# Patient Record
Sex: FEMALE | Race: OTHER | Employment: PART TIME | URBAN - METROPOLITAN AREA
[De-identification: names, ages, dates, MRNs, and addresses within clinical notes are randomized per-mention and may not be internally consistent; named-entity substitution may affect disease eponyms.]

---

## 2017-01-06 ENCOUNTER — OFFICE VISIT (OUTPATIENT)
Dept: ORTHOPEDIC SURGERY | Age: 37
End: 2017-01-06

## 2017-01-06 VITALS
SYSTOLIC BLOOD PRESSURE: 115 MMHG | HEART RATE: 76 BPM | WEIGHT: 182.1 LBS | DIASTOLIC BLOOD PRESSURE: 75 MMHG | HEIGHT: 61 IN | BODY MASS INDEX: 34.38 KG/M2

## 2017-01-06 DIAGNOSIS — M25.561 CHRONIC PAIN OF BOTH KNEES: Primary | ICD-10-CM

## 2017-01-06 DIAGNOSIS — G89.29 CHRONIC PAIN OF BOTH KNEES: Primary | ICD-10-CM

## 2017-01-06 DIAGNOSIS — M25.562 CHRONIC PAIN OF BOTH KNEES: Primary | ICD-10-CM

## 2017-01-06 PROCEDURE — 73562 X-RAY EXAM OF KNEE 3: CPT | Performed by: ORTHOPAEDIC SURGERY

## 2017-01-06 PROCEDURE — 99202 OFFICE O/P NEW SF 15 MIN: CPT | Performed by: ORTHOPAEDIC SURGERY

## 2017-03-04 DIAGNOSIS — M79.641 BILATERAL HAND PAIN: ICD-10-CM

## 2017-03-04 DIAGNOSIS — M79.642 BILATERAL HAND PAIN: ICD-10-CM

## 2017-03-06 RX ORDER — IBUPROFEN 600 MG/1
TABLET ORAL
Qty: 120 TABLET | Refills: 0 | Status: SHIPPED | OUTPATIENT
Start: 2017-03-06 | End: 2017-04-10 | Stop reason: ALTCHOICE

## 2017-04-10 ENCOUNTER — TELEPHONE (OUTPATIENT)
Dept: FAMILY MEDICINE CLINIC | Age: 37
End: 2017-04-10

## 2017-04-10 ENCOUNTER — OFFICE VISIT (OUTPATIENT)
Dept: FAMILY MEDICINE CLINIC | Age: 37
End: 2017-04-10

## 2017-04-10 VITALS
TEMPERATURE: 97.9 F | RESPIRATION RATE: 18 BRPM | SYSTOLIC BLOOD PRESSURE: 104 MMHG | HEART RATE: 70 BPM | WEIGHT: 180 LBS | OXYGEN SATURATION: 97 % | BODY MASS INDEX: 33.98 KG/M2 | DIASTOLIC BLOOD PRESSURE: 68 MMHG

## 2017-04-10 DIAGNOSIS — R05.9 COUGH: ICD-10-CM

## 2017-04-10 DIAGNOSIS — H10.023 OTHER MUCOPURULENT CONJUNCTIVITIS OF BOTH EYES: Primary | ICD-10-CM

## 2017-04-10 DIAGNOSIS — J06.9 UPPER RESPIRATORY TRACT INFECTION, UNSPECIFIED TYPE: ICD-10-CM

## 2017-04-10 PROBLEM — H10.9 CONJUNCTIVITIS OF BOTH EYES: Status: ACTIVE | Noted: 2017-04-10

## 2017-04-10 PROCEDURE — 99213 OFFICE O/P EST LOW 20 MIN: CPT | Performed by: NURSE PRACTITIONER

## 2017-04-10 RX ORDER — CIPROFLOXACIN HYDROCHLORIDE 3.5 MG/ML
SOLUTION/ DROPS TOPICAL
Qty: 10 ML | Refills: 0 | Status: SHIPPED | OUTPATIENT
Start: 2017-04-10 | End: 2017-07-14 | Stop reason: ALTCHOICE

## 2017-04-10 RX ORDER — BENZONATATE 100 MG/1
100 CAPSULE ORAL 3 TIMES DAILY PRN
Qty: 24 CAPSULE | Refills: 0 | Status: SHIPPED | OUTPATIENT
Start: 2017-04-10 | End: 2017-07-14 | Stop reason: ALTCHOICE

## 2017-04-10 RX ORDER — AZITHROMYCIN 250 MG/1
TABLET, FILM COATED ORAL
Qty: 1 PACKET | Refills: 0 | Status: SHIPPED | OUTPATIENT
Start: 2017-04-10 | End: 2017-04-20

## 2017-04-10 ASSESSMENT — ENCOUNTER SYMPTOMS
SORE THROAT: 1
NAUSEA: 0
ORTHOPNEA: 0
EYE REDNESS: 1
RHINORRHEA: 0
EYE ITCHING: 1
VOMITING: 0
DOUBLE VISION: 0
COUGH: 1
PHOTOPHOBIA: 1
STRIDOR: 0
SWOLLEN GLANDS: 0
DIARRHEA: 0
EYE PAIN: 0
GASTROINTESTINAL NEGATIVE: 1
EYE DISCHARGE: 0
HEARTBURN: 0
ABDOMINAL PAIN: 0
WHEEZING: 0
CONSTIPATION: 0

## 2017-04-11 RX ORDER — SYRINGE AND NEEDLE,INSULIN,1ML 29 G X1/2"
SYRINGE, EMPTY DISPOSABLE MISCELLANEOUS
Qty: 100 EACH | Refills: 5 | Status: SHIPPED | OUTPATIENT
Start: 2017-04-11 | End: 2019-02-28

## 2017-05-15 ENCOUNTER — TELEPHONE (OUTPATIENT)
Dept: OBGYN CLINIC | Age: 37
End: 2017-05-15

## 2017-05-17 ENCOUNTER — OFFICE VISIT (OUTPATIENT)
Dept: OBGYN CLINIC | Age: 37
End: 2017-05-17

## 2017-05-17 VITALS
WEIGHT: 174 LBS | SYSTOLIC BLOOD PRESSURE: 99 MMHG | HEART RATE: 87 BPM | BODY MASS INDEX: 32.85 KG/M2 | DIASTOLIC BLOOD PRESSURE: 68 MMHG | TEMPERATURE: 97.2 F

## 2017-05-17 DIAGNOSIS — Z11.3 SCREEN FOR STD (SEXUALLY TRANSMITTED DISEASE): ICD-10-CM

## 2017-05-17 DIAGNOSIS — Z01.419 NORMAL GYNECOLOGIC EXAMINATION: Primary | ICD-10-CM

## 2017-05-17 PROCEDURE — 99395 PREV VISIT EST AGE 18-39: CPT | Performed by: OBSTETRICS & GYNECOLOGY

## 2017-05-18 ENCOUNTER — TELEPHONE (OUTPATIENT)
Dept: OBGYN CLINIC | Age: 37
End: 2017-05-18

## 2017-05-18 DIAGNOSIS — B96.89 BACTERIAL VAGINOSIS: Primary | ICD-10-CM

## 2017-05-18 DIAGNOSIS — N76.0 BACTERIAL VAGINOSIS: Primary | ICD-10-CM

## 2017-05-18 RX ORDER — METRONIDAZOLE 500 MG/1
500 TABLET ORAL 2 TIMES DAILY
Qty: 14 TABLET | Refills: 0 | Status: SHIPPED | OUTPATIENT
Start: 2017-05-18 | End: 2017-05-25

## 2017-05-19 LAB
HPV COMMENT: NORMAL
HPV TYPE 16: NOT DETECTED
HPV TYPE 18: NOT DETECTED
HPVOH (OTHER TYPES): NOT DETECTED

## 2017-05-22 ENCOUNTER — TELEPHONE (OUTPATIENT)
Dept: OBGYN CLINIC | Age: 37
End: 2017-05-22

## 2017-05-22 LAB
C. TRACHOMATIS DNA,THIN PREP: NEGATIVE
N. GONORRHOEAE DNA, THIN PREP: NEGATIVE

## 2017-06-01 ENCOUNTER — TELEPHONE (OUTPATIENT)
Dept: PERINATAL CARE | Age: 37
End: 2017-06-01

## 2017-06-23 ENCOUNTER — OFFICE VISIT (OUTPATIENT)
Dept: FAMILY MEDICINE CLINIC | Age: 37
End: 2017-06-23

## 2017-06-23 VITALS
BODY MASS INDEX: 31.53 KG/M2 | HEART RATE: 118 BPM | DIASTOLIC BLOOD PRESSURE: 80 MMHG | TEMPERATURE: 99.1 F | WEIGHT: 167 LBS | SYSTOLIC BLOOD PRESSURE: 116 MMHG | OXYGEN SATURATION: 97 % | HEIGHT: 61 IN

## 2017-06-23 DIAGNOSIS — Z20.818 STREP THROAT EXPOSURE: ICD-10-CM

## 2017-06-23 DIAGNOSIS — J02.0 STREP PHARYNGITIS: ICD-10-CM

## 2017-06-23 LAB — S PYO AG THROAT QL: POSITIVE

## 2017-06-23 PROCEDURE — 99213 OFFICE O/P EST LOW 20 MIN: CPT | Performed by: NURSE PRACTITIONER

## 2017-06-23 PROCEDURE — 87880 STREP A ASSAY W/OPTIC: CPT | Performed by: NURSE PRACTITIONER

## 2017-06-23 RX ORDER — AMOXICILLIN 500 MG/1
500 CAPSULE ORAL 2 TIMES DAILY
Qty: 20 CAPSULE | Refills: 0 | Status: SHIPPED | OUTPATIENT
Start: 2017-06-23 | End: 2017-07-03

## 2017-06-23 ASSESSMENT — ENCOUNTER SYMPTOMS
VOMITING: 0
COUGH: 1
ABDOMINAL PAIN: 0
WHEEZING: 0
SHORTNESS OF BREATH: 0
DIARRHEA: 0
SORE THROAT: 1
NAUSEA: 0
TROUBLE SWALLOWING: 0
CHEST TIGHTNESS: 0

## 2017-07-10 RX ORDER — IBUPROFEN 800 MG/1
800 TABLET ORAL EVERY 8 HOURS PRN
Qty: 20 TABLET | Refills: 0 | Status: SHIPPED | OUTPATIENT
Start: 2017-07-10 | End: 2017-08-09 | Stop reason: SDUPTHER

## 2017-07-21 ENCOUNTER — OFFICE VISIT (OUTPATIENT)
Dept: FAMILY MEDICINE CLINIC | Age: 37
End: 2017-07-21

## 2017-07-21 VITALS
DIASTOLIC BLOOD PRESSURE: 60 MMHG | BODY MASS INDEX: 32.47 KG/M2 | SYSTOLIC BLOOD PRESSURE: 112 MMHG | HEIGHT: 61 IN | WEIGHT: 172 LBS | HEART RATE: 77 BPM | OXYGEN SATURATION: 99 %

## 2017-07-21 DIAGNOSIS — E11.8 TYPE 2 DIABETES MELLITUS WITH COMPLICATION, UNSPECIFIED LONG TERM INSULIN USE STATUS: Primary | ICD-10-CM

## 2017-07-21 LAB — HBA1C MFR BLD: 11.3 %

## 2017-07-21 PROCEDURE — 99213 OFFICE O/P EST LOW 20 MIN: CPT | Performed by: FAMILY MEDICINE

## 2017-07-21 PROCEDURE — 83036 HEMOGLOBIN GLYCOSYLATED A1C: CPT | Performed by: FAMILY MEDICINE

## 2017-07-21 ASSESSMENT — PATIENT HEALTH QUESTIONNAIRE - PHQ9
SUM OF ALL RESPONSES TO PHQ9 QUESTIONS 1 & 2: 0
SUM OF ALL RESPONSES TO PHQ QUESTIONS 1-9: 0
1. LITTLE INTEREST OR PLEASURE IN DOING THINGS: 0
2. FEELING DOWN, DEPRESSED OR HOPELESS: 0

## 2017-07-21 ASSESSMENT — ENCOUNTER SYMPTOMS
RESPIRATORY NEGATIVE: 1
GASTROINTESTINAL NEGATIVE: 1

## 2017-09-21 ENCOUNTER — OFFICE VISIT (OUTPATIENT)
Dept: FAMILY MEDICINE CLINIC | Age: 37
End: 2017-09-21

## 2017-09-21 VITALS
RESPIRATION RATE: 15 BRPM | TEMPERATURE: 98 F | BODY MASS INDEX: 33.83 KG/M2 | HEART RATE: 87 BPM | WEIGHT: 179.2 LBS | SYSTOLIC BLOOD PRESSURE: 108 MMHG | OXYGEN SATURATION: 98 % | HEIGHT: 61 IN | DIASTOLIC BLOOD PRESSURE: 68 MMHG

## 2017-09-21 DIAGNOSIS — E11.8 TYPE 2 DIABETES MELLITUS WITH COMPLICATION, UNSPECIFIED LONG TERM INSULIN USE STATUS: Primary | ICD-10-CM

## 2017-09-21 DIAGNOSIS — E66.9 OBESITY (BMI 30.0-34.9): ICD-10-CM

## 2017-09-21 PROBLEM — E66.811 OBESITY (BMI 30.0-34.9): Status: ACTIVE | Noted: 2017-09-21

## 2017-09-21 LAB — HBA1C MFR BLD: 7.3 %

## 2017-09-21 PROCEDURE — 99214 OFFICE O/P EST MOD 30 MIN: CPT | Performed by: FAMILY MEDICINE

## 2017-09-21 PROCEDURE — 83036 HEMOGLOBIN GLYCOSYLATED A1C: CPT | Performed by: FAMILY MEDICINE

## 2017-09-21 ASSESSMENT — ENCOUNTER SYMPTOMS
RESPIRATORY NEGATIVE: 1
GASTROINTESTINAL NEGATIVE: 1

## 2017-10-30 ENCOUNTER — OFFICE VISIT (OUTPATIENT)
Dept: FAMILY MEDICINE CLINIC | Age: 37
End: 2017-10-30

## 2017-10-30 VITALS
WEIGHT: 175 LBS | DIASTOLIC BLOOD PRESSURE: 76 MMHG | TEMPERATURE: 97.8 F | HEART RATE: 84 BPM | BODY MASS INDEX: 33.04 KG/M2 | OXYGEN SATURATION: 98 % | SYSTOLIC BLOOD PRESSURE: 120 MMHG

## 2017-10-30 DIAGNOSIS — M79.10 MYALGIA: ICD-10-CM

## 2017-10-30 DIAGNOSIS — R20.2 PARESTHESIA OF BOTH LOWER EXTREMITIES: ICD-10-CM

## 2017-10-30 DIAGNOSIS — R53.83 FATIGUE, UNSPECIFIED TYPE: ICD-10-CM

## 2017-10-30 DIAGNOSIS — R51.9 RIGHT TEMPORAL HEADACHE: ICD-10-CM

## 2017-10-30 PROCEDURE — 99214 OFFICE O/P EST MOD 30 MIN: CPT | Performed by: NURSE PRACTITIONER

## 2017-10-30 PROCEDURE — 3045F PR MOST RECENT HEMOGLOBIN A1C LEVEL 7.0-9.0%: CPT | Performed by: NURSE PRACTITIONER

## 2017-10-30 PROCEDURE — G8427 DOCREV CUR MEDS BY ELIG CLIN: HCPCS | Performed by: NURSE PRACTITIONER

## 2017-10-30 PROCEDURE — G8484 FLU IMMUNIZE NO ADMIN: HCPCS | Performed by: NURSE PRACTITIONER

## 2017-10-30 PROCEDURE — G8417 CALC BMI ABV UP PARAM F/U: HCPCS | Performed by: NURSE PRACTITIONER

## 2017-10-30 PROCEDURE — 1036F TOBACCO NON-USER: CPT | Performed by: NURSE PRACTITIONER

## 2017-10-30 PROCEDURE — 36415 COLL VENOUS BLD VENIPUNCTURE: CPT | Performed by: NURSE PRACTITIONER

## 2017-10-30 RX ORDER — MELOXICAM 7.5 MG/1
7.5 TABLET ORAL DAILY
COMMUNITY
End: 2018-07-16
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
## 2017-10-30 NOTE — LETTER
[facility]  [address]  Phone: [phone]  Fax: [phone]    Duke Bell NP        October 30, 2017     Patient: Kendra Keen   YOB: 1980   Date of Visit: 10/30/2017       To Whom it May Concern:    Kendra Keen was seen in my clinic on 10/30/2017. If you have any questions or concerns, please don't hesitate to call.     Sincerely,         Duke Bell NP

## 2017-10-31 LAB
A/G RATIO: 1.3 (ref 1.1–2.2)
ALBUMIN SERPL-MCNC: 4.2 G/DL (ref 3.4–5)
ALP BLD-CCNC: 164 U/L (ref 40–129)
ALT SERPL-CCNC: 13 U/L (ref 10–40)
ANA INTERPRETATION: NORMAL
ANION GAP SERPL CALCULATED.3IONS-SCNC: 14 MMOL/L (ref 3–16)
ANTI-NUCLEAR ANTIBODY (ANA): NEGATIVE
AST SERPL-CCNC: 12 U/L (ref 15–37)
BASOPHILS ABSOLUTE: 0 K/UL (ref 0–0.2)
BASOPHILS RELATIVE PERCENT: 0.5 %
BILIRUB SERPL-MCNC: 0.3 MG/DL (ref 0–1)
BUN BLDV-MCNC: 15 MG/DL (ref 7–20)
CALCIUM SERPL-MCNC: 9.5 MG/DL (ref 8.3–10.6)
CHLORIDE BLD-SCNC: 103 MMOL/L (ref 99–110)
CO2: 26 MMOL/L (ref 21–32)
CREAT SERPL-MCNC: <0.5 MG/DL (ref 0.6–1.1)
EOSINOPHILS ABSOLUTE: 0.1 K/UL (ref 0–0.6)
EOSINOPHILS RELATIVE PERCENT: 1.8 %
GFR AFRICAN AMERICAN: >60
GFR NON-AFRICAN AMERICAN: >60
GLOBULIN: 3.3 G/DL
GLUCOSE BLD-MCNC: 235 MG/DL (ref 70–99)
HCT VFR BLD CALC: 40.2 % (ref 36–48)
HEMOGLOBIN: 14 G/DL (ref 12–16)
LYMPHOCYTES ABSOLUTE: 3 K/UL (ref 1–5.1)
LYMPHOCYTES RELATIVE PERCENT: 43.5 %
MCH RBC QN AUTO: 31.5 PG (ref 26–34)
MCHC RBC AUTO-ENTMCNC: 34.9 G/DL (ref 31–36)
MCV RBC AUTO: 90.2 FL (ref 80–100)
MONOCYTES ABSOLUTE: 0.5 K/UL (ref 0–1.3)
MONOCYTES RELATIVE PERCENT: 7.3 %
NEUTROPHILS ABSOLUTE: 3.2 K/UL (ref 1.7–7.7)
NEUTROPHILS RELATIVE PERCENT: 46.9 %
PDW BLD-RTO: 12.9 % (ref 12.4–15.4)
PLATELET # BLD: 273 K/UL (ref 135–450)
PMV BLD AUTO: 9.9 FL (ref 5–10.5)
POTASSIUM SERPL-SCNC: 4.1 MMOL/L (ref 3.5–5.1)
RBC # BLD: 4.45 M/UL (ref 4–5.2)
SEDIMENTATION RATE, ERYTHROCYTE: 21 MM/HR (ref 0–20)
SODIUM BLD-SCNC: 143 MMOL/L (ref 136–145)
TOTAL CK: 115 U/L (ref 26–192)
TOTAL PROTEIN: 7.5 G/DL (ref 6.4–8.2)
TSH REFLEX: 0.97 UIU/ML (ref 0.27–4.2)
WBC # BLD: 6.9 K/UL (ref 4–11)

## 2017-10-31 RX ORDER — GABAPENTIN 300 MG/1
300 CAPSULE ORAL NIGHTLY PRN
Qty: 30 CAPSULE | Refills: 0 | Status: SHIPPED | OUTPATIENT
Start: 2017-10-31 | End: 2017-12-15 | Stop reason: SDUPTHER

## 2017-11-08 ASSESSMENT — ENCOUNTER SYMPTOMS
RESPIRATORY NEGATIVE: 1
VOMITING: 0
DIARRHEA: 0
ABDOMINAL PAIN: 0
COLOR CHANGE: 0
NAUSEA: 0
SINUS PAIN: 0

## 2017-11-27 ENCOUNTER — OFFICE VISIT (OUTPATIENT)
Dept: FAMILY MEDICINE CLINIC | Age: 37
End: 2017-11-27

## 2017-11-27 VITALS
WEIGHT: 178 LBS | HEART RATE: 92 BPM | TEMPERATURE: 97.9 F | DIASTOLIC BLOOD PRESSURE: 62 MMHG | HEIGHT: 61 IN | OXYGEN SATURATION: 97 % | SYSTOLIC BLOOD PRESSURE: 100 MMHG | BODY MASS INDEX: 33.61 KG/M2

## 2017-11-27 DIAGNOSIS — Z23 FLU VACCINE NEED: ICD-10-CM

## 2017-11-27 DIAGNOSIS — K59.03 DRUG-INDUCED CONSTIPATION: ICD-10-CM

## 2017-11-27 DIAGNOSIS — M54.2 CERVICAL PAIN (NECK): Primary | ICD-10-CM

## 2017-11-27 DIAGNOSIS — E11.9 TYPE 2 DIABETES MELLITUS WITHOUT COMPLICATION, WITHOUT LONG-TERM CURRENT USE OF INSULIN (HCC): ICD-10-CM

## 2017-11-27 PROCEDURE — 90471 IMMUNIZATION ADMIN: CPT | Performed by: NURSE PRACTITIONER

## 2017-11-27 PROCEDURE — 90686 IIV4 VACC NO PRSV 0.5 ML IM: CPT | Performed by: NURSE PRACTITIONER

## 2017-11-27 PROCEDURE — 99213 OFFICE O/P EST LOW 20 MIN: CPT | Performed by: NURSE PRACTITIONER

## 2017-11-27 RX ORDER — IBUPROFEN 800 MG/1
TABLET ORAL
Qty: 30 TABLET | Refills: 1 | Status: CANCELLED | OUTPATIENT
Start: 2017-11-27

## 2017-11-27 RX ORDER — METHOCARBAMOL 500 MG/1
500 TABLET, FILM COATED ORAL 3 TIMES DAILY
Qty: 30 TABLET | Refills: 0 | Status: SHIPPED | OUTPATIENT
Start: 2017-11-27 | End: 2019-01-07 | Stop reason: SDUPTHER

## 2017-11-27 RX ORDER — MELOXICAM 7.5 MG/1
7.5 TABLET ORAL DAILY
Qty: 30 TABLET | Status: CANCELLED | OUTPATIENT
Start: 2017-11-27

## 2017-11-28 ENCOUNTER — TELEPHONE (OUTPATIENT)
Dept: FAMILY MEDICINE CLINIC | Age: 37
End: 2017-11-28

## 2017-11-28 PROBLEM — H10.9 CONJUNCTIVITIS OF BOTH EYES: Status: RESOLVED | Noted: 2017-04-10 | Resolved: 2017-11-28

## 2017-11-28 ASSESSMENT — ENCOUNTER SYMPTOMS
VOMITING: 0
SHORTNESS OF BREATH: 0
CHEST TIGHTNESS: 0
CONSTIPATION: 1
NAUSEA: 0
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
## 2017-12-15 ENCOUNTER — OFFICE VISIT (OUTPATIENT)
Dept: FAMILY MEDICINE CLINIC | Age: 37
End: 2017-12-15

## 2017-12-15 VITALS
HEIGHT: 61 IN | WEIGHT: 181 LBS | BODY MASS INDEX: 34.17 KG/M2 | HEART RATE: 90 BPM | SYSTOLIC BLOOD PRESSURE: 110 MMHG | OXYGEN SATURATION: 98 % | TEMPERATURE: 98.5 F | RESPIRATION RATE: 16 BRPM | DIASTOLIC BLOOD PRESSURE: 72 MMHG

## 2017-12-15 DIAGNOSIS — F41.9 ANXIETY: ICD-10-CM

## 2017-12-15 DIAGNOSIS — M54.2 CERVICAL PAIN (NECK): Primary | ICD-10-CM

## 2017-12-15 DIAGNOSIS — F33.1 MODERATE EPISODE OF RECURRENT MAJOR DEPRESSIVE DISORDER (HCC): ICD-10-CM

## 2017-12-15 DIAGNOSIS — E11.9 TYPE 2 DIABETES MELLITUS WITHOUT COMPLICATION, WITHOUT LONG-TERM CURRENT USE OF INSULIN (HCC): ICD-10-CM

## 2017-12-15 LAB
CREATININE URINE POCT: 200
MICROALBUMIN/CREAT 24H UR: 30 MG/G{CREAT}
MICROALBUMIN/CREAT UR-RTO: <30

## 2017-12-15 PROCEDURE — 99213 OFFICE O/P EST LOW 20 MIN: CPT | Performed by: NURSE PRACTITIONER

## 2017-12-15 PROCEDURE — G8484 FLU IMMUNIZE NO ADMIN: HCPCS | Performed by: NURSE PRACTITIONER

## 2017-12-15 PROCEDURE — G8417 CALC BMI ABV UP PARAM F/U: HCPCS | Performed by: NURSE PRACTITIONER

## 2017-12-15 PROCEDURE — 82044 UR ALBUMIN SEMIQUANTITATIVE: CPT | Performed by: NURSE PRACTITIONER

## 2017-12-15 PROCEDURE — 1036F TOBACCO NON-USER: CPT | Performed by: NURSE PRACTITIONER

## 2017-12-15 PROCEDURE — 3045F PR MOST RECENT HEMOGLOBIN A1C LEVEL 7.0-9.0%: CPT | Performed by: NURSE PRACTITIONER

## 2017-12-15 PROCEDURE — G8427 DOCREV CUR MEDS BY ELIG CLIN: HCPCS | Performed by: NURSE PRACTITIONER

## 2017-12-15 RX ORDER — IBUPROFEN 800 MG/1
TABLET ORAL
Qty: 40 TABLET | Refills: 0 | Status: SHIPPED | OUTPATIENT
Start: 2017-12-15 | End: 2018-07-16

## 2017-12-15 RX ORDER — GABAPENTIN 300 MG/1
300 CAPSULE ORAL 2 TIMES DAILY
Qty: 60 CAPSULE | Refills: 0 | Status: SHIPPED | OUTPATIENT
Start: 2017-12-15 | End: 2019-02-28

## 2017-12-15 RX ORDER — ESCITALOPRAM OXALATE 10 MG/1
10 TABLET ORAL DAILY
Qty: 30 TABLET | Refills: 3 | Status: SHIPPED | OUTPATIENT
Start: 2017-12-15 | End: 2018-02-27

## 2017-12-15 ASSESSMENT — ENCOUNTER SYMPTOMS
BACK PAIN: 0
CHEST TIGHTNESS: 0

## 2017-12-15 NOTE — PATIENT INSTRUCTIONS
1. Stop meloxicam    2. Start advil 800mg taking 1 tablet 3 times daily for the next 2 weeks, with food    3. Apply ice pack 4 times daily for 15-20 minutes. Wrap in towel, etc to avoid the coldness directly to the skin. 4. Will increase neurontin to 300mg 2 times daily    5. Joseph Later was seen today for neck pain and constipation. Diagnoses and all orders for this visit:    Cervical pain (neck)  -     ibuprofen (ADVIL;MOTRIN) 800 MG tablet; TAKE ONE TABLET BY MOUTH EVERY 8 HOURS AS NEEDED FOR PAIN OR  FEVER  -     gabapentin (NEURONTIN) 300 MG capsule; Take 1 capsule by mouth 2 times daily    Anxiety  -     escitalopram (LEXAPRO) 10 MG tablet; Take 1 tablet by mouth daily    Moderate episode of recurrent major depressive disorder (HCC)  -     escitalopram (LEXAPRO) 10 MG tablet;  Take 1 tablet by mouth daily
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
## 2018-01-12 ENCOUNTER — OFFICE VISIT (OUTPATIENT)
Dept: FAMILY MEDICINE CLINIC | Age: 38
End: 2018-01-12

## 2018-01-12 VITALS
BODY MASS INDEX: 33.61 KG/M2 | DIASTOLIC BLOOD PRESSURE: 72 MMHG | HEART RATE: 77 BPM | SYSTOLIC BLOOD PRESSURE: 115 MMHG | HEIGHT: 61 IN | OXYGEN SATURATION: 98 % | WEIGHT: 178 LBS

## 2018-01-12 DIAGNOSIS — F33.1 MODERATE EPISODE OF RECURRENT MAJOR DEPRESSIVE DISORDER (HCC): ICD-10-CM

## 2018-01-12 DIAGNOSIS — E11.9 TYPE 2 DIABETES MELLITUS WITHOUT COMPLICATION, WITHOUT LONG-TERM CURRENT USE OF INSULIN (HCC): Primary | ICD-10-CM

## 2018-01-12 LAB — HBA1C MFR BLD: 7.1 %

## 2018-01-12 PROCEDURE — 99214 OFFICE O/P EST MOD 30 MIN: CPT | Performed by: FAMILY MEDICINE

## 2018-01-12 PROCEDURE — 83036 HEMOGLOBIN GLYCOSYLATED A1C: CPT | Performed by: FAMILY MEDICINE

## 2018-01-12 RX ORDER — TRAZODONE HYDROCHLORIDE 50 MG/1
50 TABLET ORAL NIGHTLY
Qty: 30 TABLET | Refills: 0 | Status: SHIPPED | OUTPATIENT
Start: 2018-01-12 | End: 2018-01-31

## 2018-01-12 ASSESSMENT — ENCOUNTER SYMPTOMS
RESPIRATORY NEGATIVE: 1
BACK PAIN: 1

## 2018-01-12 NOTE — TELEPHONE ENCOUNTER
Last office visit 12/15/2017     Last written 07/14/2017     Next office visit scheduled 1/12/2018    Requested Prescriptions     Pending Prescriptions Disp Refills    insulin aspart (NOVOLOG FLEXPEN) 100 UNIT/ML injection pen 5 pen 3     Sig: Inject 20 Units into the skin 3 times daily (before meals)

## 2018-01-12 NOTE — PROGRESS NOTES
Subjective:      Patient ID: Agnes Devine is a 44 y.o. female. In for check on DM(out of insulin x 1 mo)-Depress(doing OK)    Prior to Visit Medications :  Medication insulin aspart (NOVOLOG FLEXPEN) 100 UNIT/ML injection pen, Sig Inject 20 Units into the skin 3 times daily (before meals), Taking? Yes, Authorizing Provider Blake Krueger, DO    Medication ibuprofen (ADVIL;MOTRIN) 800 MG tablet, Sig TAKE ONE TABLET BY MOUTH EVERY 8 HOURS AS NEEDED FOR PAIN OR  FEVER, Taking? Yes, Authorizing Provider Darin Dominguez CNP    Medication gabapentin (NEURONTIN) 300 MG capsule, Sig Take 1 capsule by mouth 2 times daily, Taking? Yes, Authorizing Provider Darin Dominguez CNP    Medication escitalopram (LEXAPRO) 10 MG tablet, Sig Take 1 tablet by mouth daily, Taking? Yes, Authorizing Provider Darin Dominguez CNP    Medication meloxicam (MOBIC) 7.5 MG tablet, Sig Take 7.5 mg by mouth daily, Taking? Yes, Authorizing Provider Remy Sheppard MD    Medication TOUJEO SOLOSTAR 300 UNIT/ML injection pen, Sig INJECT 60 UNITS SUBCUTANEOUSLY ONCE DAILY  Patient taking differently: INJECT 40 UNITS SUBCUTANEOUSLY ONCE DAILY, Taking? Yes, Authorizing Provider Blake Krueger, DO    Medication Liraglutide (VICTOZA) 18 MG/3ML SOPN SC injection, Sig Inject 1.8 mg into the skin daily, Taking? Yes, Authorizing Provider Blake Krueger, DO    Medication conjugated estrogens (PREMARIN) 0.625 MG/GM vaginal cream, Sig Place vaginally daily. , Taking? Yes, Authorizing Provider Jose uRdd MD    Medication metFORMIN (GLUCOPHAGE) 1000 MG tablet, Sig TAKE ONE TABLET BY MOUTH TWICE DAILY WITH MEALS, Taking? Yes, Authorizing Provider Blake Krueger, DO    Medication RELION INSULIN SYR 1CC/30G 30G X 5/16\" 1 ML MISC, Sig USE ONE  THREE TIMES DAILY, Taking?  Yes, Authorizing Provider Blake Krueger, DO    Medication albuterol sulfate HFA (PROAIR HFA) 108 (90 BASE) MCG/ACT inhaler, Sig Inhale 2 puffs into the lungs every 6 hours as needed for Wheezing, Taking? Yes, Authorizing Provider Moses Goode MD    Medication Lancets MISC, Sig DX: E 11.9-Uses insulin. FSBS 3 times daily-Delica lancets for one touch ultra II, Taking? Yes, Authorizing Provider Evelyn Krueger, DO    Medication glucose monitoring kit (FREESTYLE) monitoring kit, Sig Dx E11.9-One touch ultra II meter-uses tid, Taking? Yes, Authorizing Provider Blake Krueger, DO    Medication glucose blood VI test strips (ASCENSIA AUTODISC VI;ONE TOUCH ULTRA TEST VI) strip, Sig DX: E11.9-One touch ultra strips. FSBS 3 times daily, Taking? Yes, Authorizing Provider Blake Krueger, DO    Medication Insulin Pen Needle (B-D UF III MINI PEN NEEDLES) 31G X 5 MM MISC, Sig Inject 1 each into the skin 4 times daily, Taking? Yes, Authorizing Provider Pedro Luis Roach CNP      Past Medical History:  5/15/2013: Abdominal pain, acute, right lower quadrant  No date: Anxiety  No date: Arthritis      Comment: Left Knee  8/13/2013: Bilateral ovarian cysts  No date: Blood transfusion reaction  1/29/2014: Cellulitis and abscess of trunk      Comment: Pt was seen in ED today for bleeding from                incision after taking a fall this morning. 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 Respiratory: Negative. Cardiovascular: Negative. Genitourinary: Negative. Musculoskeletal: Positive for back pain. Neurological: Negative.     Psychiatric/Behavioral: The patient is nervous/anxious. Objective:   Physical Exam   Cardiovascular: Normal rate, regular rhythm and normal heart sounds. Pulmonary/Chest: Effort normal and breath sounds normal.   Abdominal: Soft. She exhibits no distension and no mass. There is no tenderness. Musculoskeletal: She exhibits no edema. Neurological: She is alert. Psychiatric: She has a normal mood and affect. Her behavior is normal. Judgment and thought content normal.       Assessment:      1. Type 2 diabetes mellitus without complication, without long-term current use of insulin ([facility] [address]    2. Moderate episode of recurrent major depressive disorder [facility])            Plan:      Jayleen Castro was seen today for anxiety, depression, back pain and diabetes.     Diagnoses and all orders for this visit:    Type 2 diabetes mellitus without complication, without long-term current use of insulin (HCC)  -     POCT glycosylated hemoglobin (Hb A1C)  AIC 7.1-continue meds-lower carbs  Moderate episode of recurrent major depressive disorder (HCC)  Continue meds-keep active    Rx Traz 50 hs  See me 4 mos

## 2018-01-29 ENCOUNTER — PATIENT MESSAGE (OUTPATIENT)
Dept: FAMILY MEDICINE CLINIC | Age: 38
End: 2018-01-29

## 2018-01-31 RX ORDER — HYDROXYZINE HYDROCHLORIDE 25 MG/1
TABLET, FILM COATED ORAL
Qty: 30 TABLET | Refills: 0 | Status: SHIPPED | OUTPATIENT
Start: 2018-01-31 | End: 2018-04-20

## 2018-02-16 ENCOUNTER — TELEPHONE (OUTPATIENT)
Dept: FAMILY MEDICINE CLINIC | Age: 38
End: 2018-02-16

## 2018-02-16 ENCOUNTER — OFFICE VISIT (OUTPATIENT)
Dept: FAMILY MEDICINE CLINIC | Age: 38
End: 2018-02-16

## 2018-02-16 VITALS
BODY MASS INDEX: 34.78 KG/M2 | OXYGEN SATURATION: 99 % | TEMPERATURE: 97.8 F | HEART RATE: 84 BPM | HEIGHT: 61 IN | SYSTOLIC BLOOD PRESSURE: 108 MMHG | RESPIRATION RATE: 16 BRPM | DIASTOLIC BLOOD PRESSURE: 68 MMHG | WEIGHT: 184.2 LBS

## 2018-02-16 DIAGNOSIS — B96.89 ACUTE BACTERIAL SINUSITIS: ICD-10-CM

## 2018-02-16 DIAGNOSIS — R05.9 COUGH: ICD-10-CM

## 2018-02-16 DIAGNOSIS — J01.90 ACUTE BACTERIAL SINUSITIS: ICD-10-CM

## 2018-02-16 DIAGNOSIS — J02.9 ACUTE PHARYNGITIS, UNSPECIFIED ETIOLOGY: Primary | ICD-10-CM

## 2018-02-16 LAB — S PYO AG THROAT QL: NORMAL

## 2018-02-16 PROCEDURE — 99213 OFFICE O/P EST LOW 20 MIN: CPT | Performed by: NURSE PRACTITIONER

## 2018-02-16 PROCEDURE — 87880 STREP A ASSAY W/OPTIC: CPT | Performed by: NURSE PRACTITIONER

## 2018-02-16 RX ORDER — AZITHROMYCIN 250 MG/1
TABLET, FILM COATED ORAL
Qty: 6 TABLET | Refills: 0 | Status: SHIPPED | OUTPATIENT
Start: 2018-02-16 | End: 2018-02-26

## 2018-02-16 ASSESSMENT — ENCOUNTER SYMPTOMS
SINUS PRESSURE: 1
SORE THROAT: 1
CHEST TIGHTNESS: 1
CONSTIPATION: 0
NAUSEA: 0
COUGH: 0

## 2018-02-16 NOTE — PROGRESS NOTES
Subjective:      Patient ID: Daniel Nolan is a 44 y.o. female. HPI   Yesterday awoke with tickle in throat. Today sore throat worse. Nasal congestion, chest tight, mild cough. Took motrin 2:30 today. Review of Systems   Constitutional: Positive for chills and fatigue. Negative for appetite change and fever. HENT: Positive for sinus pressure and sore throat. Respiratory: Positive for chest tightness. Negative for cough. Cardiovascular: Negative for chest pain. Gastrointestinal: Negative for constipation and nausea. Musculoskeletal: Positive for myalgias. Negative for arthralgias. Neurological: Negative for headaches. Psychiatric/Behavioral: Negative. Objective:   Physical Exam   Constitutional: She is oriented to person, place, and time. She appears well-developed and well-nourished. No distress. HENT:   Head: Normocephalic and atraumatic. Right Ear: Tympanic membrane and ear canal normal.   Left Ear: Tympanic membrane and ear canal normal.   Nose: Mucosal edema and rhinorrhea present. Mouth/Throat: Uvula is midline. Posterior oropharyngeal erythema present. No oropharyngeal exudate. Neck: Normal range of motion. Neck supple. Cardiovascular: Normal rate, regular rhythm and normal heart sounds. Pulmonary/Chest: Effort normal and breath sounds normal. No respiratory distress. She has no wheezes. Occ dry cough. Abdominal: Soft. Bowel sounds are normal.   Musculoskeletal: Normal range of motion. She exhibits no edema. Lymphadenopathy:     She has no cervical adenopathy. Neurological: She is alert and oriented to person, place, and time. Skin: Skin is warm. Psychiatric: She has a normal mood and affect. Her behavior is normal.       Assessment:      1. Pharyngitis  2. URI  3. cough      Plan:      1. Increase fluids  2. mucinex DM 2 times daily  3. Tylenol or Ibuprofen TID as needed  4. Sandoval Colindres was seen today for uri.     Diagnoses and all orders for this

## 2018-02-27 ENCOUNTER — OFFICE VISIT (OUTPATIENT)
Dept: FAMILY MEDICINE CLINIC | Age: 38
End: 2018-02-27

## 2018-02-27 VITALS
OXYGEN SATURATION: 98 % | HEIGHT: 61 IN | WEIGHT: 181 LBS | SYSTOLIC BLOOD PRESSURE: 100 MMHG | BODY MASS INDEX: 34.17 KG/M2 | HEART RATE: 68 BPM | DIASTOLIC BLOOD PRESSURE: 68 MMHG

## 2018-02-27 DIAGNOSIS — R60.0 LEG EDEMA, LEFT: ICD-10-CM

## 2018-02-27 DIAGNOSIS — F33.1 MODERATE EPISODE OF RECURRENT MAJOR DEPRESSIVE DISORDER (HCC): Primary | ICD-10-CM

## 2018-02-27 PROCEDURE — 99213 OFFICE O/P EST LOW 20 MIN: CPT | Performed by: PHYSICIAN ASSISTANT

## 2018-02-27 RX ORDER — MIRTAZAPINE 15 MG/1
15 TABLET, FILM COATED ORAL NIGHTLY
Qty: 30 TABLET | Refills: 3 | Status: SHIPPED | OUTPATIENT
Start: 2018-02-27 | End: 2018-06-20 | Stop reason: ALTCHOICE

## 2018-02-27 ASSESSMENT — ENCOUNTER SYMPTOMS
COLOR CHANGE: 0
CHEST TIGHTNESS: 0
COUGH: 0

## 2018-03-26 ENCOUNTER — TELEPHONE (OUTPATIENT)
Dept: FAMILY MEDICINE CLINIC | Age: 38
End: 2018-03-26

## 2018-03-29 ENCOUNTER — OFFICE VISIT (OUTPATIENT)
Dept: FAMILY MEDICINE CLINIC | Age: 38
End: 2018-03-29

## 2018-03-29 VITALS
HEIGHT: 61 IN | DIASTOLIC BLOOD PRESSURE: 72 MMHG | OXYGEN SATURATION: 97 % | SYSTOLIC BLOOD PRESSURE: 116 MMHG | BODY MASS INDEX: 34.74 KG/M2 | RESPIRATION RATE: 14 BRPM | WEIGHT: 184 LBS | HEART RATE: 86 BPM

## 2018-03-29 DIAGNOSIS — Z79.4 TYPE 2 DIABETES MELLITUS WITHOUT COMPLICATION, WITH LONG-TERM CURRENT USE OF INSULIN (HCC): ICD-10-CM

## 2018-03-29 DIAGNOSIS — E11.9 TYPE 2 DIABETES MELLITUS WITHOUT COMPLICATION, WITH LONG-TERM CURRENT USE OF INSULIN (HCC): ICD-10-CM

## 2018-03-29 DIAGNOSIS — R20.8 BURNING SENSATION OF FEET: Primary | ICD-10-CM

## 2018-03-29 PROCEDURE — 99213 OFFICE O/P EST LOW 20 MIN: CPT | Performed by: NURSE PRACTITIONER

## 2018-03-29 RX ORDER — NAPROXEN 500 MG/1
500 TABLET ORAL 2 TIMES DAILY PRN
Qty: 28 TABLET | Refills: 0 | Status: SHIPPED | OUTPATIENT
Start: 2018-03-29 | End: 2018-06-20 | Stop reason: ALTCHOICE

## 2018-03-29 ASSESSMENT — PATIENT HEALTH QUESTIONNAIRE - PHQ9
1. LITTLE INTEREST OR PLEASURE IN DOING THINGS: 0
SUM OF ALL RESPONSES TO PHQ9 QUESTIONS 1 & 2: 0
2. FEELING DOWN, DEPRESSED OR HOPELESS: 0
SUM OF ALL RESPONSES TO PHQ QUESTIONS 1-9: 0

## 2018-03-29 NOTE — PROGRESS NOTES
Subjective:      Patient ID: Astrid Severance is a 43 y.o. female. HPI     Increased burning sensation in bilateral feet over the past 10 days. Increases as day progresses. Feels like on fire. Difficulty making it up steps. States Taking gabapentin however OAARS check indicates last filled in December. Taking ibuprofen and melixocam alternating. No known cause for sudden increase in symptoms. When rubs lower arms creates burning sensation. Like hit elbow. Review of Systems   Constitutional: Negative for appetite change, chills and unexpected weight change. Respiratory: Negative for chest tightness. Cardiovascular: Negative for chest pain. Gastrointestinal: Negative for diarrhea and nausea. Musculoskeletal: Negative for arthralgias, back pain and gait problem. Neurological: Positive for numbness. Negative for dizziness and headaches. Psychiatric/Behavioral: Negative. Objective:   Physical Exam   Constitutional: She is oriented to person, place, and time. She appears well-developed and well-nourished. No distress. HENT:   Head: Normocephalic and atraumatic. Neck: Normal range of motion. Neck supple. Cardiovascular: Normal rate, regular rhythm, normal heart sounds and intact distal pulses. Pulmonary/Chest: Effort normal and breath sounds normal. No respiratory distress. Abdominal: Soft. Bowel sounds are normal.   Musculoskeletal: Normal range of motion. She exhibits no edema or deformity. Neurological: She is alert and oriented to person, place, and time. She has normal reflexes. 2=reflexes patellar bilateral and brachial bilateral   Skin: Skin is warm. No erythema. Psychiatric: She has a normal mood and affect.  Her behavior is normal.     Current Outpatient Prescriptions   Medication Sig Dispense Refill    naproxen (NAPROSYN) 500 MG tablet Take 1 tablet by mouth 2 times daily as needed for Pain 28 tablet 0    mirtazapine (REMERON) 15 MG tablet Take 1 tablet by mouth nightly 30 tablet 3    hydrOXYzine (ATARAX) 25 MG tablet 1 or 2 hs prn sleep 30 tablet 0    Liraglutide (VICTOZA) 18 MG/3ML SOPN SC injection Inject 1.8 mg into the skin daily 3 pen 5    insulin aspart (NOVOLOG FLEXPEN) 100 UNIT/ML injection pen Inject 20 Units into the skin 3 times daily (before meals) 5 pen 3    ibuprofen (ADVIL;MOTRIN) 800 MG tablet TAKE ONE TABLET BY MOUTH EVERY 8 HOURS AS NEEDED FOR PAIN OR  FEVER 40 tablet 0    gabapentin (NEURONTIN) 300 MG capsule Take 1 capsule by mouth 2 times daily 60 capsule 0    meloxicam (MOBIC) 7.5 MG tablet Take 7.5 mg by mouth daily      TOUJEO SOLOSTAR 300 UNIT/ML injection pen INJECT 60 UNITS SUBCUTANEOUSLY ONCE DAILY (Patient taking differently: INJECT 40 UNITS SUBCUTANEOUSLY ONCE DAILY) 6 Pen 3    metFORMIN (GLUCOPHAGE) 1000 MG tablet TAKE ONE TABLET BY MOUTH TWICE DAILY WITH MEALS 60 tablet 1    RELION INSULIN SYR 1CC/30G 30G X 5/16\" 1 ML MISC USE ONE  THREE TIMES DAILY 100 each 5    albuterol sulfate HFA (PROAIR HFA) 108 (90 BASE) MCG/ACT inhaler Inhale 2 puffs into the lungs every 6 hours as needed for Wheezing 1 Inhaler 3    Lancets MISC DX: E 11.9-Uses insulin. FSBS 3 times daily-Delica lancets for one touch ultra  each 5    glucose monitoring kit (FREESTYLE) monitoring kit Dx E11.9-One touch ultra II meter-uses tid 1 kit 0    glucose blood VI test strips (ASCENSIA AUTODISC VI;ONE TOUCH ULTRA TEST VI) strip DX: E11.9-One touch ultra strips. FSBS 3 times daily 100 strip 5    Insulin Pen Needle (B-D UF III MINI PEN NEEDLES) 31G X 5 MM MISC Inject 1 each into the skin 4 times daily 100 each 3     No current facility-administered medications for this visit. Assessment:      1. Neuropathy bilateral feet-worsening  2. DM-stable      Plan:      1. Sharmaine Haider was seen today for peripheral neuropathy. Diagnoses and all orders for this visit:    Burning sensation of feet  -     EMG; Future    Will obtain EMG given sudden worsening.

## 2018-03-30 LAB — VITAMIN B-12: 672 PG/ML (ref 211–911)

## 2018-03-30 ASSESSMENT — ENCOUNTER SYMPTOMS
DIARRHEA: 0
NAUSEA: 0
BACK PAIN: 0
CHEST TIGHTNESS: 0

## 2018-04-20 ENCOUNTER — OFFICE VISIT (OUTPATIENT)
Dept: FAMILY MEDICINE CLINIC | Age: 38
End: 2018-04-20

## 2018-04-20 VITALS
DIASTOLIC BLOOD PRESSURE: 70 MMHG | WEIGHT: 184 LBS | SYSTOLIC BLOOD PRESSURE: 115 MMHG | OXYGEN SATURATION: 98 % | HEIGHT: 61 IN | HEART RATE: 99 BPM | BODY MASS INDEX: 34.74 KG/M2

## 2018-04-20 DIAGNOSIS — Z79.4 TYPE 2 DIABETES MELLITUS WITHOUT COMPLICATION, WITH LONG-TERM CURRENT USE OF INSULIN (HCC): Primary | ICD-10-CM

## 2018-04-20 DIAGNOSIS — H43.392 VITREOUS FLOATERS OF LEFT EYE: ICD-10-CM

## 2018-04-20 DIAGNOSIS — G47.01 INSOMNIA DUE TO MEDICAL CONDITION: ICD-10-CM

## 2018-04-20 DIAGNOSIS — E11.9 TYPE 2 DIABETES MELLITUS WITHOUT COMPLICATION, WITH LONG-TERM CURRENT USE OF INSULIN (HCC): Primary | ICD-10-CM

## 2018-04-20 LAB — HBA1C MFR BLD: 11.2 %

## 2018-04-20 PROCEDURE — 83036 HEMOGLOBIN GLYCOSYLATED A1C: CPT | Performed by: FAMILY MEDICINE

## 2018-04-20 PROCEDURE — 99214 OFFICE O/P EST MOD 30 MIN: CPT | Performed by: FAMILY MEDICINE

## 2018-04-20 ASSESSMENT — ENCOUNTER SYMPTOMS
RESPIRATORY NEGATIVE: 1
GASTROINTESTINAL NEGATIVE: 1

## 2018-04-27 ENCOUNTER — OFFICE VISIT (OUTPATIENT)
Dept: ORTHOPEDIC SURGERY | Age: 38
End: 2018-04-27

## 2018-04-27 DIAGNOSIS — G56.03 BILATERAL CARPAL TUNNEL SYNDROME: Primary | ICD-10-CM

## 2018-04-27 PROCEDURE — 95910 NRV CNDJ TEST 7-8 STUDIES: CPT | Performed by: PHYSICAL MEDICINE & REHABILITATION

## 2018-04-27 PROCEDURE — 95886 MUSC TEST DONE W/N TEST COMP: CPT | Performed by: PHYSICAL MEDICINE & REHABILITATION

## 2018-05-24 ENCOUNTER — TELEPHONE (OUTPATIENT)
Dept: FAMILY MEDICINE CLINIC | Age: 38
End: 2018-05-24

## 2018-06-12 ENCOUNTER — TELEPHONE (OUTPATIENT)
Dept: FAMILY MEDICINE CLINIC | Age: 38
End: 2018-06-12

## 2018-06-12 DIAGNOSIS — G56.00 MEDIAN NERVE ENTRAPMENT: Primary | ICD-10-CM

## 2018-07-12 ENCOUNTER — OFFICE VISIT (OUTPATIENT)
Dept: ORTHOPEDIC SURGERY | Age: 38
End: 2018-07-12

## 2018-07-12 VITALS — WEIGHT: 187 LBS | BODY MASS INDEX: 35.3 KG/M2 | HEIGHT: 61 IN

## 2018-07-12 DIAGNOSIS — M54.2 CERVICAL PAIN (NECK): ICD-10-CM

## 2018-07-12 DIAGNOSIS — G56.02 LEFT CARPAL TUNNEL SYNDROME: ICD-10-CM

## 2018-07-12 DIAGNOSIS — M25.532 LEFT WRIST PAIN: Primary | ICD-10-CM

## 2018-07-12 PROCEDURE — L3908 WHO COCK-UP NONMOLDE PRE OTS: HCPCS | Performed by: ORTHOPAEDIC SURGERY

## 2018-07-12 PROCEDURE — 99243 OFF/OP CNSLTJ NEW/EST LOW 30: CPT | Performed by: ORTHOPAEDIC SURGERY

## 2018-07-12 RX ORDER — CLINDAMYCIN HYDROCHLORIDE 150 MG/1
CAPSULE ORAL
COMMUNITY
Start: 2018-07-09 | End: 2018-08-01

## 2018-07-12 RX ORDER — HYDROCODONE BITARTRATE AND ACETAMINOPHEN 5; 325 MG/1; MG/1
TABLET ORAL
COMMUNITY
Start: 2018-07-09 | End: 2018-08-01

## 2018-07-12 NOTE — PROGRESS NOTES
articular congruity. IMPRESSION AND PLAN:  Likely mild left carpal tunnel syndrome, with outside concern about possible mechanical cervical spine discomfort. I've recommended we start conservatively with nighttime bracing and today we will supply her with a left carpal tunnel brace. I've advised her that if after the 1st 4-6 weeks she is not finding good resolution of symptoms, I do a diagnostic carpal tunnel injection or definitive carpal tunnel release surgery can be discussed. If her neck does start to have more symptomatology, advanced imaging and therapy can always be discussed as well. Please note that this transcription was created using voice recognition software. Any errors are unintentional and may be due to voice recognition transcription. Procedures    Titan Wrist Orthosis Brace     Patient was prescribed a Ani Thomas Titan Wrist Orthosis. The left wrist will require stabilization / immobilization from this semi-rigid / rigid orthosis to improve their function. The orthosis will assist in protecting the affected area, provide functional support and facilitate healing. The patient was educated and fit by a healthcare professional with expert knowledge and specialization in brace application while under the direct supervision of the treating physician. Verbal and written instructions for the use of and application of this item were provided. They were instructed to contact the office immediately should the brace result in increased pain, decreased sensation, increased swelling or worsening of the condition.

## 2018-07-16 ENCOUNTER — TELEPHONE (OUTPATIENT)
Dept: FAMILY MEDICINE CLINIC | Age: 38
End: 2018-07-16

## 2018-07-16 RX ORDER — IBUPROFEN 800 MG/1
TABLET ORAL
Qty: 30 TABLET | Refills: 1 | Status: SHIPPED | OUTPATIENT
Start: 2018-07-16 | End: 2019-02-09

## 2018-07-16 NOTE — TELEPHONE ENCOUNTER
Last Seen: 4/20/2018  Next Appointment: Visit date not found    Requested Prescriptions     Pending Prescriptions Disp Refills    ibuprofen (ADVIL;MOTRIN) 800 MG tablet [Pharmacy Med Name: IBUPROFEN 800MG     TAB] 30 tablet 1     Sig: TAKE ONE TABLET BY MOUTH EVERY 8 HOURS AS NEEDED FOR PAIN OR  FEVER    metFORMIN (GLUCOPHAGE) 1000 MG tablet [Pharmacy Med Name: METFORMIN 1000MG TAB] 60 tablet 1     Sig: TAKE ONE TABLET BY MOUTH TWICE DAILY WITH MEALS

## 2018-08-01 ENCOUNTER — OFFICE VISIT (OUTPATIENT)
Dept: FAMILY MEDICINE CLINIC | Age: 38
End: 2018-08-01

## 2018-08-01 VITALS
SYSTOLIC BLOOD PRESSURE: 98 MMHG | OXYGEN SATURATION: 98 % | HEART RATE: 97 BPM | WEIGHT: 178 LBS | TEMPERATURE: 98.4 F | BODY MASS INDEX: 33.63 KG/M2 | DIASTOLIC BLOOD PRESSURE: 68 MMHG

## 2018-08-01 DIAGNOSIS — R10.9 FLANK PAIN: ICD-10-CM

## 2018-08-01 DIAGNOSIS — J06.9 VIRAL URI: ICD-10-CM

## 2018-08-01 LAB
BILIRUBIN, POC: ABNORMAL
BLOOD URINE, POC: ABNORMAL
CLARITY, POC: CLEAR
COLOR, POC: YELLOW
GLUCOSE BLD-MCNC: 361 MG/DL
GLUCOSE URINE, POC: >1000
KETONES, POC: ABNORMAL
LEUKOCYTE EST, POC: ABNORMAL
NITRITE, POC: ABNORMAL
PH, POC: 5
PROTEIN, POC: ABNORMAL
SPECIFIC GRAVITY, POC: 1.01
UROBILINOGEN, POC: 0.2

## 2018-08-01 PROCEDURE — 81002 URINALYSIS NONAUTO W/O SCOPE: CPT | Performed by: PHYSICIAN ASSISTANT

## 2018-08-01 PROCEDURE — 99213 OFFICE O/P EST LOW 20 MIN: CPT | Performed by: PHYSICIAN ASSISTANT

## 2018-08-01 PROCEDURE — 82962 GLUCOSE BLOOD TEST: CPT | Performed by: PHYSICIAN ASSISTANT

## 2018-08-01 RX ORDER — FLUTICASONE PROPIONATE 50 MCG
2 SPRAY, SUSPENSION (ML) NASAL DAILY
Qty: 1 BOTTLE | Refills: 11 | Status: SHIPPED | OUTPATIENT
Start: 2018-08-01 | End: 2019-02-09 | Stop reason: ALTCHOICE

## 2018-08-01 RX ORDER — MIRTAZAPINE 15 MG/1
15 TABLET, FILM COATED ORAL DAILY
COMMUNITY
Start: 2018-07-14 | End: 2019-12-04 | Stop reason: ALTCHOICE

## 2018-08-01 ASSESSMENT — ENCOUNTER SYMPTOMS
DIARRHEA: 0
NAUSEA: 0
VOMITING: 1
SORE THROAT: 1
SINUS PAIN: 1
COUGH: 1
SHORTNESS OF BREATH: 0
RHINORRHEA: 0
CONSTIPATION: 0
SINUS PRESSURE: 1
ABDOMINAL PAIN: 0

## 2018-08-01 NOTE — PROGRESS NOTES
2018  Gisselle Flores (: 1980)  43 y.o. HPI    Patient presents for evaluation of URI. Reports that she felt fatigued yesterday and this morning woke up with nasal congestion. Endorses sore throat. Denies ear pain. Patient also endorses body aches. Patient endorses subjective fever and chills. Patient has taken motrin and tylenol for the body aches. Patient also complaining of bilateral flank pain. This is new within the last 24 hours as well. Denies dysuria and urinary frequency. Patient reports that she has been out of some of her diabetes medications. Has not been monitoring her BG. Pt has a new insurance card but has not picked up her humalog insulin or her victoza. Patient reports she has only been taking metformin and toujeo. Review of Systems   Constitutional: Positive for fatigue and fever (subjective). Negative for activity change and chills. HENT: Positive for congestion, postnasal drip, sinus pain, sinus pressure and sore throat. Negative for ear pain and rhinorrhea. Eyes: Negative for visual disturbance. Respiratory: Positive for cough. Negative for shortness of breath. Cardiovascular: Negative for chest pain and palpitations. Gastrointestinal: Positive for vomiting. Negative for abdominal pain, constipation, diarrhea and nausea. Genitourinary: Negative for difficulty urinating and dysuria. Musculoskeletal: Positive for myalgias. Negative for arthralgias. Skin: Negative for rash. Neurological: Positive for weakness. Negative for dizziness and numbness. Psychiatric/Behavioral: Negative for sleep disturbance. Allergies, past medical history, family history, and social history reviewed and unchanged from previous encounter. Current Outpatient Prescriptions   Medication Sig Dispense Refill    blood glucose test strips (ASCENSIA AUTODISC VI;ONE TOUCH ULTRA TEST VI) strip DX: E11.9-One touch ultra strips.  FSBS 3 times daily 100 strip 5   

## 2018-08-27 ENCOUNTER — APPOINTMENT (OUTPATIENT)
Dept: CT IMAGING | Age: 38
End: 2018-08-27

## 2018-08-27 ENCOUNTER — HOSPITAL ENCOUNTER (EMERGENCY)
Age: 38
Discharge: HOME | End: 2018-08-28

## 2018-08-27 DIAGNOSIS — N30.01 ACUTE CYSTITIS WITH HEMATURIA: Primary | ICD-10-CM

## 2018-08-27 DIAGNOSIS — R11.0 NAUSEA WITHOUT VOMITING: ICD-10-CM

## 2018-08-27 DIAGNOSIS — R10.9 LEFT FLANK PAIN: ICD-10-CM

## 2018-08-27 LAB
A/G RATIO: 0.9 (ref 1.1–2.2)
ALBUMIN SERPL-MCNC: 3.7 G/DL (ref 3.4–5)
ALP BLD-CCNC: 219 U/L (ref 40–129)
ALT SERPL-CCNC: 16 U/L (ref 10–40)
ANION GAP SERPL CALCULATED.3IONS-SCNC: 15 MMOL/L (ref 3–16)
AST SERPL-CCNC: 19 U/L (ref 15–37)
BACTERIA: ABNORMAL /HPF
BASOPHILS ABSOLUTE: 0.1 K/UL (ref 0–0.2)
BASOPHILS RELATIVE PERCENT: 0.5 %
BILIRUB SERPL-MCNC: <0.2 MG/DL (ref 0–1)
BILIRUBIN URINE: NEGATIVE
BLOOD, URINE: ABNORMAL
BUN BLDV-MCNC: 12 MG/DL (ref 7–20)
CALCIUM SERPL-MCNC: 9 MG/DL (ref 8.3–10.6)
CHLORIDE BLD-SCNC: 96 MMOL/L (ref 99–110)
CLARITY: CLEAR
CO2: 21 MMOL/L (ref 21–32)
COLOR: YELLOW
CREAT SERPL-MCNC: <0.5 MG/DL (ref 0.6–1.1)
EOSINOPHILS ABSOLUTE: 0.1 K/UL (ref 0–0.6)
EOSINOPHILS RELATIVE PERCENT: 0.7 %
GFR AFRICAN AMERICAN: >60
GFR NON-AFRICAN AMERICAN: >60
GLOBULIN: 3.9 G/DL
GLUCOSE BLD-MCNC: 469 MG/DL (ref 70–99)
GLUCOSE URINE: >=1000 MG/DL
HCG QUALITATIVE: NEGATIVE
HCT VFR BLD CALC: 42.9 % (ref 36–48)
HEMOGLOBIN: 14.9 G/DL (ref 12–16)
KETONES, URINE: NEGATIVE MG/DL
LEUKOCYTE ESTERASE, URINE: NEGATIVE
LYMPHOCYTES ABSOLUTE: 3.2 K/UL (ref 1–5.1)
LYMPHOCYTES RELATIVE PERCENT: 31.3 %
MCH RBC QN AUTO: 30.4 PG (ref 26–34)
MCHC RBC AUTO-ENTMCNC: 34.7 G/DL (ref 31–36)
MCV RBC AUTO: 87.8 FL (ref 80–100)
MICROSCOPIC EXAMINATION: YES
MONOCYTES ABSOLUTE: 0.7 K/UL (ref 0–1.3)
MONOCYTES RELATIVE PERCENT: 7.3 %
NEUTROPHILS ABSOLUTE: 6.2 K/UL (ref 1.7–7.7)
NEUTROPHILS RELATIVE PERCENT: 60.2 %
NITRITE, URINE: NEGATIVE
PDW BLD-RTO: 12.8 % (ref 12.4–15.4)
PH UA: 6
PLATELET # BLD: 254 K/UL (ref 135–450)
PMV BLD AUTO: 9.1 FL (ref 5–10.5)
POTASSIUM SERPL-SCNC: 4.4 MMOL/L (ref 3.5–5.1)
PROTEIN UA: NEGATIVE MG/DL
RBC # BLD: 4.89 M/UL (ref 4–5.2)
RBC UA: ABNORMAL /HPF (ref 0–2)
SODIUM BLD-SCNC: 132 MMOL/L (ref 136–145)
SPECIFIC GRAVITY UA: 1.01
TOTAL PROTEIN: 7.6 G/DL (ref 6.4–8.2)
URINE TYPE: ABNORMAL
UROBILINOGEN, URINE: 0.2 E.U./DL
WBC # BLD: 10.2 K/UL (ref 4–11)
WBC UA: ABNORMAL /HPF (ref 0–5)

## 2018-08-27 PROCEDURE — 2580000003 HC RX 258: Performed by: NURSE PRACTITIONER

## 2018-08-27 PROCEDURE — 81001 URINALYSIS AUTO W/SCOPE: CPT

## 2018-08-27 PROCEDURE — 84703 CHORIONIC GONADOTROPIN ASSAY: CPT

## 2018-08-27 PROCEDURE — 74176 CT ABD & PELVIS W/O CONTRAST: CPT

## 2018-08-27 PROCEDURE — 6360000002 HC RX W HCPCS: Performed by: NURSE PRACTITIONER

## 2018-08-27 PROCEDURE — 99284 EMERGENCY DEPT VISIT MOD MDM: CPT

## 2018-08-27 PROCEDURE — 96375 TX/PRO/DX INJ NEW DRUG ADDON: CPT

## 2018-08-27 PROCEDURE — 96365 THER/PROPH/DIAG IV INF INIT: CPT

## 2018-08-27 PROCEDURE — 85025 COMPLETE CBC W/AUTO DIFF WBC: CPT

## 2018-08-27 PROCEDURE — 80053 COMPREHEN METABOLIC PANEL: CPT

## 2018-08-27 RX ORDER — KETOROLAC TROMETHAMINE 30 MG/ML
30 INJECTION, SOLUTION INTRAMUSCULAR; INTRAVENOUS ONCE
Status: COMPLETED | OUTPATIENT
Start: 2018-08-27 | End: 2018-08-27

## 2018-08-27 RX ORDER — 0.9 % SODIUM CHLORIDE 0.9 %
1000 INTRAVENOUS SOLUTION INTRAVENOUS ONCE
Status: COMPLETED | OUTPATIENT
Start: 2018-08-27 | End: 2018-08-28

## 2018-08-27 RX ORDER — ONDANSETRON 2 MG/ML
4 INJECTION INTRAMUSCULAR; INTRAVENOUS ONCE
Status: COMPLETED | OUTPATIENT
Start: 2018-08-27 | End: 2018-08-27

## 2018-08-27 RX ADMIN — CEFTRIAXONE SODIUM 1 G: 1 INJECTION, POWDER, FOR SOLUTION INTRAMUSCULAR; INTRAVENOUS at 23:07

## 2018-08-27 RX ADMIN — ONDANSETRON HYDROCHLORIDE 4 MG: 2 INJECTION, SOLUTION INTRAMUSCULAR; INTRAVENOUS at 23:06

## 2018-08-27 RX ADMIN — SODIUM CHLORIDE 1000 ML: 9 INJECTION, SOLUTION INTRAVENOUS at 23:07

## 2018-08-27 RX ADMIN — KETOROLAC TROMETHAMINE 30 MG: 30 INJECTION, SOLUTION INTRAMUSCULAR at 23:07

## 2018-08-27 ASSESSMENT — PAIN DESCRIPTION - DESCRIPTORS: DESCRIPTORS: BURNING

## 2018-08-27 ASSESSMENT — ENCOUNTER SYMPTOMS
VOMITING: 0
BACK PAIN: 0
NAUSEA: 1
COUGH: 0
COLOR CHANGE: 0
DIARRHEA: 0
SHORTNESS OF BREATH: 0
ABDOMINAL PAIN: 1
WHEEZING: 0

## 2018-08-27 ASSESSMENT — PAIN SCALES - GENERAL: PAINLEVEL_OUTOF10: 6

## 2018-08-27 ASSESSMENT — PAIN DESCRIPTION - LOCATION: LOCATION: FLANK

## 2018-08-27 ASSESSMENT — PAIN DESCRIPTION - PAIN TYPE: TYPE: ACUTE PAIN

## 2018-08-28 VITALS
TEMPERATURE: 98.1 F | OXYGEN SATURATION: 98 % | HEIGHT: 61 IN | RESPIRATION RATE: 15 BRPM | SYSTOLIC BLOOD PRESSURE: 97 MMHG | WEIGHT: 182 LBS | HEART RATE: 78 BPM | BODY MASS INDEX: 34.36 KG/M2 | DIASTOLIC BLOOD PRESSURE: 58 MMHG

## 2018-08-28 PROCEDURE — 96366 THER/PROPH/DIAG IV INF ADDON: CPT

## 2018-08-28 RX ORDER — PHENAZOPYRIDINE HYDROCHLORIDE 100 MG/1
100 TABLET, FILM COATED ORAL 3 TIMES DAILY PRN
Qty: 9 TABLET | Refills: 0 | Status: SHIPPED | OUTPATIENT
Start: 2018-08-28 | End: 2018-08-31

## 2018-08-28 RX ORDER — NITROFURANTOIN 25; 75 MG/1; MG/1
100 CAPSULE ORAL 2 TIMES DAILY
Qty: 10 CAPSULE | Refills: 0 | Status: SHIPPED | OUTPATIENT
Start: 2018-08-28 | End: 2018-09-02

## 2018-08-28 RX ORDER — NAPROXEN 500 MG/1
500 TABLET ORAL 2 TIMES DAILY WITH MEALS
Qty: 30 TABLET | Refills: 0 | Status: SHIPPED | OUTPATIENT
Start: 2018-08-28 | End: 2019-02-09

## 2018-08-28 RX ORDER — ONDANSETRON 4 MG/1
4 TABLET, ORALLY DISINTEGRATING ORAL EVERY 8 HOURS PRN
Qty: 20 TABLET | Refills: 0 | Status: SHIPPED | OUTPATIENT
Start: 2018-08-28 | End: 2019-02-09 | Stop reason: ALTCHOICE

## 2018-08-28 NOTE — ED PROVIDER NOTES
**[facility]  ED  eMERGENCY dEPARTMENT eNCOUnter      Pt Name: Isabel Beltran  MRN: [id number]  [place] 1980  Date of evaluation: 8/27/2018  Provider: IRASEMA Tsai CNP      Chief Complaint:    Chief Complaint   Patient presents with    Flank Pain     hurts when peeing. states saw some bleed. left елена e       Nursing Notes, Past Medical Hx, Past Surgical Hx, Social Hx, Allergies, and Family Hx were all reviewed and agreed with or any disagreements were addressed in the HPI.    HPI:  (Location, Duration, Timing, Severity, Quality, Assoc Sx, Context, Modifying factors)  This is a  43 y.o. female who presents today with left flan pain and frequency with urination, today she noticed blood in her urine with urination. She states that she has no vaginal bleeding or discharge, states she has had a hysterectomy. She states she is now having more pain with urination and then developed the waves of left flank pain. She denies history of kidney stones. States the pain is coming in waves and 1 and she has had sharp and aching, rates the pain a 6 out of 10. Denies any fever or chills. She does report nausea but no vomiting or diarrhea. She denies taking any medicine for her symptoms. No additional complaints, no additional aggravating or relieving factors. The patient presents awake, alert and in no acute respiratory distress or toxic appearance. Past Medical/Surgical History:      Diagnosis Date    Abdominal pain, acute, right lower quadrant 5/15/2013    Anxiety     Arthritis     Left Knee    Bilateral ovarian cysts 8/13/2013    Blood transfusion reaction     Cellulitis and abscess of trunk 1/29/2014    Pt was seen in ED today for bleeding from incision after taking a fall this morning.       Depression     Diabetes mellitus ([address]     x5yr    Diverticula of colon 8/13/2013    DM2 (diabetes mellitus, type 2) ([address] 7/12/2016 30G X 5/16\" 1 ML MISC    USE ONE  THREE TIMES DAILY    TOUJEO SOLOSTAR 300 UNIT/ML INJECTION PEN    INJECT 60 UNITS SUBCUTANEOUSLY ONCE DAILY         Review of Systems:  Review of Systems   Constitutional: Negative for chills and fever. HENT: Negative for congestion. Respiratory: Negative for cough, shortness of breath and wheezing. Cardiovascular: Negative for chest pain. Gastrointestinal: Positive for abdominal pain and nausea. Negative for diarrhea and vomiting. Patient complains of left flank pain wraps from the abdomen, reports nausea and no vomiting or diarrhea. She does report having hematuria, frequency and dysuria. Genitourinary: Positive for dysuria, flank pain and hematuria. Negative for difficulty urinating, frequency, vaginal bleeding and vaginal discharge. Musculoskeletal: Negative for back pain. Skin: Negative for color change. Neurological: Negative for weakness, numbness and headaches. Positives and Pertinent negatives as per HPI. Except as noted above in the ROS, problem specific ROS was completed and is negative. Physical Exam:  Physical Exam   Constitutional: She is oriented to person, place, and time. She appears well-developed and well-nourished. HENT:   Head: Normocephalic. Right Ear: External ear normal.   Left Ear: External ear normal.   Mouth/Throat: Oropharynx is clear and moist.   Eyes: Right eye exhibits no discharge. Left eye exhibits no discharge. Neck: Normal range of motion. Neck supple. Cardiovascular: Normal rate and intact distal pulses. Pulmonary/Chest: Effort normal and breath sounds normal. No respiratory distress. She has no wheezes. Abdominal: Soft. There is no tenderness. Abdomen is protuberant. Bowel sounds positive. Patient has left-sided CVA tenderness. No acute abdominal tenderness, guarding or rebound tenderness. No acute ascites or rigidity. Musculoskeletal: Normal range of motion.    Neurological: She is alert and oriented to person, place, and time. GCS eye subscore is 4. GCS verbal subscore is 5. GCS motor subscore is 6. Skin: Skin is warm. She is not diaphoretic. No pallor. Psychiatric: She has a normal mood and affect. Her behavior is normal.   Nursing note and vitals reviewed.       MEDICAL DECISION MAKING    Vitals:    Vitals:    08/27/18 2005 08/27/18 2316   BP: 114/82 124/85   Pulse: 98 75   Resp: 16 15   Temp: 98.1 °F (36.7 °C)    TempSrc: Oral    SpO2: 98% 98%   Weight: 182 lb (82.6 kg)    Height: 5' 1\" (1.549 m)        LABS:   Labs Reviewed   COMPREHENSIVE METABOLIC PANEL - Abnormal; Notable for the following:        Result Value    Sodium 132 (*)     Chloride 96 (*)     Glucose 469 (*)     CREATININE <0.5 (*)     Albumin/Globulin Ratio 0.9 (*)     Alkaline Phosphatase 219 (*)     All other components within normal limits    Narrative:     Performed at:  [address], [facility] [place]   Phone [phone]   URINALYSIS - Abnormal; Notable for the following:     Glucose, Ur >=1000 (*)     Blood, Urine MODERATE (*)     All other components within normal limits    Narrative:     Performed at:  [address], [address] [place]   Phone [phone]   MICROSCOPIC URINALYSIS - Abnormal; Notable for the following:     RBC, UA  (*)     Bacteria, UA Rare (*)     All other components within normal limits    Narrative:     Performed at:  [address], [address] [place]   Phone [phone]   CBC WITH AUTO DIFFERENTIAL    Narrative:     Performed at:  [address], [facility] [place]   Phone [phone]   HCG, SERUM, QUALITATIVE    Narrative:     Performed at:  [address], [address] [facility]   Phone  of labs reviewed and were empirically for urinary tract infection. My concerns for appendicitis, pancreatitis, diverticulosis, cholecystitis or bowel infection. Therefore, shared medical decision was made between the patient myself and we agreed she will be discharged home with outpatient follow-up. She was discharged home with prescription for Naprosyn, Zofran, Pyridium and Macrobid. She was educated take medicine as prescribed. Educated to follow-up with PCP in the next 2 days reevaluation but to return for any worsening symptoms. The patient tolerated their visit well. I evaluated the patient. The physician was available for consultation as needed. The patient and / or the family were informed of the results of any tests, a time was given to answer questions, a plan was proposed and they agreed with plan. Patient verbalized understanding of discharge instructions and was discharged from the department stable condition. CLINICAL IMPRESSION:  1. Acute cystitis with hematuria    2. Left flank pain    3.  Nausea without vomiting        DISPOSITION Decision To Discharge 08/28/2018 12:24:08 AM      PATIENT REFERRED TO:  Fam Danielson DO  [address]  [address] [facility]  [phone]    Schedule an appointment as soon as possible for a visit in 2 days  follow-up with her family doctor next 2 days for reevaluation    [facility]  ED  [facility]  [address] [phone]  Go to   If symptoms worsen      DISCHARGE MEDICATIONS:  New Prescriptions    NAPROXEN (NAPROSYN) 500 MG TABLET    Take 1 tablet by mouth 2 times daily (with meals)    NITROFURANTOIN, MACROCRYSTAL-MONOHYDRATE, (MACROBID) 100 MG CAPSULE    Take 1 capsule by mouth 2 times daily for 5 days    ONDANSETRON (ZOFRAN ODT) 4 MG DISINTEGRATING TABLET    Take 1 tablet by mouth every 8 hours as needed for Nausea    PHENAZOPYRIDINE (PYRIDIUM) 100 MG TABLET    Take 1 tablet by mouth 3 times daily as needed for Pain

## 2018-09-07 ENCOUNTER — APPOINTMENT (OUTPATIENT)
Dept: GENERAL RADIOLOGY | Age: 38
End: 2018-09-07

## 2018-09-07 ENCOUNTER — HOSPITAL ENCOUNTER (EMERGENCY)
Age: 38
Discharge: HOME | End: 2018-09-07
Attending: EMERGENCY MEDICINE

## 2018-09-07 VITALS
HEART RATE: 81 BPM | BODY MASS INDEX: 33.79 KG/M2 | WEIGHT: 179 LBS | OXYGEN SATURATION: 96 % | TEMPERATURE: 97.7 F | RESPIRATION RATE: 16 BRPM | HEIGHT: 61 IN | SYSTOLIC BLOOD PRESSURE: 119 MMHG | DIASTOLIC BLOOD PRESSURE: 65 MMHG

## 2018-09-07 DIAGNOSIS — S62.606A CLOSED NONDISPLACED FRACTURE OF PHALANX OF RIGHT LITTLE FINGER, UNSPECIFIED PHALANX, INITIAL ENCOUNTER: Primary | ICD-10-CM

## 2018-09-07 PROCEDURE — 99283 EMERGENCY DEPT VISIT LOW MDM: CPT

## 2018-09-07 PROCEDURE — 73130 X-RAY EXAM OF HAND: CPT

## 2018-09-07 ASSESSMENT — PAIN DESCRIPTION - PAIN TYPE: TYPE: ACUTE PAIN

## 2018-09-07 ASSESSMENT — PAIN DESCRIPTION - LOCATION: LOCATION: FINGER (COMMENT WHICH ONE)

## 2018-09-07 ASSESSMENT — PAIN SCALES - GENERAL: PAINLEVEL_OUTOF10: 4

## 2018-09-07 ASSESSMENT — PAIN DESCRIPTION - ORIENTATION: ORIENTATION: RIGHT

## 2018-09-07 NOTE — ED NOTES
D/c paperwork and f/u given to pt. Pt verbalized understanding and had no questions or concerns at this time. Pt iv removed. Pt vs stable.  Pt d.c in good condition     Duglas Haji RN  09/07/18 1641

## 2018-09-18 ENCOUNTER — OFFICE VISIT (OUTPATIENT)
Dept: ORTHOPEDIC SURGERY | Age: 38
End: 2018-09-18

## 2018-09-18 VITALS — BODY MASS INDEX: 34.17 KG/M2 | HEIGHT: 61 IN | WEIGHT: 181 LBS

## 2018-09-18 DIAGNOSIS — M79.644 FINGER PAIN, RIGHT: Primary | ICD-10-CM

## 2018-09-18 DIAGNOSIS — S62.629A CLOSED AVULSION FRACTURE OF MIDDLE PHALANX OF FINGER, INITIAL ENCOUNTER: ICD-10-CM

## 2018-09-18 PROCEDURE — 99214 OFFICE O/P EST MOD 30 MIN: CPT | Performed by: ORTHOPAEDIC SURGERY

## 2018-10-18 ENCOUNTER — HOSPITAL ENCOUNTER (EMERGENCY)
Age: 38
Discharge: HOME | End: 2018-10-18

## 2018-10-18 ENCOUNTER — APPOINTMENT (OUTPATIENT)
Dept: GENERAL RADIOLOGY | Age: 38
End: 2018-10-18

## 2018-10-18 VITALS
HEART RATE: 78 BPM | RESPIRATION RATE: 16 BRPM | DIASTOLIC BLOOD PRESSURE: 97 MMHG | TEMPERATURE: 98 F | SYSTOLIC BLOOD PRESSURE: 133 MMHG | OXYGEN SATURATION: 99 %

## 2018-10-18 DIAGNOSIS — S60.222A CONTUSION OF LEFT HAND, INITIAL ENCOUNTER: Primary | ICD-10-CM

## 2018-10-18 PROCEDURE — 73120 X-RAY EXAM OF HAND: CPT

## 2018-10-18 PROCEDURE — 99283 EMERGENCY DEPT VISIT LOW MDM: CPT

## 2018-10-18 ASSESSMENT — PAIN DESCRIPTION - PAIN TYPE: TYPE: ACUTE PAIN

## 2018-10-18 ASSESSMENT — PAIN DESCRIPTION - ORIENTATION: ORIENTATION: LEFT

## 2018-10-18 ASSESSMENT — PAIN DESCRIPTION - LOCATION: LOCATION: HAND

## 2018-10-18 ASSESSMENT — PAIN SCALES - GENERAL: PAINLEVEL_OUTOF10: 6
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
## 2018-12-12 NOTE — TELEPHONE ENCOUNTER
Pt called stating she doesn't currently have insurance, but she takes Toujeo and Novolog. States that just one script was going to cost her over $800 and she cannot afford that. Says the pharmacist informed her she can replace the Toujeo and Novolog with Novolin N and Novolin R for less than $50 a month. Pt wants to know if the Novolin N and Novolin R can be sent to [facility]?

## 2018-12-14 ENCOUNTER — TELEPHONE (OUTPATIENT)
Dept: FAMILY MEDICINE CLINIC | Age: 38
End: 2018-12-14

## 2018-12-14 NOTE — TELEPHONE ENCOUNTER
Patient stating the Toujeo you prescribed is $800, She says that pharmacist told her about Novolin N and Novolin R that can replace the Toujeo and Novolog that would be cheaper, could you send these prescriptions to [facility]?

## 2019-01-07 ENCOUNTER — OFFICE VISIT (OUTPATIENT)
Dept: FAMILY MEDICINE CLINIC | Age: 39
End: 2019-01-07
Payer: COMMERCIAL

## 2019-01-07 VITALS
DIASTOLIC BLOOD PRESSURE: 82 MMHG | BODY MASS INDEX: 34.01 KG/M2 | OXYGEN SATURATION: 98 % | SYSTOLIC BLOOD PRESSURE: 110 MMHG | WEIGHT: 180 LBS | HEART RATE: 78 BPM

## 2019-01-07 DIAGNOSIS — M54.50 ACUTE RIGHT-SIDED LOW BACK PAIN WITHOUT SCIATICA: Primary | ICD-10-CM

## 2019-01-07 PROCEDURE — 99213 OFFICE O/P EST LOW 20 MIN: CPT | Performed by: PHYSICIAN ASSISTANT

## 2019-01-07 RX ORDER — METHOCARBAMOL 500 MG/1
500 TABLET, FILM COATED ORAL 3 TIMES DAILY
Qty: 30 TABLET | Refills: 0 | Status: SHIPPED | OUTPATIENT
Start: 2019-01-07 | End: 2019-01-17

## 2019-01-07 ASSESSMENT — ENCOUNTER SYMPTOMS
BACK PAIN: 1
COLOR CHANGE: 0

## 2019-01-23 ENCOUNTER — OFFICE VISIT (OUTPATIENT)
Dept: FAMILY MEDICINE CLINIC | Age: 39
End: 2019-01-23
Payer: COMMERCIAL

## 2019-01-23 VITALS
OXYGEN SATURATION: 99 % | HEIGHT: 61 IN | BODY MASS INDEX: 33.34 KG/M2 | HEART RATE: 80 BPM | WEIGHT: 176.6 LBS | DIASTOLIC BLOOD PRESSURE: 62 MMHG | SYSTOLIC BLOOD PRESSURE: 100 MMHG

## 2019-01-23 DIAGNOSIS — R10.11 RIGHT UPPER QUADRANT ABDOMINAL PAIN: ICD-10-CM

## 2019-01-23 DIAGNOSIS — R11.0 NAUSEA: ICD-10-CM

## 2019-01-23 DIAGNOSIS — R10.816 EPIGASTRIC ABDOMINAL TENDERNESS WITHOUT REBOUND TENDERNESS: ICD-10-CM

## 2019-01-23 DIAGNOSIS — R19.7 DIARRHEA OF PRESUMED INFECTIOUS ORIGIN: ICD-10-CM

## 2019-01-23 DIAGNOSIS — K92.1 MELENA: ICD-10-CM

## 2019-01-23 DIAGNOSIS — R10.11 RIGHT UPPER QUADRANT ABDOMINAL PAIN: Primary | ICD-10-CM

## 2019-01-23 LAB
A/G RATIO: 1.3 (ref 1.1–2.2)
ALBUMIN SERPL-MCNC: 4.3 G/DL (ref 3.4–5)
ALP BLD-CCNC: 174 U/L (ref 40–129)
ALT SERPL-CCNC: 16 U/L (ref 10–40)
ANION GAP SERPL CALCULATED.3IONS-SCNC: 16 MMOL/L (ref 3–16)
AST SERPL-CCNC: 11 U/L (ref 15–37)
BASOPHILS ABSOLUTE: 0 K/UL (ref 0–0.2)
BASOPHILS RELATIVE PERCENT: 0.5 %
BILIRUB SERPL-MCNC: 0.3 MG/DL (ref 0–1)
BUN BLDV-MCNC: 14 MG/DL (ref 7–20)
CALCIUM SERPL-MCNC: 9.7 MG/DL (ref 8.3–10.6)
CHLORIDE BLD-SCNC: 100 MMOL/L (ref 99–110)
CO2: 23 MMOL/L (ref 21–32)
CREAT SERPL-MCNC: <0.5 MG/DL (ref 0.6–1.1)
EOSINOPHILS ABSOLUTE: 0.1 K/UL (ref 0–0.6)
EOSINOPHILS RELATIVE PERCENT: 1.2 %
GFR AFRICAN AMERICAN: >60
GFR NON-AFRICAN AMERICAN: >60
GLOBULIN: 3.4 G/DL
GLUCOSE BLD-MCNC: 472 MG/DL (ref 70–99)
HAV IGM SER IA-ACNC: NORMAL
HCT VFR BLD CALC: 44.1 % (ref 36–48)
HEMOGLOBIN: 14.9 G/DL (ref 12–16)
LIPASE: 74 U/L (ref 13–60)
LYMPHOCYTES ABSOLUTE: 1.9 K/UL (ref 1–5.1)
LYMPHOCYTES RELATIVE PERCENT: 31.3 %
MCH RBC QN AUTO: 30.2 PG (ref 26–34)
MCHC RBC AUTO-ENTMCNC: 33.9 G/DL (ref 31–36)
MCV RBC AUTO: 89.2 FL (ref 80–100)
MONOCYTES ABSOLUTE: 0.4 K/UL (ref 0–1.3)
MONOCYTES RELATIVE PERCENT: 6.7 %
NEUTROPHILS ABSOLUTE: 3.8 K/UL (ref 1.7–7.7)
NEUTROPHILS RELATIVE PERCENT: 60.3 %
PDW BLD-RTO: 12.6 % (ref 12.4–15.4)
PLATELET # BLD: 262 K/UL (ref 135–450)
PMV BLD AUTO: 10.2 FL (ref 5–10.5)
POTASSIUM SERPL-SCNC: 4.2 MMOL/L (ref 3.5–5.1)
RBC # BLD: 4.94 M/UL (ref 4–5.2)
SODIUM BLD-SCNC: 139 MMOL/L (ref 136–145)
TOTAL PROTEIN: 7.7 G/DL (ref 6.4–8.2)
TSH REFLEX: 0.62 UIU/ML (ref 0.27–4.2)
WBC # BLD: 6.2 K/UL (ref 4–11)

## 2019-01-23 PROCEDURE — 99214 OFFICE O/P EST MOD 30 MIN: CPT | Performed by: FAMILY MEDICINE

## 2019-01-23 RX ORDER — OMEPRAZOLE 40 MG/1
40 CAPSULE, DELAYED RELEASE ORAL
Qty: 30 CAPSULE | Refills: 0 | Status: SHIPPED | OUTPATIENT
Start: 2019-01-23 | End: 2019-02-09

## 2019-01-23 ASSESSMENT — ENCOUNTER SYMPTOMS
CONSTIPATION: 0
BLOOD IN STOOL: 1
DIARRHEA: 1
ABDOMINAL PAIN: 1
ABDOMINAL DISTENTION: 1
COUGH: 0
SHORTNESS OF BREATH: 0
COLOR CHANGE: 0
VOMITING: 0
NAUSEA: 1

## 2019-01-24 ENCOUNTER — HOSPITAL ENCOUNTER (EMERGENCY)
Age: 39
Discharge: HOME | End: 2019-01-24
Attending: EMERGENCY MEDICINE
Payer: COMMERCIAL

## 2019-01-24 ENCOUNTER — APPOINTMENT (OUTPATIENT)
Dept: CT IMAGING | Age: 39
End: 2019-01-24
Payer: COMMERCIAL

## 2019-01-24 VITALS
HEART RATE: 78 BPM | DIASTOLIC BLOOD PRESSURE: 67 MMHG | SYSTOLIC BLOOD PRESSURE: 105 MMHG | TEMPERATURE: 98.1 F | OXYGEN SATURATION: 98 % | HEIGHT: 61 IN | WEIGHT: 176 LBS | RESPIRATION RATE: 16 BRPM | BODY MASS INDEX: 33.23 KG/M2

## 2019-01-24 DIAGNOSIS — R73.9 HYPERGLYCEMIA: Primary | ICD-10-CM

## 2019-01-24 LAB
A/G RATIO: 1.3 (ref 1.1–2.2)
ALBUMIN SERPL-MCNC: 3.9 G/DL (ref 3.4–5)
ALP BLD-CCNC: 183 U/L (ref 40–129)
ALT SERPL-CCNC: 14 U/L (ref 10–40)
ANION GAP SERPL CALCULATED.3IONS-SCNC: 11 MMOL/L (ref 3–16)
AST SERPL-CCNC: 10 U/L (ref 15–37)
BASE EXCESS VENOUS: 1 MMOL/L (ref -3–3)
BASOPHILS ABSOLUTE: 0 K/UL (ref 0–0.2)
BASOPHILS RELATIVE PERCENT: 0.5 %
BILIRUB SERPL-MCNC: 0.3 MG/DL (ref 0–1)
BILIRUBIN URINE: NEGATIVE
BLOOD, URINE: NEGATIVE
BUN BLDV-MCNC: 10 MG/DL (ref 7–20)
CALCIUM SERPL-MCNC: 9 MG/DL (ref 8.3–10.6)
CARBOXYHEMOGLOBIN: 2.1 % (ref 0–1.5)
CHLORIDE BLD-SCNC: 100 MMOL/L (ref 99–110)
CLARITY: CLEAR
CO2: 26 MMOL/L (ref 21–32)
COLOR: ABNORMAL
CREAT SERPL-MCNC: 0.6 MG/DL (ref 0.6–1.1)
EOSINOPHILS ABSOLUTE: 0.1 K/UL (ref 0–0.6)
EOSINOPHILS RELATIVE PERCENT: 0.9 %
ESTIMATED AVERAGE GLUCOSE: 314.9 MG/DL
GFR AFRICAN AMERICAN: >60
GFR NON-AFRICAN AMERICAN: >60
GLOBULIN: 3 G/DL
GLUCOSE BLD-MCNC: 381 MG/DL (ref 70–99)
GLUCOSE BLD-MCNC: 519 MG/DL (ref 70–99)
GLUCOSE URINE: >=1000 MG/DL
HBA1C MFR BLD: 12.6 %
HCO3 VENOUS: 24 MMOL/L (ref 23–29)
HCT VFR BLD CALC: 43.6 % (ref 36–48)
HEMOGLOBIN: 14.7 G/DL (ref 12–16)
KETONES, URINE: NEGATIVE MG/DL
LEUKOCYTE ESTERASE, URINE: NEGATIVE
LIPASE: 41 U/L (ref 13–60)
LYMPHOCYTES ABSOLUTE: 2.4 K/UL (ref 1–5.1)
LYMPHOCYTES RELATIVE PERCENT: 34.2 %
MCH RBC QN AUTO: 30.2 PG (ref 26–34)
MCHC RBC AUTO-ENTMCNC: 33.8 G/DL (ref 31–36)
MCV RBC AUTO: 89.4 FL (ref 80–100)
METHEMOGLOBIN VENOUS: 0.5 %
MICROSCOPIC EXAMINATION: ABNORMAL
MONOCYTES ABSOLUTE: 0.6 K/UL (ref 0–1.3)
MONOCYTES RELATIVE PERCENT: 8.3 %
NEUTROPHILS ABSOLUTE: 4 K/UL (ref 1.7–7.7)
NEUTROPHILS RELATIVE PERCENT: 56.1 %
NITRITE, URINE: NEGATIVE
O2 CONTENT, VEN: 19 VOL %
O2 SAT, VEN: 97 %
O2 THERAPY: ABNORMAL
PCO2, VEN: 33.3 MMHG (ref 40–50)
PDW BLD-RTO: 12.6 % (ref 12.4–15.4)
PERFORMED ON: ABNORMAL
PH UA: 6.5
PH VENOUS: 7.47 (ref 7.35–7.45)
PLATELET # BLD: 241 K/UL (ref 135–450)
PMV BLD AUTO: 9.1 FL (ref 5–10.5)
PO2, VEN: 83 MMHG (ref 25–40)
POTASSIUM SERPL-SCNC: 4.2 MMOL/L (ref 3.5–5.1)
PROTEIN UA: NEGATIVE MG/DL
RBC # BLD: 4.87 M/UL (ref 4–5.2)
SODIUM BLD-SCNC: 137 MMOL/L (ref 136–145)
SPECIFIC GRAVITY UA: 1.01
TCO2 CALC VENOUS: 25 MMOL/L
TOTAL PROTEIN: 6.9 G/DL (ref 6.4–8.2)
URINE TYPE: ABNORMAL
UROBILINOGEN, URINE: 0.2 E.U./DL
WBC # BLD: 7.1 K/UL (ref 4–11)

## 2019-01-24 PROCEDURE — 83690 ASSAY OF LIPASE: CPT

## 2019-01-24 PROCEDURE — 6370000000 HC RX 637 (ALT 250 FOR IP): Performed by: NURSE PRACTITIONER

## 2019-01-24 PROCEDURE — 99285 EMERGENCY DEPT VISIT HI MDM: CPT

## 2019-01-24 PROCEDURE — 6360000004 HC RX CONTRAST MEDICATION: Performed by: EMERGENCY MEDICINE

## 2019-01-24 PROCEDURE — 80053 COMPREHEN METABOLIC PANEL: CPT

## 2019-01-24 PROCEDURE — 96372 THER/PROPH/DIAG INJ SC/IM: CPT

## 2019-01-24 PROCEDURE — 96361 HYDRATE IV INFUSION ADD-ON: CPT

## 2019-01-24 PROCEDURE — 82803 BLOOD GASES ANY COMBINATION: CPT

## 2019-01-24 PROCEDURE — 85025 COMPLETE CBC W/AUTO DIFF WBC: CPT

## 2019-01-24 PROCEDURE — 96360 HYDRATION IV INFUSION INIT: CPT

## 2019-01-24 PROCEDURE — 36415 COLL VENOUS BLD VENIPUNCTURE: CPT

## 2019-01-24 PROCEDURE — 2580000003 HC RX 258: Performed by: NURSE PRACTITIONER

## 2019-01-24 PROCEDURE — 81003 URINALYSIS AUTO W/O SCOPE: CPT

## 2019-01-24 PROCEDURE — 74177 CT ABD & PELVIS W/CONTRAST: CPT

## 2019-01-24 RX ORDER — 0.9 % SODIUM CHLORIDE 0.9 %
1000 INTRAVENOUS SOLUTION INTRAVENOUS ONCE
Status: COMPLETED | OUTPATIENT
Start: 2019-01-24 | End: 2019-01-24

## 2019-01-24 RX ADMIN — IOPAMIDOL 75 ML: 755 INJECTION, SOLUTION INTRAVENOUS at 15:04

## 2019-01-24 RX ADMIN — INSULIN HUMAN 6 UNITS: 100 INJECTION, SOLUTION PARENTERAL at 15:11

## 2019-01-24 RX ADMIN — SODIUM CHLORIDE 1000 ML: 9 INJECTION, SOLUTION INTRAVENOUS at 15:10

## 2019-01-24 ASSESSMENT — PAIN SCALES - GENERAL: PAINLEVEL_OUTOF10: 6

## 2019-02-09 ENCOUNTER — HOSPITAL ENCOUNTER (EMERGENCY)
Age: 39
Discharge: HOME | End: 2019-02-09
Attending: EMERGENCY MEDICINE
Payer: COMMERCIAL

## 2019-02-09 VITALS
TEMPERATURE: 98.2 F | SYSTOLIC BLOOD PRESSURE: 111 MMHG | DIASTOLIC BLOOD PRESSURE: 61 MMHG | RESPIRATION RATE: 16 BRPM | BODY MASS INDEX: 33.61 KG/M2 | HEIGHT: 61 IN | HEART RATE: 78 BPM | OXYGEN SATURATION: 99 % | WEIGHT: 178 LBS

## 2019-02-09 DIAGNOSIS — M53.3 PAIN OF RIGHT SACROILIAC JOINT: Primary | ICD-10-CM

## 2019-02-09 DIAGNOSIS — K92.1 MELENA: ICD-10-CM

## 2019-02-09 PROCEDURE — 99283 EMERGENCY DEPT VISIT LOW MDM: CPT

## 2019-02-09 RX ORDER — OMEPRAZOLE 40 MG/1
40 CAPSULE, DELAYED RELEASE ORAL
Qty: 30 CAPSULE | Refills: 0 | Status: SHIPPED | OUTPATIENT
Start: 2019-02-09 | End: 2019-04-08 | Stop reason: SDUPTHER

## 2019-02-09 RX ORDER — METHOCARBAMOL 500 MG/1
500 TABLET, FILM COATED ORAL 4 TIMES DAILY
COMMUNITY
End: 2019-12-04 | Stop reason: ALTCHOICE

## 2019-02-09 RX ORDER — METHYLPREDNISOLONE 4 MG/1
TABLET ORAL
Qty: 1 KIT | Refills: 0 | Status: SHIPPED | OUTPATIENT
Start: 2019-02-09 | End: 2019-02-28

## 2019-02-09 RX ORDER — NAPROXEN 500 MG/1
500 TABLET ORAL 2 TIMES DAILY WITH MEALS
Qty: 20 TABLET | Refills: 0 | Status: SHIPPED | OUTPATIENT
Start: 2019-02-09 | End: 2019-07-16 | Stop reason: SDUPTHER

## 2019-02-09 ASSESSMENT — PAIN SCALES - GENERAL
PAINLEVEL_OUTOF10: 3
PAINLEVEL_OUTOF10: 3

## 2019-02-09 ASSESSMENT — PAIN DESCRIPTION - LOCATION: LOCATION: COCCYX

## 2019-02-09 ASSESSMENT — PAIN DESCRIPTION - PAIN TYPE: TYPE: ACUTE PAIN

## 2019-02-22 ENCOUNTER — OFFICE VISIT (OUTPATIENT)
Dept: FAMILY MEDICINE CLINIC | Age: 39
End: 2019-02-22
Payer: COMMERCIAL

## 2019-02-22 VITALS
TEMPERATURE: 98.5 F | OXYGEN SATURATION: 98 % | HEART RATE: 98 BPM | SYSTOLIC BLOOD PRESSURE: 110 MMHG | BODY MASS INDEX: 34.01 KG/M2 | WEIGHT: 180 LBS | DIASTOLIC BLOOD PRESSURE: 80 MMHG

## 2019-02-22 DIAGNOSIS — R68.83 CHILLS: ICD-10-CM

## 2019-02-22 DIAGNOSIS — R68.89 FLU-LIKE SYMPTOMS: Primary | ICD-10-CM

## 2019-02-22 DIAGNOSIS — R50.9 FEVER, UNSPECIFIED FEVER CAUSE: ICD-10-CM

## 2019-02-22 LAB
INFLUENZA A ANTIBODY: NORMAL
INFLUENZA B ANTIBODY: NORMAL

## 2019-02-22 PROCEDURE — 87804 INFLUENZA ASSAY W/OPTIC: CPT | Performed by: PHYSICIAN ASSISTANT

## 2019-02-22 PROCEDURE — 99213 OFFICE O/P EST LOW 20 MIN: CPT | Performed by: PHYSICIAN ASSISTANT

## 2019-02-22 RX ORDER — BENZONATATE 100 MG/1
100 CAPSULE ORAL EVERY 6 HOURS PRN
Qty: 30 CAPSULE | Refills: 0 | Status: SHIPPED | OUTPATIENT
Start: 2019-02-22 | End: 2019-03-01

## 2019-02-22 RX ORDER — OSELTAMIVIR PHOSPHATE 75 MG/1
75 CAPSULE ORAL 2 TIMES DAILY
Qty: 10 CAPSULE | Refills: 0 | Status: SHIPPED | OUTPATIENT
Start: 2019-02-22 | End: 2019-02-27

## 2019-02-22 ASSESSMENT — ENCOUNTER SYMPTOMS
WHEEZING: 0
EYE DISCHARGE: 0
NAUSEA: 0
SINUS PAIN: 0
EYE ITCHING: 0
SHORTNESS OF BREATH: 0
SORE THROAT: 1
RHINORRHEA: 0
SINUS PRESSURE: 0
COUGH: 1
SWOLLEN GLANDS: 0

## 2019-02-22 ASSESSMENT — PATIENT HEALTH QUESTIONNAIRE - PHQ9
SUM OF ALL RESPONSES TO PHQ QUESTIONS 1-9: 0
SUM OF ALL RESPONSES TO PHQ9 QUESTIONS 1 & 2: 0
SUM OF ALL RESPONSES TO PHQ QUESTIONS 1-9: 0
DEPRESSION UNABLE TO ASSESS: FUNCTIONAL CAPACITY MOTIVATION LIMITS ACCURACY
1. LITTLE INTEREST OR PLEASURE IN DOING THINGS: 0
2. FEELING DOWN, DEPRESSED OR HOPELESS: 0

## 2019-02-28 ENCOUNTER — OFFICE VISIT (OUTPATIENT)
Dept: FAMILY MEDICINE CLINIC | Age: 39
End: 2019-02-28
Payer: COMMERCIAL

## 2019-02-28 VITALS
WEIGHT: 180 LBS | DIASTOLIC BLOOD PRESSURE: 68 MMHG | BODY MASS INDEX: 34.01 KG/M2 | HEART RATE: 84 BPM | SYSTOLIC BLOOD PRESSURE: 110 MMHG | TEMPERATURE: 97.7 F | OXYGEN SATURATION: 97 %

## 2019-02-28 DIAGNOSIS — H66.002 ACUTE SUPPURATIVE OTITIS MEDIA OF LEFT EAR WITHOUT SPONTANEOUS RUPTURE OF TYMPANIC MEMBRANE, RECURRENCE NOT SPECIFIED: ICD-10-CM

## 2019-02-28 DIAGNOSIS — J01.00 ACUTE NON-RECURRENT MAXILLARY SINUSITIS: Primary | ICD-10-CM

## 2019-02-28 PROCEDURE — 99213 OFFICE O/P EST LOW 20 MIN: CPT | Performed by: NURSE PRACTITIONER

## 2019-02-28 RX ORDER — CEFDINIR 300 MG/1
300 CAPSULE ORAL 2 TIMES DAILY
Qty: 14 CAPSULE | Refills: 0 | Status: SHIPPED | OUTPATIENT
Start: 2019-02-28 | End: 2019-03-07

## 2019-02-28 ASSESSMENT — ENCOUNTER SYMPTOMS
SHORTNESS OF BREATH: 0
NAUSEA: 0
COUGH: 1
CONSTIPATION: 0
CHEST TIGHTNESS: 0
SINUS PRESSURE: 1

## 2019-03-04 ENCOUNTER — HOSPITAL ENCOUNTER (EMERGENCY)
Age: 39
Discharge: HOME | End: 2019-03-04
Payer: COMMERCIAL

## 2019-03-04 VITALS
TEMPERATURE: 98 F | WEIGHT: 180 LBS | DIASTOLIC BLOOD PRESSURE: 71 MMHG | HEART RATE: 115 BPM | RESPIRATION RATE: 17 BRPM | BODY MASS INDEX: 35.34 KG/M2 | HEIGHT: 60 IN | SYSTOLIC BLOOD PRESSURE: 123 MMHG | OXYGEN SATURATION: 98 %

## 2019-03-04 DIAGNOSIS — S39.012A BACK STRAIN, INITIAL ENCOUNTER: Primary | ICD-10-CM

## 2019-03-04 LAB
BILIRUBIN URINE: NEGATIVE
BLOOD, URINE: NEGATIVE
CLARITY: CLEAR
COLOR: ABNORMAL
GLUCOSE URINE: >=1000 MG/DL
KETONES, URINE: NEGATIVE MG/DL
LEUKOCYTE ESTERASE, URINE: NEGATIVE
MICROSCOPIC EXAMINATION: ABNORMAL
NITRITE, URINE: NEGATIVE
PH UA: 6 (ref 5–8)
PROTEIN UA: NEGATIVE MG/DL
SPECIFIC GRAVITY UA: 1.01 (ref 1–1.03)
URINE TYPE: ABNORMAL
UROBILINOGEN, URINE: 0.2 E.U./DL

## 2019-03-04 PROCEDURE — 6370000000 HC RX 637 (ALT 250 FOR IP): Performed by: PHYSICIAN ASSISTANT

## 2019-03-04 PROCEDURE — 99283 EMERGENCY DEPT VISIT LOW MDM: CPT

## 2019-03-04 PROCEDURE — 81003 URINALYSIS AUTO W/O SCOPE: CPT

## 2019-03-04 RX ORDER — NAPROXEN 500 MG/1
500 TABLET ORAL 2 TIMES DAILY
Qty: 30 TABLET | Refills: 0 | Status: SHIPPED | OUTPATIENT
Start: 2019-03-04 | End: 2019-07-16

## 2019-03-04 RX ORDER — OXYCODONE HYDROCHLORIDE AND ACETAMINOPHEN 5; 325 MG/1; MG/1
1 TABLET ORAL ONCE
Status: COMPLETED | OUTPATIENT
Start: 2019-03-04 | End: 2019-03-04

## 2019-03-04 RX ORDER — HYDROCODONE BITARTRATE AND ACETAMINOPHEN 5; 325 MG/1; MG/1
1 TABLET ORAL EVERY 8 HOURS PRN
Qty: 10 TABLET | Refills: 0 | Status: SHIPPED | OUTPATIENT
Start: 2019-03-04 | End: 2019-03-07

## 2019-03-04 RX ORDER — KETOROLAC TROMETHAMINE 10 MG/1
10 TABLET, FILM COATED ORAL ONCE
Status: COMPLETED | OUTPATIENT
Start: 2019-03-04 | End: 2019-03-04

## 2019-03-04 RX ORDER — METHOCARBAMOL 750 MG/1
750 TABLET, FILM COATED ORAL ONCE
Status: COMPLETED | OUTPATIENT
Start: 2019-03-04 | End: 2019-03-04

## 2019-03-04 RX ORDER — METHOCARBAMOL 500 MG/1
500 TABLET, FILM COATED ORAL 3 TIMES DAILY
Qty: 15 TABLET | Refills: 0 | Status: SHIPPED | OUTPATIENT
Start: 2019-03-04 | End: 2019-12-04

## 2019-03-04 RX ADMIN — OXYCODONE AND ACETAMINOPHEN 1 TABLET: 5; 325 TABLET ORAL at 15:35

## 2019-03-04 RX ADMIN — KETOROLAC TROMETHAMINE 10 MG: 10 TABLET, FILM COATED ORAL at 15:35

## 2019-03-04 RX ADMIN — METHOCARBAMOL TABLETS 750 MG: 750 TABLET, COATED ORAL at 15:35

## 2019-03-04 ASSESSMENT — PAIN DESCRIPTION - DESCRIPTORS: DESCRIPTORS: ACHING

## 2019-03-04 ASSESSMENT — PAIN SCALES - GENERAL: PAINLEVEL_OUTOF10: 6

## 2019-03-04 ASSESSMENT — PAIN DESCRIPTION - LOCATION: LOCATION: BACK

## 2019-03-13 ENCOUNTER — HOSPITAL ENCOUNTER (EMERGENCY)
Age: 39
Discharge: HOME | End: 2019-03-13
Attending: EMERGENCY MEDICINE
Payer: COMMERCIAL

## 2019-03-13 VITALS
RESPIRATION RATE: 16 BRPM | TEMPERATURE: 98.1 F | SYSTOLIC BLOOD PRESSURE: 104 MMHG | HEART RATE: 85 BPM | WEIGHT: 180 LBS | DIASTOLIC BLOOD PRESSURE: 48 MMHG | BODY MASS INDEX: 35.15 KG/M2 | OXYGEN SATURATION: 97 %

## 2019-03-13 DIAGNOSIS — R73.9 HYPERGLYCEMIA: ICD-10-CM

## 2019-03-13 DIAGNOSIS — R19.7 DIARRHEA, UNSPECIFIED TYPE: Primary | ICD-10-CM

## 2019-03-13 DIAGNOSIS — Z76.0 MEDICATION REFILL: ICD-10-CM

## 2019-03-13 LAB
A/G RATIO: 1.1 (ref 1.1–2.2)
ALBUMIN SERPL-MCNC: 3.9 G/DL (ref 3.4–5)
ALP BLD-CCNC: 180 U/L (ref 40–129)
ALT SERPL-CCNC: 22 U/L (ref 10–40)
ANION GAP SERPL CALCULATED.3IONS-SCNC: 11 MMOL/L (ref 3–16)
AST SERPL-CCNC: 14 U/L (ref 15–37)
BASOPHILS ABSOLUTE: 0.1 K/UL (ref 0–0.2)
BASOPHILS RELATIVE PERCENT: 1 %
BILIRUB SERPL-MCNC: 0.3 MG/DL (ref 0–1)
BILIRUBIN URINE: NEGATIVE
BLOOD, URINE: NEGATIVE
BUN BLDV-MCNC: 13 MG/DL (ref 7–20)
CALCIUM SERPL-MCNC: 9.2 MG/DL (ref 8.3–10.6)
CHLORIDE BLD-SCNC: 101 MMOL/L (ref 99–110)
CLARITY: CLEAR
CO2: 26 MMOL/L (ref 21–32)
COLOR: YELLOW
CREAT SERPL-MCNC: 0.6 MG/DL (ref 0.6–1.1)
EOSINOPHILS ABSOLUTE: 0.2 K/UL (ref 0–0.6)
EOSINOPHILS RELATIVE PERCENT: 2.2 %
GFR AFRICAN AMERICAN: >60
GFR NON-AFRICAN AMERICAN: >60
GLOBULIN: 3.5 G/DL
GLUCOSE BLD-MCNC: 397 MG/DL (ref 70–99)
GLUCOSE URINE: >=1000 MG/DL
HCT VFR BLD CALC: 41.8 % (ref 36–48)
HEMOGLOBIN: 14.7 G/DL (ref 12–16)
KETONES, URINE: NEGATIVE MG/DL
LEUKOCYTE ESTERASE, URINE: NEGATIVE
LIPASE: 44 U/L (ref 13–60)
LYMPHOCYTES ABSOLUTE: 2.9 K/UL (ref 1–5.1)
LYMPHOCYTES RELATIVE PERCENT: 37.9 %
MCH RBC QN AUTO: 31 PG (ref 26–34)
MCHC RBC AUTO-ENTMCNC: 35.1 G/DL (ref 31–36)
MCV RBC AUTO: 88.2 FL (ref 80–100)
MICROSCOPIC EXAMINATION: ABNORMAL
MONOCYTES ABSOLUTE: 0.6 K/UL (ref 0–1.3)
MONOCYTES RELATIVE PERCENT: 8.3 %
NEUTROPHILS ABSOLUTE: 3.9 K/UL (ref 1.7–7.7)
NEUTROPHILS RELATIVE PERCENT: 50.6 %
NITRITE, URINE: NEGATIVE
PDW BLD-RTO: 13 % (ref 12.4–15.4)
PH UA: 6 (ref 5–8)
PLATELET # BLD: 277 K/UL (ref 135–450)
PMV BLD AUTO: 9.2 FL (ref 5–10.5)
POTASSIUM SERPL-SCNC: 3.7 MMOL/L (ref 3.5–5.1)
PROTEIN UA: NEGATIVE MG/DL
RBC # BLD: 4.74 M/UL (ref 4–5.2)
SODIUM BLD-SCNC: 138 MMOL/L (ref 136–145)
SPECIFIC GRAVITY UA: 1.02 (ref 1–1.03)
TOTAL PROTEIN: 7.4 G/DL (ref 6.4–8.2)
URINE TYPE: ABNORMAL
UROBILINOGEN, URINE: 0.2 E.U./DL
WBC # BLD: 7.6 K/UL (ref 4–11)

## 2019-03-13 PROCEDURE — 99283 EMERGENCY DEPT VISIT LOW MDM: CPT

## 2019-03-13 PROCEDURE — 80053 COMPREHEN METABOLIC PANEL: CPT

## 2019-03-13 PROCEDURE — 2580000003 HC RX 258: Performed by: EMERGENCY MEDICINE

## 2019-03-13 PROCEDURE — 85025 COMPLETE CBC W/AUTO DIFF WBC: CPT

## 2019-03-13 PROCEDURE — 83690 ASSAY OF LIPASE: CPT

## 2019-03-13 PROCEDURE — 96374 THER/PROPH/DIAG INJ IV PUSH: CPT

## 2019-03-13 PROCEDURE — 6370000000 HC RX 637 (ALT 250 FOR IP): Performed by: EMERGENCY MEDICINE

## 2019-03-13 PROCEDURE — 96361 HYDRATE IV INFUSION ADD-ON: CPT

## 2019-03-13 PROCEDURE — 81003 URINALYSIS AUTO W/O SCOPE: CPT

## 2019-03-13 PROCEDURE — 96375 TX/PRO/DX INJ NEW DRUG ADDON: CPT

## 2019-03-13 PROCEDURE — 2500000003 HC RX 250 WO HCPCS: Performed by: EMERGENCY MEDICINE

## 2019-03-13 PROCEDURE — 6360000002 HC RX W HCPCS: Performed by: EMERGENCY MEDICINE

## 2019-03-13 RX ORDER — ACETAMINOPHEN 500 MG
1000 TABLET ORAL ONCE
Status: COMPLETED | OUTPATIENT
Start: 2019-03-13 | End: 2019-03-13

## 2019-03-13 RX ORDER — ONDANSETRON 2 MG/ML
4 INJECTION INTRAMUSCULAR; INTRAVENOUS ONCE
Status: COMPLETED | OUTPATIENT
Start: 2019-03-13 | End: 2019-03-13

## 2019-03-13 RX ORDER — SUCRALFATE 1 G/1
1 TABLET ORAL EVERY 6 HOURS SCHEDULED
Status: DISCONTINUED | OUTPATIENT
Start: 2019-03-13 | End: 2019-03-13 | Stop reason: HOSPADM

## 2019-03-13 RX ORDER — 0.9 % SODIUM CHLORIDE 0.9 %
1000 INTRAVENOUS SOLUTION INTRAVENOUS ONCE
Status: COMPLETED | OUTPATIENT
Start: 2019-03-13 | End: 2019-03-13

## 2019-03-13 RX ADMIN — ACETAMINOPHEN 1000 MG: 500 TABLET ORAL at 04:59

## 2019-03-13 RX ADMIN — SUCRALFATE 1 G: 1 TABLET ORAL at 06:09

## 2019-03-13 RX ADMIN — ONDANSETRON 4 MG: 2 INJECTION INTRAMUSCULAR; INTRAVENOUS at 04:58

## 2019-03-13 RX ADMIN — FAMOTIDINE 20 MG: 10 INJECTION, SOLUTION INTRAVENOUS at 04:59

## 2019-03-13 RX ADMIN — SODIUM CHLORIDE 1000 ML: 9 INJECTION, SOLUTION INTRAVENOUS at 04:59

## 2019-03-13 ASSESSMENT — PAIN SCALES - GENERAL
PAINLEVEL_OUTOF10: 6
PAINLEVEL_OUTOF10: 4
PAINLEVEL_OUTOF10: 6

## 2019-03-13 ASSESSMENT — PAIN DESCRIPTION - LOCATION
LOCATION: RECTUM
LOCATION: ABDOMEN

## 2019-03-13 ASSESSMENT — PAIN DESCRIPTION - PAIN TYPE
TYPE: ACUTE PAIN
TYPE: ACUTE PAIN

## 2019-04-08 ENCOUNTER — OFFICE VISIT (OUTPATIENT)
Dept: FAMILY MEDICINE CLINIC | Age: 39
End: 2019-04-08
Payer: COMMERCIAL

## 2019-04-08 VITALS
TEMPERATURE: 98.1 F | RESPIRATION RATE: 16 BRPM | OXYGEN SATURATION: 99 % | HEART RATE: 85 BPM | SYSTOLIC BLOOD PRESSURE: 96 MMHG | HEIGHT: 61 IN | WEIGHT: 182.9 LBS | BODY MASS INDEX: 34.53 KG/M2 | DIASTOLIC BLOOD PRESSURE: 70 MMHG

## 2019-04-08 DIAGNOSIS — J06.9 VIRAL URI: Primary | ICD-10-CM

## 2019-04-08 DIAGNOSIS — K21.9 GASTROESOPHAGEAL REFLUX DISEASE WITHOUT ESOPHAGITIS: ICD-10-CM

## 2019-04-08 DIAGNOSIS — G44.52 NEW DAILY PERSISTENT HEADACHE: ICD-10-CM

## 2019-04-08 PROCEDURE — 99214 OFFICE O/P EST MOD 30 MIN: CPT | Performed by: PHYSICIAN ASSISTANT

## 2019-04-08 RX ORDER — OMEPRAZOLE 40 MG/1
40 CAPSULE, DELAYED RELEASE ORAL
Qty: 30 CAPSULE | Refills: 0 | Status: SHIPPED | OUTPATIENT
Start: 2019-04-08 | End: 2019-12-04 | Stop reason: SDUPTHER

## 2019-04-08 RX ORDER — FLUTICASONE PROPIONATE 50 MCG
2 SPRAY, SUSPENSION (ML) NASAL DAILY
Qty: 1 BOTTLE | Refills: 11 | Status: SHIPPED | OUTPATIENT
Start: 2019-04-08 | End: 2019-11-12 | Stop reason: SDUPTHER

## 2019-04-08 RX ORDER — KETOROLAC TROMETHAMINE 30 MG/ML
30 INJECTION, SOLUTION INTRAMUSCULAR; INTRAVENOUS ONCE
Status: DISCONTINUED | OUTPATIENT
Start: 2019-04-08 | End: 2019-04-08

## 2019-04-08 RX ORDER — KETOROLAC TROMETHAMINE 30 MG/ML
30 INJECTION, SOLUTION INTRAMUSCULAR; INTRAVENOUS ONCE
Status: COMPLETED | OUTPATIENT
Start: 2019-04-08 | End: 2019-04-08

## 2019-04-08 RX ADMIN — KETOROLAC TROMETHAMINE 30 MG: 30 INJECTION, SOLUTION INTRAMUSCULAR; INTRAVENOUS at 15:56

## 2019-04-08 ASSESSMENT — ENCOUNTER SYMPTOMS
DIARRHEA: 0
COUGH: 0
ABDOMINAL PAIN: 0
SHORTNESS OF BREATH: 0
VOMITING: 0
NAUSEA: 0
CONSTIPATION: 0
RHINORRHEA: 0

## 2019-04-08 NOTE — PROGRESS NOTES
4/10/2019  March  (: 1980)  35 y.o. HPI    Patient presents for evaluation of HA. Patient states that she has had persistent headache for the last 3 days. It is located in the frontal region, on both sides, occasionally behind her eyes. Endorses photophobia. Also endorses blurry vision. Denies speech difficulties, facial paralysis, and extremity weakness. Has tried tylenol/ibuprofen without improvement in sxs. Does have some other URI symptoms as well, nasal congestion and sore throat, but feels theses are getting better, HA is getting worse. DM: patient states that she had been having insurance issues but has gotten new coverage. Has had her insulin steadily over the last month. Has been using toujeo, lispro, and victoza. Has reflux with victoza, well controlled with omeprazole; denies dysphagia/odynophagia. Has had great improvement in BG, trending down into the low 100s. Occasional low of 80. Has also been monitoring diet. Review of Systems   Constitutional: Negative for activity change, chills and fever. HENT: Positive for congestion and sore throat. Negative for ear pain and rhinorrhea. Eyes: Positive for visual disturbance (blurry). Respiratory: Negative for cough and shortness of breath. Cardiovascular: Negative for chest pain and palpitations. Gastrointestinal: Negative for abdominal pain, constipation, diarrhea, nausea and vomiting. Genitourinary: Negative for difficulty urinating and dysuria. Musculoskeletal: Negative for arthralgias and myalgias. Skin: Negative for rash. Neurological: Positive for dizziness and headaches. Negative for weakness and numbness. Psychiatric/Behavioral: Negative for sleep disturbance. Allergies, past medical history, family history, and social history reviewed and unchanged from previous encounter.      Current Outpatient Medications   Medication Sig Dispense Refill    omeprazole (PRILOSEC) 40 MG delayed release capsule Take 1 capsule by mouth every morning (before breakfast) 30 capsule 0    fluticasone (FLONASE) 50 MCG/ACT nasal spray 2 sprays by Nasal route daily 1 Bottle 11    insulin glargine (TOUJEO SOLOSTAR) 300 UNIT/ML injection pen INJECT 60 UNITS SUBCUTANEOUSLY ONCE DAILY 6 pen 1    insulin lispro (HUMALOG KWIKPEN) 100 UNIT/ML pen 20 units tid ac 15 pen 1    Liraglutide (VICTOZA) 18 MG/3ML SOPN SC injection Inject 1.8 mg into the skin daily 3 pen 1    naproxen (NAPROSYN) 500 MG tablet Take 1 tablet by mouth 2 times daily 30 tablet 0    methocarbamol (ROBAXIN) 500 MG tablet Take 1 tablet by mouth 3 times daily 15 tablet 0    methocarbamol (ROBAXIN) 500 MG tablet Take 500 mg by mouth 4 times daily      naproxen (NAPROSYN) 500 MG tablet Take 1 tablet by mouth 2 times daily (with meals) 20 tablet 0    Insulin Pen Needle (B-D UF III MINI PEN NEEDLES) 31G X 5 MM MISC Inject 1 each into the skin 4 times daily 100 each 0    metFORMIN (GLUCOPHAGE) 1000 MG tablet TAKE ONE TABLET BY MOUTH TWICE DAILY WITH MEALS 60 tablet 1    blood glucose test strips (ASCENSIA AUTODISC VI;ONE TOUCH ULTRA TEST VI) strip DX: E11.9-One touch ultra strips. FSBS 3 times daily 100 strip 5    albuterol sulfate HFA (PROAIR HFA) 108 (90 BASE) MCG/ACT inhaler Inhale 2 puffs into the lungs every 6 hours as needed for Wheezing 1 Inhaler 3    Lancets MISC DX: E 11.9-Uses insulin. FSBS 3 times daily-Delica lancets for one touch ultra  each 5    glucose monitoring kit (FREESTYLE) monitoring kit Dx E11.9-One touch ultra II meter-uses tid 1 kit 0    mirtazapine (REMERON) 15 MG tablet Take 15 mg by mouth daily       No current facility-administered medications for this visit.         Vitals:    04/08/19 1518   BP: 96/70   Site: Right Upper Arm   Position: Sitting   Cuff Size: Medium Adult   Pulse: 85   Resp: 16   Temp: 98.1 °F (36.7 °C)   SpO2: 99%   Weight: 182 lb 14.4 oz (83 kg)   Height: 5' 1\" (1.549 m)     Estimated body mass index is Diabetes Mellitus.

## 2019-04-10 ASSESSMENT — ENCOUNTER SYMPTOMS: SORE THROAT: 1

## 2019-04-30 ENCOUNTER — TELEPHONE (OUTPATIENT)
Dept: FAMILY MEDICINE CLINIC | Age: 39
End: 2019-04-30

## 2019-04-30 ENCOUNTER — OFFICE VISIT (OUTPATIENT)
Dept: FAMILY MEDICINE CLINIC | Age: 39
End: 2019-04-30
Payer: COMMERCIAL

## 2019-04-30 VITALS
DIASTOLIC BLOOD PRESSURE: 62 MMHG | TEMPERATURE: 98.2 F | HEART RATE: 88 BPM | WEIGHT: 191.4 LBS | BODY MASS INDEX: 36.16 KG/M2 | SYSTOLIC BLOOD PRESSURE: 120 MMHG

## 2019-04-30 DIAGNOSIS — H93.13 TINNITUS OF BOTH EARS: ICD-10-CM

## 2019-04-30 DIAGNOSIS — G44.52 NEW DAILY PERSISTENT HEADACHE: Primary | ICD-10-CM

## 2019-04-30 PROCEDURE — 99203 OFFICE O/P NEW LOW 30 MIN: CPT | Performed by: NURSE PRACTITIONER

## 2019-04-30 PROCEDURE — 96372 THER/PROPH/DIAG INJ SC/IM: CPT | Performed by: NURSE PRACTITIONER

## 2019-04-30 RX ORDER — ZOLMITRIPTAN 2.5 MG/1
2.5 TABLET, FILM COATED ORAL PRN
Qty: 6 TABLET | Refills: 0 | Status: SHIPPED | OUTPATIENT
Start: 2019-04-30 | End: 2019-12-04 | Stop reason: ALTCHOICE

## 2019-04-30 RX ORDER — KETOROLAC TROMETHAMINE 30 MG/ML
30 INJECTION, SOLUTION INTRAMUSCULAR; INTRAVENOUS ONCE
Status: COMPLETED | OUTPATIENT
Start: 2019-04-30 | End: 2019-04-30

## 2019-04-30 RX ORDER — KETOROLAC TROMETHAMINE 30 MG/ML
30 INJECTION, SOLUTION INTRAMUSCULAR; INTRAVENOUS ONCE
Qty: 1 ML | Refills: 0
Start: 2019-04-30 | End: 2019-04-30 | Stop reason: CLARIF

## 2019-04-30 RX ADMIN — KETOROLAC TROMETHAMINE 30 MG: 30 INJECTION, SOLUTION INTRAMUSCULAR; INTRAVENOUS at 15:32

## 2019-04-30 ASSESSMENT — ENCOUNTER SYMPTOMS
SINUS PAIN: 0
EYE DISCHARGE: 0
SCALP TENDERNESS: 1
BLURRED VISION: 1
COUGH: 0
VOMITING: 0
SHORTNESS OF BREATH: 0
SINUS PRESSURE: 0
ABDOMINAL PAIN: 0
COLOR CHANGE: 0
PHOTOPHOBIA: 1
TROUBLE SWALLOWING: 0
NAUSEA: 0
CHEST TIGHTNESS: 0
BACK PAIN: 0
SORE THROAT: 0

## 2019-04-30 NOTE — TELEPHONE ENCOUNTER
Pharmacy called stating no insurance can we switch from zomig to fumasumatriptan 25, 50, 0r 100 mg .     Spoke with Diomedes Cornelius she ok'd to switch to the fumasumatriptan of 25 mg    [facility]Current back spoke to Sohail Marino informed of the change

## 2019-04-30 NOTE — PROGRESS NOTES
2019     Sarah Garcia (:  1980) is a 35 y.o. female, here for evaluation of the following medical concerns: She is having headaches for a couple weeks. She was seen at [facility] and got a shot, that helped. She has a MRI scheduled this week. Pressure in the margareth and sides of head with bending over. She also has ringing in her ears. She is a diabetic, she is on medication, sugars stable. No highs or lows. She is taking ibuprofen and tylenol for headaches. She is fatigued and weak after a headache. Her vision becomes blurry with the headaches. She does not have issues with blood pressure. No blood pressure medications. The pillow is hurting her. She becomes hot and flushed and then the headache comes on. No changes in life. Headache    This is a recurrent problem. The current episode started 1 to 4 weeks ago. The problem occurs daily. The problem has been unchanged. The pain is located in the occipital and frontal region. The pain radiates to the right neck and left neck. Quality: extremely painful. The pain is at a severity of 4/10. The pain is mild. Associated symptoms include anorexia, blurred vision, dizziness, ear pain, phonophobia, photophobia, scalp tenderness and weakness. Pertinent negatives include no abdominal pain, back pain, coughing, fever, nausea, seizures, sinus pressure, sore throat or vomiting. The symptoms are aggravated by unknown. She has tried acetaminophen, darkened room and NSAIDs for the symptoms. The treatment provided mild relief. Her past medical history is significant for obesity. There is no history of migraines in the family (daughter). Review of Systems   Constitutional: Positive for activity change. Negative for appetite change, chills, diaphoresis, fatigue and fever. HENT: Positive for ear pain. Negative for congestion, postnasal drip, sinus pressure, sinus pain, sore throat and trouble swallowing.     Eyes: Positive for blurred vision and TABLET BY MOUTH TWICE DAILY WITH MEALS Yes IRASEMA Fontaine - CNP   blood glucose test strips (ASCENSIA AUTODISC VI;ONE TOUCH ULTRA TEST VI) strip DX: E11.9-One touch ultra strips. FSBS 3 times daily Yes EVETTE Mccord   albuterol sulfate HFA (PROAIR HFA) 108 (90 BASE) MCG/ACT inhaler Inhale 2 puffs into the lungs every 6 hours as needed for Wheezing Yes Jose Wells MD   Lancets MISC DX: E 11.9-Uses insulin. FSBS 3 times daily-Delica lancets for one touch ultra II Yes Blake Krueger DO   glucose monitoring kit (FREESTYLE) monitoring kit Dx E11.9-One touch ultra II meter-uses tid Yes Blake Krueger DO   methocarbamol (ROBAXIN) 500 MG tablet Take 1 tablet by mouth 3 times daily  EVETTE Frost   methocarbamol (ROBAXIN) 500 MG tablet Take 500 mg by mouth 4 times daily  Historical Provider, MD   mirtazapine (REMERON) 15 MG tablet Take 15 mg by mouth daily  Historical Provider, MD        Social History     Tobacco Use    Smoking status: Former Smoker     Years: 0.00     Types: Cigarettes     Last attempt to quit: 1999     Years since quittin.6    Smokeless tobacco: Never Used    Tobacco comment: smoked for 1 months when teenager   Substance Use Topics    Alcohol use: Yes     Comment: social drinker  4 beer per month        Vitals:    19 1422   BP: 120/62   Pulse: 88   Temp: 98.2 °F (36.8 °C)   TempSrc: Oral   Weight: 191 lb 6.4 oz (86.8 kg)     Estimated body mass index is 36.16 kg/m² as calculated from the following:    Height as of 19: 5' 1\" (1.549 m). Weight as of this encounter: 191 lb 6.4 oz (86.8 kg). Physical Exam   Constitutional: She is oriented to person, place, and time. Vital signs are normal. She appears well-developed and well-nourished. No distress. HENT:   Head: Normocephalic and atraumatic.    Right Ear: External ear normal.   Left Ear: External ear normal.   Nose: Nose normal.   Mouth/Throat: Oropharynx is clear and moist.   Eyes: Pupils are equal, round, and reactive to light. Conjunctivae and EOM are normal. Right eye exhibits no discharge. Left eye exhibits no discharge. Neck: Normal range of motion. Neck supple. Cardiovascular: Normal rate, regular rhythm, normal heart sounds and intact distal pulses. Pulmonary/Chest: Effort normal and breath sounds normal. No respiratory distress. Abdominal: Soft. Musculoskeletal: Normal range of motion. She exhibits no deformity. Neurological: She is alert and oriented to person, place, and time. Skin: Skin is warm and dry. She is not diaphoretic. Psychiatric: She has a normal mood and affect. Her behavior is normal. Judgment and thought content normal.   Vitals reviewed. ASSESSMENT/PLAN:  Lavon Foreman was seen today for headache. Diagnoses and all orders for this visit:    New daily persistent headache  -     DONATO - Naomy Verma DO, Neurology, [place]  -     ketorolac (TORADOL) 30 MG/ML injection; Inject 1 mL into the muscle once for 1 dose  -     ZOLMitriptan (ZOMIG) 2.5 MG tablet; Take 1 tablet by mouth as needed for Migraine    Tinnitus of both ears  -     DONATO - Naomy Verma DO, Neurology, [facility]    MRI tomorrow    Return if symptoms worsen or fail to improve. An electronic signature was used to authenticate this note. --JUANCARLOS Cross on 4/30/2019 at3:39 PM

## 2019-05-01 ENCOUNTER — HOSPITAL ENCOUNTER (OUTPATIENT)
Dept: CT IMAGING | Age: 39
Discharge: HOME | End: 2019-05-01
Payer: MEDICAID

## 2019-05-01 DIAGNOSIS — G44.52 NEW DAILY PERSISTENT HEADACHE: ICD-10-CM

## 2019-05-01 PROCEDURE — 70450 CT HEAD/BRAIN W/O DYE: CPT

## 2019-05-08 ENCOUNTER — OFFICE VISIT (OUTPATIENT)
Dept: FAMILY MEDICINE CLINIC | Age: 39
End: 2019-05-08
Payer: MEDICAID

## 2019-05-08 VITALS
DIASTOLIC BLOOD PRESSURE: 78 MMHG | OXYGEN SATURATION: 97 % | SYSTOLIC BLOOD PRESSURE: 106 MMHG | TEMPERATURE: 98 F | WEIGHT: 192 LBS | BODY MASS INDEX: 36.28 KG/M2 | HEART RATE: 80 BPM

## 2019-05-08 DIAGNOSIS — L72.9 INFECTED CYST OF SKIN: Primary | ICD-10-CM

## 2019-05-08 DIAGNOSIS — L08.9 INFECTED CYST OF SKIN: Primary | ICD-10-CM

## 2019-05-08 PROCEDURE — 99213 OFFICE O/P EST LOW 20 MIN: CPT | Performed by: PHYSICIAN ASSISTANT

## 2019-05-08 RX ORDER — ACETAMINOPHEN AND CODEINE PHOSPHATE 300; 30 MG/1; MG/1
1 TABLET ORAL EVERY 6 HOURS PRN
Qty: 18 TABLET | Refills: 0 | Status: SHIPPED | OUTPATIENT
Start: 2019-05-08 | End: 2019-05-11

## 2019-05-08 RX ORDER — SULFAMETHOXAZOLE AND TRIMETHOPRIM 800; 160 MG/1; MG/1
1 TABLET ORAL 2 TIMES DAILY
Qty: 14 TABLET | Refills: 0 | Status: SHIPPED | OUTPATIENT
Start: 2019-05-08 | End: 2019-05-15

## 2019-05-08 ASSESSMENT — ENCOUNTER SYMPTOMS
COLOR CHANGE: 1
ABDOMINAL PAIN: 0

## 2019-05-08 NOTE — PROGRESS NOTES
Gavino Waters 35 y.o. female    Chief Complaint   Patient presents with    Cyst     top of vaginia        HPI  Patient is here for evaluation of cyst near her vagina. This is a recurrent cyst, last one was 2 years ago. She first started noticing pain and swelling in the area on Sunday. It has gradually increased in size and discomfort. She is having pain while walking. She has not noticed any drainage from the area. It is slightly pink, possibly due to rubbing of undergarments. Hx of I&D in this same area. Hx of diabetes. Current Outpatient Medications:     sulfamethoxazole-trimethoprim (BACTRIM DS;SEPTRA DS) 800-160 MG per tablet, Take 1 tablet by mouth 2 times daily for 7 days, Disp: 14 tablet, Rfl: 0    acetaminophen-codeine (TYLENOL/CODEINE #3) 300-30 MG per tablet, Take 1 tablet by mouth every 6 hours as needed for Pain for up to 3 days. Intended supply: 3 days.  Take lowest dose possible to manage pain, Disp: 18 tablet, Rfl: 0    ZOLMitriptan (ZOMIG) 2.5 MG tablet, Take 1 tablet by mouth as needed for Migraine, Disp: 6 tablet, Rfl: 0    omeprazole (PRILOSEC) 40 MG delayed release capsule, Take 1 capsule by mouth every morning (before breakfast), Disp: 30 capsule, Rfl: 0    fluticasone (FLONASE) 50 MCG/ACT nasal spray, 2 sprays by Nasal route daily, Disp: 1 Bottle, Rfl: 11    insulin glargine (TOUJEO SOLOSTAR) 300 UNIT/ML injection pen, INJECT 60 UNITS SUBCUTANEOUSLY ONCE DAILY, Disp: 6 pen, Rfl: 1    insulin lispro (HUMALOG KWIKPEN) 100 UNIT/ML pen, 20 units tid ac, Disp: 15 pen, Rfl: 1    Liraglutide (VICTOZA) 18 MG/3ML SOPN SC injection, Inject 1.8 mg into the skin daily, Disp: 3 pen, Rfl: 1    naproxen (NAPROSYN) 500 MG tablet, Take 1 tablet by mouth 2 times daily, Disp: 30 tablet, Rfl: 0    methocarbamol (ROBAXIN) 500 MG tablet, Take 1 tablet by mouth 3 times daily, Disp: 15 tablet, Rfl: 0    methocarbamol (ROBAXIN) 500 MG tablet, Take 500 mg by mouth 4 times daily, Disp: , Rfl:     naproxen (NAPROSYN) 500 MG tablet, Take 1 tablet by mouth 2 times daily (with meals), Disp: 20 tablet, Rfl: 0    Insulin Pen Needle (B-D UF III MINI PEN NEEDLES) 31G X 5 MM MISC, Inject 1 each into the skin 4 times daily, Disp: 100 each, Rfl: 0    metFORMIN (GLUCOPHAGE) 1000 MG tablet, TAKE ONE TABLET BY MOUTH TWICE DAILY WITH MEALS, Disp: 60 tablet, Rfl: 1    mirtazapine (REMERON) 15 MG tablet, Take 15 mg by mouth daily, Disp: , Rfl:     blood glucose test strips (ASCENSIA AUTODISC VI;ONE TOUCH ULTRA TEST VI) strip, DX: E11.9-One touch ultra strips. FSBS 3 times daily, Disp: 100 strip, Rfl: 5    albuterol sulfate HFA (PROAIR HFA) 108 (90 BASE) MCG/ACT inhaler, Inhale 2 puffs into the lungs every 6 hours as needed for Wheezing, Disp: 1 Inhaler, Rfl: 3    Lancets MISC, DX: E 11.9-Uses insulin. FSBS 3 times daily-Delica lancets for one touch ultra II, Disp: 100 each, Rfl: 5    glucose monitoring kit (FREESTYLE) monitoring kit, Dx E11.9-One touch ultra II meter-uses tid, Disp: 1 kit, Rfl: 0      Vitals:    05/08/19 1030   BP: 106/78   Pulse: 80   Temp: 98 °F (36.7 °C)   SpO2: 97%       Review of Systems   Constitutional: Negative for activity change, appetite change, chills and fever. Gastrointestinal: Negative for abdominal pain. Genitourinary: Negative for vaginal bleeding, vaginal discharge and vaginal pain. Musculoskeletal: Negative for arthralgias. Skin: Positive for color change. Hematological: Negative for adenopathy. Does not bruise/bleed easily. Physical Exam   Constitutional: She appears well-developed and well-nourished. Cardiovascular: Normal rate, regular rhythm and normal heart sounds. Pulmonary/Chest: Effort normal and breath sounds normal.   Genitourinary:         Skin:   Mild erythema of overlying skin   Vitals reviewed. Assessment    1.  Infected cyst of skin        Plan    Taiwo Beauchamp was seen today for cyst.    Diagnoses and all orders for this visit:    Infected cyst of skin  -     sulfamethoxazole-trimethoprim (BACTRIM DS;SEPTRA DS) 800-160 MG per tablet; Take 1 tablet by mouth 2 times daily for 7 days  -     acetaminophen-codeine (TYLENOL/CODEINE #3) 300-30 MG per tablet; Take 1 tablet by mouth every 6 hours as needed for Pain for up to 3 days. Intended supply: 3 days.  Take lowest dose possible to manage pain  -   Follow up with Dr. Mary Persaud for I&D

## 2019-05-08 NOTE — LETTER
[address] Primary Care  [address] [facility] [address]  Phone: [phone]  Fax: [phone]    Melvin Hare        May 8, 2019     Patient: Katt Boston   YOB: 1980   Date of Visit: 5/8/2019       To Whom It May Concern: It is my medical opinion that Katt Boston may return to work on 05/10/2019. Please excuse abscences from 05/07 and 05/08. .    If you have any questions or concerns, please don't hesitate to call.     Sincerely,            EVETTE Hare

## 2019-05-09 ENCOUNTER — TELEPHONE (OUTPATIENT)
Dept: FAMILY MEDICINE CLINIC | Age: 39
End: 2019-05-09

## 2019-05-09 ENCOUNTER — PROCEDURE VISIT (OUTPATIENT)
Dept: FAMILY MEDICINE CLINIC | Age: 39
End: 2019-05-09
Payer: MEDICAID

## 2019-05-09 VITALS — DIASTOLIC BLOOD PRESSURE: 80 MMHG | SYSTOLIC BLOOD PRESSURE: 110 MMHG | TEMPERATURE: 97.8 F

## 2019-05-09 DIAGNOSIS — N73.9 PELVIC ABSCESS IN FEMALE: Primary | ICD-10-CM

## 2019-05-09 PROCEDURE — 10060 I&D ABSCESS SIMPLE/SINGLE: CPT | Performed by: FAMILY MEDICINE

## 2019-05-09 RX ORDER — CEPHALEXIN 500 MG/1
500 CAPSULE ORAL 3 TIMES DAILY
Qty: 21 CAPSULE | Refills: 0 | Status: SHIPPED | OUTPATIENT
Start: 2019-05-09 | End: 2019-05-16

## 2019-05-09 RX ORDER — CEPHALEXIN 500 MG/1
500 CAPSULE ORAL 3 TIMES DAILY
Qty: 21 CAPSULE | Refills: 0 | Status: SHIPPED | OUTPATIENT
Start: 2019-05-09 | End: 2019-05-09 | Stop reason: SDUPTHER

## 2019-05-09 RX ORDER — ACETAMINOPHEN AND CODEINE PHOSPHATE 300; 30 MG/1; MG/1
1 TABLET ORAL EVERY 4 HOURS PRN
Qty: 30 TABLET | Refills: 0 | Status: SHIPPED | OUTPATIENT
Start: 2019-05-09 | End: 2019-05-14

## 2019-05-09 ASSESSMENT — ENCOUNTER SYMPTOMS
SHORTNESS OF BREATH: 0
ABDOMINAL DISTENTION: 0
DIARRHEA: 0
COLOR CHANGE: 1
CONSTIPATION: 0
BACK PAIN: 0
NAUSEA: 0
VOMITING: 0
ABDOMINAL PAIN: 0

## 2019-05-09 NOTE — LETTER
[address] [place] [address] Primary Care  [address] [facility] [address]  Phone: [phone]  Fax: [phone]    Davis Flores MD        May 9, 2019     Patient: Nazia Jarrell   YOB: 1980   Date of Visit: 5/9/2019       To Whom It May Concern: It is my medical opinion that Nazia Jarrell may return to work on 5/13/19. If you have any questions or concerns, please don't hesitate to call.     Sincerely,          Davis Flores MD

## 2019-05-09 NOTE — PATIENT INSTRUCTIONS
Patient Education        Skin Abscess: Care Instructions  Your Care Instructions    A skin abscess is a bacterial infection that forms a pocket of pus. A boil is a kind of skin abscess. The doctor may have cut an opening in the abscess so that the pus can drain out. You may have gauze in the cut so that the abscess will stay open and keep draining. You may need antibiotics. You will need to follow up with your doctor to make sure the infection has gone away. The doctor has checked you carefully, but problems can develop later. If you notice any problems or new symptoms, get medical treatment right away. Follow-up care is a key part of your treatment and safety. Be sure to make and go to all appointments, and call your doctor if you are having problems. It's also a good idea to know your test results and keep a list of the medicines you take. How can you care for yourself at home? · Apply warm and dry compresses, a heating pad set on low, or a hot water bottle 3 or 4 times a day for pain. Keep a cloth between the heat source and your skin. · If your doctor prescribed antibiotics, take them as directed. Do not stop taking them just because you feel better. You need to take the full course of antibiotics. · Take pain medicines exactly as directed. ? If the doctor gave you a prescription medicine for pain, take it as prescribed. ? If you are not taking a prescription pain medicine, ask your doctor if you can take an over-the-counter medicine. · Keep your bandage clean and dry. Change the bandage whenever it gets wet or dirty, or at least one time a day. · If the abscess was packed with gauze:  ? Keep follow-up appointments to have the gauze changed or removed. If the doctor instructed you to remove the gauze, follow the instructions you were given for how to remove it. ? After the gauze is removed, soak the area in warm water for 15 to 20 minutes 2 times a day, until the wound closes.   When should you call for help? Call your doctor now or seek immediate medical care if:    · You have signs of worsening infection, such as:  ? Increased pain, swelling, warmth, or redness. ? Red streaks leading from the infected skin. ? Pus draining from the wound. ? A fever.    Watch closely for changes in your health, and be sure to contact your doctor if:    · You do not get better as expected. Where can you learn more? Go to https://[email or web address] [email or web address]. org and sign in to your [employer] account. Enter S231 in the FunGoPlay box to learn more about \"Skin Abscess: Care Instructions. \"     If you do not have an account, please click on the \"Sign Up Now\" link. Current as of: April 17, 2018  Content Version: 12.0  © 3675-8780 [employer]. Care instructions adapted under license by [facility] ([facility]). If you have questions about a medical condition or this instruction, always ask your healthcare professional. [place] [address] any warranty or liability for your use of this information. Patient Education        Wound Check: Care Instructions  Your Care Instructions  People have wounds that need care for many reasons. You may have a cut that needs care after surgery. You may have a cut or puncture wound from an accident. Or you may have a wound because of a condition like diabetes. Whatever the cause of your wound, there are things you can do to care for it at home. Your doctor may also want you to come back for a wound check. The wound check lets the doctor know how your wound is healing and if you need more treatment. Follow-up care is a key part of your treatment and safety. Be sure to make and go to all appointments, and call your doctor if you are having problems. It's also a good idea to know your test results and keep a list of the medicines you take. How can you care for yourself at home?   · If your doctor told you how to care for your wound, follow your doctor's instructions. If you did not get instructions, follow this general advice:  ? You may cover the wound with a thin layer of petroleum jelly, such as Vaseline, and a nonstick bandage. ? Apply more petroleum jelly and replace the bandage as needed. · Keep the wound dry for the first 24 to 48 hours. After this, you can shower if your doctor okays it. Pat the wound dry. · Be safe with medicines. Read and follow all instructions on the label. ? If the doctor gave you a prescription medicine for pain, take it as prescribed. ? If you are not taking a prescription pain medicine, ask your doctor if you can take an over-the-counter medicine. · If your doctor prescribed antibiotics, take them as directed. Do not stop taking them just because you feel better. You need to take the full course of antibiotics. · If you have stitches, do not remove them on your own. Your doctor will tell you when to come back to have them removed. · If you have Steri-Strips, leave them on until they fall off. · If possible, prop up the injured area on a pillow anytime you sit or lie down during the next 3 days. Try to keep it above the level of your heart. This will help reduce swelling. When should you call for help? Call your doctor now or seek immediate medical care if:    · You have new pain, or the pain gets worse.     · The skin near the wound is cold or pale or changes color.     · You have tingling, weakness, or numbness near the wound.     · The wound starts to bleed, and blood soaks through the bandage. Oozing small amounts of blood is normal.     · You have symptoms of infection, such as:  ? Increased pain, swelling, warmth, or redness. ? Red streaks leading from the wound. ? Pus draining from the wound. ? A fever.    Watch closely for changes in your health, and be sure to contact your doctor if:    · You do not get better as expected. Where can you learn more? Go to https://alconeb.health-partners. org and sign in to your [email or web address] account. Enter  in the Bycler box to learn more about \"Wound Check: Care Instructions. \"     If you do not have an account, please click on the \"Sign Up Now\" link. Current as of: September 23, 2018  Content Version: 12.0  © 6846-4104 [employer]. Care instructions adapted under license by [facility] ([facility]). If you have questions about a medical condition or this instruction, always ask your healthcare professional. [address] any warranty or liability for your use of this information.

## 2019-05-09 NOTE — PROGRESS NOTES
2019    This is a 35 y.o. female who presents for  Chief Complaint   Patient presents with    Cyst     on Vagina, Drained some last night       HPI:     Lower abd pain/abscess  Has these all the time  Had this in the same spot last time  Saw Cori Morton yesterday  Started on Bactrim  Started draining today  Pus material   No fevers, chills  Some nausea due to pain   + Hx of MRSA     Past Medical History:   Diagnosis Date    Abdominal pain, acute, right lower quadrant 5/15/2013    Anxiety     Arthritis     Left Knee    Bilateral ovarian cysts 2013    Blood transfusion reaction     Cellulitis and abscess of trunk 2014    Pt was seen in ED today for bleeding from incision after taking a fall this morning.       Depression     Diabetes mellitus ([facility] [address]     [id number]    Diverticula of colon 2013    DM2 (diabetes mellitus, type 2) ([facility] [address] 2016    Insomnia     Insomnia secondary to situational depression 2014    Left carpal tunnel syndrome 2018    MDRO (multiple drug resistant organisms) resistance     poss MRSA after hysterectomy treated with antibiotics    OA (osteoarthritis) of knee 2014    Obesity (BMI 30.0-34.9) 2017    Ovarian cyst     Plantar fasciitis, left 2014       Past Surgical History:   Procedure Laterality Date    BREAST CYST ASPIRATION       SECTION      CHOLECYSTECTOMY      COLONOSCOPY  2007    polyps    HYSTERECTOMY  2008    LAPAROSCOPY      lysis of adhesions    LAPAROTOMY  2014    LAPAROTOMY, BILATERAL SALPINGO - OOPHORECTOMY    SALPINGO-OOPHORECTOMY  2014       Social History     Socioeconomic History    Marital status:      Spouse name: Not on file    Number of children: Not on file    Years of education: Not on file    Highest education level: Not on file   Occupational History    Not on file   Social Needs    Financial resource strain: Not on file    Food insecurity:     Worry: Not on file Take 1 capsule by mouth 3 times daily for 7 days 21 capsule 0    sulfamethoxazole-trimethoprim (BACTRIM DS;SEPTRA DS) 800-160 MG per tablet Take 1 tablet by mouth 2 times daily for 7 days 14 tablet 0    acetaminophen-codeine (TYLENOL/CODEINE #3) 300-30 MG per tablet Take 1 tablet by mouth every 6 hours as needed for Pain for up to 3 days. Intended supply: 3 days. Take lowest dose possible to manage pain 18 tablet 0    ZOLMitriptan (ZOMIG) 2.5 MG tablet Take 1 tablet by mouth as needed for Migraine 6 tablet 0    omeprazole (PRILOSEC) 40 MG delayed release capsule Take 1 capsule by mouth every morning (before breakfast) 30 capsule 0    fluticasone (FLONASE) 50 MCG/ACT nasal spray 2 sprays by Nasal route daily 1 Bottle 11    insulin glargine (TOUJEO SOLOSTAR) 300 UNIT/ML injection pen INJECT 60 UNITS SUBCUTANEOUSLY ONCE DAILY 6 pen 1    insulin lispro (HUMALOG KWIKPEN) 100 UNIT/ML pen 20 units tid ac 15 pen 1    Liraglutide (VICTOZA) 18 MG/3ML SOPN SC injection Inject 1.8 mg into the skin daily 3 pen 1    naproxen (NAPROSYN) 500 MG tablet Take 1 tablet by mouth 2 times daily 30 tablet 0    methocarbamol (ROBAXIN) 500 MG tablet Take 1 tablet by mouth 3 times daily 15 tablet 0    methocarbamol (ROBAXIN) 500 MG tablet Take 500 mg by mouth 4 times daily      naproxen (NAPROSYN) 500 MG tablet Take 1 tablet by mouth 2 times daily (with meals) 20 tablet 0    Insulin Pen Needle (B-D UF III MINI PEN NEEDLES) 31G X 5 MM MISC Inject 1 each into the skin 4 times daily 100 each 0    metFORMIN (GLUCOPHAGE) 1000 MG tablet TAKE ONE TABLET BY MOUTH TWICE DAILY WITH MEALS 60 tablet 1    mirtazapine (REMERON) 15 MG tablet Take 15 mg by mouth daily      blood glucose test strips (ASCENSIA AUTODISC VI;ONE TOUCH ULTRA TEST VI) strip DX: E11.9-One touch ultra strips.  FSBS 3 times daily 100 strip 5    albuterol sulfate HFA (PROAIR HFA) 108 (90 BASE) MCG/ACT inhaler Inhale 2 puffs into the lungs every 6 hours as needed for Wheezing 1 Inhaler 3    Lancets MISC DX: E 11.9-Uses insulin. FSBS 3 times daily-Delica lancets for one touch ultra  each 5    glucose monitoring kit (FREESTYLE) monitoring kit Dx E11.9-One touch ultra II meter-uses tid 1 kit 0     No current facility-administered medications for this visit. Immunization History   Administered Date(s) Administered    Influenza Virus Vaccine 01/17/2014, 12/03/2014, 01/11/2016    Influenza, Intradermal, Preservative free 11/08/2012    Influenza, Peg Cooney, 3 yrs and older, IM, PF (Fluzone 3 yrs and older or Afluria 5 yrs and older) 11/27/2017    Pneumococcal Polysaccharide ([id number]) 01/17/2014, 12/03/2014    Tdap (Boostrix, Adacel) 03/17/2014, 02/21/2015       Allergies   Allergen Reactions    Morphine Nausea And Vomiting     States after getting morphine began having sharp chest pain. Review of Systems   Constitutional: Negative for activity change, chills, fever and unexpected weight change. Respiratory: Negative for shortness of breath. Cardiovascular: Negative for chest pain. Gastrointestinal: Negative for abdominal distention, abdominal pain, constipation, diarrhea, nausea and vomiting. Genitourinary: Positive for pelvic pain and vaginal pain. Negative for decreased urine volume, difficulty urinating, dyspareunia, dysuria, flank pain, frequency, genital sores, hematuria, menstrual problem, urgency, vaginal bleeding and vaginal discharge. Musculoskeletal: Negative for back pain. Skin: Positive for color change and wound. Negative for rash. Allergic/Immunologic: Negative for immunocompromised state. Hematological: Negative for adenopathy. /80 (Site: Right Upper Arm, Position: Sitting)   Temp 97.8 °F (36.6 °C) (Oral)   LMP 12/18/2008 (Approximate)     Physical Exam   Constitutional: She appears well-developed and well-nourished. No distress. HENT:   Head: Normocephalic and atraumatic.    Eyes: Pupils are equal, round, and reactive to light. EOM are normal.   Neck: Normal range of motion. Neck supple. Cardiovascular: Normal rate and regular rhythm. Pulmonary/Chest: Effort normal and breath sounds normal. No respiratory distress. No breast tenderness, discharge or bleeding. Abdominal: Soft. She exhibits no distension and no mass. There is no tenderness. There is no rebound and no guarding. Genitourinary: Vagina normal and uterus normal. Rectal exam shows no external hemorrhoid. No breast tenderness, discharge or bleeding. There is no rash, tenderness, lesion or injury on the right labia. There is no rash, tenderness, lesion or injury on the left labia. Uterus is not deviated, not enlarged, not fixed and not tender. Cervix exhibits no motion tenderness, no discharge and no friability. Right adnexum displays no mass, no tenderness and no fullness. Left adnexum displays no mass, no tenderness and no fullness. No erythema, tenderness or bleeding in the vagina. No foreign body in the vagina. No signs of injury around the vagina. No vaginal discharge found. Musculoskeletal: Normal range of motion. Lymphadenopathy:        Right: No inguinal adenopathy present. Left: No inguinal adenopathy present. Neurological: She is alert. Skin: Skin is warm and dry. No rash noted. She is not diaphoretic. There is erythema. No pallor. Psychiatric: She has a normal mood and affect. Her behavior is normal. Judgment and thought content normal.   Nursing note and vitals reviewed.     Incision and Drainage Procedure Note    Pre-operative Diagnosis: vulvar abscess    Post-operative Diagnosis: same    Indications: pain, infection, worsening despite oral Abx    Anesthesia: 2% lidocaine with epinephrine    Procedure Details   The procedure, risks and complications have been discussed in detail (including, but not limited to airway compromise, infection, bleeding) with the patient, and the patient has signed consent to the procedure. The skin was sterilely prepped and draped over the affected area in the usual fashion. After adequate local anesthesia, I&D with a #11 blade was performed on the right upper vulvar, lower pelvic. Purulent drainage: absent, with bloody serous fluid  The patient was observed until stable. Findings:  Per above    EBL: 1 cc's    Drains: none    Condition:  Stable    Complications:  pain. Plan  1. Pelvic abscess in female  No improvement with Bactrim, although <24 hours. Will add Keflex and follow culture. - acetaminophen-codeine (TYLENOL/CODEINE #3) 300-30 MG per tablet; Take 1 tablet by mouth every 4 hours as needed for Pain for up to 5 days. Intended supply: 5 days. Take lowest dose possible to manage pain  Dispense: 30 tablet; Refill: 0  - cephALEXin (KEFLEX) 500 MG capsule; Take 1 capsule by mouth 3 times daily for 7 days  Dispense: 21 capsule; Refill: 0  - I&D VULVAR ABSCESS  - Culture    While assessing care for this patient, I have reviewed all pertinent lab work/imaging/ specialist notes and care in reference to those problems addressed above in detail. Appropriate medical decision making was based on this. Please note that portions of this note may have been completed with a voice recognition program. Efforts were made to edit the dictations but occasionally words are mis-transcribed. Return in about 4 days (around 5/13/2019) for wound check, procedure visit 15 min.

## 2019-05-10 ENCOUNTER — TELEPHONE (OUTPATIENT)
Dept: FAMILY MEDICINE CLINIC | Age: 39
End: 2019-05-10

## 2019-05-10 RX ORDER — MUPIROCIN CALCIUM 20 MG/G
CREAM TOPICAL
Qty: 30 G | Refills: 1 | Status: SHIPPED | OUTPATIENT
Start: 2019-05-10 | End: 2019-06-09

## 2019-05-12 LAB
GRAM STAIN RESULT: NORMAL
WOUND/ABSCESS: NORMAL

## 2019-05-13 ENCOUNTER — PROCEDURE VISIT (OUTPATIENT)
Dept: FAMILY MEDICINE CLINIC | Age: 39
End: 2019-05-13
Payer: MEDICAID

## 2019-05-13 VITALS
HEART RATE: 88 BPM | DIASTOLIC BLOOD PRESSURE: 60 MMHG | SYSTOLIC BLOOD PRESSURE: 100 MMHG | WEIGHT: 191 LBS | BODY MASS INDEX: 36.09 KG/M2 | OXYGEN SATURATION: 98 % | TEMPERATURE: 98 F

## 2019-05-13 DIAGNOSIS — R10.2 PELVIC PAIN: Primary | ICD-10-CM

## 2019-05-13 DIAGNOSIS — L02.91 ABSCESS: ICD-10-CM

## 2019-05-13 PROCEDURE — 99213 OFFICE O/P EST LOW 20 MIN: CPT | Performed by: FAMILY MEDICINE

## 2019-05-13 RX ORDER — TRAMADOL HYDROCHLORIDE 50 MG/1
50 TABLET ORAL EVERY 4 HOURS PRN
Qty: 18 TABLET | Refills: 0 | Status: SHIPPED | OUTPATIENT
Start: 2019-05-13 | End: 2019-05-16

## 2019-05-13 ASSESSMENT — ENCOUNTER SYMPTOMS
COLOR CHANGE: 1
NAUSEA: 0
SHORTNESS OF BREATH: 0

## 2019-05-13 NOTE — LETTER
[facility]  [address] [facility] [address]  Phone: [phone]  Fax: [phone]    Johanne Freitas MD        May 13, 2019     Patient: Solange Ignacio   YOB: 1980   Date of Visit: 5/13/2019       To Whom It May Concern: It is my medical opinion that Solange Ignacio may return to work on 5/15/19. If you have any questions or concerns, please don't hesitate to call.     Sincerely,        Johanne Freitas MD

## 2019-05-13 NOTE — PROGRESS NOTES
2019    This is a 35 y.o. female who presents for  Chief Complaint   Patient presents with    Wound Check       HPI:     Less painful but bump still present  Pain was worse at night, throbbing sensation  Tylenol #3 gives her heart palpitations, hasn't been taking  Scheduling Motrin 800 mg Q 6 hours  No worsening redness or swelling  No fevers, chills,   Last episode of drainage was Saturday      Past Medical History:   Diagnosis Date    Abdominal pain, acute, right lower quadrant 5/15/2013    Anxiety     Arthritis     Left Knee    Bilateral ovarian cysts 2013    Blood transfusion reaction     Cellulitis and abscess of trunk 2014    Pt was seen in ED today for bleeding from incision after taking a fall this morning.       Depression     Diabetes mellitus ([facility] [address]     x5yr    Diverticula of colon 2013    DM2 (diabetes mellitus, type 2) ([facility] [address] 2016    Insomnia     Insomnia secondary to situational depression 2014    Left carpal tunnel syndrome 2018    MDRO (multiple drug resistant organisms) resistance     poss MRSA after hysterectomy treated with antibiotics    OA (osteoarthritis) of knee 2014    Obesity (BMI 30.0-34.9) 2017    Ovarian cyst     Plantar fasciitis, left 2014       Past Surgical History:   Procedure Laterality Date    BREAST CYST ASPIRATION       SECTION      CHOLECYSTECTOMY      COLONOSCOPY  2007    polyps    HYSTERECTOMY  2008    LAPAROSCOPY      lysis of adhesions    LAPAROTOMY  2014    LAPAROTOMY, BILATERAL SALPINGO - OOPHORECTOMY    SALPINGO-OOPHORECTOMY  2014       Social History     Socioeconomic History    Marital status:      Spouse name: Not on file    Number of children: Not on file    Years of education: Not on file    Highest education level: Not on file   Occupational History    Not on file   Social Needs    Financial resource strain: Not on file    Food insecurity:     Worry: 30 g 1    acetaminophen-codeine (TYLENOL/CODEINE #3) 300-30 MG per tablet Take 1 tablet by mouth every 4 hours as needed for Pain for up to 5 days. Intended supply: 5 days. Take lowest dose possible to manage pain 30 tablet 0    cephALEXin (KEFLEX) 500 MG capsule Take 1 capsule by mouth 3 times daily for 7 days 21 capsule 0    sulfamethoxazole-trimethoprim (BACTRIM DS;SEPTRA DS) 800-160 MG per tablet Take 1 tablet by mouth 2 times daily for 7 days 14 tablet 0    ZOLMitriptan (ZOMIG) 2.5 MG tablet Take 1 tablet by mouth as needed for Migraine 6 tablet 0    omeprazole (PRILOSEC) 40 MG delayed release capsule Take 1 capsule by mouth every morning (before breakfast) 30 capsule 0    fluticasone (FLONASE) 50 MCG/ACT nasal spray 2 sprays by Nasal route daily 1 Bottle 11    insulin glargine (TOUJEO SOLOSTAR) 300 UNIT/ML injection pen INJECT 60 UNITS SUBCUTANEOUSLY ONCE DAILY 6 pen 1    insulin lispro (HUMALOG KWIKPEN) 100 UNIT/ML pen 20 units tid ac 15 pen 1    Liraglutide (VICTOZA) 18 MG/3ML SOPN SC injection Inject 1.8 mg into the skin daily 3 pen 1    naproxen (NAPROSYN) 500 MG tablet Take 1 tablet by mouth 2 times daily 30 tablet 0    methocarbamol (ROBAXIN) 500 MG tablet Take 1 tablet by mouth 3 times daily 15 tablet 0    methocarbamol (ROBAXIN) 500 MG tablet Take 500 mg by mouth 4 times daily      naproxen (NAPROSYN) 500 MG tablet Take 1 tablet by mouth 2 times daily (with meals) 20 tablet 0    Insulin Pen Needle (B-D UF III MINI PEN NEEDLES) 31G X 5 MM MISC Inject 1 each into the skin 4 times daily 100 each 0    metFORMIN (GLUCOPHAGE) 1000 MG tablet TAKE ONE TABLET BY MOUTH TWICE DAILY WITH MEALS 60 tablet 1    mirtazapine (REMERON) 15 MG tablet Take 15 mg by mouth daily      blood glucose test strips (ASCENSIA AUTODISC VI;ONE TOUCH ULTRA TEST VI) strip DX: E11.9-One touch ultra strips.  FSBS 3 times daily 100 strip 5    albuterol sulfate HFA (PROAIR HFA) 108 (90 BASE) MCG/ACT inhaler Inhale 2 puffs into the lungs every 6 hours as needed for Wheezing 1 Inhaler 3    Lancets MISC DX: E 11.9-Uses insulin. FSBS 3 times daily-Delica lancets for one touch ultra  each 5    glucose monitoring kit (FREESTYLE) monitoring kit Dx E11.9-One touch ultra II meter-uses tid 1 kit 0     No current facility-administered medications for this visit. Immunization History   Administered Date(s) Administered    Influenza Virus Vaccine 01/17/2014, 12/03/2014, 01/11/2016    Influenza, Intradermal, Preservative free 11/08/2012    Influenza, Tiffany Sames, 3 yrs and older, IM, PF (Fluzone 3 yrs and older or Afluria 5 yrs and older) 11/27/2017    Pneumococcal Polysaccharide ([id number]) 01/17/2014, 12/03/2014    Tdap (Boostrix, Adacel) 03/17/2014, 02/21/2015       Allergies   Allergen Reactions    Morphine Nausea And Vomiting     States after getting morphine began having sharp chest pain. Review of Systems   Constitutional: Negative for activity change, fever and unexpected weight change. Respiratory: Negative for shortness of breath. Cardiovascular: Negative for chest pain. Gastrointestinal: Negative for nausea. Skin: Positive for color change and wound. Negative for pallor and rash. Allergic/Immunologic: Negative for immunocompromised state. Hematological: Negative for adenopathy. /60 (Site: Left Upper Arm, Position: Sitting, Cuff Size: Medium Adult)   Pulse 88   Temp 98 °F (36.7 °C)   Wt 191 lb (86.6 kg)   LMP 12/18/2008 (Approximate)   SpO2 98%   BMI 36.09 kg/m²     Physical Exam   Constitutional: She is oriented to person, place, and time. She appears well-developed and well-nourished. No distress. HENT:   Head: Normocephalic and atraumatic. Eyes: Pupils are equal, round, and reactive to light. EOM are normal.   Neck: Normal range of motion. Neck supple. Pulmonary/Chest: No respiratory distress. Neurological: She is alert and oriented to person, place, and time.    Skin: Skin

## 2019-05-13 NOTE — PATIENT INSTRUCTIONS
Patient Education        Wound Check: Care Instructions  Your Care Instructions  People have wounds that need care for many reasons. You may have a cut that needs care after surgery. You may have a cut or puncture wound from an accident. Or you may have a wound because of a condition like diabetes. Whatever the cause of your wound, there are things you can do to care for it at home. Your doctor may also want you to come back for a wound check. The wound check lets the doctor know how your wound is healing and if you need more treatment. Follow-up care is a key part of your treatment and safety. Be sure to make and go to all appointments, and call your doctor if you are having problems. It's also a good idea to know your test results and keep a list of the medicines you take. How can you care for yourself at home? · If your doctor told you how to care for your wound, follow your doctor's instructions. If you did not get instructions, follow this general advice:  ? You may cover the wound with a thin layer of petroleum jelly, such as Vaseline, and a nonstick bandage. ? Apply more petroleum jelly and replace the bandage as needed. · Keep the wound dry for the first 24 to 48 hours. After this, you can shower if your doctor okays it. Pat the wound dry. · Be safe with medicines. Read and follow all instructions on the label. ? If the doctor gave you a prescription medicine for pain, take it as prescribed. ? If you are not taking a prescription pain medicine, ask your doctor if you can take an over-the-counter medicine. · If your doctor prescribed antibiotics, take them as directed. Do not stop taking them just because you feel better. You need to take the full course of antibiotics. · If you have stitches, do not remove them on your own. Your doctor will tell you when to come back to have them removed. · If you have Steri-Strips, leave them on until they fall off.   · If possible, prop up the injured area on a pillow anytime you sit or lie down during the next 3 days. Try to keep it above the level of your heart. This will help reduce swelling. When should you call for help? Call your doctor now or seek immediate medical care if:    · You have new pain, or the pain gets worse.     · The skin near the wound is cold or pale or changes color.     · You have tingling, weakness, or numbness near the wound.     · The wound starts to bleed, and blood soaks through the bandage. Oozing small amounts of blood is normal.     · You have symptoms of infection, such as:  ? Increased pain, swelling, warmth, or redness. ? Red streaks leading from the wound. ? Pus draining from the wound. ? A fever.    Watch closely for changes in your health, and be sure to contact your doctor if:    · You do not get better as expected. Where can you learn more? Go to [email or web address]://[email or web address] org and sign in to your [employer] account. Enter  in the [employer] box to learn more about \"Wound Check: Care Instructions. \"     If you do not have an account, please click on the \"Sign Up Now\" link. Current as of: September 23, 2018  Content Version: 12.0  © 6727-8923 [employer]wise[employer]. Care instructions adapted under license by [facility] ([facility]). If you have questions about a medical condition or this instruction, always ask your healthcare professional. [place] [address] any warranty or liability for your use of this information.

## 2019-06-24 ENCOUNTER — HOSPITAL ENCOUNTER (EMERGENCY)
Age: 39
Discharge: HOME | End: 2019-06-24
Attending: EMERGENCY MEDICINE
Payer: MEDICARE

## 2019-06-24 ENCOUNTER — APPOINTMENT (OUTPATIENT)
Dept: CT IMAGING | Age: 39
End: 2019-06-24
Payer: MEDICARE

## 2019-06-24 VITALS
DIASTOLIC BLOOD PRESSURE: 74 MMHG | RESPIRATION RATE: 12 BRPM | OXYGEN SATURATION: 100 % | WEIGHT: 187 LBS | HEART RATE: 74 BPM | SYSTOLIC BLOOD PRESSURE: 120 MMHG | BODY MASS INDEX: 33.13 KG/M2 | TEMPERATURE: 98.6 F | HEIGHT: 63 IN

## 2019-06-24 DIAGNOSIS — R10.31 RIGHT LOWER QUADRANT ABDOMINAL PAIN: Primary | ICD-10-CM

## 2019-06-24 DIAGNOSIS — K86.9 PANCREATIC LESION: ICD-10-CM

## 2019-06-24 LAB
A/G RATIO: 1.1 (ref 1.1–2.2)
ALBUMIN SERPL-MCNC: 3.9 G/DL (ref 3.4–5)
ALP BLD-CCNC: 193 U/L (ref 40–129)
ALT SERPL-CCNC: 19 U/L (ref 10–40)
ANION GAP SERPL CALCULATED.3IONS-SCNC: 13 MMOL/L (ref 3–16)
AST SERPL-CCNC: 20 U/L (ref 15–37)
BASOPHILS ABSOLUTE: 0.1 K/UL (ref 0–0.2)
BASOPHILS RELATIVE PERCENT: 1 %
BILIRUB SERPL-MCNC: 0.6 MG/DL (ref 0–1)
BILIRUBIN URINE: NEGATIVE
BLOOD, URINE: NEGATIVE
BUN BLDV-MCNC: 14 MG/DL (ref 7–20)
CALCIUM SERPL-MCNC: 9.5 MG/DL (ref 8.3–10.6)
CHLORIDE BLD-SCNC: 100 MMOL/L (ref 99–110)
CLARITY: CLEAR
CO2: 24 MMOL/L (ref 21–32)
COLOR: YELLOW
CREAT SERPL-MCNC: <0.5 MG/DL (ref 0.6–1.1)
EOSINOPHILS ABSOLUTE: 0.1 K/UL (ref 0–0.6)
EOSINOPHILS RELATIVE PERCENT: 2.6 %
GFR AFRICAN AMERICAN: >60
GFR NON-AFRICAN AMERICAN: >60
GLOBULIN: 3.6 G/DL
GLUCOSE BLD-MCNC: 426 MG/DL (ref 70–99)
GLUCOSE URINE: >=1000 MG/DL
HCT VFR BLD CALC: 42.7 % (ref 36–48)
HEMOGLOBIN: 14.7 G/DL (ref 12–16)
KETONES, URINE: NEGATIVE MG/DL
LEUKOCYTE ESTERASE, URINE: NEGATIVE
LIPASE: 42 U/L (ref 13–60)
LYMPHOCYTES ABSOLUTE: 2.1 K/UL (ref 1–5.1)
LYMPHOCYTES RELATIVE PERCENT: 38.4 %
MCH RBC QN AUTO: 30.8 PG (ref 26–34)
MCHC RBC AUTO-ENTMCNC: 34.3 G/DL (ref 31–36)
MCV RBC AUTO: 89.6 FL (ref 80–100)
MICROSCOPIC EXAMINATION: ABNORMAL
MONOCYTES ABSOLUTE: 0.5 K/UL (ref 0–1.3)
MONOCYTES RELATIVE PERCENT: 9.2 %
NEUTROPHILS ABSOLUTE: 2.7 K/UL (ref 1.7–7.7)
NEUTROPHILS RELATIVE PERCENT: 48.8 %
NITRITE, URINE: NEGATIVE
PDW BLD-RTO: 12.8 % (ref 12.4–15.4)
PH UA: 6 (ref 5–8)
PLATELET # BLD: 236 K/UL (ref 135–450)
PMV BLD AUTO: 9.2 FL (ref 5–10.5)
POTASSIUM SERPL-SCNC: 4.8 MMOL/L (ref 3.5–5.1)
PROTEIN UA: NEGATIVE MG/DL
RBC # BLD: 4.77 M/UL (ref 4–5.2)
SODIUM BLD-SCNC: 137 MMOL/L (ref 136–145)
SPECIFIC GRAVITY UA: 1.01 (ref 1–1.03)
TOTAL PROTEIN: 7.5 G/DL (ref 6.4–8.2)
URINE TYPE: ABNORMAL
UROBILINOGEN, URINE: 0.2 E.U./DL
WBC # BLD: 5.6 K/UL (ref 4–11)

## 2019-06-24 PROCEDURE — 96375 TX/PRO/DX INJ NEW DRUG ADDON: CPT

## 2019-06-24 PROCEDURE — 99284 EMERGENCY DEPT VISIT MOD MDM: CPT

## 2019-06-24 PROCEDURE — 80053 COMPREHEN METABOLIC PANEL: CPT

## 2019-06-24 PROCEDURE — 83690 ASSAY OF LIPASE: CPT

## 2019-06-24 PROCEDURE — 6370000000 HC RX 637 (ALT 250 FOR IP): Performed by: EMERGENCY MEDICINE

## 2019-06-24 PROCEDURE — 6360000002 HC RX W HCPCS: Performed by: NURSE PRACTITIONER

## 2019-06-24 PROCEDURE — 81003 URINALYSIS AUTO W/O SCOPE: CPT

## 2019-06-24 PROCEDURE — 74177 CT ABD & PELVIS W/CONTRAST: CPT

## 2019-06-24 PROCEDURE — 96374 THER/PROPH/DIAG INJ IV PUSH: CPT

## 2019-06-24 PROCEDURE — 2580000003 HC RX 258: Performed by: NURSE PRACTITIONER

## 2019-06-24 PROCEDURE — 6360000004 HC RX CONTRAST MEDICATION: Performed by: EMERGENCY MEDICINE

## 2019-06-24 PROCEDURE — 85025 COMPLETE CBC W/AUTO DIFF WBC: CPT

## 2019-06-24 RX ORDER — HYDROCODONE BITARTRATE AND ACETAMINOPHEN 5; 325 MG/1; MG/1
1 TABLET ORAL ONCE
Status: COMPLETED | OUTPATIENT
Start: 2019-06-24 | End: 2019-06-24

## 2019-06-24 RX ORDER — KETOROLAC TROMETHAMINE 30 MG/ML
30 INJECTION, SOLUTION INTRAMUSCULAR; INTRAVENOUS ONCE
Status: COMPLETED | OUTPATIENT
Start: 2019-06-24 | End: 2019-06-24

## 2019-06-24 RX ORDER — 0.9 % SODIUM CHLORIDE 0.9 %
1000 INTRAVENOUS SOLUTION INTRAVENOUS ONCE
Status: COMPLETED | OUTPATIENT
Start: 2019-06-24 | End: 2019-06-24

## 2019-06-24 RX ORDER — ONDANSETRON 2 MG/ML
4 INJECTION INTRAMUSCULAR; INTRAVENOUS ONCE
Status: COMPLETED | OUTPATIENT
Start: 2019-06-24 | End: 2019-06-24

## 2019-06-24 RX ORDER — DICYCLOMINE HCL 20 MG
20 TABLET ORAL ONCE
Status: COMPLETED | OUTPATIENT
Start: 2019-06-24 | End: 2019-06-24

## 2019-06-24 RX ORDER — DICYCLOMINE HYDROCHLORIDE 10 MG/1
10 CAPSULE ORAL
Qty: 120 CAPSULE | Refills: 3 | Status: SHIPPED | OUTPATIENT
Start: 2019-06-24 | End: 2020-05-10

## 2019-06-24 RX ORDER — ONDANSETRON 4 MG/1
4 TABLET, ORALLY DISINTEGRATING ORAL EVERY 8 HOURS PRN
Qty: 12 TABLET | Refills: 0 | Status: SHIPPED | OUTPATIENT
Start: 2019-06-24 | End: 2019-12-04 | Stop reason: ALTCHOICE

## 2019-06-24 RX ADMIN — IOPAMIDOL 75 ML: 755 INJECTION, SOLUTION INTRAVENOUS at 12:11

## 2019-06-24 RX ADMIN — SODIUM CHLORIDE 1000 ML: 9 INJECTION, SOLUTION INTRAVENOUS at 10:46

## 2019-06-24 RX ADMIN — KETOROLAC TROMETHAMINE 30 MG: 30 INJECTION, SOLUTION INTRAMUSCULAR at 10:46

## 2019-06-24 RX ADMIN — HYDROCODONE BITARTRATE AND ACETAMINOPHEN 1 TABLET: 5; 325 TABLET ORAL at 12:45

## 2019-06-24 RX ADMIN — DICYCLOMINE HYDROCHLORIDE 20 MG: 20 TABLET ORAL at 12:45

## 2019-06-24 RX ADMIN — ONDANSETRON 4 MG: 2 INJECTION INTRAMUSCULAR; INTRAVENOUS at 10:46

## 2019-06-24 ASSESSMENT — PAIN SCALES - GENERAL
PAINLEVEL_OUTOF10: 6
PAINLEVEL_OUTOF10: 4
PAINLEVEL_OUTOF10: 6
PAINLEVEL_OUTOF10: 0
PAINLEVEL_OUTOF10: 6
PAINLEVEL_OUTOF10: 6

## 2019-06-24 ASSESSMENT — PAIN DESCRIPTION - ORIENTATION: ORIENTATION: RIGHT;LOWER

## 2019-06-24 ASSESSMENT — PAIN DESCRIPTION - LOCATION: LOCATION: ABDOMEN

## 2019-06-24 NOTE — ED NOTES
Pt ambulatory to the restroom with a steady gait without assistance to obtain a urine sample.       Diana Solitario  06/24/19 [id number]

## 2019-06-24 NOTE — ED PROVIDER NOTES
[address] [facility]    CHIEF COMPLAINT  Abdominal Pain (Pt states she has RLQ pain that starts in the abdomen and radiates to the back. It started on Saturday)      HISTORY OF PRESENT ILLNESS  Teresa Boyce is a 35 y.o. female who presents to the ED complaining of right lower abdominal pain that radiates to back onset Saturday. Patient reports that she was carrying her grandson and bouncing him on her lap when she noticed the pain. Patient reports that pain is sharp and burning and has worsened. Patient reports that she feels very nauseous but denies any vomiting, constipation, or diarrhea. Patient reports that one episode of loose stool yesterday but denies any since then. No known fever or chills. No chest pain or shortness of breath. No dysuria, hematuria, urinary urgency, difficulty, tension. Patient denies any vaginal bleeding or discharge. No other complaints, modifying factors or associated symptoms. Nursing notes reviewed. Past Medical History:   Diagnosis Date    Abdominal pain, acute, right lower quadrant 5/15/2013    Anxiety     Arthritis     Left Knee    Bilateral ovarian cysts 8/13/2013    Blood transfusion reaction     Cellulitis and abscess of trunk 1/29/2014    Pt was seen in ED today for bleeding from incision after taking a fall this morning.       Depression     Diabetes mellitus ([address]     x5yr    Diverticula of colon 8/13/2013    DM2 (diabetes mellitus, type 2) ([address] 7/12/2016    Insomnia     Insomnia secondary to situational depression 2/27/2014    Left carpal tunnel syndrome 7/12/2018    MDRO (multiple drug resistant organisms) resistance     poss MRSA after hysterectomy treated with antibiotics    OA (osteoarthritis) of knee 5/13/2014    Obesity (BMI 30.0-34.9) 9/21/2017    Ovarian cyst     Plantar fasciitis, left 5/13/2014     Past Surgical History:   Procedure Laterality Date    BREAST CYST ASPIRATION  2013   SECTION      CHOLECYSTECTOMY      COLONOSCOPY  2007    polyps    HYSTERECTOMY  2008    LAPAROSCOPY      lysis of adhesions    LAPAROTOMY  2014    LAPAROTOMY, BILATERAL SALPINGO - OOPHORECTOMY    SALPINGO-OOPHORECTOMY  2014     Family History   Problem Relation Age of Onset    Cancer Mother         uterus    Diabetes Mother     High Blood Pressure Mother     Hypertension Mother     Diabetes Brother     Hypertension Brother     Diabetes Maternal Grandmother     Cancer Maternal Grandfather         stomach    Cancer Paternal Grandfather         prostate     Social History     Socioeconomic History    Marital status:      Spouse name: Not on file    Number of children: Not on file    Years of education: Not on file    Highest education level: Not on file   Occupational History    Not on file   Social Needs    Financial resource strain: Not on file    Food insecurity:     Worry: Not on file     Inability: Not on file    Transportation needs:     Medical: Not on file     Non-medical: Not on file   Tobacco Use    Smoking status: Former Smoker     Years: 0.00     Types: Cigarettes     Last attempt to quit: 1999     Years since quittin.7    Smokeless tobacco: Never Used    Tobacco comment: smoked for 1 months when teenager   Substance and Sexual Activity    Alcohol use: Yes     Comment: social drinker  4 beer per month    Drug use: No     Comment: Hx of cocaine use in     Sexual activity: Yes     Partners: Male     Comment: painful intercourse    Lifestyle    Physical activity:     Days per week: Not on file     Minutes per session: Not on file    Stress: Not on file   Relationships    Social connections:     Talks on phone: Not on file     Gets together: Not on file     Attends [employer] service: Not on file     Active member of club or organization: Not on file     Attends meetings of clubs or organizations: Not on file     Relationship status: Not on file    Intimate partner violence:     Fear of current or ex partner: Not on file     Emotionally abused: Not on file     Physically abused: Not on file     Forced sexual activity: Not on file   Other Topics Concern    Not on file   Social History Narrative    Not on file     No current facility-administered medications for this encounter. Current Outpatient Medications   Medication Sig Dispense Refill    ZOLMitriptan (ZOMIG) 2.5 MG tablet Take 1 tablet by mouth as needed for Migraine 6 tablet 0    omeprazole (PRILOSEC) 40 MG delayed release capsule Take 1 capsule by mouth every morning (before breakfast) 30 capsule 0    fluticasone (FLONASE) 50 MCG/ACT nasal spray 2 sprays by Nasal route daily 1 Bottle 11    insulin glargine (TOUJEO SOLOSTAR) 300 UNIT/ML injection pen INJECT 60 UNITS SUBCUTANEOUSLY ONCE DAILY 6 pen 1    insulin lispro (HUMALOG KWIKPEN) 100 UNIT/ML pen 20 units tid ac 15 pen 1    Liraglutide (VICTOZA) 18 MG/3ML SOPN SC injection Inject 1.8 mg into the skin daily 3 pen 1    naproxen (NAPROSYN) 500 MG tablet Take 1 tablet by mouth 2 times daily 30 tablet 0    methocarbamol (ROBAXIN) 500 MG tablet Take 1 tablet by mouth 3 times daily 15 tablet 0    methocarbamol (ROBAXIN) 500 MG tablet Take 500 mg by mouth 4 times daily      naproxen (NAPROSYN) 500 MG tablet Take 1 tablet by mouth 2 times daily (with meals) 20 tablet 0    Insulin Pen Needle (B-D UF III MINI PEN NEEDLES) 31G X 5 MM MISC Inject 1 each into the skin 4 times daily 100 each 0    metFORMIN (GLUCOPHAGE) 1000 MG tablet TAKE ONE TABLET BY MOUTH TWICE DAILY WITH MEALS 60 tablet 1    mirtazapine (REMERON) 15 MG tablet Take 15 mg by mouth daily      blood glucose test strips (ASCENSIA AUTODISC VI;ONE TOUCH ULTRA TEST VI) strip DX: E11.9-One touch ultra strips.  FSBS 3 times daily 100 strip 5    albuterol sulfate HFA (PROAIR HFA) 108 (90 BASE) MCG/ACT inhaler Inhale 2 puffs into the lungs every 6 hours as needed for Wheezing 1 Inhaler 3    Lancets MISC DX: E 11.9-Uses insulin. FSBS 3 times daily-Delica lancets for one touch ultra  each 5    glucose monitoring kit (FREESTYLE) monitoring kit Dx E11.9-One touch ultra II meter-uses tid 1 kit 0     Allergies   Allergen Reactions    Morphine Nausea And Vomiting     States after getting morphine began having sharp chest pain. REVIEW OF SYSTEMS  10 systems reviewed, pertinent positives per HPI otherwise noted to be negative    PHYSICAL EXAM  /75   Pulse 77   Temp 98.4 °F (36.9 °C) (Oral)   Resp 14   Ht 5' 3\" (1.6 m)   Wt 187 lb (84.8 kg)   LMP 12/18/2008 (Approximate)   SpO2 99%   BMI 33.13 kg/m²   GENERAL APPEARANCE: Awake and alert. Cooperative. No acute distress. Non Toxic in appearance. Speech is clear and patient answers questions appropriately. HEAD: Normocephalic. Atraumatic. EYES: PERRL. EOM's grossly intact. ENT: Mucous membranes are pink and moist.   NECK: Supple. HEART: no chest wall tenderness. LUNGS: Respirations unlabored. CTAB. Good air exchange. Speaking comfortably in full sentences. ABDOMEN: Soft. Non-distended. RLQ tenderness, No CVA tenderness. Negative rigidity. +BS x 4 quadrants. Midline pulsatile masses. EXTREMITIES: No peripheral edema. Moves all extremities equally. All extremities neurovascularly intact. Brisk Cap refill. Pulses palpable 2/2 radial/dorsalis pedis equal bilaterally. No unilateral weakness. SKIN: Warm and dry. No acute rashes, lesions or ecchymosis. NEUROLOGICAL: Alert and oriented. No gross facial drooping. Strength 5/5, sensation intact. PSYCHIATRIC: Normal mood and affect.     RADIOLOGY  Ct Abdomen Pelvis W Iv Contrast Additional Contrast? None    Result Date: 6/24/2019  EXAMINATION: CT OF THE ABDOMEN AND PELVIS WITH CONTRAST 6/24/2019 12:06 pm TECHNIQUE: CT of the abdomen and pelvis was performed with the administration of intravenous contrast. Multiplanar reformatted images are provided for review. Dose modulation, iterative reconstruction, and/or weight based adjustment of the mA/kV was utilized to reduce the radiation dose to as low as reasonably achievable. COMPARISON: 01/24/2019 and 10/29/2014 HISTORY: ORDERING SYSTEM PROVIDED HISTORY: abd pain TECHNOLOGIST PROVIDED HISTORY: Additional Contrast?->None Ordering Physician Provided Reason for Exam: right sided abd pain since sat with nausea-constipation Acuity: Acute Type of Exam: Initial Relevant Medical/Surgical History: total hyst-gb removed FINDINGS: Lower Chest: The lung bases are clear. Organs: The liver, spleen, pancreas, adrenal glands and kidneys are unchanged. Status post cholecystectomy with mild intrahepatic ductal dilatation. No change in the 1.7 x 2.3 cm low-attenuation area within the uncinate process of the pancreas. GI/Bowel: There is no bowel obstruction. The appendix is within normal limits. Pelvis: Status post hysterectomy. Peritoneum/Retroperitoneum: There is no evidence of free fluid or adenopathy. Bones/Soft Tissues: Degenerative changes involve the thoracolumbar spine and bilateral hips. 1. No acute abnormality. 2. Unchanged 2.3 cm low-attenuation area within the uncinate process favoring focal fat infiltration rather than a cystic pancreatic lesion. Recommend nonemergent MRI of the abdomen with without contrast using pancreatic mass protocol. ED COURSE   I have evaluated this patient in collaboration with Dr Sujata Nelson. Vital signs stable. Patient was given Zofran, Toradol, Bentyl, and Norco with one normal saline bolus pain and nausea with good relief. Urinalysis revealed greater than 1000 glucose otherwise no acute findings for infection. CBC revealed no leukocytosis with stable hemoglobin and hematocrit. CMP revealed glucose of 426 patient was given IV fluids. Otherwise no acute findings. Lipase normal at 42.   The abdomen pelvis revealed no acute abnormality, unchanged 2.3 cm low-attenuation area within the uncinate process favoring focal fat infiltration rather than a cystic pancreatic lesion however recommendations for a nonemergent MRI were given per radiology. A discussion was had with . Guanakito Mina regarding ED findings, results and follow up. All questions were answered. Patient will follow up with  PCP and GI  for further evaluation/treatment. Patient will return to ED for new/worsening symptoms. Comfortable upon discharge. Return precautions were discussed in detail with patient. Patient agreeable with plan of care, treatment, discharge at this time. Patient was sent home with a prescription for bentyl and zofran.     MDM  Results for orders placed or performed during the hospital encounter of 06/24/19   CBC Auto Differential   Result Value Ref Range    WBC 5.6 4.0 - 11.0 K/uL    RBC 4.77 4.00 - 5.20 M/uL    Hemoglobin 14.7 12.0 - 16.0 g/dL    Hematocrit 42.7 36.0 - 48.0 %    MCV 89.6 80.0 - 100.0 fL    MCH 30.8 26.0 - 34.0 pg    MCHC 34.3 31.0 - 36.0 g/dL    RDW 12.8 12.4 - 15.4 %    Platelets 952 065 - 957 K/uL    MPV 9.2 5.0 - 10.5 fL    Neutrophils % 48.8 %    Lymphocytes % 38.4 %    Monocytes % 9.2 %    Eosinophils % 2.6 %    Basophils % 1.0 %    Neutrophils # 2.7 1.7 - 7.7 K/uL    Lymphocytes # 2.1 1.0 - 5.1 K/uL    Monocytes # 0.5 0.0 - 1.3 K/uL    Eosinophils # 0.1 0.0 - 0.6 K/uL    Basophils # 0.1 0.0 - 0.2 K/uL   Comprehensive Metabolic Panel   Result Value Ref Range    Sodium 137 136 - 145 mmol/L    Potassium 4.8 3.5 - 5.1 mmol/L    Chloride 100 99 - 110 mmol/L    CO2 24 21 - 32 mmol/L    Anion Gap 13 3 - 16    Glucose 426 (H) 70 - 99 mg/dL    BUN 14 7 - 20 mg/dL    CREATININE <0.5 (L) 0.6 - 1.1 mg/dL    GFR Non-African American >60 >60    GFR African American >60 >60    Calcium 9.5 8.3 - 10.6 mg/dL    Total Protein 7.5 6.4 - 8.2 g/dL    Alb 3.9 3.4 - 5.0 g/dL    Albumin/Globulin Ratio 1.1 1.1 - 2.2    Total Bilirubin 0.6 0.0 - 1.0 mg/dL    Alkaline Phosphatase 193 (H) 40 - 129 U/L    ALT 19 10 - 40 U/L    AST 20 15 - 37 U/L    Globulin 3.6 g/dL   Lipase   Result Value Ref Range    Lipase 42.0 13.0 - 60.0 U/L   Urinalysis   Result Value Ref Range    Color, UA Yellow Straw/Yellow    Clarity, UA Clear Clear    Glucose, Ur >=1000 (A) Negative mg/dL    Bilirubin Urine Negative Negative    Ketones, Urine Negative Negative mg/dL    Specific Gravity, UA 1.010 1.005 - 1.030    Blood, Urine Negative Negative    pH, UA 6.0 5.0 - 8.0    Protein, UA Negative Negative mg/dL    Urobilinogen, Urine 0.2 <2.0 E.U./dL    Nitrite, Urine Negative Negative    Leukocyte Esterase, Urine Negative Negative    Microscopic Examination Not Indicated     Urine Type Not Specified          I estimate there is LOW risk for ACUTE APPENDICITIS, BOWEL OBSTRUCTION, CHOLECYSTITIS, DIVERTICULITIS, INCARCERATED HERNIA, PANCREATITIS, or PERFORATED BOWEL or ULCER, thus I consider the discharge disposition reasonable. Also, there is no evidence or peritonitis, sepsis, or toxicity. Cl Sales and I have discussed the diagnosis and risks, and we agree with discharging home to follow-up with their primary doctor. We also discussed returning to the Emergency Department immediately if new or worsening symptoms occur. We have discussed the symptoms which are most concerning (e.g., bloody stool, fever, changing or worsening pain, vomiting) that necessitate immediate return. FINAL Impression    1. Right lower quadrant abdominal pain    2. Pancreatic lesion        Blood pressure 120/74, pulse 74, temperature 98.6 °F (37 °C), temperature source Oral, resp. rate 12, height 5' 3\" (1.6 m), weight 187 lb (84.8 kg), last menstrual period 12/18/2008, SpO2 100 %, not currently breastfeeding. DISPOSITION  Patient was discharged to home in good condition.             IRASEMA Montoya - AVELINA  06/24/19 IRASEMA De Leon - AVELINA  07/06/19 [id number]

## 2019-06-24 NOTE — ED PROVIDER NOTES
I independently performed a history and physical on Nazia Jarrell. All diagnostic, treatment, and disposition decisions were made by myself in conjunction with the advanced practice provider. For further details of Nazia Jarrell emergency department encounter, please see Mira Ortez NP's documentation. Patient is a 48-year-old female presenting with right lower quadrant pain that has been constant since 2 days ago while she was playing with her grandson and bouncing him who is 1 months old on her right leg and subsequently feeling a sharp burning pain in her right lower abdominal region that radiates to her back. She denies any actual falls or trauma. No dysuria or vaginal complaints. She had a prior hysterectomy with bilateral salpingo-oophorectomy along with cholecystectomy. She states she has had multiple abscesses in the past and the most recent one was 1 month ago when she was on antibiotics for one near her right groin but denies any cutaneous abscesses that she is aware of currently. She denies any fever or chills. She is nauseated but no vomiting or diarrhea. No dysuria. No rashes. Physical:   Gen: No acute distress. AOx3. Pysch: Normal mood and affect  HEENT: NCAT, MMM  Neck: supple  Cardiac: RRR, pulses 2+ in upper extremities  Lungs: C2AB, no R/R/W  Abdomen: soft and mild RLQ tenderness and suprapubic tenderness with no R/D/G  Neuro: no focal neuro deficits with strength and sensation 5/5 in lower extremities  Back: No midline or paraspinal tenderness, no CVA tenderness    MDM: Patient was evaluated due to concern for right lower quadrant pain for 2 days but no fever or vomiting. She was nauseated. She has no ovaries and therefore no concern for torsion. She had a prior cholecystectomy. No significant lab findings. CT scan was obtained showing no appendicitis.   She was informed of her pancreatic lesion and to follow-up with primary doctor to have outpatient MRI. Improvement of pain in the emergency department. At this time, with normal vitals and no significant lab findings, I do feel that she is safe for discharge. No obvious finding for abscess at this time. She was given strict return precautions for any worsening pain, vomiting, fever, but otherwise follow-up with primary doctor within the next 3 to 5 days for any other concerns. Since this occurred suddenly while playing with her grandson, I do believe this may be musculoskeletal in nature, but again, she knows to return to the emergency department for any other concerns or worsening symptoms. She was well-appearing and in no acute distress when I saw her.      Alex Rivers MD  06/25/19 [id number]

## 2019-06-27 ENCOUNTER — OFFICE VISIT (OUTPATIENT)
Dept: FAMILY MEDICINE CLINIC | Age: 39
End: 2019-06-27
Payer: MEDICARE

## 2019-06-27 VITALS
TEMPERATURE: 97.8 F | OXYGEN SATURATION: 99 % | BODY MASS INDEX: 33.13 KG/M2 | RESPIRATION RATE: 18 BRPM | SYSTOLIC BLOOD PRESSURE: 122 MMHG | DIASTOLIC BLOOD PRESSURE: 74 MMHG | WEIGHT: 187 LBS | HEART RATE: 83 BPM

## 2019-06-27 DIAGNOSIS — E11.9 TYPE 2 DIABETES MELLITUS WITHOUT COMPLICATION, WITH LONG-TERM CURRENT USE OF INSULIN (HCC): ICD-10-CM

## 2019-06-27 DIAGNOSIS — Z79.4 TYPE 2 DIABETES MELLITUS WITHOUT COMPLICATION, WITH LONG-TERM CURRENT USE OF INSULIN (HCC): ICD-10-CM

## 2019-06-27 DIAGNOSIS — R10.84 GENERALIZED ABDOMINAL PAIN: Primary | ICD-10-CM

## 2019-06-27 DIAGNOSIS — R31.9 HEMATURIA, UNSPECIFIED TYPE: ICD-10-CM

## 2019-06-27 DIAGNOSIS — K86.9 LESION OF PANCREAS: ICD-10-CM

## 2019-06-27 DIAGNOSIS — K59.00 CONSTIPATION, UNSPECIFIED CONSTIPATION TYPE: ICD-10-CM

## 2019-06-27 LAB
BILIRUBIN, POC: ABNORMAL
BLOOD URINE, POC: ABNORMAL
CLARITY, POC: CLEAR
COLOR, POC: YELLOW
GLUCOSE URINE, POC: 1000
HBA1C MFR BLD: 11.2 %
KETONES, POC: ABNORMAL
LEUKOCYTE EST, POC: ABNORMAL
NITRITE, POC: ABNORMAL
PH, POC: 5.5
PROTEIN, POC: ABNORMAL
SPECIFIC GRAVITY, POC: 1.01
UROBILINOGEN, POC: 0.2

## 2019-06-27 PROCEDURE — 99214 OFFICE O/P EST MOD 30 MIN: CPT | Performed by: FAMILY MEDICINE

## 2019-06-27 PROCEDURE — 83036 HEMOGLOBIN GLYCOSYLATED A1C: CPT | Performed by: FAMILY MEDICINE

## 2019-06-27 PROCEDURE — 3046F HEMOGLOBIN A1C LEVEL >9.0%: CPT | Performed by: FAMILY MEDICINE

## 2019-06-27 PROCEDURE — 1036F TOBACCO NON-USER: CPT | Performed by: FAMILY MEDICINE

## 2019-06-27 PROCEDURE — G8427 DOCREV CUR MEDS BY ELIG CLIN: HCPCS | Performed by: FAMILY MEDICINE

## 2019-06-27 PROCEDURE — 2022F DILAT RTA XM EVC RTNOPTHY: CPT | Performed by: FAMILY MEDICINE

## 2019-06-27 PROCEDURE — G8417 CALC BMI ABV UP PARAM F/U: HCPCS | Performed by: FAMILY MEDICINE

## 2019-06-27 RX ORDER — POLYETHYLENE GLYCOL 3350 17 G/17G
17 POWDER, FOR SOLUTION ORAL DAILY PRN
Qty: 510 G | Refills: 5 | Status: SHIPPED | OUTPATIENT
Start: 2019-06-27 | End: 2020-12-02

## 2019-06-27 RX ORDER — CIPROFLOXACIN 500 MG/1
500 TABLET, FILM COATED ORAL 2 TIMES DAILY
Qty: 14 TABLET | Refills: 0 | Status: SHIPPED | OUTPATIENT
Start: 2019-06-27 | End: 2019-07-04

## 2019-06-27 ASSESSMENT — ENCOUNTER SYMPTOMS
ABDOMINAL PAIN: 1
NAUSEA: 1
CONSTIPATION: 1

## 2019-06-27 NOTE — PROGRESS NOTES
Kvng Ochoa is a 35 y.o. female    Chief Complaint   Patient presents with    [place] [facility] [place]    Abdominal Pain     lower right x 6 days    Back Pain    Diabetes       HPI:    This is a new patient to me. Abdominal Pain   This is a new problem. The current episode started in the past 7 days. The problem occurs intermittently. The problem has been unchanged. The pain is located in the LUQ and LLQ. The abdominal pain radiates to the back. Associated symptoms include constipation and nausea. Nothing aggravates the pain. The pain is relieved by nothing. Diabetes   She presents for her follow-up diabetic visit. She has type 2 diabetes mellitus. Her disease course has been worsening. Pertinent negatives for diabetes include no polydipsia. Risk factors for coronary artery disease include diabetes mellitus and obesity. Current diabetic treatment includes intensive insulin program. She is following a diabetic diet. An ACE inhibitor/angiotensin II receptor blocker is not being taken. ROS:    Review of Systems   Gastrointestinal: Positive for constipation and nausea. Endocrine: Negative for polydipsia. /74   Pulse 83   Temp 97.8 °F (36.6 °C) (Oral)   Resp 18   Wt 187 lb (84.8 kg)   LMP 12/18/2008 (Approximate)   SpO2 99%   BMI 33.13 kg/m²     Physical Exam:    Physical Exam   Constitutional: She appears well-developed and well-nourished. HENT:   Head: Normocephalic. Cardiovascular: Normal rate and regular rhythm. No murmur heard. Pulmonary/Chest: Effort normal and breath sounds normal. No stridor. Abdominal: Soft. Bowel sounds are normal. There is tenderness (right side). Neurological: She is alert.        Current Outpatient Medications   Medication Sig Dispense Refill    polyethylene glycol (GLYCOLAX) powder Take 17 g by mouth daily as needed (constipation) 510 g 5    ciprofloxacin (CIPRO) 500 MG tablet Take 1 tablet by mouth 2 times daily for 7 days 14 tablet 0    dicyclomine (BENTYL) 10 MG capsule Take 1 capsule by mouth 4 times daily (before meals and nightly) 120 capsule 3    ondansetron (ZOFRAN ODT) 4 MG disintegrating tablet Take 1 tablet by mouth every 8 hours as needed for Nausea or Vomiting 12 tablet 0    ZOLMitriptan (ZOMIG) 2.5 MG tablet Take 1 tablet by mouth as needed for Migraine 6 tablet 0    omeprazole (PRILOSEC) 40 MG delayed release capsule Take 1 capsule by mouth every morning (before breakfast) 30 capsule 0    fluticasone (FLONASE) 50 MCG/ACT nasal spray 2 sprays by Nasal route daily 1 Bottle 11    insulin glargine (TOUJEO SOLOSTAR) 300 UNIT/ML injection pen INJECT 60 UNITS SUBCUTANEOUSLY ONCE DAILY 6 pen 1    insulin lispro (HUMALOG KWIKPEN) 100 UNIT/ML pen 20 units tid ac 15 pen 1    Liraglutide (VICTOZA) 18 MG/3ML SOPN SC injection Inject 1.8 mg into the skin daily 3 pen 1    naproxen (NAPROSYN) 500 MG tablet Take 1 tablet by mouth 2 times daily 30 tablet 0    methocarbamol (ROBAXIN) 500 MG tablet Take 1 tablet by mouth 3 times daily 15 tablet 0    methocarbamol (ROBAXIN) 500 MG tablet Take 500 mg by mouth 4 times daily      naproxen (NAPROSYN) 500 MG tablet Take 1 tablet by mouth 2 times daily (with meals) 20 tablet 0    Insulin Pen Needle (B-D UF III MINI PEN NEEDLES) 31G X 5 MM MISC Inject 1 each into the skin 4 times daily 100 each 0    metFORMIN (GLUCOPHAGE) 1000 MG tablet TAKE ONE TABLET BY MOUTH TWICE DAILY WITH MEALS 60 tablet 1    mirtazapine (REMERON) 15 MG tablet Take 15 mg by mouth daily      blood glucose test strips (ASCENSIA AUTODISC VI;ONE TOUCH ULTRA TEST VI) strip DX: E11.9-One touch ultra strips. FSBS 3 times daily 100 strip 5    albuterol sulfate HFA (PROAIR HFA) 108 (90 BASE) MCG/ACT inhaler Inhale 2 puffs into the lungs every 6 hours as needed for Wheezing 1 Inhaler 3    Lancets MISC DX: E 11.9-Uses insulin.  FSBS 3 times daily-Delica lancets for one touch ultra  each 5    glucose monitoring kit

## 2019-06-29 LAB — URINE CULTURE, ROUTINE: NORMAL

## 2019-07-09 ENCOUNTER — HOSPITAL ENCOUNTER (OUTPATIENT)
Dept: MRI IMAGING | Age: 39
Discharge: HOME | End: 2019-07-09
Payer: MEDICARE

## 2019-07-09 DIAGNOSIS — K86.9 LESION OF PANCREAS: ICD-10-CM

## 2019-07-09 PROCEDURE — A9579 GAD-BASE MR CONTRAST NOS,1ML: HCPCS | Performed by: FAMILY MEDICINE

## 2019-07-09 PROCEDURE — 74183 MRI ABD W/O CNTR FLWD CNTR: CPT

## 2019-07-09 PROCEDURE — 6360000004 HC RX CONTRAST MEDICATION: Performed by: FAMILY MEDICINE

## 2019-07-09 RX ADMIN — GADOTERIDOL 17 ML: 279.3 INJECTION, SOLUTION INTRAVENOUS at 10:31

## 2019-07-16 ENCOUNTER — OFFICE VISIT (OUTPATIENT)
Dept: FAMILY MEDICINE CLINIC | Age: 39
End: 2019-07-16
Payer: MEDICARE

## 2019-07-16 VITALS
DIASTOLIC BLOOD PRESSURE: 72 MMHG | HEART RATE: 86 BPM | SYSTOLIC BLOOD PRESSURE: 124 MMHG | OXYGEN SATURATION: 98 % | BODY MASS INDEX: 33.13 KG/M2 | WEIGHT: 187 LBS | RESPIRATION RATE: 16 BRPM

## 2019-07-16 DIAGNOSIS — M25.551 PAIN OF RIGHT HIP JOINT: ICD-10-CM

## 2019-07-16 DIAGNOSIS — N76.0 ACUTE VAGINITIS: ICD-10-CM

## 2019-07-16 DIAGNOSIS — R31.9 HEMATURIA, UNSPECIFIED TYPE: ICD-10-CM

## 2019-07-16 DIAGNOSIS — Z12.39 SCREENING FOR BREAST CANCER: ICD-10-CM

## 2019-07-16 LAB
BILIRUBIN URINE: NEGATIVE
BLOOD, URINE: NEGATIVE
CLARITY: CLEAR
COLOR: YELLOW
EPITHELIAL CELLS, UA: 2 /HPF (ref 0–5)
GLUCOSE URINE: >=1000 MG/DL
HYALINE CASTS: 3 /LPF (ref 0–8)
KETONES, URINE: ABNORMAL MG/DL
LEUKOCYTE ESTERASE, URINE: ABNORMAL
MICROSCOPIC EXAMINATION: YES
NITRITE, URINE: NEGATIVE
PH UA: 6 (ref 5–8)
PROTEIN UA: NEGATIVE MG/DL
RBC UA: 1 /HPF (ref 0–4)
SPECIFIC GRAVITY UA: >1.03 (ref 1–1.03)
UROBILINOGEN, URINE: 0.2 E.U./DL
WBC UA: 11 /HPF (ref 0–5)

## 2019-07-16 PROCEDURE — G8417 CALC BMI ABV UP PARAM F/U: HCPCS | Performed by: NURSE PRACTITIONER

## 2019-07-16 PROCEDURE — G8427 DOCREV CUR MEDS BY ELIG CLIN: HCPCS | Performed by: NURSE PRACTITIONER

## 2019-07-16 PROCEDURE — 1036F TOBACCO NON-USER: CPT | Performed by: NURSE PRACTITIONER

## 2019-07-16 PROCEDURE — 99214 OFFICE O/P EST MOD 30 MIN: CPT | Performed by: NURSE PRACTITIONER

## 2019-07-16 RX ORDER — NAPROXEN 500 MG/1
500 TABLET ORAL 2 TIMES DAILY WITH MEALS
Qty: 60 TABLET | Refills: 0 | Status: SHIPPED | OUTPATIENT
Start: 2019-07-16 | End: 2019-12-04 | Stop reason: ALTCHOICE

## 2019-07-17 LAB
C. TRACHOMATIS DNA ,URINE: NEGATIVE
CANDIDA SPECIES, DNA PROBE: NORMAL
GARDNERELLA VAGINALIS, DNA PROBE: NORMAL
N. GONORRHOEAE DNA, URINE: NEGATIVE
TRICHOMONAS VAGINALIS DNA: NORMAL

## 2019-07-22 ASSESSMENT — ENCOUNTER SYMPTOMS
CONSTIPATION: 0
NAUSEA: 0
VOMITING: 0
ABDOMINAL PAIN: 0
DIARRHEA: 0
COLOR CHANGE: 0

## 2019-08-12 ENCOUNTER — OFFICE VISIT (OUTPATIENT)
Dept: FAMILY MEDICINE CLINIC | Age: 39
End: 2019-08-12
Payer: MEDICARE

## 2019-08-12 VITALS
HEIGHT: 61 IN | BODY MASS INDEX: 34.66 KG/M2 | HEART RATE: 94 BPM | OXYGEN SATURATION: 97 % | SYSTOLIC BLOOD PRESSURE: 102 MMHG | DIASTOLIC BLOOD PRESSURE: 72 MMHG | WEIGHT: 183.6 LBS

## 2019-08-12 DIAGNOSIS — R04.2 HEMOPTYSIS: ICD-10-CM

## 2019-08-12 DIAGNOSIS — Z12.39 BREAST CANCER SCREENING: ICD-10-CM

## 2019-08-12 DIAGNOSIS — Z13.29 THYROID DISORDER SCREEN: ICD-10-CM

## 2019-08-12 LAB
CREATININE URINE POCT: NORMAL
HBA1C MFR BLD: 14 %
MICROALBUMIN/CREAT 24H UR: NORMAL MG/G{CREAT}
MICROALBUMIN/CREAT UR-RTO: NORMAL

## 2019-08-12 PROCEDURE — 82044 UR ALBUMIN SEMIQUANTITATIVE: CPT | Performed by: FAMILY MEDICINE

## 2019-08-12 PROCEDURE — G8427 DOCREV CUR MEDS BY ELIG CLIN: HCPCS | Performed by: FAMILY MEDICINE

## 2019-08-12 PROCEDURE — 99214 OFFICE O/P EST MOD 30 MIN: CPT | Performed by: FAMILY MEDICINE

## 2019-08-12 PROCEDURE — 83036 HEMOGLOBIN GLYCOSYLATED A1C: CPT | Performed by: FAMILY MEDICINE

## 2019-08-12 PROCEDURE — G8417 CALC BMI ABV UP PARAM F/U: HCPCS | Performed by: FAMILY MEDICINE

## 2019-08-12 PROCEDURE — 3046F HEMOGLOBIN A1C LEVEL >9.0%: CPT | Performed by: FAMILY MEDICINE

## 2019-08-12 PROCEDURE — 1036F TOBACCO NON-USER: CPT | Performed by: FAMILY MEDICINE

## 2019-08-12 PROCEDURE — 2022F DILAT RTA XM EVC RTNOPTHY: CPT | Performed by: FAMILY MEDICINE

## 2019-08-12 RX ORDER — ATORVASTATIN CALCIUM 10 MG/1
10 TABLET, FILM COATED ORAL DAILY
Qty: 90 TABLET | Refills: 0 | Status: SHIPPED | OUTPATIENT
Start: 2019-08-12 | End: 2020-05-10

## 2019-08-12 RX ORDER — LISINOPRIL 2.5 MG/1
2.5 TABLET ORAL DAILY
Qty: 90 TABLET | Refills: 1 | Status: SHIPPED | OUTPATIENT
Start: 2019-08-12 | End: 2020-05-10

## 2019-08-12 ASSESSMENT — ENCOUNTER SYMPTOMS
COUGH: 0
SHORTNESS OF BREATH: 0
ABDOMINAL PAIN: 0
BLOOD IN STOOL: 0

## 2019-08-12 NOTE — PROGRESS NOTES
Subjective:      Patient ID: Maggie Ambrose is a 35 y.o. female. HPI  Patient in for checkup on diabetes. She has been back on her 2 injectables for the last 2 weeks. She occasionally has trouble getting her medications. Her last A1c was approximately 10 or 11. She states that one day last week she woke up in the night and had something in her mouth. Added and she spit it out and it ended up being a blood clot but 2 inches in diameter. She has had no sore throat no postnasal drip no cough no dental surgery. She states she did not cough it up it was just in her mouth and she spit it out. She is a non-smoker and this is never happened before. Prior to Visit Medications :  Medication insulin glargine (TOUJEO SOLOSTAR) 300 UNIT/ML injection pen, Sig INJECT 60 UNITS SUBCUTANEOUSLY ONCE DAILY, Taking? Yes, Authorizing Provider Blake Krueger, DO    Medication Liraglutide (VICTOZA) 18 MG/3ML SOPN SC injection, Sig Inject 1.8 mg into the skin daily, Taking? Yes, Authorizing Provider Blake Krueger, DO    Medication lisinopril (PRINIVIL;ZESTRIL) 2.5 MG tablet, Sig Take 1 tablet by mouth daily, Taking? Yes, Authorizing Provider Blake Krueger, DO    Medication atorvastatin (LIPITOR) 10 MG tablet, Sig Take 1 tablet by mouth daily, Taking? Yes, Authorizing Provider Blake Krueger, DO    Medication insulin lispro (HUMALOG KWIKPEN) 100 UNIT/ML pen, Sig INJECT 20 UNITS SUBCUTANEOUSLY THREE TIMES DAILY BEFORE MEAL(S), Taking? Yes, Authorizing Provider Blake Krueger, DO    Medication metFORMIN (GLUCOPHAGE) 1000 MG tablet, Sig TAKE 1 TABLET BY MOUTH TWICE DAILY WITH MEALS, Taking? Yes, Authorizing Provider Blake Krueger, DO    Medication ibuprofen (ADVIL;MOTRIN) 800 MG tablet, Sig TAKE 1 TABLET BY MOUTH EVERY 8 HOURS AS NEEDED FOR PAIN OR  FEVER, Taking? Yes, Authorizing Provider Blake Krueger, DO    Medication naproxen (NAPROSYN) 500 MG tablet, Sig Take 1 tablet by mouth 2 times daily (with meals), Taking?  Yes,

## 2019-08-13 LAB
CHOLESTEROL, TOTAL: 235 MG/DL (ref 0–199)
HDLC SERPL-MCNC: 52 MG/DL (ref 40–60)
LDL CHOLESTEROL CALCULATED: 148 MG/DL
TRIGL SERPL-MCNC: 176 MG/DL (ref 0–150)
TSH SERPL DL<=0.05 MIU/L-ACNC: 0.94 UIU/ML (ref 0.27–4.2)
VLDLC SERPL CALC-MCNC: 35 MG/DL

## 2019-09-05 ENCOUNTER — HOSPITAL ENCOUNTER (EMERGENCY)
Age: 39
Discharge: LEFT BEFORE DISCHARGE | End: 2019-09-05
Payer: MEDICARE

## 2019-09-05 VITALS
DIASTOLIC BLOOD PRESSURE: 67 MMHG | RESPIRATION RATE: 14 BRPM | HEART RATE: 94 BPM | SYSTOLIC BLOOD PRESSURE: 104 MMHG | HEIGHT: 61 IN | BODY MASS INDEX: 35.3 KG/M2 | OXYGEN SATURATION: 97 % | TEMPERATURE: 98.3 F | WEIGHT: 187 LBS

## 2019-09-05 LAB
A/G RATIO: 1.2 (ref 1.1–2.2)
ALBUMIN SERPL-MCNC: 4.4 G/DL (ref 3.4–5)
ALP BLD-CCNC: 148 U/L (ref 40–129)
ALT SERPL-CCNC: <5 U/L (ref 10–40)
ANION GAP SERPL CALCULATED.3IONS-SCNC: 12 MMOL/L (ref 3–16)
AST SERPL-CCNC: 14 U/L (ref 15–37)
BASOPHILS ABSOLUTE: 0 K/UL (ref 0–0.2)
BASOPHILS RELATIVE PERCENT: 0.6 %
BILIRUB SERPL-MCNC: 0.5 MG/DL (ref 0–1)
BUN BLDV-MCNC: 11 MG/DL (ref 7–20)
CALCIUM SERPL-MCNC: 9.8 MG/DL (ref 8.3–10.6)
CHLORIDE BLD-SCNC: 98 MMOL/L (ref 99–110)
CO2: 28 MMOL/L (ref 21–32)
CREAT SERPL-MCNC: <0.5 MG/DL (ref 0.6–1.1)
EOSINOPHILS ABSOLUTE: 0.1 K/UL (ref 0–0.6)
EOSINOPHILS RELATIVE PERCENT: 1.4 %
GFR AFRICAN AMERICAN: >60
GFR NON-AFRICAN AMERICAN: >60
GLOBULIN: 3.8 G/DL
GLUCOSE BLD-MCNC: 279 MG/DL (ref 70–99)
HCT VFR BLD CALC: 46 % (ref 36–48)
HEMOGLOBIN: 16.1 G/DL (ref 12–16)
LIPASE: 50 U/L (ref 13–60)
LYMPHOCYTES ABSOLUTE: 2.7 K/UL (ref 1–5.1)
LYMPHOCYTES RELATIVE PERCENT: 37.3 %
MCH RBC QN AUTO: 31.1 PG (ref 26–34)
MCHC RBC AUTO-ENTMCNC: 35 G/DL (ref 31–36)
MCV RBC AUTO: 88.8 FL (ref 80–100)
MONOCYTES ABSOLUTE: 0.7 K/UL (ref 0–1.3)
MONOCYTES RELATIVE PERCENT: 9.9 %
NEUTROPHILS ABSOLUTE: 3.7 K/UL (ref 1.7–7.7)
NEUTROPHILS RELATIVE PERCENT: 50.8 %
PDW BLD-RTO: 13 % (ref 12.4–15.4)
PLATELET # BLD: 287 K/UL (ref 135–450)
PMV BLD AUTO: 9.4 FL (ref 5–10.5)
POTASSIUM SERPL-SCNC: 4.3 MMOL/L (ref 3.5–5.1)
RBC # BLD: 5.18 M/UL (ref 4–5.2)
SODIUM BLD-SCNC: 138 MMOL/L (ref 136–145)
TOTAL PROTEIN: 8.2 G/DL (ref 6.4–8.2)
WBC # BLD: 7.2 K/UL (ref 4–11)

## 2019-09-05 PROCEDURE — 80053 COMPREHEN METABOLIC PANEL: CPT

## 2019-09-05 PROCEDURE — 85025 COMPLETE CBC W/AUTO DIFF WBC: CPT

## 2019-09-05 PROCEDURE — 83690 ASSAY OF LIPASE: CPT

## 2019-09-05 PROCEDURE — 4500000002 HC ER NO CHARGE

## 2019-09-05 ASSESSMENT — PAIN DESCRIPTION - ONSET: ONSET: ON-GOING

## 2019-09-05 ASSESSMENT — PAIN SCALES - GENERAL: PAINLEVEL_OUTOF10: 3

## 2019-09-05 ASSESSMENT — PAIN DESCRIPTION - FREQUENCY: FREQUENCY: INTERMITTENT

## 2019-09-05 ASSESSMENT — PAIN DESCRIPTION - PROGRESSION: CLINICAL_PROGRESSION: NOT CHANGED

## 2019-09-05 ASSESSMENT — PAIN DESCRIPTION - DESCRIPTORS: DESCRIPTORS: ACHING

## 2019-09-05 ASSESSMENT — PAIN DESCRIPTION - ORIENTATION: ORIENTATION: RIGHT;LOWER

## 2019-09-05 ASSESSMENT — PAIN DESCRIPTION - LOCATION: LOCATION: ABDOMEN

## 2019-09-05 ASSESSMENT — PAIN DESCRIPTION - PAIN TYPE: TYPE: ACUTE PAIN

## 2019-09-18 ENCOUNTER — HOSPITAL ENCOUNTER (EMERGENCY)
Age: 39
Discharge: LEFT BEFORE DISCHARGE | End: 2019-09-18
Payer: MEDICARE

## 2019-09-18 PROCEDURE — 4500000002 HC ER NO CHARGE

## 2019-09-19 ENCOUNTER — OFFICE VISIT (OUTPATIENT)
Dept: FAMILY MEDICINE CLINIC | Age: 39
End: 2019-09-19
Payer: MEDICARE

## 2019-09-19 VITALS
TEMPERATURE: 98.2 F | DIASTOLIC BLOOD PRESSURE: 82 MMHG | OXYGEN SATURATION: 98 % | HEART RATE: 115 BPM | SYSTOLIC BLOOD PRESSURE: 130 MMHG

## 2019-09-19 DIAGNOSIS — J22 ACUTE LOWER RESPIRATORY INFECTION: Primary | ICD-10-CM

## 2019-09-19 PROCEDURE — 1036F TOBACCO NON-USER: CPT | Performed by: PHYSICIAN ASSISTANT

## 2019-09-19 PROCEDURE — 99213 OFFICE O/P EST LOW 20 MIN: CPT | Performed by: PHYSICIAN ASSISTANT

## 2019-09-19 PROCEDURE — G8417 CALC BMI ABV UP PARAM F/U: HCPCS | Performed by: PHYSICIAN ASSISTANT

## 2019-09-19 PROCEDURE — G8427 DOCREV CUR MEDS BY ELIG CLIN: HCPCS | Performed by: PHYSICIAN ASSISTANT

## 2019-09-19 RX ORDER — ALBUTEROL SULFATE 90 UG/1
2 AEROSOL, METERED RESPIRATORY (INHALATION) EVERY 6 HOURS PRN
Qty: 1 INHALER | Refills: 3 | Status: SHIPPED | OUTPATIENT
Start: 2019-09-19 | End: 2020-12-02

## 2019-09-19 RX ORDER — AZITHROMYCIN 250 MG/1
250 TABLET, FILM COATED ORAL SEE ADMIN INSTRUCTIONS
Qty: 6 TABLET | Refills: 0 | Status: SHIPPED | OUTPATIENT
Start: 2019-09-19 | End: 2019-09-24

## 2019-09-19 RX ORDER — AZITHROMYCIN 250 MG/1
250 TABLET, FILM COATED ORAL SEE ADMIN INSTRUCTIONS
Qty: 6 TABLET | Refills: 0 | Status: SHIPPED | OUTPATIENT
Start: 2019-09-19 | End: 2019-09-19

## 2019-09-19 RX ORDER — BENZONATATE 100 MG/1
100-200 CAPSULE ORAL 3 TIMES DAILY PRN
Qty: 30 CAPSULE | Refills: 0 | Status: SHIPPED | OUTPATIENT
Start: 2019-09-19 | End: 2019-09-26

## 2019-09-19 ASSESSMENT — ENCOUNTER SYMPTOMS
SINUS PRESSURE: 0
SORE THROAT: 0
RHINORRHEA: 1
COUGH: 1
EYE DISCHARGE: 0
WHEEZING: 1
SHORTNESS OF BREATH: 1
EYE ITCHING: 0

## 2019-09-20 ENCOUNTER — TELEPHONE (OUTPATIENT)
Dept: FAMILY MEDICINE CLINIC | Age: 39
End: 2019-09-20

## 2019-09-23 ENCOUNTER — TELEPHONE (OUTPATIENT)
Dept: FAMILY MEDICINE CLINIC | Age: 39
End: 2019-09-23

## 2019-09-23 NOTE — TELEPHONE ENCOUNTER
Submitted PA for insulin glargine (TOUJEO SOLOSTAR) 300 UNIT/ML injection pen  Via CMM Key: [id number] STATUS: PENDING.

## 2019-10-07 ENCOUNTER — TELEPHONE (OUTPATIENT)
Dept: FAMILY MEDICINE CLINIC | Age: 39
End: 2019-10-07

## 2019-10-21 ENCOUNTER — TELEPHONE (OUTPATIENT)
Dept: FAMILY MEDICINE CLINIC | Age: 39
End: 2019-10-21

## 2019-10-24 ENCOUNTER — TELEPHONE (OUTPATIENT)
Dept: FAMILY MEDICINE CLINIC | Age: 39
End: 2019-10-24

## 2019-11-12 ENCOUNTER — OFFICE VISIT (OUTPATIENT)
Dept: FAMILY MEDICINE CLINIC | Age: 39
End: 2019-11-12
Payer: MEDICARE

## 2019-11-12 VITALS
OXYGEN SATURATION: 96 % | DIASTOLIC BLOOD PRESSURE: 68 MMHG | BODY MASS INDEX: 32.31 KG/M2 | WEIGHT: 171 LBS | HEART RATE: 105 BPM | SYSTOLIC BLOOD PRESSURE: 90 MMHG | TEMPERATURE: 98.2 F

## 2019-11-12 DIAGNOSIS — J02.9 SORE THROAT: Primary | ICD-10-CM

## 2019-11-12 DIAGNOSIS — J06.9 VIRAL URI: ICD-10-CM

## 2019-11-12 LAB — S PYO AG THROAT QL: NORMAL

## 2019-11-12 PROCEDURE — G8484 FLU IMMUNIZE NO ADMIN: HCPCS | Performed by: PHYSICIAN ASSISTANT

## 2019-11-12 PROCEDURE — 1036F TOBACCO NON-USER: CPT | Performed by: PHYSICIAN ASSISTANT

## 2019-11-12 PROCEDURE — 99213 OFFICE O/P EST LOW 20 MIN: CPT | Performed by: PHYSICIAN ASSISTANT

## 2019-11-12 PROCEDURE — G8417 CALC BMI ABV UP PARAM F/U: HCPCS | Performed by: PHYSICIAN ASSISTANT

## 2019-11-12 PROCEDURE — 87880 STREP A ASSAY W/OPTIC: CPT | Performed by: PHYSICIAN ASSISTANT

## 2019-11-12 PROCEDURE — G8427 DOCREV CUR MEDS BY ELIG CLIN: HCPCS | Performed by: PHYSICIAN ASSISTANT

## 2019-11-12 RX ORDER — BENZONATATE 100 MG/1
100 CAPSULE ORAL 3 TIMES DAILY PRN
Qty: 21 CAPSULE | Refills: 0 | Status: SHIPPED | OUTPATIENT
Start: 2019-11-12 | End: 2019-11-19

## 2019-11-12 RX ORDER — IBUPROFEN 800 MG/1
TABLET ORAL
Qty: 30 TABLET | Refills: 0 | Status: SHIPPED
Start: 2019-11-12 | End: 2020-02-29 | Stop reason: CLARIF

## 2019-11-12 RX ORDER — FLUTICASONE PROPIONATE 50 MCG
2 SPRAY, SUSPENSION (ML) NASAL DAILY
Qty: 1 BOTTLE | Refills: 0 | Status: SHIPPED | OUTPATIENT
Start: 2019-11-12

## 2019-11-12 ASSESSMENT — ENCOUNTER SYMPTOMS
COUGH: 1
EYE PAIN: 0
RHINORRHEA: 1
TROUBLE SWALLOWING: 0
SHORTNESS OF BREATH: 0
NAUSEA: 0
VOMITING: 0
SORE THROAT: 1
VOICE CHANGE: 1
SINUS PAIN: 0
ABDOMINAL PAIN: 0
CHEST TIGHTNESS: 1

## 2019-12-04 ENCOUNTER — OFFICE VISIT (OUTPATIENT)
Dept: FAMILY MEDICINE CLINIC | Age: 39
End: 2019-12-04
Payer: MEDICARE

## 2019-12-04 VITALS
DIASTOLIC BLOOD PRESSURE: 70 MMHG | BODY MASS INDEX: 32.12 KG/M2 | WEIGHT: 170 LBS | HEART RATE: 86 BPM | SYSTOLIC BLOOD PRESSURE: 110 MMHG | OXYGEN SATURATION: 99 %

## 2019-12-04 DIAGNOSIS — B37.2 CANDIDAL DERMATITIS: Primary | ICD-10-CM

## 2019-12-04 DIAGNOSIS — N90.89 VULVAR BLEEDING: ICD-10-CM

## 2019-12-04 DIAGNOSIS — R10.2 VULVAR PAIN: ICD-10-CM

## 2019-12-04 DIAGNOSIS — K21.9 GASTROESOPHAGEAL REFLUX DISEASE WITHOUT ESOPHAGITIS: ICD-10-CM

## 2019-12-04 LAB
BILIRUBIN, POC: NEGATIVE
BLOOD URINE, POC: NEGATIVE
CLARITY, POC: CLEAR
COLOR, POC: YELLOW
GLUCOSE URINE, POC: ABNORMAL
KETONES, POC: NEGATIVE
LEUKOCYTE EST, POC: NEGATIVE
NITRITE, POC: NEGATIVE
PH, POC: 6
PROTEIN, POC: NEGATIVE
SPECIFIC GRAVITY, POC: 1.02
UROBILINOGEN, POC: 0.2

## 2019-12-04 PROCEDURE — G8427 DOCREV CUR MEDS BY ELIG CLIN: HCPCS | Performed by: FAMILY MEDICINE

## 2019-12-04 PROCEDURE — G8484 FLU IMMUNIZE NO ADMIN: HCPCS | Performed by: FAMILY MEDICINE

## 2019-12-04 PROCEDURE — 99214 OFFICE O/P EST MOD 30 MIN: CPT | Performed by: FAMILY MEDICINE

## 2019-12-04 PROCEDURE — 1036F TOBACCO NON-USER: CPT | Performed by: FAMILY MEDICINE

## 2019-12-04 PROCEDURE — G8417 CALC BMI ABV UP PARAM F/U: HCPCS | Performed by: FAMILY MEDICINE

## 2019-12-04 RX ORDER — OMEPRAZOLE 40 MG/1
40 CAPSULE, DELAYED RELEASE ORAL
Qty: 30 CAPSULE | Refills: 0 | Status: SHIPPED | OUTPATIENT
Start: 2019-12-04 | End: 2020-05-10

## 2019-12-04 RX ORDER — NAPROXEN 500 MG/1
500 TABLET ORAL 2 TIMES DAILY WITH MEALS
Qty: 60 TABLET | Refills: 1 | Status: SHIPPED | OUTPATIENT
Start: 2019-12-04 | End: 2021-01-27 | Stop reason: ALTCHOICE

## 2019-12-04 ASSESSMENT — ENCOUNTER SYMPTOMS
NAUSEA: 0
DIARRHEA: 0
COLOR CHANGE: 1
BACK PAIN: 0
VOMITING: 0
CONSTIPATION: 0
ABDOMINAL PAIN: 0
ABDOMINAL DISTENTION: 0
SHORTNESS OF BREATH: 0

## 2019-12-05 LAB
CANDIDA SPECIES, DNA PROBE: ABNORMAL
GARDNERELLA VAGINALIS, DNA PROBE: ABNORMAL
TRICHOMONAS VAGINALIS DNA: ABNORMAL

## 2019-12-05 RX ORDER — FLUCONAZOLE 150 MG/1
150 TABLET ORAL
Qty: 2 TABLET | Refills: 0 | Status: SHIPPED | OUTPATIENT
Start: 2019-12-05 | End: 2019-12-09

## 2019-12-06 LAB
C TRACH DNA GENITAL QL NAA+PROBE: NEGATIVE
N. GONORRHOEAE DNA: NEGATIVE

## 2020-02-10 ENCOUNTER — OFFICE VISIT (OUTPATIENT)
Dept: FAMILY MEDICINE CLINIC | Age: 40
End: 2020-02-10
Payer: MEDICARE

## 2020-02-10 VITALS
HEIGHT: 61 IN | TEMPERATURE: 100.1 F | HEART RATE: 125 BPM | RESPIRATION RATE: 20 BRPM | OXYGEN SATURATION: 98 % | WEIGHT: 166 LBS | BODY MASS INDEX: 31.34 KG/M2 | DIASTOLIC BLOOD PRESSURE: 70 MMHG | SYSTOLIC BLOOD PRESSURE: 144 MMHG

## 2020-02-10 LAB
INFLUENZA A ANTIBODY: NEGATIVE
INFLUENZA B ANTIBODY: POSITIVE
S PYO AG THROAT QL: NORMAL

## 2020-02-10 PROCEDURE — 87880 STREP A ASSAY W/OPTIC: CPT | Performed by: NURSE PRACTITIONER

## 2020-02-10 PROCEDURE — G8417 CALC BMI ABV UP PARAM F/U: HCPCS | Performed by: NURSE PRACTITIONER

## 2020-02-10 PROCEDURE — 99213 OFFICE O/P EST LOW 20 MIN: CPT | Performed by: NURSE PRACTITIONER

## 2020-02-10 PROCEDURE — 87804 INFLUENZA ASSAY W/OPTIC: CPT | Performed by: NURSE PRACTITIONER

## 2020-02-10 PROCEDURE — G8484 FLU IMMUNIZE NO ADMIN: HCPCS | Performed by: NURSE PRACTITIONER

## 2020-02-10 PROCEDURE — G8427 DOCREV CUR MEDS BY ELIG CLIN: HCPCS | Performed by: NURSE PRACTITIONER

## 2020-02-10 PROCEDURE — 1036F TOBACCO NON-USER: CPT | Performed by: NURSE PRACTITIONER

## 2020-02-10 RX ORDER — OSELTAMIVIR PHOSPHATE 75 MG/1
75 CAPSULE ORAL 2 TIMES DAILY
Qty: 10 CAPSULE | Refills: 0 | Status: SHIPPED | OUTPATIENT
Start: 2020-02-10 | End: 2020-02-15

## 2020-02-10 ASSESSMENT — PATIENT HEALTH QUESTIONNAIRE - PHQ9
SUM OF ALL RESPONSES TO PHQ9 QUESTIONS 1 & 2: 0
SUM OF ALL RESPONSES TO PHQ QUESTIONS 1-9: 0
2. FEELING DOWN, DEPRESSED OR HOPELESS: 0
SUM OF ALL RESPONSES TO PHQ QUESTIONS 1-9: 0
1. LITTLE INTEREST OR PLEASURE IN DOING THINGS: 0

## 2020-02-10 ASSESSMENT — ENCOUNTER SYMPTOMS
NAUSEA: 0
CHEST TIGHTNESS: 0
DIARRHEA: 1
VOMITING: 1
COUGH: 1

## 2020-02-10 NOTE — PROGRESS NOTES
Subjective:      Patient ID: Nga Mckee is a 35 y.o. female. HPI     Friday awoke with sore throat. Saturday went to work. Last night had fever 101. 2. body hurting, emesis, diarrhea. Emesis x 1 this morning. Took mucinex and sore throat improved. Took naprosyn, motrin for headache. Review of Systems   Constitutional: Positive for chills, fatigue and fever. Negative for unexpected weight change. Respiratory: Positive for cough. Negative for chest tightness. Cardiovascular: Negative for chest pain and palpitations. Gastrointestinal: Positive for diarrhea and vomiting. Negative for nausea. Musculoskeletal: Positive for myalgias. Negative for arthralgias. Neurological: Negative for dizziness and headaches. Psychiatric/Behavioral: Negative. Objective:   Physical Exam  Constitutional:       Appearance: Normal appearance. She is well-developed. HENT:      Head: Normocephalic and atraumatic. Right Ear: Tympanic membrane and ear canal normal.      Left Ear: Tympanic membrane and ear canal normal.   Eyes:      Extraocular Movements: Extraocular movements intact. Pupils: Pupils are equal, round, and reactive to light. Neck:      Musculoskeletal: Normal range of motion and neck supple. Thyroid: No thyromegaly. Cardiovascular:      Rate and Rhythm: Normal rate and regular rhythm. Pulmonary:      Effort: Pulmonary effort is normal.      Breath sounds: Normal breath sounds. No wheezing or rhonchi. Abdominal:      General: Bowel sounds are normal.      Palpations: Abdomen is soft. Musculoskeletal: Normal range of motion. Skin:     General: Skin is warm. Neurological:      Mental Status: She is alert.    Psychiatric:         Behavior: Behavior normal.          Current Outpatient Medications   Medication Sig Dispense Refill    omeprazole (PRILOSEC) 40 MG delayed release capsule Take 1 capsule by mouth every morning (before breakfast) 30 capsule 0    fluticasone (FLONASE) 50 MCG/ACT nasal spray 2 sprays by Nasal route daily 1 Bottle 0    ibuprofen (ADVIL;MOTRIN) 800 MG tablet TAKE 1 TABLET BY MOUTH EVERY 8 HOURS AS NEEDED FOR PAIN OR  FEVER 30 tablet 0    albuterol sulfate HFA (VENTOLIN HFA) 108 (90 Base) MCG/ACT inhaler Inhale 2 puffs into the lungs every 6 hours as needed for Wheezing 1 Inhaler 3    lisinopril (PRINIVIL;ZESTRIL) 2.5 MG tablet Take 1 tablet by mouth daily 90 tablet 1    atorvastatin (LIPITOR) 10 MG tablet Take 1 tablet by mouth daily 90 tablet 0    insulin lispro (HUMALOG KWIKPEN) 100 UNIT/ML pen INJECT 20 UNITS SUBCUTANEOUSLY THREE TIMES DAILY BEFORE MEAL(S) 15 pen 3    metFORMIN (GLUCOPHAGE) 1000 MG tablet TAKE 1 TABLET BY MOUTH TWICE DAILY WITH MEALS 60 tablet 1    polyethylene glycol (GLYCOLAX) powder Take 17 g by mouth daily as needed (constipation) 510 g 5    dicyclomine (BENTYL) 10 MG capsule Take 1 capsule by mouth 4 times daily (before meals and nightly) 120 capsule 3    Insulin Pen Needle (B-D UF III MINI PEN NEEDLES) 31G X 5 MM MISC Inject 1 each into the skin 4 times daily 100 each 0    blood glucose test strips (ASCENSIA AUTODISC VI;ONE TOUCH ULTRA TEST VI) strip DX: E11.9-One touch ultra strips. FSBS 3 times daily 100 strip 5    albuterol sulfate HFA (PROAIR HFA) 108 (90 BASE) MCG/ACT inhaler Inhale 2 puffs into the lungs every 6 hours as needed for Wheezing 1 Inhaler 3    Lancets MISC DX: E 11.9-Uses insulin. FSBS 3 times daily-Delica lancets for one touch ultra  each 5    glucose monitoring kit (FREESTYLE) monitoring kit Dx E11.9-One touch ultra II meter-uses tid 1 kit 0    naproxen (EC NAPROSYN) 500 MG EC tablet Take 1 tablet by mouth 2 times daily (with meals) for 14 days 60 tablet 1    insulin glargine (BASAGLAR KWIKPEN) 100 UNIT/ML injection pen Inject 48 Units into the skin nightly 5 pen 1     No current facility-administered medications for this visit. Assessment:      1.  Influenza B   2. pharyngitis

## 2020-02-10 NOTE — LETTER
[address] [facility] [address]  [facility] [address]  Phone: [phone]  Fax: [phone]    IRASEMA Tobin CNP        February 10, 2020     Patient: Carlos A Macias   YOB: 1980   Date of Visit: 2/10/2020       To Whom It May Concern:    Carlos A Macias was seen today for evaluation and treatment. It is my medical opinion that Carlos A Macias will return to work 2/14/2020. If you have any questions or concerns, please don't hesitate to call.     Sincerely,        IRASEMA BERRY CNP

## 2020-02-13 ENCOUNTER — HOSPITAL ENCOUNTER (EMERGENCY)
Age: 40
Discharge: HOME | End: 2020-02-14
Payer: MEDICARE

## 2020-02-13 ENCOUNTER — APPOINTMENT (OUTPATIENT)
Dept: GENERAL RADIOLOGY | Age: 40
End: 2020-02-13
Payer: MEDICARE

## 2020-02-13 LAB
A/G RATIO: 0.9 (ref 1.1–2.2)
ALBUMIN SERPL-MCNC: 3.9 G/DL (ref 3.4–5)
ALP BLD-CCNC: 155 U/L (ref 40–129)
ALT SERPL-CCNC: 21 U/L (ref 10–40)
ANION GAP SERPL CALCULATED.3IONS-SCNC: 20 MMOL/L (ref 3–16)
AST SERPL-CCNC: 24 U/L (ref 15–37)
BASE EXCESS VENOUS: -1.1 MMOL/L (ref -3–3)
BASOPHILS ABSOLUTE: 0 K/UL (ref 0–0.2)
BASOPHILS RELATIVE PERCENT: 0.4 %
BILIRUB SERPL-MCNC: 0.3 MG/DL (ref 0–1)
BILIRUBIN URINE: NEGATIVE
BLOOD, URINE: NEGATIVE
BUN BLDV-MCNC: 11 MG/DL (ref 7–20)
CALCIUM SERPL-MCNC: 9.3 MG/DL (ref 8.3–10.6)
CARBOXYHEMOGLOBIN: 1.6 % (ref 0–1.5)
CHLORIDE BLD-SCNC: 92 MMOL/L (ref 99–110)
CLARITY: CLEAR
CO2: 20 MMOL/L (ref 21–32)
COLOR: YELLOW
CREAT SERPL-MCNC: <0.5 MG/DL (ref 0.6–1.1)
EOSINOPHILS ABSOLUTE: 0 K/UL (ref 0–0.6)
EOSINOPHILS RELATIVE PERCENT: 0.4 %
GFR AFRICAN AMERICAN: >60
GFR NON-AFRICAN AMERICAN: >60
GLOBULIN: 4.2 G/DL
GLUCOSE BLD-MCNC: 442 MG/DL (ref 70–99)
GLUCOSE URINE: >=1000 MG/DL
HCO3 VENOUS: 22.4 MMOL/L (ref 23–29)
HCT VFR BLD CALC: 45.4 % (ref 36–48)
HEMOGLOBIN: 15.8 G/DL (ref 12–16)
KETONES, URINE: >=80 MG/DL
LEUKOCYTE ESTERASE, URINE: NEGATIVE
LYMPHOCYTES ABSOLUTE: 2.1 K/UL (ref 1–5.1)
LYMPHOCYTES RELATIVE PERCENT: 41.6 %
MAGNESIUM: 1.9 MG/DL (ref 1.8–2.4)
MCH RBC QN AUTO: 31.5 PG (ref 26–34)
MCHC RBC AUTO-ENTMCNC: 34.8 G/DL (ref 31–36)
MCV RBC AUTO: 90.5 FL (ref 80–100)
METHEMOGLOBIN VENOUS: 0.5 %
MICROSCOPIC EXAMINATION: ABNORMAL
MONOCYTES ABSOLUTE: 0.6 K/UL (ref 0–1.3)
MONOCYTES RELATIVE PERCENT: 10.8 %
NEUTROPHILS ABSOLUTE: 2.4 K/UL (ref 1.7–7.7)
NEUTROPHILS RELATIVE PERCENT: 46.8 %
NITRITE, URINE: NEGATIVE
O2 CONTENT, VEN: 21 VOL %
O2 SAT, VEN: 97 %
O2 THERAPY: ABNORMAL
PCO2, VEN: 34.6 MMHG (ref 40–50)
PDW BLD-RTO: 12.7 % (ref 12.4–15.4)
PH UA: 5.5 (ref 5–8)
PH VENOUS: 7.43 (ref 7.35–7.45)
PHOSPHORUS: 3.9 MG/DL (ref 2.5–4.9)
PLATELET # BLD: 244 K/UL (ref 135–450)
PMV BLD AUTO: 9.4 FL (ref 5–10.5)
PO2, VEN: 93.3 MMHG (ref 25–40)
POTASSIUM SERPL-SCNC: 4.6 MMOL/L (ref 3.5–5.1)
PROTEIN UA: NEGATIVE MG/DL
RBC # BLD: 5.02 M/UL (ref 4–5.2)
SODIUM BLD-SCNC: 132 MMOL/L (ref 136–145)
SPECIFIC GRAVITY UA: 1.01 (ref 1–1.03)
TCO2 CALC VENOUS: 24 MMOL/L
TOTAL PROTEIN: 8.1 G/DL (ref 6.4–8.2)
URINE TYPE: ABNORMAL
UROBILINOGEN, URINE: 0.2 E.U./DL
WBC # BLD: 5.1 K/UL (ref 4–11)

## 2020-02-13 PROCEDURE — 6360000002 HC RX W HCPCS: Performed by: NURSE PRACTITIONER

## 2020-02-13 PROCEDURE — 71046 X-RAY EXAM CHEST 2 VIEWS: CPT

## 2020-02-13 PROCEDURE — 96372 THER/PROPH/DIAG INJ SC/IM: CPT

## 2020-02-13 PROCEDURE — 84100 ASSAY OF PHOSPHORUS: CPT

## 2020-02-13 PROCEDURE — 85025 COMPLETE CBC W/AUTO DIFF WBC: CPT

## 2020-02-13 PROCEDURE — 2580000003 HC RX 258: Performed by: NURSE PRACTITIONER

## 2020-02-13 PROCEDURE — 81003 URINALYSIS AUTO W/O SCOPE: CPT

## 2020-02-13 PROCEDURE — 96361 HYDRATE IV INFUSION ADD-ON: CPT

## 2020-02-13 PROCEDURE — 80053 COMPREHEN METABOLIC PANEL: CPT

## 2020-02-13 PROCEDURE — 6370000000 HC RX 637 (ALT 250 FOR IP): Performed by: NURSE PRACTITIONER

## 2020-02-13 PROCEDURE — 96375 TX/PRO/DX INJ NEW DRUG ADDON: CPT

## 2020-02-13 PROCEDURE — 99283 EMERGENCY DEPT VISIT LOW MDM: CPT

## 2020-02-13 PROCEDURE — 82803 BLOOD GASES ANY COMBINATION: CPT

## 2020-02-13 PROCEDURE — 83735 ASSAY OF MAGNESIUM: CPT

## 2020-02-13 PROCEDURE — 96374 THER/PROPH/DIAG INJ IV PUSH: CPT

## 2020-02-13 RX ORDER — KETOROLAC TROMETHAMINE 30 MG/ML
30 INJECTION, SOLUTION INTRAMUSCULAR; INTRAVENOUS ONCE
Status: COMPLETED | OUTPATIENT
Start: 2020-02-13 | End: 2020-02-13

## 2020-02-13 RX ORDER — DIPHENHYDRAMINE HYDROCHLORIDE 50 MG/ML
25 INJECTION INTRAMUSCULAR; INTRAVENOUS ONCE
Status: COMPLETED | OUTPATIENT
Start: 2020-02-13 | End: 2020-02-13

## 2020-02-13 RX ORDER — 0.9 % SODIUM CHLORIDE 0.9 %
2000 INTRAVENOUS SOLUTION INTRAVENOUS ONCE
Status: COMPLETED | OUTPATIENT
Start: 2020-02-13 | End: 2020-02-13

## 2020-02-13 RX ORDER — METOCLOPRAMIDE HYDROCHLORIDE 5 MG/ML
10 INJECTION INTRAMUSCULAR; INTRAVENOUS ONCE
Status: COMPLETED | OUTPATIENT
Start: 2020-02-13 | End: 2020-02-13

## 2020-02-13 RX ADMIN — SODIUM CHLORIDE 2000 ML: 9 INJECTION, SOLUTION INTRAVENOUS at 22:14

## 2020-02-13 RX ADMIN — INSULIN HUMAN 10 UNITS: 100 INJECTION, SOLUTION PARENTERAL at 23:32

## 2020-02-13 RX ADMIN — DIPHENHYDRAMINE HYDROCHLORIDE 25 MG: 50 INJECTION, SOLUTION INTRAMUSCULAR; INTRAVENOUS at 22:14

## 2020-02-13 RX ADMIN — METOCLOPRAMIDE HYDROCHLORIDE 10 MG: 5 INJECTION INTRAMUSCULAR; INTRAVENOUS at 22:14

## 2020-02-13 RX ADMIN — KETOROLAC TROMETHAMINE 30 MG: 30 INJECTION, SOLUTION INTRAMUSCULAR at 22:14

## 2020-02-13 ASSESSMENT — PAIN DESCRIPTION - PAIN TYPE: TYPE: ACUTE PAIN

## 2020-02-13 ASSESSMENT — PAIN DESCRIPTION - FREQUENCY: FREQUENCY: CONTINUOUS

## 2020-02-13 ASSESSMENT — PAIN SCALES - GENERAL
PAINLEVEL_OUTOF10: 6
PAINLEVEL_OUTOF10: 6

## 2020-02-13 ASSESSMENT — PAIN DESCRIPTION - DESCRIPTORS: DESCRIPTORS: ACHING;THROBBING

## 2020-02-13 ASSESSMENT — PAIN DESCRIPTION - LOCATION: LOCATION: HEAD

## 2020-02-14 VITALS
TEMPERATURE: 98 F | SYSTOLIC BLOOD PRESSURE: 98 MMHG | OXYGEN SATURATION: 95 % | RESPIRATION RATE: 16 BRPM | HEIGHT: 61 IN | DIASTOLIC BLOOD PRESSURE: 59 MMHG | HEART RATE: 80 BPM | WEIGHT: 167 LBS | BODY MASS INDEX: 31.53 KG/M2

## 2020-02-14 LAB
ANION GAP SERPL CALCULATED.3IONS-SCNC: 13 MMOL/L (ref 3–16)
BUN BLDV-MCNC: 10 MG/DL (ref 7–20)
CALCIUM SERPL-MCNC: 8.1 MG/DL (ref 8.3–10.6)
CHLORIDE BLD-SCNC: 98 MMOL/L (ref 99–110)
CO2: 22 MMOL/L (ref 21–32)
CREAT SERPL-MCNC: <0.5 MG/DL (ref 0.6–1.1)
GFR AFRICAN AMERICAN: >60
GFR NON-AFRICAN AMERICAN: >60
GLUCOSE BLD-MCNC: 275 MG/DL (ref 70–99)
POTASSIUM SERPL-SCNC: 3.5 MMOL/L (ref 3.5–5.1)
SODIUM BLD-SCNC: 133 MMOL/L (ref 136–145)

## 2020-02-14 PROCEDURE — 80048 BASIC METABOLIC PNL TOTAL CA: CPT

## 2020-02-14 RX ORDER — NAPROXEN 500 MG/1
500 TABLET ORAL 2 TIMES DAILY WITH MEALS
Qty: 60 TABLET | Refills: 0 | Status: SHIPPED | OUTPATIENT
Start: 2020-02-14 | End: 2020-12-02

## 2020-02-14 RX ORDER — ONDANSETRON 4 MG/1
4 TABLET, ORALLY DISINTEGRATING ORAL EVERY 8 HOURS PRN
Qty: 20 TABLET | Refills: 0 | Status: SHIPPED | OUTPATIENT
Start: 2020-02-14 | End: 2020-11-14

## 2020-02-14 NOTE — ED NOTES
Patient is resting in bed with family at bedside.  Patient is alert and oriented     Richa Hwang RN  02/14/20 [id number]

## 2020-02-14 NOTE — ED PROVIDER NOTES
Evaluated by Advanced Practice Provider    [address] [facility]  ED      CHIEF COMPLAINT  Illness (dx with the flu on monday, taking tamiflu, but is not feeling any better. Has headache till and sinus pressure. tylenol isn't working for the pain. )    [facility]  Barbara Wagoner is a 35 y.o. female who presents to the ED complaining of flu-like symptoms. Bad cough, HA that has not gone away, has taken tylenol but not helping. When she gets up she feels dizzy and like she will pass out. When she sneezes she sees stars. Diagnosed with the flu on Monday, on tamiflu. She is a diabetic. Sugars had been good for a while, when she changed insurance they are wanting PA for everything. Today and yesterday had vomiting and diarrhea. Has not checked her blood sugars over the past couple of days due to feeling so bad. Treatments tried prior to arrival in the ED include: above. The patient denies other complaints, modifying factors or associated symptoms. The patient arrived to the ED via private car. Patient is accompanied by family who is bedside for the visit. PAST MEDICAL HISTORY    Past Medical History:   Diagnosis Date    Abdominal pain, acute, right lower quadrant 5/15/2013    Anxiety     Arthritis     Left Knee    Bilateral ovarian cysts 8/13/2013    Blood transfusion reaction     Cellulitis and abscess of trunk 1/29/2014    Pt was seen in ED today for bleeding from incision after taking a fall this morning.      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 FSBS 3 times daily 100 strip 5    albuterol sulfate HFA (PROAIR HFA) 108 (90 BASE) MCG/ACT inhaler Inhale 2 puffs into the lungs every 6 hours as needed for Wheezing 1 Inhaler 3    Lancets MISC DX: E 11.9-Uses insulin. FSBS 3 times daily-Delica lancets for one touch ultra  each 5    glucose monitoring kit (FREESTYLE) monitoring kit Dx E11.9-One touch ultra II meter-uses tid 1 kit 0       ALLERGIES    Allergies   Allergen Reactions    Morphine Nausea And Vomiting     States after getting morphine began having sharp chest pain.         FAMILY HISTORY    Family History   Problem Relation Age of Onset   [de-identified] Cancer Mother         uterus    Diabetes Mother     High Blood Pressure Mother     Hypertension Mother     Diabetes Brother     Hypertension Brother     Diabetes Maternal Grandmother     Cancer Maternal Grandfather         stomach    Cancer Paternal Grandfather         prostate       SOCIAL HISTORY    Social History     Socioeconomic History    Marital status:      Spouse name: None    Number of children: None    Years of education: None    Highest education level: None   Occupational History    None   Social Needs    Financial resource strain: None    Food insecurity:     Worry: None     Inability: None    Transportation needs:     Medical: None     Non-medical: None   Tobacco Use    Smoking status: Former Smoker     Years: 0.00     Types: Cigarettes     Last attempt to quit: 1999     Years since quittin.4    Smokeless tobacco: Never Used   [employer] hepatosplenomegaly    MUSCULOSKELETAL:  No gross deformities or trauma noted. Moving all extremities equally and appropriately. Normal ROM. SKIN: Warm/dry. Skin is intact. No rashes/lesions noted. PSYCHIATRIC: Mood and affect appropriate. Speech is clear and articulate. NEUROLOGIC: Alert and oriented. No focal motor or sensory deficits. LABS  I have reviewed all labs for this visit.    Results for orders placed or performed during the hospital encounter of 02/13/20   CBC Auto Differential   Result Value Ref Range    WBC 5.1 4.0 - 11.0 K/uL    RBC 5.02 4.00 - 5.20 M/uL    Hemoglobin 15.8 12.0 - 16.0 g/dL    Hematocrit 45.4 36.0 - 48.0 %    MCV 90.5 80.0 - 100.0 fL    MCH 31.5 26.0 - 34.0 pg    MCHC 34.8 31.0 - 36.0 g/dL    RDW 12.7 12.4 - 15.4 %    Platelets 462 443 - 071 K/uL    MPV 9.4 5.0 - 10.5 fL    Neutrophils % 46.8 %    Lymphocytes % 41.6 %    Monocytes % 10.8 %    Eosinophils % 0.4 %    Basophils % 0.4 %    Neutrophils Absolute 2.4 1.7 - 7.7 K/uL    Lymphocytes Absolute 2.1 1.0 - 5.1 K/uL    Monocytes Absolute 0.6 0.0 - 1.3 K/uL    Eosinophils Absolute 0.0 0.0 - 0.6 K/uL    Basophils Absolute 0.0 0.0 - 0.2 K/uL   Comprehensive Metabolic Panel   Result Value Ref Range    Sodium 132 (L) 136 - 145 mmol/L    Potassium 4.6 3.5 - 5.1 mmol/L    Chloride 92 (L) 99 - 110 mmol/L    CO2 20 (L) 21 - 32 mmol/L    Anion Gap 20 (H) 3 - 16    Glucose 442 (H) 70 - 99 mg/dL    BUN 11 7 - 20 mg/dL    CREATININE <0.5 (L) 0.6 - 1.1 mg/dL    GFR Non-African American >60 >60    GFR African American >60 >60    Calcium 9.3 8.3 - 10.6 mg/dL    Total Protein 8.1 6.4 - 8.2 g/dL    Alb 3.9 3.4 - 5.0 g/dL    Albumin/Globulin Ratio 0.9 (L) 1.1 - 2.2    Total Bilirubin 0.3 0.0 - 1.0 mg/dL    Alkaline Phosphatase 155 (H) 40 - 129 U/L    ALT 21 10 - 40 U/L    AST 24 15 - 37 U/L    Globulin 4.2 g/dL   Urinalysis, reflex to microscopic   Result Value Ref Range    Color, UA Yellow Straw/Yellow    Clarity, UA Clear Clear    Glucose, Ur Sodium remained low at 133 but not significant. Upon reevaluation of the patient she did report feeling her symptoms had improved. I feel that the patient was likely dehydrated and this is what was causing her symptoms. She was discharged with prescriptions for naproxen and Zofran to help with symptom control. At this point I do not feel the patient requires further work up and it is reasonable to discharge the patient. Please refer to AVS for further details regarding discharge instructions. A discussion was had with the patient regarding diagnosis, diagnostic testing results, treatment/ plan of care, and follow up. All questions were answered. Patient will follow up as directed for further evaluation/treatment. The patient was given strict return precautions as we discussed symptoms that would necessitate return to the ED. Patient will return to ED for new/worsening symptoms. The patient verbalized their understanding and agreement with the above plan. I estimate there is LOW risk for EPIGLOTTITIS, PNEUMONIA, MENINGITIS, OR URINARY TRACT INFECTION, thus I consider the discharge disposition reasonable. Also, there is no evidence or peritonitis, sepsis, or toxicity. Melia Bee and I have discussed the diagnosis and risks, and we agree with discharging home to follow-up with their primary doctor. We also discussed returning to the Emergency Department immediately if new or worsening symptoms occur. We have discussed the symptoms which are most concerning (e.g., changing or worsening pain, trouble swallowing or breating, neck stiffness, fever) that necessitate immediate return. Patient was sent home with a prescription for below medication/s. I did [place] patient on appropriate use of these medication.   New Prescriptions    NAPROXEN (NAPROSYN) 500 MG TABLET    Take 1 tablet by mouth 2 times daily (with meals)    ONDANSETRON (ZOFRAN ODT) 4 MG DISINTEGRATING TABLET    Take 1 tablet by

## 2020-02-14 NOTE — ED NOTES
--Patient provided with discharge instructions. --Instructions, and follow-up appointments reviewed with patient/family. No further questions or needs at this time. --Vital signs and patient stable upon discharge. --Patient ambulatory to [facility]. --DSD to IV site. Bleed controlled.       Sandy ArnoldLifecare Hospital of [facility]burg  02/14/20 [id number]

## 2020-02-14 NOTE — ED NOTES
IV infusing at this time. Patient is sleeping and family is at bedside.       Josué PlazaCancer Treatment Centers of America  02/13/20 046

## 2020-02-29 ENCOUNTER — HOSPITAL ENCOUNTER (EMERGENCY)
Age: 40
Discharge: HOME | End: 2020-02-29
Payer: MEDICARE

## 2020-02-29 VITALS
HEIGHT: 61 IN | HEART RATE: 86 BPM | DIASTOLIC BLOOD PRESSURE: 82 MMHG | SYSTOLIC BLOOD PRESSURE: 113 MMHG | RESPIRATION RATE: 16 BRPM | WEIGHT: 167 LBS | OXYGEN SATURATION: 99 % | TEMPERATURE: 98.7 F | BODY MASS INDEX: 31.53 KG/M2

## 2020-02-29 PROCEDURE — 99282 EMERGENCY DEPT VISIT SF MDM: CPT

## 2020-02-29 PROCEDURE — 6370000000 HC RX 637 (ALT 250 FOR IP): Performed by: NURSE PRACTITIONER

## 2020-02-29 RX ORDER — BUTALBITAL, ACETAMINOPHEN AND CAFFEINE 50; 325; 40 MG/1; MG/1; MG/1
1 TABLET ORAL ONCE
Status: COMPLETED | OUTPATIENT
Start: 2020-02-29 | End: 2020-02-29

## 2020-02-29 RX ORDER — IBUPROFEN 600 MG/1
600 TABLET ORAL EVERY 8 HOURS PRN
Qty: 30 TABLET | Refills: 0 | Status: SHIPPED | OUTPATIENT
Start: 2020-02-29 | End: 2021-04-29

## 2020-02-29 RX ORDER — AMOXICILLIN AND CLAVULANATE POTASSIUM 875; 125 MG/1; MG/1
1 TABLET, FILM COATED ORAL 2 TIMES DAILY
Qty: 20 TABLET | Refills: 0 | Status: SHIPPED | OUTPATIENT
Start: 2020-02-29 | End: 2020-03-10

## 2020-02-29 RX ORDER — BUTALBITAL, ACETAMINOPHEN AND CAFFEINE 300; 40; 50 MG/1; MG/1; MG/1
1 CAPSULE ORAL EVERY 4 HOURS PRN
Qty: 6 CAPSULE | Refills: 0 | Status: SHIPPED | OUTPATIENT
Start: 2020-02-29 | End: 2020-03-03 | Stop reason: SDUPTHER

## 2020-02-29 RX ORDER — AMOXICILLIN AND CLAVULANATE POTASSIUM 875; 125 MG/1; MG/1
1 TABLET, FILM COATED ORAL ONCE
Status: COMPLETED | OUTPATIENT
Start: 2020-02-29 | End: 2020-02-29

## 2020-02-29 RX ADMIN — AMOXICILLIN AND CLAVULANATE POTASSIUM 1 TABLET: 875; 125 TABLET, FILM COATED ORAL at 16:48

## 2020-02-29 RX ADMIN — BUTALBITAL, ACETAMINOPHEN, AND CAFFEINE 1 TABLET: 50; 325; 40 TABLET ORAL at 16:48

## 2020-02-29 ASSESSMENT — ENCOUNTER SYMPTOMS
EYE PAIN: 1
PHOTOPHOBIA: 0
NAUSEA: 0
ABDOMINAL DISTENTION: 0
WHEEZING: 0
VOMITING: 0
ABDOMINAL PAIN: 0
EYE DISCHARGE: 0
SHORTNESS OF BREATH: 0
EYE ITCHING: 0
BACK PAIN: 0
DIARRHEA: 0
ALLERGIC/IMMUNOLOGIC NEGATIVE: 1
EYE REDNESS: 0
COUGH: 0

## 2020-02-29 ASSESSMENT — PAIN SCALES - GENERAL
PAINLEVEL_OUTOF10: 9
PAINLEVEL_OUTOF10: 9
PAINLEVEL_OUTOF10: 3

## 2020-02-29 ASSESSMENT — PAIN DESCRIPTION - LOCATION: LOCATION: HEAD;EAR

## 2020-02-29 ASSESSMENT — PAIN DESCRIPTION - PAIN TYPE: TYPE: ACUTE PAIN

## 2020-02-29 ASSESSMENT — PAIN DESCRIPTION - ORIENTATION: ORIENTATION: RIGHT

## 2020-02-29 NOTE — ED PROVIDER NOTES
**[facility]  ED  EMERGENCY DEPARTMENT ENCOUNTER      Pt Name: Milton Baugh  YANET:[id number]  [place] 1980  Date of evaluation: 2/29/2020  Provider: IRASEMA Valladares CNP      Chief Complaint:    Chief Complaint   Patient presents with   Joyce Kirk     since yedsterday    Headache    Eye Problem       Nursing Notes, Past Medical Hx, Past Surgical Hx, Social Hx, Allergies, and Family Hx were all reviewed and agreed with or any disagreements were addressed in the HPI.    HPI:  (Location, Duration, Timing, Severity, Quality, Assoc Sx, Context, Modifying factors)  This is a  38 y.o. female who presents with complaints of fracturing her tooth last week and now with pain that shoots from her jaw to her right ear and up to her head. States headache is behind right eye but denies any watering, itching, vision changes or floaters. Denies any fevers, chills, nausea, vomiting, abdominal pain or weakness. Rates pain a 9/10    PastMedical/Surgical History:      Diagnosis Date    Abdominal pain, acute, right lower quadrant 5/15/2013    Anxiety     Arthritis     Left Knee    Bilateral ovarian cysts 8/13/2013    Blood transfusion reaction     Cellulitis and abscess of trunk 1/29/2014    Pt was seen in ED today for bleeding from incision after taking a fall this morning.      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 Conjunctiva/sclera: Conjunctivae normal.      Pupils: Pupils are equal, round, and reactive to light. Neck:      Musculoskeletal: Normal range of motion and neck supple. No muscular tenderness. Cardiovascular:      Rate and Rhythm: Normal rate and regular rhythm. Pulses: Normal pulses. Heart sounds: Normal heart sounds. No murmur. No friction rub. No gallop. Pulmonary:      Effort: Pulmonary effort is normal. No respiratory distress. Breath sounds: Normal breath sounds. No stridor. No wheezing, rhonchi or rales. Chest:      Chest wall: No tenderness. Abdominal:      Tenderness: There is no abdominal tenderness. Musculoskeletal: Normal range of motion. Lymphadenopathy:      Cervical: No cervical adenopathy. Skin:     General: Skin is warm and dry. Capillary Refill: Capillary refill takes less than 2 seconds. Neurological:      General: No focal deficit present. Mental Status: She is alert and oriented to person, place, and time. Cranial Nerves: No cranial nerve deficit. Sensory: No sensory deficit. Motor: No weakness. Coordination: Coordination normal.      Gait: Gait normal.      Deep Tendon Reflexes: Reflexes normal.   Psychiatric:         Mood and Affect: Mood normal.         Behavior: Behavior normal.         Thought Content: Thought content normal.         Judgment: Judgment normal.         MEDICAL DECISION MAKING    Vitals:    Vitals:    02/29/20 1630   BP: 113/82   Pulse: 86   Resp: 16   Temp: 98.7 °F (37.1 °C)   TempSrc: Oral   SpO2: 99%   Weight: 167 lb (75.8 kg)   Height: 5' 1\" (1.549 m)       LABS:Labs Reviewed - No data to display     Remainder of labs reviewed and werenegative at this time or not returned at the time of this note.     RADIOLOGY:   Non-plain film images such as CT, Ultrasound and MRI are read by the radiologist. Baljinder HOLLOWAY APRN - AVELINA have directly visualized the radiologic plain film image(s) with the below findings:        Interpretation per the Radiologist below, if available at the time of this note:    No orders to display        Xr Chest Standard (2 Vw)    Result Date: 2/13/2020  EXAMINATION: TWO XRAY VIEWS OF THE CHEST 2/13/2020 10:12 pm COMPARISON: 09/26/2016 radiograph HISTORY: ORDERING SYSTEM PROVIDED HISTORY: flu, cough, low O2 sat TECHNOLOGIST PROVIDED HISTORY: Reason for exam:->flu, cough, low O2 sat Reason for Exam: Post op open heart surgery FINDINGS: The heart, mediastinum and pulmonary vascularity are normal.  Lungs are well-expanded and clear. No skeletal abnormalities are present in the chest.     No significant findings in the chest.          MEDICAL DECISION MAKING / ED COURSE:      PROCEDURES:   Procedures    None    Patient was given:  Medications   butalbital-acetaminophen-caffeine (FIORICET, ESGIC) per tablet 1 tablet (1 tablet Oral Given 2/29/20 1648)   amoxicillin-clavulanate (AUGMENTIN) 875-125 MG per tablet 1 tablet (1 tablet Oral Given 2/29/20 1648)       Patient is alert and oriented x4. Skin is pwd, no cyanosis or pallor. Moist mucus membranes. Tooth 31 with fracture, no palpable abscess noted. Decay present. PERRLA, EOMI. ( no pain with extraocular eye movement), no erythema noted to the right eye. Right TM with middle ear effusion, no signs of infection. Neck with FROM, no cervical adenopathy noted. Heart with RRR. Lungs are clear to auscultation. Abdomen is soft and non-tender. Distal pulses and neurovascular status is intact  Differentials: headache, migraine, otitis media, middle ear effusion, dental caries, dental abscess, fractured tooth,  Augmentin given for the dental pain, possible infection and fioricet given for headache  Diagnosis: tooth fracture, dental caries, headache, middle ear effusion, right ear pain  Patient will be discharged with ibuprofen, Fioricet and Augmentin.    Follow up with PCP and dentist ( patient states she already has flonase at home to help with the middle ear effusion)  Dental clinic list provided      The patient tolerated their visit well. I evaluated the patient. The physician was available for consultation as needed. The patient and / or the family were informed of the results of any tests, a time was given to answer questions, a plan was proposed and they agreed with plan. CLINICAL IMPRESSION:  1. Closed fracture of tooth, initial encounter    2. Dental caries    3. Right ear pain    4. Acute middle ear effusion, right    5.  Acute nonintractable headache, unspecified headache type        DISPOSITION        PATIENT REFERRED TO:  Bipin Viveros DO  [address] [address],[address] [facility] [address]  [phone]    Schedule an appointment as soon as possible for a visit in 3 days  For recheck    [facility]  [address]  Go to   For new or worsening symptoms      DISCHARGE MEDICATIONS:  New Prescriptions    AMOXICILLIN-CLAVULANATE (AUGMENTIN) 875-125 MG PER TABLET    Take 1 tablet by mouth 2 times daily for 10 days    BUTALBITAL-APAP-CAFFEINE (FIORICET) -40 MG CAPS PER CAPSULE    Take 1 capsule by mouth every 4 hours as needed for Headaches    IBUPROFEN (ADVIL;MOTRIN) 600 MG TABLET    Take 1 tablet by mouth every 8 hours as needed for Pain       DISCONTINUED MEDICATIONS:  Discontinued Medications    IBUPROFEN (ADVIL;MOTRIN) 800 MG TABLET    TAKE 1 TABLET BY MOUTH EVERY 8 HOURS AS NEEDED FOR PAIN OR  FEVER              (Please note the MDM and HPI sections of this note were completed with a voice recognition program.  Efforts were made to edit the dictations but occasionally words are mis-transcribed.)    Electronically signed, IRASEMA Xavier CNP,        IRASEMA Xavier CNP  02/29/20 0374

## 2020-03-02 ENCOUNTER — TELEPHONE (OUTPATIENT)
Dept: FAMILY MEDICINE CLINIC | Age: 40
End: 2020-03-02

## 2020-03-03 ENCOUNTER — OFFICE VISIT (OUTPATIENT)
Dept: FAMILY MEDICINE CLINIC | Age: 40
End: 2020-03-03
Payer: COMMERCIAL

## 2020-03-03 VITALS
BODY MASS INDEX: 32.51 KG/M2 | WEIGHT: 172.2 LBS | SYSTOLIC BLOOD PRESSURE: 115 MMHG | HEART RATE: 105 BPM | HEIGHT: 61 IN | OXYGEN SATURATION: 97 % | DIASTOLIC BLOOD PRESSURE: 78 MMHG

## 2020-03-03 LAB — HBA1C MFR BLD: 14 %

## 2020-03-03 PROCEDURE — 83036 HEMOGLOBIN GLYCOSYLATED A1C: CPT | Performed by: FAMILY MEDICINE

## 2020-03-03 PROCEDURE — 99214 OFFICE O/P EST MOD 30 MIN: CPT | Performed by: FAMILY MEDICINE

## 2020-03-03 RX ORDER — BUTALBITAL, ACETAMINOPHEN AND CAFFEINE 300; 40; 50 MG/1; MG/1; MG/1
1 CAPSULE ORAL 2 TIMES DAILY PRN
Qty: 20 CAPSULE | Refills: 0 | Status: SHIPPED | OUTPATIENT
Start: 2020-03-03 | End: 2021-04-29

## 2020-03-03 RX ORDER — PREDNISONE 20 MG/1
20 TABLET ORAL 2 TIMES DAILY
Qty: 10 TABLET | Refills: 0 | Status: SHIPPED | OUTPATIENT
Start: 2020-03-03 | End: 2020-03-08

## 2020-03-03 ASSESSMENT — ENCOUNTER SYMPTOMS
SINUS PAIN: 0
CONSTIPATION: 0
CHEST TIGHTNESS: 0
SHORTNESS OF BREATH: 0
COUGH: 0
BLOOD IN STOOL: 0
ABDOMINAL PAIN: 0
SINUS PRESSURE: 0

## 2020-03-03 NOTE — PROGRESS NOTES
Subjective:      Patient ID: Namita Denney is a 38 y.o. female. HPI  Rt HA & Rt ear pain-onset 5 days-seen in ER-told tooth problem-put on Augm-no better. She states she has been out of her for probably 2 months and the rest of her diabetic medication because her not being covered by any insurance but she did find that [facility] will be taken care of her medications until she gets things straightened out with the insurance company. Her last A1c August 2019 was 14 and today used A1c was 14-but she has been out of medication off and on for quite a while-she states she would like to be prescribed Trulicity if her insurance will cover it-right now she buys her Metformin and pays out-of-pocket but she has not had it for several months. Prior to Visit Medications :  Medication amoxicillin-clavulanate (AUGMENTIN) 875-125 MG per tablet, Sig Take 1 tablet by mouth 2 times daily for 10 days, Taking? Yes, Authorizing Provider Reed Santa APRN - CNP    Medication butalbital-APAP-caffeine (FIORICET) -40 MG CAPS per capsule, Sig Take 1 capsule by mouth every 4 hours as needed for Headaches, Taking? Yes, Authorizing Provider Reed Santa APRN - CNP    Medication ibuprofen (ADVIL;MOTRIN) 600 MG tablet, Sig Take 1 tablet by mouth every 8 hours as needed for Pain, Taking? Yes, Authorizing Provider Reed Santa APRN - CNP    Medication naproxen (NAPROSYN) 500 MG tablet, Sig Take 1 tablet by mouth 2 times daily (with meals), Taking? Yes, Authorizing Provider Alejandro Mejia APRN - CNP    Medication ondansetron (ZOFRAN ODT) 4 MG disintegrating tablet, Sig Take 1 tablet by mouth every 8 hours as needed for Nausea, Taking? Yes, Authorizing Provider Alejandro Mejia APRN - CNP    Medication omeprazole (PRILOSEC) 40 MG delayed release capsule, Sig Take 1 capsule by mouth every morning (before breakfast), Taking?  Yes, Authorizing Provider Mariely Tran MD    Medication fluticasone (FLONASE) 50 MCG/ACT

## 2020-03-09 PROBLEM — H92.01 OTALGIA OF RIGHT EAR: Status: ACTIVE | Noted: 2020-03-09

## 2020-03-09 PROBLEM — K02.9 DENTAL CARIES: Status: ACTIVE | Noted: 2020-03-09

## 2020-03-09 PROBLEM — S02.5XXA FRACTURE OF TOOTH: Status: ACTIVE | Noted: 2020-03-09

## 2020-03-09 PROBLEM — H65.199 ACUTE TRANSUDATIVE OTITIS MEDIA: Status: ACTIVE | Noted: 2020-03-09

## 2020-03-10 ENCOUNTER — OFFICE VISIT (OUTPATIENT)
Dept: FAMILY MEDICINE CLINIC | Age: 40
End: 2020-03-10
Payer: COMMERCIAL

## 2020-03-10 VITALS
WEIGHT: 161 LBS | BODY MASS INDEX: 30.4 KG/M2 | SYSTOLIC BLOOD PRESSURE: 110 MMHG | TEMPERATURE: 98 F | HEART RATE: 98 BPM | DIASTOLIC BLOOD PRESSURE: 62 MMHG | OXYGEN SATURATION: 97 % | HEIGHT: 61 IN

## 2020-03-10 LAB
APTT: 33.6 SEC (ref 24.2–36.2)
BASOPHILS ABSOLUTE: 0 K/UL (ref 0–0.2)
BASOPHILS RELATIVE PERCENT: 0.5 %
EOSINOPHILS ABSOLUTE: 0.1 K/UL (ref 0–0.6)
EOSINOPHILS RELATIVE PERCENT: 0.6 %
HCT VFR BLD CALC: 44.9 % (ref 36–48)
HEMOGLOBIN: 15.5 G/DL (ref 12–16)
INR BLD: 0.85 (ref 0.86–1.14)
LYMPHOCYTES ABSOLUTE: 3 K/UL (ref 1–5.1)
LYMPHOCYTES RELATIVE PERCENT: 36.3 %
MCH RBC QN AUTO: 31.5 PG (ref 26–34)
MCHC RBC AUTO-ENTMCNC: 34.6 G/DL (ref 31–36)
MCV RBC AUTO: 91 FL (ref 80–100)
MONOCYTES ABSOLUTE: 0.6 K/UL (ref 0–1.3)
MONOCYTES RELATIVE PERCENT: 7.2 %
NEUTROPHILS ABSOLUTE: 4.6 K/UL (ref 1.7–7.7)
NEUTROPHILS RELATIVE PERCENT: 55.4 %
PDW BLD-RTO: 12.8 % (ref 12.4–15.4)
PLATELET # BLD: 250 K/UL (ref 135–450)
PMV BLD AUTO: 9.9 FL (ref 5–10.5)
PROTHROMBIN TIME: 9.8 SEC (ref 10–13.2)
RBC # BLD: 4.93 M/UL (ref 4–5.2)
WBC # BLD: 8.4 K/UL (ref 4–11)

## 2020-03-10 PROCEDURE — 36415 COLL VENOUS BLD VENIPUNCTURE: CPT | Performed by: INTERNAL MEDICINE

## 2020-03-10 PROCEDURE — 99214 OFFICE O/P EST MOD 30 MIN: CPT | Performed by: INTERNAL MEDICINE

## 2020-03-10 RX ORDER — RIZATRIPTAN BENZOATE 10 MG/1
TABLET, ORALLY DISINTEGRATING ORAL
Qty: 9 TABLET | Refills: 3 | Status: SHIPPED | OUTPATIENT
Start: 2020-03-10 | End: 2021-01-27 | Stop reason: ALTCHOICE

## 2020-03-10 RX ORDER — METOCLOPRAMIDE 5 MG/1
5 TABLET ORAL 3 TIMES DAILY PRN
Qty: 30 TABLET | Refills: 3 | Status: SHIPPED | OUTPATIENT
Start: 2020-03-10 | End: 2020-05-10

## 2020-03-10 NOTE — PROGRESS NOTES
3/10/2020     Loy Meehan (:  1980) is a 38 y.o. female, here for evaluation of the following medical concerns:  Chief Complaint   Patient presents with    Headache       HPI  Acute visit with complaint of migraine headache twice monthly no longer responding to advil. Moderate, some nausea and photophobia and lasting hours. No visual changes or vomiting. Also easy bruising on legs, no bleeding. Patient's medications, allergies, past medical, surgical, social and family histories were reviewed and updated as appropriate. Review of Systems   Constitutional: Negative for fatigue. Genitourinary: Negative for dysuria and frequency. Neurological: Positive for headaches. Negative for dizziness and weakness. Prior to Visit Medications    Medication Sig Taking?  Authorizing Provider   metFORMIN (GLUCOPHAGE) 1000 MG tablet Take 1 tablet by mouth daily (with breakfast)  Blake Krueger DO   Dulaglutide (TRULICITY) 8.81 AP/9.4OM SOPN Inject 0.75 mg into the skin once a week  Blake Krueger DO   butalbital-APAP-caffeine (FIORICET) -40 MG CAPS per capsule Take 1 capsule by mouth 2 times daily as needed for Headaches  Blake Krueger DO   amoxicillin-clavulanate (AUGMENTIN) 875-125 MG per tablet Take 1 tablet by mouth 2 times daily for 10 days  IRASEMA Rosas CNP   ibuprofen (ADVIL;MOTRIN) 600 MG tablet Take 1 tablet by mouth every 8 hours as needed for Pain  IRASEMA Rosas CNP   naproxen (NAPROSYN) 500 MG tablet Take 1 tablet by mouth 2 times daily (with meals)  IRASEMA Costa CNP   ondansetron (ZOFRAN ODT) 4 MG disintegrating tablet Take 1 tablet by mouth every 8 hours as needed for Nausea  IRASEMA Costa CNP   omeprazole (PRILOSEC) 40 MG delayed release capsule Take 1 capsule by mouth every morning (before breakfast)  Moris Moreno MD   naproxen (EC NAPROSYN) 500 MG EC tablet Take 1 tablet by mouth 2 times daily (with meals) for 14 days  Reena Grider Leia Humphrey MD   fluticasone (FLONASE) 50 MCG/ACT nasal spray 2 sprays by Nasal route daily  EVETTE Pop   insulin glargine (BASAGLAR KWIKPEN) 100 UNIT/ML injection pen Inject 48 Units into the skin nightly  EVETTE Milligan   albuterol sulfate HFA (VENTOLIN HFA) 108 (90 Base) MCG/ACT inhaler Inhale 2 puffs into the lungs every 6 hours as needed for Wheezing  EVETTE Milligan   lisinopril (PRINIVIL;ZESTRIL) 2.5 MG tablet Take 1 tablet by mouth daily  Blake Krueger DO   atorvastatin (LIPITOR) 10 MG tablet Take 1 tablet by mouth daily  Blake Krueger DO   insulin lispro (HUMALOG KWIKPEN) 100 UNIT/ML pen INJECT 20 UNITS SUBCUTANEOUSLY THREE TIMES DAILY BEFORE MEAL(S)  Blake Krueger DO   polyethylene glycol (GLYCOLAX) powder Take 17 g by mouth daily as needed (constipation)  Sojennifer Morse DO   dicyclomine (BENTYL) 10 MG capsule Take 1 capsule by mouth 4 times daily (before meals and nightly)  IRASEMA Toure CNP   Insulin Pen Needle (B-D UF III MINI PEN NEEDLES) 31G X 5 MM MISC Inject 1 each into the skin 4 times daily  IRASEMA Monzon CNP   blood glucose test strips (ASCENSIA AUTODISC VI;ONE TOUCH ULTRA TEST VI) strip DX: E11.9-One touch ultra strips. FSBS 3 times daily  EVETTE Jiang   albuterol sulfate HFA (PROAIR HFA) 108 (90 BASE) MCG/ACT inhaler Inhale 2 puffs into the lungs every 6 hours as needed for Wheezing  Jay Son MD   Lancets MISC DX: E 11.9-Uses insulin.  FSBS 3 times daily-Delica lancets for one touch ultra II  Blake Krueger DO   glucose monitoring kit (FREESTYLE) monitoring kit Dx E11.9-One touch ultra II meter-uses tid  Romi Omer DO        Social History     Tobacco Use    Smoking status: Former Smoker     Years: 0.00     Types: Cigarettes     Last attempt to quit: 1999     Years since quittin.4    Smokeless tobacco: Never Used    Tobacco comment: smoked for 1 months when teenager   Substance Use Topics    Alcohol use: Yes     Comment: social authenticate this note.     --Eudora Sicard, MD on 3/10/2020 at 5:24 PM

## 2020-04-02 ENCOUNTER — NURSE TRIAGE (OUTPATIENT)
Dept: OTHER | Facility: CLINIC | Age: 40
End: 2020-04-02

## 2020-04-26 NOTE — ED PROVIDER NOTES
COLONOSCOPY  2007    polyps    HYSTERECTOMY  2008    LAPAROSCOPY  6/31/2013    lysis of adhesions    LAPAROTOMY  1/2014    LAPAROTOMY, BILATERAL SALPINGO - OOPHORECTOMY    SALPINGO-OOPHORECTOMY  1/2014     Social History     Social History    Marital status: Legally      Spouse name: N/A    Number of children: N/A    Years of education: N/A     Social History Main Topics    Smoking status: Former Smoker     Years: 0.00     Types: Cigarettes     Quit date: 9/22/1999    Smokeless tobacco: Never Used      Comment: smoked for 1 months when teenager    Alcohol use Yes      Comment: social drinker  4 beer per month    Drug use: No      Comment: Hx of cocaine use in 2004    Sexual activity: Yes     Partners: Male      Comment: painful intercourse      Other Topics Concern    None     Social History Narrative    None       Medications/Allergies     Previous Medications    ALBUTEROL SULFATE HFA (PROAIR HFA) 108 (90 BASE) MCG/ACT INHALER    Inhale 2 puffs into the lungs every 6 hours as needed for Wheezing    BLOOD GLUCOSE TEST STRIPS (ASCENSIA AUTODISC VI;ONE TOUCH ULTRA TEST VI) STRIP    DX: E11.9-One touch ultra strips. FSBS 3 times daily    FLUTICASONE (FLONASE) 50 MCG/ACT NASAL SPRAY    2 sprays by Nasal route daily    GABAPENTIN (NEURONTIN) 300 MG CAPSULE    Take 1 capsule by mouth 2 times daily    GLUCOSE MONITORING KIT (FREESTYLE) MONITORING KIT    Dx E11.9-One touch ultra II meter-uses tid    IBUPROFEN (ADVIL;MOTRIN) 800 MG TABLET    TAKE ONE TABLET BY MOUTH EVERY 8 HOURS AS NEEDED FOR PAIN OR  FEVER    INSULIN LISPRO (HUMALOG KWIKPEN) 100 UNIT/ML PEN    20 units tid ac    INSULIN PEN NEEDLE (B-D UF III MINI PEN NEEDLES) 31G X 5 MM MISC    Inject 1 each into the skin 4 times daily    LANCETS MISC    DX: E 11.9-Uses insulin.  FSBS 3 times daily-Delica lancets for one touch ultra II    LIRAGLUTIDE (VICTOZA) 18 MG/3ML SOPN SC INJECTION    Inject 1.8 mg into the skin daily    METFORMIN (GLUCOPHAGE) fever

## 2020-05-04 ENCOUNTER — NURSE TRIAGE (OUTPATIENT)
Dept: OTHER | Facility: CLINIC | Age: 40
End: 2020-05-04

## 2020-05-04 ENCOUNTER — OFFICE VISIT (OUTPATIENT)
Dept: PRIMARY CARE CLINIC | Age: 40
End: 2020-05-04
Payer: COMMERCIAL

## 2020-05-04 VITALS — TEMPERATURE: 98.7 F | HEART RATE: 107 BPM | OXYGEN SATURATION: 97 %

## 2020-05-04 PROCEDURE — 99213 OFFICE O/P EST LOW 20 MIN: CPT | Performed by: NURSE PRACTITIONER

## 2020-05-04 NOTE — TELEPHONE ENCOUNTER
throat, loss of smell)        Headache, cough, sore throat, fever; hard to take deep breath    Protocols used: CORONAVIRUS (COVID-19) DIAGNOSED OR SUSPECTED-ADULT-AH    Call received through Covid-19 hot line. Rosangela Colindres calls reporting cough, headache, slight SOB, fever. Also has DM with elevated BS. She has fast acting insulin with correction scale--instructed on hydration-8 ounces every hour/ insulin scale now/ low carb food choice now. Per dispo-to be seen today. IF BS does not come down 50-70 points in the next 2-4 hours--must go to ER. Care instructions given per disposition via protocol. Assisted patient in finding the nearest flu clinic. Patient agrees to follow up with flu clinic today[facility] and to go to ER if BS does not come down in the next 2-4 hours. Please do not respond to the triage nurse through this encounter. Any subsequent communication should be directly with the patient.

## 2020-05-05 ENCOUNTER — TELEPHONE (OUTPATIENT)
Dept: PRIMARY CARE CLINIC | Age: 40
End: 2020-05-05

## 2020-05-05 NOTE — TELEPHONE ENCOUNTER
Your patient was seen at the [facility] today. A follow up virtual visit with the patient's PCP should be completed in 48 hours. Please schedule the patient for this follow-up. Thank you.

## 2020-05-07 ENCOUNTER — VIRTUAL VISIT (OUTPATIENT)
Dept: FAMILY MEDICINE CLINIC | Age: 40
End: 2020-05-07
Payer: COMMERCIAL

## 2020-05-07 ENCOUNTER — NURSE TRIAGE (OUTPATIENT)
Dept: OTHER | Facility: CLINIC | Age: 40
End: 2020-05-07

## 2020-05-07 PROCEDURE — 99213 OFFICE O/P EST LOW 20 MIN: CPT | Performed by: FAMILY MEDICINE

## 2020-05-07 ASSESSMENT — ENCOUNTER SYMPTOMS
SHORTNESS OF BREATH: 1
DIARRHEA: 1
COUGH: 1

## 2020-05-10 ENCOUNTER — HOSPITAL ENCOUNTER (EMERGENCY)
Age: 40
Discharge: HOME | End: 2020-05-11
Attending: EMERGENCY MEDICINE
Payer: COMMERCIAL

## 2020-05-10 PROCEDURE — 93005 ELECTROCARDIOGRAM TRACING: CPT

## 2020-05-10 PROCEDURE — 99285 EMERGENCY DEPT VISIT HI MDM: CPT

## 2020-05-10 RX ORDER — DULAGLUTIDE 0.75 MG/.5ML
0.75 INJECTION, SOLUTION SUBCUTANEOUS WEEKLY
COMMUNITY
End: 2020-11-14

## 2020-05-11 ENCOUNTER — APPOINTMENT (OUTPATIENT)
Dept: GENERAL RADIOLOGY | Age: 40
End: 2020-05-11
Payer: COMMERCIAL

## 2020-05-11 ENCOUNTER — TELEPHONE (OUTPATIENT)
Dept: FAMILY MEDICINE CLINIC | Age: 40
End: 2020-05-11

## 2020-05-11 VITALS
RESPIRATION RATE: 18 BRPM | DIASTOLIC BLOOD PRESSURE: 41 MMHG | WEIGHT: 146 LBS | OXYGEN SATURATION: 95 % | HEART RATE: 98 BPM | TEMPERATURE: 98.6 F | HEIGHT: 61 IN | BODY MASS INDEX: 27.56 KG/M2 | SYSTOLIC BLOOD PRESSURE: 123 MMHG

## 2020-05-11 LAB
A/G RATIO: 0.9 (ref 1.1–2.2)
ALBUMIN SERPL-MCNC: 3.7 G/DL (ref 3.4–5)
ALP BLD-CCNC: 134 U/L (ref 40–129)
ALT SERPL-CCNC: 12 U/L (ref 10–40)
ANION GAP SERPL CALCULATED.3IONS-SCNC: 15 MMOL/L (ref 3–16)
ANION GAP SERPL CALCULATED.3IONS-SCNC: 19 MMOL/L (ref 3–16)
AST SERPL-CCNC: 13 U/L (ref 15–37)
BASOPHILS ABSOLUTE: 0 K/UL (ref 0–0.2)
BASOPHILS RELATIVE PERCENT: 0.3 %
BILIRUB SERPL-MCNC: 0.3 MG/DL (ref 0–1)
BUN BLDV-MCNC: 5 MG/DL (ref 7–20)
BUN BLDV-MCNC: 6 MG/DL (ref 7–20)
CALCIUM SERPL-MCNC: 8.2 MG/DL (ref 8.3–10.6)
CALCIUM SERPL-MCNC: 9.1 MG/DL (ref 8.3–10.6)
CHLORIDE BLD-SCNC: 103 MMOL/L (ref 99–110)
CHLORIDE BLD-SCNC: 99 MMOL/L (ref 99–110)
CO2: 18 MMOL/L (ref 21–32)
CO2: 20 MMOL/L (ref 21–32)
CREAT SERPL-MCNC: <0.5 MG/DL (ref 0.6–1.1)
CREAT SERPL-MCNC: <0.5 MG/DL (ref 0.6–1.1)
D DIMER: 266 NG/ML DDU (ref 0–229)
EKG ATRIAL RATE: 101 BPM
EKG DIAGNOSIS: NORMAL
EKG P AXIS: 116 DEGREES
EKG P-R INTERVAL: 132 MS
EKG Q-T INTERVAL: 308 MS
EKG QRS DURATION: 76 MS
EKG QTC CALCULATION (BAZETT): 399 MS
EKG R AXIS: 61 DEGREES
EKG T AXIS: 49 DEGREES
EKG VENTRICULAR RATE: 101 BPM
EOSINOPHILS ABSOLUTE: 0 K/UL (ref 0–0.6)
EOSINOPHILS RELATIVE PERCENT: 0.4 %
FERRITIN: 656.6 NG/ML (ref 15–150)
GFR AFRICAN AMERICAN: >60
GFR AFRICAN AMERICAN: >60
GFR NON-AFRICAN AMERICAN: >60
GFR NON-AFRICAN AMERICAN: >60
GLOBULIN: 4.1 G/DL
GLUCOSE BLD-MCNC: 282 MG/DL (ref 70–99)
GLUCOSE BLD-MCNC: 364 MG/DL (ref 70–99)
HCT VFR BLD CALC: 39.1 % (ref 36–48)
HEMOGLOBIN: 13.6 G/DL (ref 12–16)
INR BLD: 0.98 (ref 0.86–1.14)
LACTATE DEHYDROGENASE: 194 U/L (ref 100–190)
LYMPHOCYTES ABSOLUTE: 2 K/UL (ref 1–5.1)
LYMPHOCYTES RELATIVE PERCENT: 39.9 %
MCH RBC QN AUTO: 31.8 PG (ref 26–34)
MCHC RBC AUTO-ENTMCNC: 34.7 G/DL (ref 31–36)
MCV RBC AUTO: 91.6 FL (ref 80–100)
MONOCYTES ABSOLUTE: 0.5 K/UL (ref 0–1.3)
MONOCYTES RELATIVE PERCENT: 8.9 %
NEUTROPHILS ABSOLUTE: 2.6 K/UL (ref 1.7–7.7)
NEUTROPHILS RELATIVE PERCENT: 50.5 %
PDW BLD-RTO: 12.2 % (ref 12.4–15.4)
PLATELET # BLD: 278 K/UL (ref 135–450)
PMV BLD AUTO: 8.6 FL (ref 5–10.5)
POTASSIUM SERPL-SCNC: 3.8 MMOL/L (ref 3.5–5.1)
POTASSIUM SERPL-SCNC: 4.6 MMOL/L (ref 3.5–5.1)
PROCALCITONIN: 0.1 NG/ML (ref 0–0.15)
PROTHROMBIN TIME: 11.4 SEC (ref 10–13.2)
RBC # BLD: 4.27 M/UL (ref 4–5.2)
SODIUM BLD-SCNC: 136 MMOL/L (ref 136–145)
SODIUM BLD-SCNC: 138 MMOL/L (ref 136–145)
TOTAL PROTEIN: 7.8 G/DL (ref 6.4–8.2)
TROPONIN: <0.01 NG/ML
WBC # BLD: 5.1 K/UL (ref 4–11)

## 2020-05-11 PROCEDURE — 96360 HYDRATION IV INFUSION INIT: CPT

## 2020-05-11 PROCEDURE — 85379 FIBRIN DEGRADATION QUANT: CPT

## 2020-05-11 PROCEDURE — 2580000003 HC RX 258: Performed by: EMERGENCY MEDICINE

## 2020-05-11 PROCEDURE — 71045 X-RAY EXAM CHEST 1 VIEW: CPT

## 2020-05-11 PROCEDURE — 84484 ASSAY OF TROPONIN QUANT: CPT

## 2020-05-11 PROCEDURE — 84145 PROCALCITONIN (PCT): CPT

## 2020-05-11 PROCEDURE — 83615 LACTATE (LD) (LDH) ENZYME: CPT

## 2020-05-11 PROCEDURE — 82728 ASSAY OF FERRITIN: CPT

## 2020-05-11 PROCEDURE — 85025 COMPLETE CBC W/AUTO DIFF WBC: CPT

## 2020-05-11 PROCEDURE — 85610 PROTHROMBIN TIME: CPT

## 2020-05-11 PROCEDURE — 80053 COMPREHEN METABOLIC PANEL: CPT

## 2020-05-11 PROCEDURE — 96372 THER/PROPH/DIAG INJ SC/IM: CPT

## 2020-05-11 PROCEDURE — 6370000000 HC RX 637 (ALT 250 FOR IP): Performed by: EMERGENCY MEDICINE

## 2020-05-11 RX ORDER — 0.9 % SODIUM CHLORIDE 0.9 %
1000 INTRAVENOUS SOLUTION INTRAVENOUS ONCE
Status: COMPLETED | OUTPATIENT
Start: 2020-05-11 | End: 2020-05-11

## 2020-05-11 RX ORDER — AZITHROMYCIN 250 MG/1
500 TABLET, FILM COATED ORAL ONCE
Status: COMPLETED | OUTPATIENT
Start: 2020-05-11 | End: 2020-05-11

## 2020-05-11 RX ORDER — AZITHROMYCIN 250 MG/1
250 TABLET, FILM COATED ORAL SEE ADMIN INSTRUCTIONS
Qty: 6 TABLET | Refills: 0 | Status: SHIPPED | OUTPATIENT
Start: 2020-05-11 | End: 2020-05-16

## 2020-05-11 RX ADMIN — INSULIN LISPRO 20 UNITS: 100 INJECTION, SOLUTION INTRAVENOUS; SUBCUTANEOUS at 01:08

## 2020-05-11 RX ADMIN — AZITHROMYCIN 500 MG: 250 TABLET, FILM COATED ORAL at 01:08

## 2020-05-11 RX ADMIN — SODIUM CHLORIDE 1000 ML: 9 INJECTION, SOLUTION INTRAVENOUS at 01:08

## 2020-05-11 NOTE — ED PROVIDER NOTES
DISINTEGRATING TABLET    Take 1 tablet by mouth every 8 hours as needed for Nausea    POLYETHYLENE GLYCOL (GLYCOLAX) POWDER    Take 17 g by mouth daily as needed (constipation)    RIZATRIPTAN (MAXALT-MLT) 10 MG DISINTEGRATING TABLET    Take one tablet by mouth daily as needed for headache, may repeat in 2 hours if needed       Social history:  reports that she quit smoking about 20 years ago. Her smoking use included cigarettes. She quit after 0.00 years of use. She has never used smokeless tobacco. She reports current alcohol use. She reports that she does not use drugs. Family history:    Family History   Problem Relation Age of Onset    Cancer Mother         uterus    Diabetes Mother     High Blood Pressure Mother     Hypertension Mother     Diabetes Brother     Hypertension Brother     Diabetes Maternal Grandmother     Cancer Maternal Grandfather         stomach    Cancer Paternal Grandfather         prostate         Exam  ED Triage Vitals [05/10/20 2348]   BP Temp Temp Source Pulse Resp SpO2 Height Weight   (!) 148/89 97.9 °F (36.6 °C) Axillary 108 16 94 % 5' 1\" (1.549 m) 146 lb (66.2 kg)     Nursing note and vitals reviewed. Constitutional: In no acute distress  HENT:      Head: Normocephalic      Ears: External ears normal.      Nose: Nose normal.     Mouth: Membrane mucosa moist   Eyes: No discharge. Neck: Supple. Trachea midline. Cardiovascular: Regular rate. Warm extremities  Pulmonary/Chest: Effort normal. No respiratory distress. No wheezing. Speaking full sentences. Not using accessory muscles  Abdominal: Soft. No distension. Nontender  : Deferred  Rectal: Deferred   Musculoskeletal: Moves all extremities. No gross deformity. Neurological: Alert and oriented. Face symmetric. Speech is clear. Skin: Warm and dry. No rash. Psychiatric: Normal mood and affect. Behavior is normal.    Procedures      EKG  The Ekg interpreted by me in the absence of a cardiologist shows.   Tachy sinus rhythm. No specific ST-T wave changes appreciated. No evidence of acute ischemia. Radiology  XR CHEST PORTABLE   Final Result   Commonly reported imaging features of COVID-19 pneumonia are present, and   have developed since the prior study. Other processes such as influenza   pneumonia and organizing pneumonia, as can be seen with drug toxicity and   connective tissue disease, can cause a similar imaging pattern.              Labs  Results for orders placed or performed during the hospital encounter of 05/10/20   Comprehensive Metabolic Panel   Result Value Ref Range    Sodium 138 136 - 145 mmol/L    Potassium 3.8 3.5 - 5.1 mmol/L    Chloride 99 99 - 110 mmol/L    CO2 20 (L) 21 - 32 mmol/L    Anion Gap 19 (H) 3 - 16    Glucose 364 (H) 70 - 99 mg/dL    BUN 6 (L) 7 - 20 mg/dL    CREATININE <0.5 (L) 0.6 - 1.1 mg/dL    GFR Non-African American >60 >60    GFR African American >60 >60    Calcium 9.1 8.3 - 10.6 mg/dL    Total Protein 7.8 6.4 - 8.2 g/dL    Alb 3.7 3.4 - 5.0 g/dL    Albumin/Globulin Ratio 0.9 (L) 1.1 - 2.2    Total Bilirubin 0.3 0.0 - 1.0 mg/dL    Alkaline Phosphatase 134 (H) 40 - 129 U/L    ALT 12 10 - 40 U/L    AST 13 (L) 15 - 37 U/L    Globulin 4.1 g/dL   Troponin   Result Value Ref Range    Troponin <0.01 <0.01 ng/mL   CBC Auto Differential   Result Value Ref Range    WBC 5.1 4.0 - 11.0 K/uL    RBC 4.27 4.00 - 5.20 M/uL    Hemoglobin 13.6 12.0 - 16.0 g/dL    Hematocrit 39.1 36.0 - 48.0 %    MCV 91.6 80.0 - 100.0 fL    MCH 31.8 26.0 - 34.0 pg    MCHC 34.7 31.0 - 36.0 g/dL    RDW 12.2 (L) 12.4 - 15.4 %    Platelets 361 747 - 002 K/uL    MPV 8.6 5.0 - 10.5 fL    Neutrophils % 50.5 %    Lymphocytes % 39.9 %    Monocytes % 8.9 %    Eosinophils % 0.4 %    Basophils % 0.3 %    Neutrophils Absolute 2.6 1.7 - 7.7 K/uL    Lymphocytes Absolute 2.0 1.0 - 5.1 K/uL    Monocytes Absolute 0.5 0.0 - 1.3 K/uL    Eosinophils Absolute 0.0 0.0 - 0.6 K/uL    Basophils Absolute 0.0 0.0 - 0.2 K/uL   Lactate Dehydrogenase   Result Value Ref Range     (H) 100 - 190 U/L   Procalcitonin   Result Value Ref Range    Procalcitonin 0.10 0.00 - 0.15 ng/mL   D-Dimer, Quantitative   Result Value Ref Range    D-Dimer, Quant 266 (H) 0 - 229 ng/mL DDU   Protime-INR   Result Value Ref Range    Protime 11.4 10.0 - 13.2 sec    INR 0.98 0.86 - 2.27   Basic Metabolic Panel   Result Value Ref Range    Sodium 136 136 - 145 mmol/L    Potassium 4.6 3.5 - 5.1 mmol/L    Chloride 103 99 - 110 mmol/L    CO2 18 (L) 21 - 32 mmol/L    Anion Gap 15 3 - 16    Glucose 282 (H) 70 - 99 mg/dL    BUN 5 (L) 7 - 20 mg/dL    CREATININE <0.5 (L) 0.6 - 1.1 mg/dL    GFR Non-African American >60 >60    GFR African American >60 >60    Calcium 8.2 (L) 8.3 - 10.6 mg/dL       Screenings           MDM and ED Course  Patient is a 43-year-old woman who presents with shortness of breath and cough and known novel coronavirus 19+ status. Is tachycardic to 108. Likely dehydrated. Will give IV fluids. Afebrile and has no respiratory distress, speaking in full sentences. O2 saturation 96% on room air (EMP MDM)    D-dimer obtained for evaluation of severity of novel coronavirus and not for purposes of re-stratification for pulmonary embolism. Dimer was mildly elevated. Do not believe that patient would benefit at this time from empiric anticoagulation given this is much more likely secondary to it being an acute phase reactant for her respiratory infection. BMP revealed hyperglycemia likely because patient had not taken her insulin. This was given with IV fluids and her hyperglycemia improved and she no longer has an anion gap. She is well-appearing. Now 97% on room air. Pulse has also improved to 81 and she was likely dehydrated. Discussed again with the patient strict return precautions and she is happy with her care. [unfilled]    Final Impression  1. Dyspnea and respiratory abnormalities    2.  COVID-19        Blood pressure (!) 148/89, pulse 81, temperature 97.9 °F (36.6 °C), temperature source Axillary, resp. rate 16, height 5' 1\" (1.549 m), weight 146 lb (66.2 kg), last menstrual period 12/18/2008, SpO2 97 %, not currently breastfeeding. Disposition:  DISPOSITION Decision To Discharge 05/11/2020 02:32:34 AM      Patient Referrals:  Corinne Solid, DO  [address] [address]  [phone]    In 1 day        Discharge Medications:  New Prescriptions    AZITHROMYCIN (ZITHROMAX) 250 MG TABLET    Take 1 tablet by mouth See Admin Instructions for 5 days 500mg on day 1 followed by 250mg on days 2 - 5       Discontinued Medications:  Discontinued Medications    ATORVASTATIN (LIPITOR) 10 MG TABLET    Take 1 tablet by mouth daily    DICYCLOMINE (BENTYL) 10 MG CAPSULE    Take 1 capsule by mouth 4 times daily (before meals and nightly)    INSULIN GLARGINE (BASAGLAR KWIKPEN) 100 UNIT/ML INJECTION PEN    Inject 48 Units into the skin nightly    LISINOPRIL (PRINIVIL;ZESTRIL) 2.5 MG TABLET    Take 1 tablet by mouth daily    METOCLOPRAMIDE (REGLAN) 5 MG TABLET    Take 1 tablet by mouth 3 times daily as needed (nausea and headache)    OMEPRAZOLE (PRILOSEC) 40 MG DELAYED RELEASE CAPSULE    Take 1 capsule by mouth every morning (before breakfast)       This chart was generated using the Dragon dictation system. I created this record but it may contain dictation errors given the limitations of this technology.         Pool De La Rosa MD  05/11/20 3931

## 2020-05-12 ENCOUNTER — CARE COORDINATION (OUTPATIENT)
Dept: CARE COORDINATION | Age: 40
End: 2020-05-12

## 2020-05-18 ENCOUNTER — TELEPHONE (OUTPATIENT)
Dept: PRIMARY CARE CLINIC | Age: 40
End: 2020-05-18

## 2020-05-18 ENCOUNTER — OFFICE VISIT (OUTPATIENT)
Dept: PRIMARY CARE CLINIC | Age: 40
End: 2020-05-18
Payer: OTHER GOVERNMENT

## 2020-05-18 VITALS — HEART RATE: 93 BPM | OXYGEN SATURATION: 96 % | TEMPERATURE: 98.6 F

## 2020-05-18 LAB
SARS-COV-2: DETECTED
SOURCE: ABNORMAL

## 2020-05-18 PROCEDURE — 99213 OFFICE O/P EST LOW 20 MIN: CPT | Performed by: NURSE PRACTITIONER

## 2020-05-18 NOTE — TELEPHONE ENCOUNTER
Your patient was seen at the [facility] today. A follow up virtual visit with the patient's PCP should be completed in 48 hours. Please schedule the patient for this follow-up. Thank you.

## 2020-05-19 ENCOUNTER — VIRTUAL VISIT (OUTPATIENT)
Dept: FAMILY MEDICINE CLINIC | Age: 40
End: 2020-05-19
Payer: COMMERCIAL

## 2020-05-19 ENCOUNTER — TELEPHONE (OUTPATIENT)
Dept: PRIMARY CARE CLINIC | Age: 40
End: 2020-05-19

## 2020-05-19 PROCEDURE — 99213 OFFICE O/P EST LOW 20 MIN: CPT | Performed by: FAMILY MEDICINE

## 2020-05-19 ASSESSMENT — ENCOUNTER SYMPTOMS
VOMITING: 0
NAUSEA: 0
DIARRHEA: 0
COUGH: 0
SHORTNESS OF BREATH: 0

## 2020-05-19 NOTE — PROGRESS NOTES
have it emailed to her. This was a video visit that lasted approximately 13 to 15 minutes.         Blake Krueger, DO

## 2020-05-21 LAB
SARS-COV-2: NOT DETECTED
SOURCE: NORMAL

## 2020-09-16 ENCOUNTER — HOSPITAL ENCOUNTER (OUTPATIENT)
Dept: WOMENS IMAGING | Age: 40
Discharge: HOME | End: 2020-09-16
Payer: COMMERCIAL

## 2020-09-16 ENCOUNTER — OFFICE VISIT (OUTPATIENT)
Dept: FAMILY MEDICINE CLINIC | Age: 40
End: 2020-09-16

## 2020-09-16 VITALS
TEMPERATURE: 98.3 F | HEIGHT: 61 IN | DIASTOLIC BLOOD PRESSURE: 72 MMHG | HEART RATE: 63 BPM | WEIGHT: 151.8 LBS | BODY MASS INDEX: 28.66 KG/M2 | SYSTOLIC BLOOD PRESSURE: 110 MMHG | OXYGEN SATURATION: 98 %

## 2020-09-16 LAB — HBA1C MFR BLD: 14 %

## 2020-09-16 PROCEDURE — 77067 SCR MAMMO BI INCL CAD: CPT

## 2020-09-16 PROCEDURE — 83036 HEMOGLOBIN GLYCOSYLATED A1C: CPT | Performed by: FAMILY MEDICINE

## 2020-09-16 PROCEDURE — 99214 OFFICE O/P EST MOD 30 MIN: CPT | Performed by: FAMILY MEDICINE

## 2020-09-16 RX ORDER — TOPIRAMATE 25 MG/1
TABLET ORAL
Qty: 60 TABLET | Refills: 3 | Status: SHIPPED | OUTPATIENT
Start: 2020-09-16 | End: 2021-01-27 | Stop reason: ALTCHOICE

## 2020-09-16 RX ORDER — ESCITALOPRAM OXALATE 10 MG/1
TABLET ORAL
Qty: 60 TABLET | Refills: 0 | Status: SHIPPED | OUTPATIENT
Start: 2020-09-16 | End: 2020-11-14

## 2020-09-16 RX ORDER — GABAPENTIN 300 MG/1
CAPSULE ORAL
Qty: 60 CAPSULE | Refills: 0 | Status: SHIPPED | OUTPATIENT
Start: 2020-09-16 | End: 2020-12-15 | Stop reason: ALTCHOICE

## 2020-09-16 ASSESSMENT — ENCOUNTER SYMPTOMS
COUGH: 0
BLOOD IN STOOL: 0
ABDOMINAL PAIN: 0
CONSTIPATION: 0
SHORTNESS OF BREATH: 0
CHEST TIGHTNESS: 0

## 2020-09-16 NOTE — LETTER
[address]  [place] [address]  Phone: [phone]  Fax: [phone] DO        September 16, 2020     Patient: Alejandro Cowden   YOB: 1980   Date of Visit: 9/16/2020       To Whom It May Concern: It is my medical opinion that Alejandro Cowden may return to work on 9/21/2020. Off 9/16/2020 thru 9/20-under my care. If you have any questions or concerns, please don't hesitate to call.     Sincerely,          Diane Powers, DO

## 2020-09-16 NOTE — PATIENT INSTRUCTIONS
Type 2 diabetes mellitus without complication, unspecified whether long term insulin use (HCC)  -     POCT glycosylated hemoglobin (Hb A1C)  A1c is 14 as where the last 2-continue medications and adjusted sliding scale twice a day-continue watching carbohydrates and increase activity as tolerated  Hx of migraine headaches  Prescription sent for generic Topamax-call me in 2 or 3 weeks with an update  Neuropathy  Prescription sent for gabapentin and call me 2 or 3 weeks with an update  Breast cancer screening  -     LACHO DIGITAL SCREEN W OR WO CAD BILATERAL;  Future  Refer for mammogram hopefully today  Other orders  -     topiramate (TOPAMAX) 25 MG tablet; 1 hs for 10 days then 2 hs  -     escitalopram (LEXAPRO) 10 MG tablet; 1 in AM for 2 weeks then 2 in AM  -     gabapentin (NEURONTIN) 300 MG capsule; 1 hs-may increase to 2 hs after 10 days  Prescription sent for generic Lexapro and again call me in 2 to 3 weeks with an update  See me 2 months

## 2020-09-16 NOTE — PROGRESS NOTES
Subjective:      Patient ID: Arely Urena is a 38 y.o. female. HPI  3 week Hx pain both feet-onset late PM-daily-motrin,mobic no help--she did take some gabapentin a while back for some burning in her feet but is not on it now. Diabetes-she has lost a considerable amount of weight but still has trouble with her blood sugars-Trulicity was not covered so she is back on Victoza but only for the last few weeks-Metformin 1000 mg once a day and a sliding scale with regular insulin-she stated that last week she had a blood sugar a little over 500. Migraine headaches- she states she gets headaches frequently and Imitrex does help and she is never been on any kind of a migraine preventer. She is now eligible for mammograms. She states she is going through a separation which will probably be permanent and is depressed and a little trouble sleeping. She has been on Lexapro in the past and worked fairly well. Prior to Visit Medications :  Medication topiramate (TOPAMAX) 25 MG tablet, Sig 1 hs for 10 days then 2 hs, Taking? Yes, Authorizing Provider Blake Krueger, DO    Medication escitalopram (LEXAPRO) 10 MG tablet, Sig 1 in AM for 2 weeks then 2 in AM, Taking? Yes, Authorizing Provider Blake Krueger, DO    Medication gabapentin (NEURONTIN) 300 MG capsule, Sig 1 hs-may increase to 2 hs after 10 days, Taking? Yes, Authorizing Provider Blake Krueger, DO    Medication Dulaglutide (TRULICITY) 2.44 VV/5.3ND SOPN, Sig Inject 0.75 mg into the skin once a week, Taking? Yes, Authorizing Provider Historical Provider, MD    Medication metFORMIN (GLUCOPHAGE) 1000 MG tablet, Sig Take 1 tablet by mouth daily (with breakfast), Taking? Yes, Authorizing Provider Blake Krueger, DO    Medication Dulaglutide (TRULICITY) 7.19 ZB/7.1JJ SOPN, Sig Inject 0.75 mg into the skin once a week, Taking?  Yes, Authorizing Provider Blake Krueger, DO    Medication butalbital-APAP-caffeine (FIORICET) -40 MG CAPS per capsule, Sig Take 1 capsule by mouth 2 times daily as needed for Headaches, Taking? Yes, Authorizing Provider Blake Krueger, DO    Medication ibuprofen (ADVIL;MOTRIN) 600 MG tablet, Sig Take 1 tablet by mouth every 8 hours as needed for Pain, Taking? Yes, Authorizing Provider Judd Farfan APRN - CNP    Medication naproxen (NAPROSYN) 500 MG tablet, Sig Take 1 tablet by mouth 2 times daily (with meals), Taking? Yes, Authorizing Provider Everlydahiana Coad, APRN - CNP    Medication ondansetron (ZOFRAN ODT) 4 MG disintegrating tablet, Sig Take 1 tablet by mouth every 8 hours as needed for Nausea, Taking? Yes, Authorizing Provider Everlyn Coad, APRN - CNP    Medication fluticasone (FLONASE) 50 MCG/ACT nasal spray, Sig 2 sprays by Nasal route daily, Taking? Yes, Authorizing Provider Claudeen Grebe, PA    Medication albuterol sulfate HFA (VENTOLIN HFA) 108 (90 Base) MCG/ACT inhaler, Sig Inhale 2 puffs into the lungs every 6 hours as needed for Wheezing, Taking? Yes, Authorizing Provider EVETTE Soria    Medication insulin lispro (HUMALOG KWIKPEN) 100 UNIT/ML pen, Sig INJECT 20 UNITS SUBCUTANEOUSLY THREE TIMES DAILY BEFORE MEAL(S), Taking? Yes, Authorizing Provider Blake Krueger, DO    Medication Insulin Pen Needle (B-D UF III MINI PEN NEEDLES) 31G X 5 MM MISC, Sig Inject 1 each into the skin 4 times daily, Taking? Yes, Authorizing Provider Bonnie Thomas APRN - CNP    Medication blood glucose test strips (ASCENSIA AUTODISC VI;ONE TOUCH ULTRA TEST VI) strip, Sig DX: E11.9-One touch ultra strips. FSBS 3 times daily, Taking? Yes, Authorizing Provider EVETTE Tee    Medication albuterol sulfate HFA (PROAIR HFA) 108 (90 BASE) MCG/ACT inhaler, Sig Inhale 2 puffs into the lungs every 6 hours as needed for Wheezing, Taking? Yes, Authorizing Provider Kathleen Truong MD    Medication Lancets MISC, Sig DX: E 11.9-Uses insulin. FSBS 3 times daily-Delica lancets for one touch ultra II, Taking?  Yes, Authorizing Provider Denis Olvera DO    Medication glucose monitoring kit (FREESTYLE) monitoring kit, Sig Dx E11.9-One touch ultra II meter-uses tid, Taking? Yes, Authorizing Provider Ardean Aschoff Heindl, DO    Medication rizatriptan (MAXALT-MLT) 10 MG disintegrating tablet, Sig Take one tablet by mouth daily as needed for headache, may repeat in 2 hours if needed  Patient not taking: Reported on 5/7/2020, Taking? , Authorizing Provider Luc Peters MD    Medication naproxen (EC NAPROSYN) 500 MG EC tablet, Sig Take 1 tablet by mouth 2 times daily (with meals) for 14 days, Taking? , Authorizing Provider Louie Johnson MD    Medication polyethylene glycol (GLYCOLAX) powder, Sig Take 17 g by mouth daily as needed (constipation)  Patient not taking: Reported on 5/7/2020, Taking? , Authorizing Provider Simon Valentino DO      Past Medical History:  5/15/2013: Abdominal pain, acute, right lower quadrant  No date: Anxiety  No date: Arthritis      Comment:  Left Knee  8/13/2013: Bilateral ovarian cysts  No date: Blood transfusion reaction  1/29/2014: Cellulitis and abscess of trunk      Comment:  Pt was seen in ED today for bleeding from incision after               taking a fall this morning. 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 HENT: Negative for congestion and postnasal drip. Eyes: Negative for visual disturbance. Respiratory: Negative for cough, chest tightness and shortness of breath. Cardiovascular: Negative for chest pain, palpitations and leg swelling. Gastrointestinal: Negative for abdominal pain, blood in stool and constipation. Genitourinary: Positive for frequency. Negative for hematuria. Musculoskeletal: Negative for arthralgias and myalgias. Skin: Negative for rash. Neurological: Negative for tremors and headaches. Psychiatric/Behavioral: Positive for dysphoric mood and sleep disturbance. The patient is nervous/anxious. Objective:   Physical Exam      Physical Exam  Constitutional:       Appearance: Normal appearance. She is well-developed. HENT:      Head: Normocephalic. Mouth/Throat:      Mouth: Mucous membranes are moist.      Pharynx: Oropharynx is clear. Eyes:      General: No scleral icterus. Conjunctiva/sclera: Conjunctivae normal.   Neck:      Musculoskeletal: Neck supple. Thyroid: No thyromegaly. Cardiovascular:      Rate and Rhythm: Normal rate and regular rhythm. Heart sounds: Normal heart sounds. Pulmonary:      Effort: Pulmonary effort is normal.      Breath sounds: Normal breath sounds. Abdominal:      General: Bowel sounds are normal. There is no distension. Palpations: Abdomen is soft. There is no mass. Tenderness: There is no abdominal tenderness. Musculoskeletal: Normal range of motion. General: No tenderness. Right lower leg: No edema. Left lower leg: No edema. Lymphadenopathy:      Cervical: No cervical adenopathy. Skin:     General: Skin is warm and dry. Coloration: Skin is not pale. Neurological:      Mental Status: She is alert and oriented to person, place, and time. Cranial Nerves: No cranial nerve deficit. Motor: No abnormal muscle tone.       Coordination: Coordination normal.      Deep Tendon Reflexes: Reflexes normal.   Psychiatric:         Mood and Affect: Mood normal. Behavior: Behavior normal.         Thought Content: Thought content normal.         Judgment: Judgment normal.      Comments: She is a little teary-eyed at times         Assessment:      1. Type 2 diabetes mellitus without complication, unspecified whether long term insulin use ([address]    2. Hx of migraine headaches    3. Neuropathy    4. Breast cancer screening            Plan:      Kulwinder Ng was seen today for other. Diagnoses and all orders for this visit:    Type 2 diabetes mellitus without complication, unspecified whether long term insulin use (HCC)  -     POCT glycosylated hemoglobin (Hb A1C)  A1c is 14 as where the last 2-continue medications and adjusted sliding scale twice a day-continue watching carbohydrates and increase activity as tolerated  Hx of migraine headaches  Prescription sent for generic Topamax-call me in 2 or 3 weeks with an update  Neuropathy  Prescription sent for gabapentin and call me 2 or 3 weeks with an update  Breast cancer screening  -     [facility] DIGITAL SCREEN W OR WO CAD BILATERAL;  Future  Refer for mammogram hopefully today  Other orders  -     topiramate (TOPAMAX) 25 MG tablet; 1 hs for 10 days then 2 hs  -     escitalopram (LEXAPRO) 10 MG tablet; 1 in AM for 2 weeks then 2 in AM  -     gabapentin (NEURONTIN) 300 MG capsule; 1 hs-may increase to 2 hs after 10 days  Prescription sent for generic Lexapro and again call me in 2 to 3 weeks with an update  See me 2 months          Blake Krueger,

## 2020-09-21 ENCOUNTER — TELEPHONE (OUTPATIENT)
Dept: FAMILY MEDICINE CLINIC | Age: 40
End: 2020-09-21

## 2020-09-24 ENCOUNTER — TELEPHONE (OUTPATIENT)
Dept: SURGERY | Age: 40
End: 2020-09-24

## 2020-09-26 NOTE — PROGRESS NOTES
20     CHIEF COMPLAINT:  Evaluation of high risk status. HISTORY OF PRESENT ILLNESS:  Lico Willoughby is a 38 y.o. woman referred by  Cynthia Gonzalez DO for evaluation of her risk of breast cancer. The patient's family history is significant for: breast cancer in her maternal aunt (diagnosed in her 45s), ovarian cancer in her mother (dx around 45,  43), maternal grandfather with stomach cancer, maternal aunt with kidney cancer, maternal uncle with kidney and lung cancer, and paternal grandfather with metastatic prostate cancer. As far as she knows, no one in her family has ever had genetic testing. She presents today to discuss her risk for breast cancer. The patient states that she herself has not noticed any abnormal masses in either breast.  She denies any change in the appearance of her breasts or the skin of her breasts. She denies bilateral nipple discharge. She has no other systemic complaints and otherwise feels well today. She has not had any prior breast biopsies. She does note a history of a prior left breast abscess (upper inner quadrant) around . She notes that she was hospitalized for this at that time and had a bedside I&D performed. She does also have a history of multiple prior abscesses in her right groin x3 as well as a neck abscess which was I&D in . Her last screening mammogram was performed on 2020 and revealed no new suspicious masses, microcalcifications, or areas of architectural distortion. I have reviewed her medical records. She is diabetic on p.o. meds and insulin. Accuchecks at home were running high, just recently changed medication (was delayed due to insurance). Also has migraines. She did have COVID-19 back in May; follow up repeat testing was negative.      PCP - Nayely Lopez DO   OB/GYN - Was seeing Dr. Carroll [facility]), but no longer follows      REVIEW OF SYSTEMS  Constitutional: Positive for fatigue (worse since COVID back in May). Negative for chills, fever and unexpected weight change. Eyes: Positive for visual disturbance (from diabetes). Respiratory: Negative for apnea, cough and wheezing. Cardiovascular: Negative for chest pain. Gastrointestinal: Positive for nausea. Negative for abdominal distention, abdominal pain and vomiting. Musculoskeletal: Negative for arthralgias, gait problem and neck pain. Skin: Negative for rash and wound. Neurological: Positive for syncope (diabetes) and numbness (diabetes). Negative for weakness and headaches. Hematological: Negative for adenopathy. Bruises/bleeds easily. Psychiatric/Behavioral: Negative for confusion and dysphoric mood. The patient is nervous/anxious. I personally reviewed and agree with the above ROS as documented by my medical assistant. Obstetric and Gynecologic History  Number of pregnancies: 4  Births: 3  Miscarriages: 1  Abortions: 0  Age at First Birth:  12  Age at Menstruation:  5  Still Menstruating? No, age of menopause 44  Hysterectomy? Yes - and ovaries removed (hysterectomy for painful heavy periods, and ovaries due to cysts). Initially took one ovary out to prevent menopause, but then went back and had the second ovary removed. All done in 2014. Age at first mammogram: 44  Frequency of mammograms: Last one prior to this was 3 years ago. Bra/Cup size: 38 D    History of breastfeeding? Yes   History of OCP Use? No and is not currently taking  History of hormone use? Yes took Premarin for 3-6 months. Stopped 8 years ago. Past Medical History      Diagnosis Date    Abdominal pain, acute, right lower quadrant 5/15/2013    Anxiety     Arthritis     Left Knee    Bilateral ovarian cysts 8/13/2013    Blood transfusion reaction     Cellulitis and abscess of trunk 1/29/2014    Pt was seen in ED today for bleeding from incision after taking a fall this morning.       COVID-19     Depression     Diabetes mellitus ([facility]     x5yr   Melissa De Jesus  Dulaglutide (TRULICITY) 8.00 TC/9.6GE SOPN Inject 0.75 mg into the skin once a week      rizatriptan (MAXALT-MLT) 10 MG disintegrating tablet Take one tablet by mouth daily as needed for headache, may repeat in 2 hours if needed (Patient not taking: Reported on 5/7/2020) 9 tablet 3    metFORMIN (GLUCOPHAGE) 1000 MG tablet Take 1 tablet by mouth daily (with breakfast) 30 tablet 1    Dulaglutide (TRULICITY) 7.10 WM/2.9JH SOPN Inject 0.75 mg into the skin once a week 4 pen 2    butalbital-APAP-caffeine (FIORICET) -40 MG CAPS per capsule Take 1 capsule by mouth 2 times daily as needed for Headaches 20 capsule 0    ibuprofen (ADVIL;MOTRIN) 600 MG tablet Take 1 tablet by mouth every 8 hours as needed for Pain 30 tablet 0    naproxen (NAPROSYN) 500 MG tablet Take 1 tablet by mouth 2 times daily (with meals) 60 tablet 0    ondansetron (ZOFRAN ODT) 4 MG disintegrating tablet Take 1 tablet by mouth every 8 hours as needed for Nausea 20 tablet 0    naproxen (EC NAPROSYN) 500 MG EC tablet Take 1 tablet by mouth 2 times daily (with meals) for 14 days 60 tablet 1    fluticasone (FLONASE) 50 MCG/ACT nasal spray 2 sprays by Nasal route daily 1 Bottle 0    albuterol sulfate HFA (VENTOLIN HFA) 108 (90 Base) MCG/ACT inhaler Inhale 2 puffs into the lungs every 6 hours as needed for Wheezing 1 Inhaler 3    insulin lispro (HUMALOG KWIKPEN) 100 UNIT/ML pen INJECT 20 UNITS SUBCUTANEOUSLY THREE TIMES DAILY BEFORE MEAL(S) 15 pen 3    polyethylene glycol (GLYCOLAX) powder Take 17 g by mouth daily as needed (constipation) (Patient not taking: Reported on 5/7/2020) 510 g 5    Insulin Pen Needle (B-D UF III MINI PEN NEEDLES) 31G X 5 MM MISC Inject 1 each into the skin 4 times daily 100 each 0    blood glucose test strips (ASCENSIA AUTODISC VI;ONE TOUCH ULTRA TEST VI) strip DX: E11.9-One touch ultra strips.  FSBS 3 times daily 100 strip 5    albuterol sulfate HFA (PROAIR HFA) 108 (90 BASE) MCG/ACT inhaler Inhale 2 puffs into the lungs every 6 hours as needed for Wheezing 1 Inhaler 3    Lancets MISC DX: E 11.9-Uses insulin. FSBS 3 times daily-Delica lancets for one touch ultra  each 5    glucose monitoring kit (FREESTYLE) monitoring kit Dx E11.9-One touch ultra II meter-uses tid 1 kit 0     No current facility-administered medications for this visit. Allergies  Allergies   Allergen Reactions    Morphine Nausea And Vomiting     States after getting morphine began having sharp chest pain. PHYSICAL EXAMINATION:   VS: /75 (Site: Left Upper Arm, Position: Sitting, Cuff Size: Medium Adult)   Pulse 81   Temp 98.7 °F (37.1 °C) (Temporal)   Resp 18   Ht 5' 1\" (1.549 m)   Wt 154 lb (69.9 kg)   LMP 12/18/2008 (Approximate)   SpO2 98%   BMI 29.10 kg/m²     General: Well-developed, well-nourished, in no apparent distress. Head: The head is normocephalic  Neck: Supple with no thyromegaly or adenopathy  Respiratory: Normal respiratory effort, chest expands symmetrically, lungs are clear to auscultation bilaterally  Cardiovascular: Regular rate and rhythm. No lower extremity edema. Abdomen: Soft, non-tender, non-distended, without obvious masses or hernia  Psychiatric: Alert and oriented x 3, appropriate affect and behavior   Musculoskeletal: Normal gait and range of motion in all 4 extremities. Skin: Warm and dry  Lymphatics: The supraclavicular, submental, cervical and axillary regions are free of significant lymphadenopathy  Right Breast: Examined with patient seated and supine. The skin, nipple, and areola appear normal, there is no skin dimpling with movement of the pectoralis, there is no nipple retraction, no nipple discharge can be elicited, the parenchyma is mildly nodular, there are no dominant masses and the axillary tail is normal. Small well-healed burn wound in upper outer quadrant at superior aspect of breast.  Left Breast: Examined with patient seated and supine.  The skin, nipple, and areola appear normal, there is no skin dimpling with movement of the pectoralis, there is no nipple retraction, no nipple discharge can be elicited, the parenchyma is mildly nodular, there are no dominant masses and the axillary tail is normal.     --------------------------------------    IMAGING: Imaging was performed here and read by our radiologists. I personally reviewed the breast imaging performed on 9/16/2020. Mammogram revealed no new suspicious masses, microcalcifications or areas of distortion, BI-RADS 2. Breast density is described as average/scattered fibroglandular. PATHOLOGY:  There is no pathology to review    --------------------------------------    IMPRESSION/RECOMMENDATION:      Kika Pederson is a 38 y.o. woman who presents today for consultation regarding her possible elevated risk for future breast cancer development. This is based on her family history. First of all, I reassured her that based upon her clinical examination and recent mammogram that there is no evidence of any underlying abnormalities that require further intervention or biopsy. I did perform a formal risk assessment using the HARJIT Risk Assessment tool (Tyrer-Cuzick Model), version 8. Her lifetime risk for breast cancer is 8.7% (general population average 12.8%) thus, she does not meet high risk criteria. I did ask her to alert me if she ever finds out about any other family members with breast cancer, or who develop breast cancer, so that I can re-calculate her risk. Given her family history, we did talk about the role of genetic counseling and testing. Based on her risk, we will refer her for counseling +/- testing. If her testing is positive, she will see me back to discuss options. We briefly discussed that in this case, next steps could include high risk screening with mammograms/MRIs versus prophylactic mastectomies versus chemo-prevention. She has already had bilateral oophorectomy.      She should continue with her annual screening mammograms (she will do this with Dr. Lee Antony), and monthly self breast exams. I answered all of her questions thoroughly, and she does seem pleased with this plan of approach. I encouraged her to contact me in the interim if any new questions or concerns arise. IN SUMMARY:  - Referred to genetics - will f/u with me if results are positive    Kitty Cao.  Wallace Fontenot, DO  Breast Surgical Oncology    [facility]  Phone: [phone] (option 3)

## 2020-09-28 ENCOUNTER — TELEPHONE (OUTPATIENT)
Dept: SURGERY | Age: 40
End: 2020-09-28

## 2020-09-29 ENCOUNTER — OFFICE VISIT (OUTPATIENT)
Dept: SURGERY | Age: 40
End: 2020-09-29
Payer: COMMERCIAL

## 2020-09-29 VITALS
TEMPERATURE: 98.7 F | WEIGHT: 154 LBS | HEART RATE: 81 BPM | HEIGHT: 61 IN | SYSTOLIC BLOOD PRESSURE: 113 MMHG | OXYGEN SATURATION: 98 % | RESPIRATION RATE: 18 BRPM | BODY MASS INDEX: 29.07 KG/M2 | DIASTOLIC BLOOD PRESSURE: 75 MMHG

## 2020-09-29 PROCEDURE — 99204 OFFICE O/P NEW MOD 45 MIN: CPT | Performed by: SURGERY

## 2020-09-29 ASSESSMENT — ENCOUNTER SYMPTOMS
ABDOMINAL DISTENTION: 0
ABDOMINAL PAIN: 0
WHEEZING: 0
VOMITING: 0
APNEA: 0
COUGH: 0
NAUSEA: 1

## 2020-09-29 NOTE — PATIENT INSTRUCTIONS
Please make a follow up appointment with me if your genetic testing is positive. Continue having your mammograms once a year. I recommend that you perform self breast exams once a month. Call the office with any concerns. Patient Education        Breast Self-Exam: Care Instructions  Your Care Instructions     A breast self-exam is when you check your breasts for lumps or changes. This regular exam helps you learn how your breasts normally look and feel. Most breast problems or changes are not because of cancer. Breast self-exam is not a substitute for a mammogram. Having regular breast exams by your doctor and regular mammograms improve your chances of finding any problems with your breasts. Some women set a time each month to do a step-by-step breast self-exam. Other women like a less formal system. They might look at their breasts as they brush their teeth, or feel their breasts once in a while in the shower. If you notice a change in your breast, tell your doctor. Follow-up care is a key part of your treatment and safety. Be sure to make and go to all appointments, and call your doctor if you are having problems. It's also a good idea to know your test results and keep a list of the medicines you take. How do you do a breast self-exam?  · The best time to examine your breasts is usually one week after your menstrual period begins. Your breasts should not be tender then. If you do not have periods, you might do your exam on a day of the month that is easy to remember. · To examine your breasts:  ? Remove all your clothes above the waist and lie down. When you are lying down, your breast tissue spreads evenly over your chest wall, which makes it easier to feel all your breast tissue. ? Use the pads--not the fingertips--of the 3 middle fingers of your left hand to check your right breast. Move your fingers slowly in small coin-sized circles that overlap.   ? Use three levels of pressure to feel of

## 2020-10-09 ENCOUNTER — NURSE TRIAGE (OUTPATIENT)
Dept: OTHER | Facility: CLINIC | Age: 40
End: 2020-10-09

## 2020-10-09 NOTE — TELEPHONE ENCOUNTER
Received call from [place] [id number] [address]. Reason for Disposition   Patient wants to be seen    Answer Assessment - Initial Assessment Questions  1. LOCATION: \"Where does it hurt? \"       Right frontal, temple    2. ONSET: \"When did the headache start? \" (Minutes, hours or days)       2-3 days ago    3. PATTERN: \"Does the pain come and go, or has it been constant since it started? \"      Constant     4. SEVERITY: \"How bad is the pain? \" and \"What does it keep you from doing? \"  (e.g., Scale 1-10; mild, moderate, or severe)    - MILD (1-3): doesn't interfere with normal activities     - MODERATE (4-7): interferes with normal activities or awakens from sleep     - SEVERE (8-10): excruciating pain, unable to do any normal activities         4-5 out of 10    5. RECURRENT SYMPTOM: \"Have you ever had headaches before? \" If so, ask: \"When was the last time? \" and \"What happened that time? \"       Pt states that she does gets migraines however, this feels different    6. CAUSE: \"What do you think is causing the headache? \"      Pt is not sure if this is a migraine    7. MIGRAINE: \"Have you been diagnosed with migraine headaches? \" If so, ask: \"Is this headache similar? \"       Yes, pt states that she is on migraine medication. The medication has not been working. 8. HEAD INJURY: \"Has there been any recent injury to the head? \"       Denies     9. OTHER SYMPTOMS: \"Do you have any other symptoms? \" (fever, stiff neck, eye pain, sore throat, cold symptoms)      Dizziness     10. PREGNANCY: \"Is there any chance you are pregnant? \" \"When was your last menstrual period? \"        Denies    Protocols used: HEADACHE-ADULT-[place]    Recommended that pt be seen today. If an appointment is not available for today, recommended that pt be seen in an urgent care or ED. Provided pt with care advice. Call soft transferred to [place] [phone] to schedule appointment. Attention Provider:  Thank you for allowing me to participate in the care of your patient. The  patient was connected to triage in response to information provided to the [facility]. Please do not respond through this encounter as the response is not directed to a shared pool.

## 2020-11-14 ENCOUNTER — HOSPITAL ENCOUNTER (EMERGENCY)
Age: 40
Discharge: HOME | End: 2020-11-14

## 2020-11-14 VITALS
BODY MASS INDEX: 27.75 KG/M2 | HEIGHT: 61 IN | WEIGHT: 147 LBS | HEART RATE: 84 BPM | RESPIRATION RATE: 15 BRPM | OXYGEN SATURATION: 98 % | DIASTOLIC BLOOD PRESSURE: 72 MMHG | SYSTOLIC BLOOD PRESSURE: 102 MMHG | TEMPERATURE: 98 F

## 2020-11-14 LAB
ANION GAP SERPL CALCULATED.3IONS-SCNC: 10 MMOL/L (ref 3–16)
BUN BLDV-MCNC: 11 MG/DL (ref 7–20)
CALCIUM SERPL-MCNC: 9 MG/DL (ref 8.3–10.6)
CHLORIDE BLD-SCNC: 103 MMOL/L (ref 99–110)
CO2: 24 MMOL/L (ref 21–32)
CREAT SERPL-MCNC: <0.5 MG/DL (ref 0.6–1.1)
GFR AFRICAN AMERICAN: >60
GFR NON-AFRICAN AMERICAN: >60
GLUCOSE BLD-MCNC: 303 MG/DL (ref 70–99)
GLUCOSE BLD-MCNC: 420 MG/DL (ref 70–99)
PERFORMED ON: ABNORMAL
POTASSIUM SERPL-SCNC: 4.3 MMOL/L (ref 3.5–5.1)
SODIUM BLD-SCNC: 137 MMOL/L (ref 136–145)

## 2020-11-14 PROCEDURE — 96372 THER/PROPH/DIAG INJ SC/IM: CPT

## 2020-11-14 PROCEDURE — 80048 BASIC METABOLIC PNL TOTAL CA: CPT

## 2020-11-14 PROCEDURE — 6370000000 HC RX 637 (ALT 250 FOR IP): Performed by: PHYSICIAN ASSISTANT

## 2020-11-14 PROCEDURE — 99285 EMERGENCY DEPT VISIT HI MDM: CPT

## 2020-11-14 PROCEDURE — 2580000003 HC RX 258: Performed by: PHYSICIAN ASSISTANT

## 2020-11-14 RX ORDER — LIRAGLUTIDE 6 MG/ML
1.2 INJECTION SUBCUTANEOUS DAILY
COMMUNITY
End: 2021-01-19 | Stop reason: SDUPTHER

## 2020-11-14 RX ORDER — 0.9 % SODIUM CHLORIDE 0.9 %
1000 INTRAVENOUS SOLUTION INTRAVENOUS ONCE
Status: COMPLETED | OUTPATIENT
Start: 2020-11-14 | End: 2020-11-14

## 2020-11-14 RX ORDER — TRAMADOL HYDROCHLORIDE 50 MG/1
50 TABLET ORAL EVERY 6 HOURS PRN
Qty: 28 TABLET | Refills: 0 | Status: SHIPPED | OUTPATIENT
Start: 2020-11-14 | End: 2020-11-21

## 2020-11-14 RX ORDER — AMOXICILLIN AND CLAVULANATE POTASSIUM 875; 125 MG/1; MG/1
1 TABLET, FILM COATED ORAL 2 TIMES DAILY
Qty: 14 TABLET | Refills: 0 | Status: SHIPPED | OUTPATIENT
Start: 2020-11-14 | End: 2020-11-21

## 2020-11-14 RX ORDER — TRAMADOL HYDROCHLORIDE 50 MG/1
50 TABLET ORAL ONCE
Status: COMPLETED | OUTPATIENT
Start: 2020-11-14 | End: 2020-11-14

## 2020-11-14 RX ORDER — INSULIN GLARGINE 300 U/ML
INJECTION, SOLUTION SUBCUTANEOUS
COMMUNITY
End: 2021-01-19 | Stop reason: ALTCHOICE

## 2020-11-14 RX ADMIN — SODIUM CHLORIDE 1000 ML: 9 INJECTION, SOLUTION INTRAVENOUS at 13:06

## 2020-11-14 RX ADMIN — TRAMADOL HYDROCHLORIDE 50 MG: 50 TABLET, FILM COATED ORAL at 11:52

## 2020-11-14 RX ADMIN — INSULIN HUMAN 10 UNITS: 100 INJECTION, SOLUTION PARENTERAL at 13:06

## 2020-11-14 ASSESSMENT — PAIN DESCRIPTION - DESCRIPTORS: DESCRIPTORS: SORE

## 2020-11-14 ASSESSMENT — PAIN SCALES - GENERAL
PAINLEVEL_OUTOF10: 1
PAINLEVEL_OUTOF10: 4

## 2020-11-14 ASSESSMENT — PAIN DESCRIPTION - ORIENTATION: ORIENTATION: RIGHT;LEFT

## 2020-11-14 ASSESSMENT — PAIN DESCRIPTION - LOCATION: LOCATION: HIP;LEG;FOOT

## 2020-11-14 NOTE — ED PROVIDER NOTES
Emergency [address] [facility]  ED    Patient: Jadon Kamara  YME:[id number]  : 1980  Date of Evaluation: 2020  ED [facility] Provider: EVETTE Jay    Chief Complaint       Chief Complaint   Patient presents with    Foot Pain     patient reporting she takes gabapentin for her pain but reports her foot pain is getting worse. pt c/o low back pain on left side raditing into left hip and left leg. pain is worse with movement    Back Pain    Hip Pain     [place]     I was wearing a surgical mask for the entirety of this encounter. Jadon Kamara is a 38 y.o. female who presents to the emergency department for evaluation of bilateral foot pain which has been previously diagnosed as neuropathic foot pain as a result of her poorly controlled diabetes. Patient has been previously on Trulicity as well as Victoza however has recently had to change these medications as a result of insurance coverage. She has an upcoming appointment in the coming days to revisit her A1c and diabetic medication regimens however her bilateral foot pain which is independent of any traumatic mechanism continues to persist.  She describes ongoing 5 out of 10 bilateral foot pain which is causing her sleep disturbance and is otherwise idiopathic but consistent with her previous diagnoses of diabetic foot pain without any other radiation aggravating or alleviating factors times several days    ROS:     Review of Systems describes ongoing 5 out of 10 bilateral foot pain which is causing her sleep disturbance and is otherwise idiopathic but consistent with her previous diagnoses of diabetic foot pain without any other radiation aggravating or alleviating factors times several days  At least 10 systemsreviewed and otherwise acutely negative except as in the [address]     Past History     Past Medical History:   Diagnosis Date    Abdominal pain, acute, right lower quadrant 5/15/2013    Anxiety  Arthritis     Left Knee    Bilateral ovarian cysts 2013    Blood transfusion reaction     Cellulitis and abscess of trunk 2014    Pt was seen in ED today for bleeding from incision after taking a fall this morning.       COVID-19     Depression     Diabetes mellitus ([address]     x5yr    Diverticula of colon 2013    DM2 (diabetes mellitus, type 2) ([address] 2016    Insomnia     Insomnia secondary to situational depression 2014    Left carpal tunnel syndrome 2018    MDRO (multiple drug resistant organisms) resistance     poss MRSA after hysterectomy treated with antibiotics    OA (osteoarthritis) of knee 2014    Obesity (BMI 30.0-34.9) 2017    Ovarian cyst     Plantar fasciitis, left 2014     Past Surgical History:   Procedure Laterality Date    BREAST CYST ASPIRATION       SECTION      CHOLECYSTECTOMY      COLONOSCOPY      polyps    HYSTERECTOMY  2008    LAPAROSCOPY      lysis of adhesions    LAPAROTOMY  2014    LAPAROTOMY, BILATERAL SALPINGO - OOPHORECTOMY    SALPINGO-OOPHORECTOMY  2014     Social History     Socioeconomic History    Marital status:      Spouse name: None    Number of children: None    Years of education: None    Highest education level: None   Occupational History    None   Social Needs    Financial resource strain: None    Food insecurity     Worry: None     Inability: None    Transportation needs     Medical: None     Non-medical: None   Tobacco Use    Smoking status: Former Smoker     Years: 0.00     Types: Cigarettes     Last attempt to quit: 1999     Years since quittin.1    Smokeless tobacco: Never Used    Tobacco comment: smoked for 1 months when teenager   Substance and Sexual Activity    Alcohol use: Yes     Comment: social drinker  4 beer per month    Drug use: No     Comment: Hx of cocaine use in     Sexual activity: Yes     Partners: Male     Comment: painful intercourse    Lifestyle    Physical activity     Days per week: None     Minutes per session: None    Stress: None   Relationships    Social connections     Talks on phone: None     Gets together: None     Attends [employer] service: None     Active member of club or organization: None     Attends meetings of clubs or organizations: None     Relationship status: None    Intimate partner violence     Fear of current or ex partner: None     Emotionally abused: None     Physically abused: None     Forced sexual activity: None   Other Topics Concern    None   Social History Narrative    None       Medications/Allergies     Previous Medications    ALBUTEROL SULFATE HFA (PROAIR HFA) 108 (90 BASE) MCG/ACT INHALER    Inhale 2 puffs into the lungs every 6 hours as needed for Wheezing    ALBUTEROL SULFATE HFA (VENTOLIN HFA) 108 (90 BASE) MCG/ACT INHALER    Inhale 2 puffs into the lungs every 6 hours as needed for Wheezing    BLOOD GLUCOSE TEST STRIPS (ASCENSIA AUTODISC VI;ONE TOUCH ULTRA TEST VI) STRIP    DX: E11.9-One touch ultra strips. FSBS 3 times daily    BUTALBITAL-APAP-CAFFEINE (FIORICET) -40 MG CAPS PER CAPSULE    Take 1 capsule by mouth 2 times daily as needed for Headaches    FLUTICASONE (FLONASE) 50 MCG/ACT NASAL SPRAY    2 sprays by Nasal route daily    GABAPENTIN (NEURONTIN) 300 MG CAPSULE    1 hs-may increase to 2 hs after 10 days    GLUCOSE MONITORING KIT (FREESTYLE) MONITORING KIT    Dx E11.9-One touch ultra II meter-uses tid    IBUPROFEN (ADVIL;MOTRIN) 600 MG TABLET    Take 1 tablet by mouth every 8 hours as needed for Pain    INSULIN GLARGINE, 2 UNIT DIAL, (TOUJEO MAX SOLOSTAR) 300 UNIT/ML SOPN    Inject into the skin    INSULIN LISPRO (HUMALOG KWIKPEN) 100 UNIT/ML PEN    INJECT 20 UNITS SUBCUTANEOUSLY THREE TIMES DAILY BEFORE MEAL(S)    INSULIN PEN NEEDLE (B-D UF III MINI PEN NEEDLES) 31G X 5 MM MISC    Inject 1 each into the skin 4 times daily    LANCETS MISC    DX: E 11.9-Uses insulin. FSBS 3 times daily-Delica lancets for one touch ultra II    LIRAGLUTIDE (VICTOZA) 18 MG/3ML SOPN SC INJECTION    Inject 1.2 mg into the skin daily    METFORMIN (GLUCOPHAGE) 1000 MG TABLET    Take 1 tablet by mouth daily (with breakfast)    NAPROXEN (EC NAPROSYN) 500 MG EC TABLET    Take 1 tablet by mouth 2 times daily (with meals) for 14 days    NAPROXEN (NAPROSYN) 500 MG TABLET    Take 1 tablet by mouth 2 times daily (with meals)    POLYETHYLENE GLYCOL (GLYCOLAX) POWDER    Take 17 g by mouth daily as needed (constipation)    RIZATRIPTAN (MAXALT-MLT) 10 MG DISINTEGRATING TABLET    Take one tablet by mouth daily as needed for headache, may repeat in 2 hours if needed    TOPIRAMATE (TOPAMAX) 25 MG TABLET    1 hs for 10 days then 2 hs     Allergies   Allergen Reactions    Morphine Nausea And Vomiting     States after getting morphine began having sharp chest pain.          Physical Exam       ED Triage Vitals [11/14/20 1104]   BP Temp Temp Source Pulse Resp SpO2 Height Weight   122/84 98.2 °F (36.8 °C) Oral 95 18 97 % 5' 1\" (1.549 m) 147 lb (66.7 kg)     Physical Exam  Normotensive non-tachycardic afebrile nontachypneic satting well on room air  Constitutional:  Well-nourished well-developed in no acute distress  Eyes:  PERRLA, EOMI x6, no nystagmus no photophobia  HENT:  Teeth and tongue intact, uvula midline, no PTA or retropharyngeal abscess noted no other intraoral lesion or edema appreciated patient tolerating secretions well, no stridor, no nuchal rigidity  Respiratory:  Clear to auscultation bilaterally, no crepitus or subcutaneous emphysema noted with palpation in the bilateral chest wall  Cardiovascular:  S1-S2, no S3  GI:  Abdomen soft nontender nondistended  :  Exam deferred for now no subjective complaints  Musculoskeletal:  Distally neurovascularly intact 2+ pulses normal capillary refill gross sensorimotor intact ×4 extremities  Integument:  Skin is warm well perfused  Lymphatic:  No significant lymphadenopathy appreciated  Neurologic:  Alert and oriented ×3, cranial nerves II through XII grossly intact as tested, patient able to ambulate, no ataxia, cauda equina, saddle anesthesia or bowel or bladder incontinence  Psychiatric:  Mood, behavior, judgment all within normal limits      SCREENINGS    [place] Coma Scale  Eye Opening: Spontaneous  Best Verbal Response: Oriented  Best Motor Response: Obeys commands  [place] Coma Scale Score: 15      Diagnostics   Labs:  Results for orders placed or performed during the hospital encounter of 39/56/95   Basic metabolic panel   Result Value Ref Range    Sodium 137 136 - 145 mmol/L    Potassium 4.3 3.5 - 5.1 mmol/L    Chloride 103 99 - 110 mmol/L    CO2 24 21 - 32 mmol/L    Anion Gap 10 3 - 16    Glucose 303 (H) 70 - 99 mg/dL    BUN 11 7 - 20 mg/dL    CREATININE <0.5 (L) 0.6 - 1.1 mg/dL    GFR Non-African American >60 >60    GFR African American >60 >60    Calcium 9.0 8.3 - 10.6 mg/dL   POCT Glucose   Result Value Ref Range    POC Glucose 420 (H) 70 - 99 mg/dl    Performed on ACCU-CHEK      Radiographs:  No results found.       ED Course and MDM    [place] Coma Scale  Eye Opening: Spontaneous  Best Verbal Response: Oriented  Best Motor Response: Obeys commands  [place] Coma Scale Score: 15      In brief, David Anaya is a 38 y.o. female who presented to the emergency department for evaluation of recurrent bilateral idiopathic foot pain in the setting of known poorly controlled diabetes patient has been previously diagnosed with diabetic foot pain for which she was prescribed gabapentin but states that her current dose that she was initiated on has been very minimally beneficial.  Patient also states that she has had poor control secondary to having to change medication regimens as result of her insurance coverage but has an upcoming follow-up appointment with her endocrinologist in the coming days    Her laboratory studies demonstrated a point-of-care glucose of 420. Patient was provided 1 L normal saline as well as 10 units of subcutaneous insulin and recheck of her BMP demonstrated a glucose level of 303 with no other significant metabolic disturbance    Patient was provided tramadol as a means to manage her bilateral foot pain here at the emergency department which she benefited from rather well and will be provided a short course to continue to manage this foot pain until such time she can follow-up with her endocrinologist to more appropriately manage her diabetic concerns which are likely the underlying etiology of her diabetic foot pain        ED Medication Orders (From admission, onward)    Start Ordered     Status Ordering Provider    11/14/20 1245 11/14/20 1234  insulin regular (HUMULIN R;NOVOLIN R) injection 10 Units  ONCE      Last MAR action:  Given - by [employer] on 11/14/20 at [address], [place]    11/14/20 1245 11/14/20 1234  0.9 % sodium chloride bolus  ONCE      Last MAR action:  Stopped - by [employer] Drivers on 11/14/20 at [address], [place]    11/14/20 1200 11/14/20 1145  traMADol (ULTRAM) tablet 50 mg  ONCE      Last MAR action:  Given - by [employer] Drivers on 11/14/20 at [address]          Final Impression      1. Hyperglycemia due to diabetes mellitus ([address]    2. Pain in both feet        DISPOSITION Decision To Discharge 11/14/2020 03:19:14 PM     Patient seen independently with an Emergency Medicine attending available for supervision.     (Please note that portions of this note may have been completed with a voice recognition program. Efforts were made to edit the dictations but occasionally words aremis-transcribed.)    Gregorio Amaral[address]  11/14/20 7150

## 2020-11-16 ENCOUNTER — OFFICE VISIT (OUTPATIENT)
Dept: PRIMARY CARE CLINIC | Age: 40
End: 2020-11-16

## 2020-11-16 PROCEDURE — 99211 OFF/OP EST MAY X REQ PHY/QHP: CPT | Performed by: NURSE PRACTITIONER

## 2020-11-16 NOTE — PATIENT INSTRUCTIONS

## 2020-11-16 NOTE — PROGRESS NOTES
Loly Bejarano received a viral test for COVID-19. They were educated on isolation and quarantine as appropriate. For any symptoms, they were directed to seek care from their PCP, given contact information to establish with a doctor, directed to an urgent care or the emergency room.

## 2020-11-17 LAB — SARS-COV-2, NAA: NOT DETECTED

## 2020-11-30 ENCOUNTER — HOSPITAL ENCOUNTER (EMERGENCY)
Age: 40
Discharge: HOME | End: 2020-11-30

## 2020-11-30 VITALS
HEIGHT: 61 IN | OXYGEN SATURATION: 100 % | WEIGHT: 144 LBS | SYSTOLIC BLOOD PRESSURE: 109 MMHG | RESPIRATION RATE: 14 BRPM | BODY MASS INDEX: 27.19 KG/M2 | HEART RATE: 85 BPM | DIASTOLIC BLOOD PRESSURE: 70 MMHG | TEMPERATURE: 97 F

## 2020-11-30 LAB
BILIRUBIN URINE: NEGATIVE
BLOOD, URINE: NEGATIVE
CLARITY: CLEAR
COLOR: YELLOW
GLUCOSE URINE: >=1000 MG/DL
KETONES, URINE: NEGATIVE MG/DL
LEUKOCYTE ESTERASE, URINE: NEGATIVE
MICROSCOPIC EXAMINATION: ABNORMAL
NITRITE, URINE: NEGATIVE
PH UA: 6 (ref 5–8)
PROTEIN UA: NEGATIVE MG/DL
SPECIFIC GRAVITY UA: 1.02 (ref 1–1.03)
URINE TYPE: ABNORMAL
UROBILINOGEN, URINE: 0.2 E.U./DL

## 2020-11-30 PROCEDURE — 99282 EMERGENCY DEPT VISIT SF MDM: CPT

## 2020-11-30 PROCEDURE — 81003 URINALYSIS AUTO W/O SCOPE: CPT

## 2020-11-30 PROCEDURE — 6370000000 HC RX 637 (ALT 250 FOR IP): Performed by: NURSE PRACTITIONER

## 2020-11-30 RX ORDER — HYDROCODONE BITARTRATE AND ACETAMINOPHEN 5; 325 MG/1; MG/1
1 TABLET ORAL ONCE
Status: COMPLETED | OUTPATIENT
Start: 2020-11-30 | End: 2020-11-30

## 2020-11-30 RX ORDER — HYDROCODONE BITARTRATE AND ACETAMINOPHEN 5; 325 MG/1; MG/1
1 TABLET ORAL EVERY 6 HOURS PRN
Qty: 12 TABLET | Refills: 0 | Status: SHIPPED | OUTPATIENT
Start: 2020-11-30 | End: 2020-12-03

## 2020-11-30 RX ADMIN — HYDROCODONE BITARTRATE AND ACETAMINOPHEN 1 TABLET: 5; 325 TABLET ORAL at 23:13

## 2020-11-30 ASSESSMENT — PAIN SCALES - GENERAL: PAINLEVEL_OUTOF10: 4

## 2020-12-01 NOTE — ED PROVIDER NOTES
Partners: Male     Comment: painful intercourse    Lifestyle    Physical activity     Days per week: Not on file     Minutes per session: Not on file    Stress: Not on file   Relationships    Social connections     Talks on phone: Not on file     Gets together: Not on file     Attends Jainism service: Not on file     Active member of club or organization: Not on file     Attends meetings of clubs or organizations: Not on file     Relationship status: Not on file    Intimate partner violence     Fear of current or ex partner: Not on file     Emotionally abused: Not on file     Physically abused: Not on file     Forced sexual activity: Not on file   Other Topics Concern    Not on file   Social History Narrative    Not on file     No current facility-administered medications for this encounter. Current Outpatient Medications   Medication Sig Dispense Refill    HYDROcodone-acetaminophen (NORCO) 5-325 MG per tablet Take 1 tablet by mouth every 6 hours as needed for Pain for up to 3 days.  12 tablet 0    Liraglutide (VICTOZA) 18 MG/3ML SOPN SC injection Inject 1.2 mg into the skin daily      Insulin Glargine, 2 Unit Dial, (TOUJEO MAX SOLOSTAR) 300 UNIT/ML SOPN Inject into the skin      topiramate (TOPAMAX) 25 MG tablet 1 hs for 10 days then 2 hs 60 tablet 3    gabapentin (NEURONTIN) 300 MG capsule 1 hs-may increase to 2 hs after 10 days 60 capsule 0    rizatriptan (MAXALT-MLT) 10 MG disintegrating tablet Take one tablet by mouth daily as needed for headache, may repeat in 2 hours if needed 9 tablet 3    metFORMIN (GLUCOPHAGE) 1000 MG tablet Take 1 tablet by mouth daily (with breakfast) 30 tablet 1    butalbital-APAP-caffeine (FIORICET) -40 MG CAPS per capsule Take 1 capsule by mouth 2 times daily as needed for Headaches 20 capsule 0    ibuprofen (ADVIL;MOTRIN) 600 MG tablet Take 1 tablet by mouth every 8 hours as needed for Pain 30 tablet 0    naproxen (NAPROSYN) 500 MG tablet Take 1 tablet by mouth 2 times daily (with meals) 60 tablet 0    naproxen (EC NAPROSYN) 500 MG EC tablet Take 1 tablet by mouth 2 times daily (with meals) for 14 days 60 tablet 1    fluticasone (FLONASE) 50 MCG/ACT nasal spray 2 sprays by Nasal route daily 1 Bottle 0    albuterol sulfate HFA (VENTOLIN HFA) 108 (90 Base) MCG/ACT inhaler Inhale 2 puffs into the lungs every 6 hours as needed for Wheezing 1 Inhaler 3    insulin lispro (HUMALOG KWIKPEN) 100 UNIT/ML pen INJECT 20 UNITS SUBCUTANEOUSLY THREE TIMES DAILY BEFORE MEAL(S) 15 pen 3    polyethylene glycol (GLYCOLAX) powder Take 17 g by mouth daily as needed (constipation) (Patient not taking: Reported on 5/7/2020) 510 g 5    Insulin Pen Needle (B-D UF III MINI PEN NEEDLES) 31G X 5 MM MISC Inject 1 each into the skin 4 times daily 100 each 0    blood glucose test strips (ASCENSIA AUTODISC VI;ONE TOUCH ULTRA TEST VI) strip DX: E11.9-One touch ultra strips. FSBS 3 times daily 100 strip 5    albuterol sulfate HFA (PROAIR HFA) 108 (90 BASE) MCG/ACT inhaler Inhale 2 puffs into the lungs every 6 hours as needed for Wheezing 1 Inhaler 3    Lancets MISC DX: E 11.9-Uses insulin. FSBS 3 times daily-Delica lancets for one touch ultra  each 5    glucose monitoring kit (FREESTYLE) monitoring kit Dx E11.9-One touch ultra II meter-uses tid 1 kit 0     Allergies   Allergen Reactions    Morphine Nausea And Vomiting     States after getting morphine began having sharp chest pain. REVIEW OF SYSTEMS    10 systems reviewed, pertinent positives per HPI otherwise noted to be negative    PHYSICAL EXAM  /70   Pulse 85   Temp 97 °F (36.1 °C) (Temporal)   Resp 14   Ht 5' 1\" (1.549 m)   Wt 144 lb (65.3 kg)   LMP 12/18/2008 (Approximate)   SpO2 100%   BMI 27.21 kg/m²   GENERAL APPEARANCE: Patient is well-developed, well-nourished. Awake and alert. No apparent distress. HEENT: Normocephalic, atraumatic. Conjunctiva appear normal. Sclera is non-icteric.   External ears are normal.  Mucous membranes moist.  EYES: EOM's grossly intact. NECK: Supple with normal ROM. Trachea midline   CARDIOVASCULAR:  Regular rate and rhythm. Brisk capillary refill. LUNGS: Equal symmetric chest rise. Breathing is unlabored. Speaking comfortably in full sentences. Abdomen: Soft, Nondistended, nontender to palpation. There is no pulsatile mass to palpation. No masses or hepatosplenomegaly. EXTREMITIES: Normal ROM, no edema, no tenderness, or deformity. Distal pulses palpable. No gross deformities or trauma noted. Moving all extremities equally and appropriately. BACK: On exam of lumbar spine, there is no swelling, bruising, or color change noted. There is no lumbar midline bony tenderness, without crepitus, deformity, or step off. Patient exhibits no tenderness of paraspinal musculature to either side of midline. There is no point tenderness over the SI Joint. Straight leg raise negative. SKIN: Warm and dry. Skin is intact. No rashes/lesions noted. NEUROLOGICAL: Alert and oriented. Normal strength (5/5 in all extremities) and sensation is intact. Patellar deep tendon reflexes are 2+ bilaterally. L1-inner thigh sensation is intact to light touch  L2 able to adduct thighs bilaterally  L3 Able to extend bilateral knees without difficulty  L4 able to dorsiflex ankles bilaterally  L5 able to point great toes upward bilaterally  S1 able to flex knees bilaterally  S2 able to plantar flex toes bilaterally  S3-5 denies saddle anesthesia  PSYCHIATRIC: Mood and affect appropriate. Speech is clear and articulate.     LABS  Results for orders placed or performed during the hospital encounter of 11/30/20   Urinalysis, reflex to microscopic   Result Value Ref Range    Color, UA Yellow Straw/Yellow    Clarity, UA Clear Clear    Glucose, Ur >=1000 (A) Negative mg/dL    Bilirubin Urine Negative Negative    Ketones, Urine Negative Negative mg/dL    Specific Gravity, UA 1.025 1.005 - 1.030    Blood, Urine Negative Negative    pH, UA 6.0 5.0 - 8.0    Protein, UA Negative Negative mg/dL    Urobilinogen, Urine 0.2 <2.0 E.U./dL    Nitrite, Urine Negative Negative    Leukocyte Esterase, Urine Negative Negative    Microscopic Examination Not Indicated     Urine Type NotGiven        RADIOLOGY  No results found. ED COURSE/MDM  Patient seen and evaluated. Old records reviewed. Diagnostic testing reviewed and results discussed. I have evaluated this patient. My supervising physician was available for consultation. Jovan Brewster presented to the ED today with above noted complaints. Physical exam does no reveal lumbar midline spine tenderness to palpation. There is no tenderness noted to the paraspinal musculature to either side of midline. The patient has no acute injury, saddle anesthesia, urinary retention, fecal incontinence, or leg weakness, imaging is not indicated at this time. I did consider the possibility of kidney stone and UTI as patient does have reproducible left flank tenderness to palpation. Patient has UA that is negative for infection and negative for blood. There can be rare instances of kidney stone without hematuria but I feel given her stable vital signs and unremarkable UA, a CT is likely not going to add much to her workup. I discussed this with the patient and she is in agreement. I did discuss with her the possibility of shingles as she had chicken pox as a child, shingles can have a prodromal type of pain. I discussed with her either contacting her PCP or returning to the ED if she develops a rash. I did also advise her to follow-up with primary care provider for further evaluation and management of her complaints today.     While in ED patient received   Medications   HYDROcodone-acetaminophen (NORCO) 5-325 MG per tablet 1 tablet (1 tablet Oral Given 11/30/20 4542)       A discussion was had with the patient regarding diagnosis, diagnostic testing results, treatment/ plan of care, and follow up. All questions were answered. Patient will follow up as directed for further evaluation/treatment. The patient was given strict return precautions as we discussed symptoms that would necessitate return to the ED. Patient will return to ED for new/worsening symptoms. The patient verbalized their understanding and agreement with the above plan. I estimate there is LOW risk for ABDOMINAL AORTIC ANEURYSM, CAUDA EQUINA SYNDROME, EPIDURAL MASS LESION, SPINAL STENOSIS, OR HERNIATED DISK CAUSING SEVERE STENOSIS, thus I consider the discharge disposition reasonable. Juliana Aquino and I have discussed the diagnosis and risks, and we agree with discharging home to follow-up with their primary doctor. We also discussed returning to the Emergency Department immediately if new or worsening symptoms occur. We have discussed the symptoms which are most concerning (e.g., saddle anesthesia, urinary or bowel incontinence or retention, changing or worsening pain) that necessitate immediate return. Patient was sent home with a prescription for below medication/s. I did Crow patient on appropriate use of these medication. New Prescriptions    HYDROCODONE-ACETAMINOPHEN (NORCO) 5-325 MG PER TABLET    Take 1 tablet by mouth every 6 hours as needed for Pain for up to 3 days. Cinical Impression    1. Acute left flank pain        Blood pressure 109/70, pulse 85, temperature 97 °F (36.1 °C), temperature source Temporal, resp. rate 14, height 5' 1\" (1.549 m), weight 144 lb (65.3 kg), last menstrual period 12/18/2008, SpO2 100 %, not currently breastfeeding. FOLLOW UP  Ana Santana 49  [address] [address] [facility] [address]  [phone]    Call in 1 day  For further evaluation    [facility]  ED  [address]  Go to   If symptoms worsen      DISPOSITION  Patient was discharged to home in good condition.     Comment: Please note this report has been produced using speech recognition software and may contain errors related to that system including errors in grammar, punctuation, and spelling, as well as words and phrases that may be inappropriate. If there are any questions or concerns please feel free to contact the dictating provider for clarification.         Annelle Baumgarten, APRN - CNP  11/30/20 1585

## 2020-12-01 NOTE — ED NOTES
All discharge paperwork and follow-up instructions reviewed with pt. Pt verbalized understanding. Pt ambulatory upon discharge in stable condition to private vehicle with .        Wayne Rodríguez RN  11/30/20 6236

## 2020-12-02 ENCOUNTER — TELEMEDICINE (OUTPATIENT)
Dept: FAMILY MEDICINE CLINIC | Age: 40
End: 2020-12-02

## 2020-12-02 PROCEDURE — 99213 OFFICE O/P EST LOW 20 MIN: CPT | Performed by: FAMILY MEDICINE

## 2020-12-02 RX ORDER — PREGABALIN 50 MG/1
CAPSULE ORAL
Qty: 30 CAPSULE | Refills: 0 | Status: SHIPPED | OUTPATIENT
Start: 2020-12-02 | End: 2020-12-16 | Stop reason: SDUPTHER

## 2020-12-02 RX ORDER — ACYCLOVIR 400 MG/1
400 TABLET ORAL
Qty: 30 TABLET | Refills: 0 | Status: SHIPPED | OUTPATIENT
Start: 2020-12-02 | End: 2020-12-12

## 2020-12-02 ASSESSMENT — ENCOUNTER SYMPTOMS
NAUSEA: 0
COUGH: 0
ABDOMINAL PAIN: 1
DIARRHEA: 0
VOMITING: 0

## 2020-12-02 NOTE — PROGRESS NOTES
Subjective:      Patient ID: Herman Traore is a 38 y.o. female. HPI  This is a video visit with the patient due to the ongoing virus in the community. She was seen in the emergency room approximately 3 days ago with pain left flank area but no urinary symptoms-I thought she might have beginning of shingles and gave her some hydrocodone and told her to call me. She continues to have a lot of pain but no rash and she does have some burning and itching. We are alone on the visit-we have permission for the visit. She is at home and I am in my office at [facility]. Prior to Visit Medications :  Medication acyclovir (ZOVIRAX) 400 MG tablet, Sig Take 1 tablet by mouth 5 times daily for 10 days, Taking? Yes, Authorizing Provider Blake Krueger, DO    Medication pregabalin (LYRICA) 50 MG capsule, Sig 1 at night the first night then start 2 at night, Taking? Yes, Authorizing Provider Blake Krueger, DO    Medication HYDROcodone-acetaminophen (NORCO) 5-325 MG per tablet, Sig Take 1 tablet by mouth every 6 hours as needed for Pain for up to 3 days. , Taking? Yes, Authorizing Provider Mal Sung, IRASEMA - CNP    Medication Liraglutide (VICTOZA) 18 MG/3ML SOPN SC injection, Sig Inject 1.2 mg into the skin daily, Taking? Yes, Authorizing Provider Remy Sheppard MD    Medication Insulin Glargine, 2 Unit Dial, (TOUJEO MAX SOLOSTAR) 300 UNIT/ML SOPN, Sig Inject into the skin, Taking? Yes, Authorizing Provider Remy Sheppard MD    Medication topiramate (TOPAMAX) 25 MG tablet, Sig 1 hs for 10 days then 2 hs, Taking? Yes, Authorizing Provider Blake Krueger, DO    Medication rizatriptan (MAXALT-MLT) 10 MG disintegrating tablet, Sig Take one tablet by mouth daily as needed for headache, may repeat in 2 hours if needed, Taking? Yes, Authorizing Provider Altagracia Allison MD    Medication metFORMIN (GLUCOPHAGE) 1000 MG tablet, Sig Take 1 tablet by mouth daily (with breakfast), Taking?  Yes, Authorizing Provider Brown Krueger, DO    Medication butalbital-APAP-caffeine (FIORICET) -40 MG CAPS per capsule, Sig Take 1 capsule by mouth 2 times daily as needed for Headaches, Taking? Yes, Authorizing Provider Blake Krueger, DO    Medication ibuprofen (ADVIL;MOTRIN) 600 MG tablet, Sig Take 1 tablet by mouth every 8 hours as needed for Pain, Taking? Yes, Authorizing Provider Nivia Hancock APRN - CNP    Medication naproxen (NAPROSYN) 500 MG tablet, Sig Take 1 tablet by mouth 2 times daily (with meals), Taking? Yes, Authorizing Provider Jae Cummings APRN - CNP    Medication fluticasone (FLONASE) 50 MCG/ACT nasal spray, Sig 2 sprays by Nasal route daily, Taking? Yes, Authorizing Provider EVETTE Mathew    Medication insulin lispro (HUMALOG KWIKPEN) 100 UNIT/ML pen, Sig INJECT 20 UNITS SUBCUTANEOUSLY THREE TIMES DAILY BEFORE MEAL(S), Taking? Yes, Authorizing Provider Blake Krueger, DO    Medication Insulin Pen Needle (B-D UF III MINI PEN NEEDLES) 31G X 5 MM MISC, Sig Inject 1 each into the skin 4 times daily, Taking? Yes, Authorizing Provider Cyndee Kaufman APRN - CNP    Medication blood glucose test strips (ASCENSIA AUTODISC VI;ONE TOUCH ULTRA TEST VI) strip, Sig DX: E11.9-One touch ultra strips. FSBS 3 times daily, Taking? Yes, Authorizing Provider EVETTE Alvarez    Medication Lancets MISC, Sig DX: E 11.9-Uses insulin. FSBS 3 times daily-Delica lancets for one touch ultra II, Taking? Yes, Authorizing Provider Brown Krueger, DO    Medication glucose monitoring kit (FREESTYLE) monitoring kit, Sig Dx E11.9-One touch ultra II meter-uses tid, Taking?  Yes, Authorizing Provider Blake Krueger, DO    Medication gabapentin (NEURONTIN) 300 MG capsule, Sig 1 hs-may increase to 2 hs after 10 days, Taking? , Authorizing Provider Blake Krueger, DO    Medication naproxen (EC NAPROSYN) 500 MG EC tablet, Sig Take 1 tablet by mouth 2 times daily (with meals) for 14 days  Patient not taking: Reported on 12/2/2020, Taking? , Authorizing Provider Yemi Little MD    Medication albuterol sulfate HFA (VENTOLIN HFA) 108 (90 Base) MCG/ACT inhaler, Sig Inhale 2 puffs into the lungs every 6 hours as needed for Wheezing  Patient not taking: Reported on 12/2/2020, Taking? , Authorizing Provider EVETTE Perez    Medication polyethylene glycol (GLYCOLAX) powder, Sig Take 17 g by mouth daily as needed (constipation)  Patient not taking: Reported on 5/7/2020, Taking? , Authorizing Provider Odalys Terry DO    Medication albuterol sulfate HFA (PROAIR HFA) 108 (90 BASE) MCG/ACT inhaler, Sig Inhale 2 puffs into the lungs every 6 hours as needed for Wheezing  Patient not taking: Reported on 12/2/2020, Taking? , Authorizing Provider Maria A Rios MD      Past Medical History:  5/15/2013: Abdominal pain, acute, right lower quadrant  No date: Anxiety  No date: Arthritis      Comment:  Left Knee  8/13/2013: Bilateral ovarian cysts  No date: Blood transfusion reaction  1/29/2014: Cellulitis and abscess of trunk      Comment:  Pt was seen in ED today for bleeding from incision after               taking a fall this morning. No date: COVID-19  No date: Depression  No date: Diabetes mellitus ([facility] [address]      Comment:  x5yr  8/13/2013: Diverticula of colon  7/12/2016: DM2 (diabetes mellitus, type 2) ([facility] [address]  No date: Insomnia  2/27/2014: Insomnia secondary to situational depression  7/12/2018: Left carpal tunnel syndrome  No date: MDRO (multiple drug resistant organisms) resistance      Comment:  poss MRSA after hysterectomy treated with antibiotics  5/13/2014: OA (osteoarthritis) of knee  9/21/2017: Obesity (BMI 30.0-34.9)  No date: Ovarian cyst  5/13/2014: Plantar fasciitis, left        Review of Systems    Review of Systems   Constitutional: Negative for fever. Respiratory: Negative for cough. Cardiovascular: Negative for chest pain. Gastrointestinal: Positive for abdominal pain. Negative for diarrhea, nausea and vomiting.         See HPI Genitourinary: Negative for dysuria, frequency and hematuria. Objective:   Physical Exam      Physical Exam  Constitutional:       Appearance: She is obese. Skin:     Comments: There is no evidence of any rash or erythema. Neurological:      Mental Status: She is alert and oriented to person, place, and time. Psychiatric:         Mood and Affect: Mood normal.         Behavior: Behavior normal.         Thought Content: Thought content normal.         Judgment: Judgment normal.         Assessment:       Diagnosis Orders   1. Herpes zoster without complication  pregabalin (LYRICA) 50 MG capsule         Plan:      Choco King was seen today for diabetes. Diagnoses and all orders for this visit:    Herpes zoster without complication  -     pregabalin (LYRICA) 50 MG capsule; 1 at night the first night then start 2 at night  I sent a prescription in for acyclovir 405 times a day until gone-also generic Lyrica as directed-she is to send me a note on Friday morning with an update-she is to discontinue her to gabapentin at night when she starts her Lyrica tonight. Other orders  -     acyclovir (ZOVIRAX) 400 MG tablet; Take 1 tablet by mouth 5 times daily for 10 days    This is been a 10 to 12-minute video visit with the patient.         Blake Krueger,

## 2020-12-02 NOTE — PATIENT INSTRUCTIONS
Herpes zoster without complication  -     pregabalin (LYRICA) 50 MG capsule; 1 at night the first night then start 2 at night  I sent a prescription in for acyclovir 405 times a day until gone-also generic Lyrica as directed-she is to send me a note on Friday morning with an update-she is to discontinue her to gabapentin at night when she starts her Lyrica tonight. Other orders  -     acyclovir (ZOVIRAX) 400 MG tablet;  Take 1 tablet by mouth 5 times daily for 10 days

## 2020-12-09 ENCOUNTER — TELEPHONE (OUTPATIENT)
Dept: FAMILY MEDICINE CLINIC | Age: 40
End: 2020-12-09

## 2020-12-15 ENCOUNTER — HOSPITAL ENCOUNTER (EMERGENCY)
Age: 40
Discharge: HOME | End: 2020-12-15
Attending: EMERGENCY MEDICINE

## 2020-12-15 ENCOUNTER — APPOINTMENT (OUTPATIENT)
Dept: CT IMAGING | Age: 40
End: 2020-12-15

## 2020-12-15 VITALS
OXYGEN SATURATION: 98 % | WEIGHT: 147 LBS | SYSTOLIC BLOOD PRESSURE: 105 MMHG | DIASTOLIC BLOOD PRESSURE: 68 MMHG | BODY MASS INDEX: 27.75 KG/M2 | HEIGHT: 61 IN | TEMPERATURE: 97.9 F | RESPIRATION RATE: 16 BRPM | HEART RATE: 81 BPM

## 2020-12-15 LAB
A/G RATIO: 1.3 (ref 1.1–2.2)
ALBUMIN SERPL-MCNC: 4.4 G/DL (ref 3.4–5)
ALP BLD-CCNC: 161 U/L (ref 40–129)
ALT SERPL-CCNC: 17 U/L (ref 10–40)
ANION GAP SERPL CALCULATED.3IONS-SCNC: 12 MMOL/L (ref 3–16)
AST SERPL-CCNC: 24 U/L (ref 15–37)
BASOPHILS ABSOLUTE: 0 K/UL (ref 0–0.2)
BASOPHILS RELATIVE PERCENT: 0.7 %
BILIRUB SERPL-MCNC: 0.4 MG/DL (ref 0–1)
BILIRUBIN URINE: NEGATIVE
BLOOD, URINE: NEGATIVE
BUN BLDV-MCNC: 10 MG/DL (ref 7–20)
CALCIUM SERPL-MCNC: 9.6 MG/DL (ref 8.3–10.6)
CHLORIDE BLD-SCNC: 97 MMOL/L (ref 99–110)
CLARITY: CLEAR
CO2: 25 MMOL/L (ref 21–32)
COLOR: YELLOW
CREAT SERPL-MCNC: <0.5 MG/DL (ref 0.6–1.1)
EOSINOPHILS ABSOLUTE: 0.1 K/UL (ref 0–0.6)
EOSINOPHILS RELATIVE PERCENT: 1.5 %
GFR AFRICAN AMERICAN: >60
GFR NON-AFRICAN AMERICAN: >60
GLOBULIN: 3.5 G/DL
GLUCOSE BLD-MCNC: 467 MG/DL (ref 70–99)
GLUCOSE URINE: >=1000 MG/DL
HCG QUALITATIVE: NEGATIVE
HCT VFR BLD CALC: 43.8 % (ref 36–48)
HEMOGLOBIN: 14.9 G/DL (ref 12–16)
KETONES, URINE: 40 MG/DL
LEUKOCYTE ESTERASE, URINE: NEGATIVE
LIPASE: 42 U/L (ref 13–60)
LYMPHOCYTES ABSOLUTE: 3.3 K/UL (ref 1–5.1)
LYMPHOCYTES RELATIVE PERCENT: 48.9 %
MCH RBC QN AUTO: 30.5 PG (ref 26–34)
MCHC RBC AUTO-ENTMCNC: 34 G/DL (ref 31–36)
MCV RBC AUTO: 89.7 FL (ref 80–100)
MICROSCOPIC EXAMINATION: ABNORMAL
MONOCYTES ABSOLUTE: 0.5 K/UL (ref 0–1.3)
MONOCYTES RELATIVE PERCENT: 7.9 %
NEUTROPHILS ABSOLUTE: 2.7 K/UL (ref 1.7–7.7)
NEUTROPHILS RELATIVE PERCENT: 41 %
NITRITE, URINE: NEGATIVE
PDW BLD-RTO: 12.1 % (ref 12.4–15.4)
PH UA: 6 (ref 5–8)
PLATELET # BLD: 222 K/UL (ref 135–450)
PMV BLD AUTO: 8.8 FL (ref 5–10.5)
POTASSIUM SERPL-SCNC: 4.5 MMOL/L (ref 3.5–5.1)
PROTEIN UA: NEGATIVE MG/DL
RBC # BLD: 4.88 M/UL (ref 4–5.2)
SODIUM BLD-SCNC: 134 MMOL/L (ref 136–145)
SPECIFIC GRAVITY UA: 1.02 (ref 1–1.03)
TOTAL PROTEIN: 7.9 G/DL (ref 6.4–8.2)
URINE TYPE: ABNORMAL
UROBILINOGEN, URINE: 0.2 E.U./DL
WBC # BLD: 6.7 K/UL (ref 4–11)

## 2020-12-15 PROCEDURE — 85025 COMPLETE CBC W/AUTO DIFF WBC: CPT

## 2020-12-15 PROCEDURE — 96375 TX/PRO/DX INJ NEW DRUG ADDON: CPT

## 2020-12-15 PROCEDURE — 2500000003 HC RX 250 WO HCPCS: Performed by: EMERGENCY MEDICINE

## 2020-12-15 PROCEDURE — 81003 URINALYSIS AUTO W/O SCOPE: CPT

## 2020-12-15 PROCEDURE — 74177 CT ABD & PELVIS W/CONTRAST: CPT

## 2020-12-15 PROCEDURE — 6360000004 HC RX CONTRAST MEDICATION: Performed by: EMERGENCY MEDICINE

## 2020-12-15 PROCEDURE — 83690 ASSAY OF LIPASE: CPT

## 2020-12-15 PROCEDURE — 99285 EMERGENCY DEPT VISIT HI MDM: CPT

## 2020-12-15 PROCEDURE — 96374 THER/PROPH/DIAG INJ IV PUSH: CPT

## 2020-12-15 PROCEDURE — 80053 COMPREHEN METABOLIC PANEL: CPT

## 2020-12-15 PROCEDURE — 2580000003 HC RX 258: Performed by: EMERGENCY MEDICINE

## 2020-12-15 PROCEDURE — 84703 CHORIONIC GONADOTROPIN ASSAY: CPT

## 2020-12-15 PROCEDURE — 6360000002 HC RX W HCPCS: Performed by: EMERGENCY MEDICINE

## 2020-12-15 RX ORDER — HYDROCODONE BITARTRATE AND ACETAMINOPHEN 5; 325 MG/1; MG/1
1 TABLET ORAL EVERY 4 HOURS PRN
Qty: 10 TABLET | Refills: 0 | Status: SHIPPED | OUTPATIENT
Start: 2020-12-15 | End: 2020-12-18

## 2020-12-15 RX ORDER — 0.9 % SODIUM CHLORIDE 0.9 %
1000 INTRAVENOUS SOLUTION INTRAVENOUS ONCE
Status: COMPLETED | OUTPATIENT
Start: 2020-12-15 | End: 2020-12-15

## 2020-12-15 RX ORDER — MORPHINE SULFATE 4 MG/ML
4 INJECTION, SOLUTION INTRAMUSCULAR; INTRAVENOUS ONCE
Status: COMPLETED | OUTPATIENT
Start: 2020-12-15 | End: 2020-12-15

## 2020-12-15 RX ADMIN — IOPAMIDOL 75 ML: 755 INJECTION, SOLUTION INTRAVENOUS at 04:58

## 2020-12-15 RX ADMIN — SODIUM CHLORIDE 1000 ML: 9 INJECTION, SOLUTION INTRAVENOUS at 05:37

## 2020-12-15 RX ADMIN — FAMOTIDINE 20 MG: 10 INJECTION INTRAVENOUS at 05:10

## 2020-12-15 RX ADMIN — MORPHINE SULFATE 4 MG: 4 INJECTION, SOLUTION INTRAMUSCULAR; INTRAVENOUS at 04:32

## 2020-12-15 ASSESSMENT — PAIN DESCRIPTION - ORIENTATION: ORIENTATION: LEFT

## 2020-12-15 ASSESSMENT — PAIN DESCRIPTION - DESCRIPTORS: DESCRIPTORS: STABBING

## 2020-12-15 ASSESSMENT — PAIN DESCRIPTION - ONSET: ONSET: ON-GOING

## 2020-12-15 ASSESSMENT — PAIN SCALES - GENERAL
PAINLEVEL_OUTOF10: 6
PAINLEVEL_OUTOF10: 4

## 2020-12-15 ASSESSMENT — PAIN DESCRIPTION - PAIN TYPE: TYPE: ACUTE PAIN

## 2020-12-15 ASSESSMENT — PAIN DESCRIPTION - LOCATION: LOCATION: ABDOMEN;FLANK

## 2020-12-15 ASSESSMENT — PAIN DESCRIPTION - PROGRESSION: CLINICAL_PROGRESSION: NOT CHANGED

## 2020-12-15 ASSESSMENT — PAIN DESCRIPTION - FREQUENCY: FREQUENCY: CONTINUOUS

## 2020-12-15 NOTE — ED PROVIDER NOTES
[facility]  ED  eMERGENCY dEPARTMENTeNCOUnter      Pt Name: Luis Alfredo Clark  MRN: [id number]  [place] 1980  Date of evaluation: 12/15/2020  Provider: Nick Sheridan MD    CHIEF COMPLAINT       Chief Complaint   Patient presents with    Abdominal Pain     L sided flank and abd pain. Pt states she was seen here two weeks ago and dx'd with shingles - finished antiviral. Worsening pain.  Flank Pain         HISTORY OF PRESENT ILLNESS   (Location/Symptom, Timing/Onset,Context/Setting, Quality, Duration, Modifying Factors, Severity)  Note limiting factors. Luis Alfredo Clark is a 38 y.o. female who presents to the emergency department for left-sided flank and back pain extending to the lower abdomen. Patient states that her symptoms started approximately 2 weeks ago. She was seen here and then by her PCP. There is concern for shingles. She has never had a rash. The pain has been worsening interval while on the medications for shingles. She was not on steroids she has been on antivirals and Lyrica. Patient is a known diabetic and states that she has not been compliant with her diet today. She had 2 sodas and had not taken her insulin. Nursing notes were reviewed. REVIEW OF SYSTEMS    (2-9 systems for level 4, 10 or more for level 5)     Review of Systems    Positive and pertinent negative as per HPI. Except as noted above in the ROS, all other systems were reviewed and were negative. PAST MEDICAL HISTORY     Past Medical History:   Diagnosis Date    Abdominal pain, acute, right lower quadrant 5/15/2013    Anxiety     Arthritis     Left Knee    Bilateral ovarian cysts 8/13/2013    Blood transfusion reaction     Cellulitis and abscess of trunk 1/29/2014    Pt was seen in ED today for bleeding from incision after taking a fall this morning.       COVID-19     Depression     Diabetes mellitus ([facility] [address]     x5yr    Diverticula of colon 8/13/2013  DM2 (diabetes mellitus, type 2) ([facility] [address] 2016    Insomnia     Insomnia secondary to situational depression 2014    Left carpal tunnel syndrome 2018    MDRO (multiple drug resistant organisms) resistance     poss MRSA after hysterectomy treated with antibiotics    OA (osteoarthritis) of knee 2014    Obesity (BMI 30.0-34.9) 2017    Ovarian cyst     Plantar fasciitis, left 2014         SURGICALHISTORY       Past Surgical History:   Procedure Laterality Date    BREAST CYST ASPIRATION       SECTION      CHOLECYSTECTOMY      COLONOSCOPY      polyps    HYSTERECTOMY  2008    LAPAROSCOPY      lysis of adhesions    LAPAROTOMY  2014    LAPAROTOMY, BILATERAL SALPINGO - OOPHORECTOMY    SALPINGO-OOPHORECTOMY  2014         CURRENT MEDICATIONS       Discharge Medication List as of 12/15/2020  6:01 AM      CONTINUE these medications which have NOT CHANGED    Details   pregabalin (LYRICA) 50 MG capsule 1 at night the first night then start 2 at night, Disp-30 capsule,R-0Normal      Liraglutide (VICTOZA) 18 MG/3ML SOPN SC injection Inject 1.2 mg into the skin dailyHistorical Med      Insulin Glargine, 2 Unit Dial, (TOUJEO MAX SOLOSTAR) 300 UNIT/ML SOPN Inject into the skinHistorical Med      topiramate (TOPAMAX) 25 MG tablet 1 hs for 10 days then 2 hs, Disp-60 tablet,R-3Normal      rizatriptan (MAXALT-MLT) 10 MG disintegrating tablet Take one tablet by mouth daily as needed for headache, may repeat in 2 hours if needed, Disp-9 tablet, R-3Normal      metFORMIN (GLUCOPHAGE) 1000 MG tablet Take 1 tablet by mouth daily (with breakfast), Disp-30 tablet, R-1Please consider 90 day supplies to promote better adherenceNormal      butalbital-APAP-caffeine (FIORICET) -40 MG CAPS per capsule Take 1 capsule by mouth 2 times daily as needed for Headaches, Disp-20 capsule, R-0Normal ibuprofen (ADVIL;MOTRIN) 600 MG tablet Take 1 tablet by mouth every 8 hours as needed for Pain, Disp-30 tablet, R-0Print      naproxen (EC NAPROSYN) 500 MG EC tablet Take 1 tablet by mouth 2 times daily (with meals) for 14 days, Disp-60 tablet, R-1Normal      fluticasone (FLONASE) 50 MCG/ACT nasal spray 2 sprays by Nasal route daily, Disp-1 Bottle, R-0Normal      insulin lispro (HUMALOG KWIKPEN) 100 UNIT/ML pen INJECT 20 UNITS SUBCUTANEOUSLY THREE TIMES DAILY BEFORE MEAL(S), Disp-15 pen, R-3Please consider 90 day supplies to promote better adherenceNormal      Insulin Pen Needle (B-D UF III MINI PEN NEEDLES) 31G X 5 MM MISC 4 TIMES DAILY Starting Thu 1/24/2019, Disp-100 each, R-0, Print      blood glucose test strips (ASCENSIA AUTODISC VI;ONE TOUCH ULTRA TEST VI) strip Disp-100 strip, R-5, NormalDX: E11.9-One touch ultra strips. FSBS 3 times daily      Lancets MISC Disp-100 each, R-5, NormalDX: E 11.9-Uses insulin.  FSBS 3 times daily-Delica lancets for one touch ultra II      glucose monitoring kit (FREESTYLE) monitoring kit Disp-1 kit, R-0, NormalDx E11.9-One touch ultra II meter-uses tid             ALLERGIES     Morphine    FAMILY HISTORY       Family History   Problem Relation Age of Onset    Cancer Mother         ovarian    Diabetes Mother     High Blood Pressure Mother     Hypertension Mother     Diabetes Brother     Hypertension Brother     Diabetes Maternal Grandmother     Cancer Maternal Grandfather         stomach    Prostate Cancer Paternal Grandfather         metastatic    Breast Cancer Maternal Aunt           SOCIAL HISTORY       Social History     Socioeconomic History    Marital status:      Spouse name: None    Number of children: None    Years of education: None    Highest education level: None   Occupational History    None   Social Needs    Financial resource strain: None    Food insecurity     Worry: None     Inability: None    Transportation needs     Medical: None Non-medical: None   Tobacco Use    Smoking status: Former Smoker     Years: 0.00     Types: Cigarettes     Quit date: 1999     Years since quittin.2    Smokeless tobacco: Never Used    Tobacco comment: smoked for 1 months when teenager   Substance and Sexual Activity    Alcohol use: Yes     Comment: social drinker  4 beer per month    Drug use: No     Comment: Hx of cocaine use in     Sexual activity: Yes     Partners: Male     Comment: painful intercourse    Lifestyle    Physical activity     Days per week: None     Minutes per session: None    Stress: None   Relationships    Social connections     Talks on phone: None     Gets together: None     Attends Taoism service: None     Active member of club or organization: None     Attends meetings of clubs or organizations: None     Relationship status: None    Intimate partner violence     Fear of current or ex partner: None     Emotionally abused: None     Physically abused: None     Forced sexual activity: None   Other Topics Concern    None   Social History Narrative    None       SCREENINGS    [place] Coma Scale  Eye Opening: Spontaneous  Best Verbal Response: Oriented  Best Motor Response: Obeys commands  [place] Coma Scale Score: 15        PHYSICAL EXAM    (up to 7 for level 4, 8 or more for level 5)     ED Triage Vitals [12/15/20 0349]   BP Temp Temp Source Pulse Resp SpO2 Height Weight   102/72 97.9 °F (36.6 °C) Oral 97 18 100 % 5' 1\" (1.549 m) 147 lb (66.7 kg)       Physical Exam  Vitals signs and nursing note reviewed. Constitutional:       Appearance: Normal appearance. She is well-developed. She is not ill-appearing. HENT:      Head: Normocephalic and atraumatic. Right Ear: External ear normal.      Left Ear: External ear normal.      Nose: Nose normal.   Eyes:      General: No scleral icterus. Right eye: No discharge. Left eye: No discharge.       Conjunctiva/sclera: Conjunctivae normal.   Neck: Musculoskeletal: Neck supple. Cardiovascular:      Rate and Rhythm: Normal rate and regular rhythm. Heart sounds: Normal heart sounds. Pulmonary:      Effort: Pulmonary effort is normal. No respiratory distress. Breath sounds: Normal breath sounds. No wheezing or rales. Abdominal:      General: Bowel sounds are normal. There is no distension. Palpations: Abdomen is soft. Tenderness: There is no abdominal tenderness. There is left CVA tenderness. There is no guarding or rebound. Musculoskeletal:      Comments: No spinal deformity or tenderness. Her pain is worse along the musculature of the left back extending to the left abdomen. There is no tightness or fullness of the musculature. It is across several dermatomes and again there is no rash. No overlying skin changes. Skin:     Coloration: Skin is not pale. Neurological:      Mental Status: She is alert. Psychiatric:         Mood and Affect: Mood normal.         Behavior: Behavior normal.             DIAGNOSTIC RESULTS     EKG: All EKG's are interpreted by the Emergency Department Physician who either signs or Co-signs this chart in the absence of a cardiologist.    12 lead EKG shows     RADIOLOGY:   Non-plain film images such as CT, Ultrasound and MRI are read by the radiologist. Plain radiographic images are visualized and preliminarily interpreted by the emergency physician with the below findings:        Interpretation per the Radiologist below, if available at the time of this note:    CT ABDOMEN PELVIS W IV CONTRAST Additional Contrast? None   Final Result   No abnormality identified to explain the patient's symptoms. Normal examination.                ED BEDSIDE ULTRASOUND:   Performed by ED Physician - none    LABS:  Labs Reviewed   CBC WITH AUTO DIFFERENTIAL - Abnormal; Notable for the following components:       Result Value    RDW 12.1 (*)     All other components within normal limits    Narrative: Performed at:  [address],  [address] [place] [address], [address]   Phone [phone]   COMPREHENSIVE METABOLIC PANEL - Abnormal; Notable for the following components:    Sodium 134 (*)     Chloride 97 (*)     Glucose 467 (*)     CREATININE <0.5 (*)     Alkaline Phosphatase 161 (*)     All other components within normal limits    Narrative:     Performed at:  [address],  [address] [place] [address], [address]   Phone [phone]   URINALYSIS - Abnormal; Notable for the following components:    Glucose, Ur >=1000 (*)     Ketones, Urine 40 (*)     All other components within normal limits    Narrative:     Performed at:  [address],  [address] [place] [address], [address]   Phone [phone]   HCG, SERUM, QUALITATIVE    Narrative:     Performed at:  [address],  [address] [place] [address], [address]   Phone [phone]   LIPASE    Narrative:     Performed at:  [address],  [address] [place] [address], [address]   Phone [phone]       All other labs were within normal range or not returned as of this dictation.     EMERGENCY DEPARTMENT COURSE and DIFFERENTIAL DIAGNOSIS/MDM:   Vitals:    Vitals:    12/15/20 0349 12/15/20 0445 12/15/20 0545   BP: 102/72 119/77 105/68   Pulse: 97 74 81   Resp: 18 16 16   Temp: 97.9 °F (36.6 °C)     TempSrc: Oral     SpO2: 100% 100% 98%   Weight: 147 lb (66.7 kg)     Height: 5' 1\" (1.549 m) Adult female who comes in with left-sided flank pain radiating to the anterior abdomen. Laboratory studies were obtained to the patient's chart was reviewed and she had no imaging studies when she was first diagnosed. CT abdomen pelvis ordered. Patient is given morphine for her pain. She reports that morphine gives her heartburn she therefore she was given Pepcid. Patient is reassessed and she has significant improvement of her pain. Laboratory studies show no significant abnormality in his CT scan is negative for any acute process. I discussed all of our findings with the patient at this point based on history, physical exam and diagnosis with work-up I have low suspicion for serious or life-threatening conditions. I recommend that the patient continue to follow-up with her primary care provider. This was discussed. She appeared expressed understanding and she is agreeable to treatment plan. She is requesting medication to help with the pain and she is given a short course of opioids. CRITICAL CARE TIME   None       CONSULTS:  None    PROCEDURES:  Unless otherwise noted above, none     Procedures    FINAL IMPRESSION      1. Flank pain          DISPOSITION/PLAN   DISPOSITION Decision To Discharge 12/15/2020 05:59:17 AM      PATIENT REFERREDTO:  Jennifer Greco, DO  [address]  [address] [place] [address],[address] [place] [address]  [phone]    Schedule an appointment as soon as possible for a visit         DISCHARGEMEDICATIONS:  Discharge Medication List as of 12/15/2020  6:01 AM      START taking these medications    Details   HYDROcodone-acetaminophen (NORCO) 5-325 MG per tablet Take 1 tablet by mouth every 4 hours as needed for Pain for up to 3 days. Intended supply: 3 days.  Take lowest dose possible to manage pain, Disp-10 tablet, R-0Print (Please note that portions of this note were completed with a voice recognition program.  Efforts were made to edit the dictations but occasionally words are mis-transcribed.)    Miguelito Morton MD (electronically signed)  Attending Emergency Physician        Miguelito Morton MD  12/15/20 [id number]

## 2020-12-15 NOTE — ED NOTES
All discharge paperwork and follow-up instructions reviewed with pt. Prescriptions reviewed. Pt verbalized understanding. Pt ambulatory upon discharge in stable condition. Pt stated she called a cab to pick her up - pt understands that she is not allowed to drive after receiving IVP Morphine.         Wayne Rodríguez RN  12/15/20 3933

## 2020-12-16 ENCOUNTER — CARE COORDINATION (OUTPATIENT)
Dept: CARE COORDINATION | Age: 40
End: 2020-12-16

## 2020-12-16 NOTE — CARE COORDINATION
ACM spoke with Patel Carney to follow up on her ED visit dx: Flank pain. She was dx with shingles two weeks ago. She reports today that she has spoke with PCP and she has increased her Lyrica. She has started:  START taking:  HYDROcodone-acetaminophen (Norco)  STOP taking:  gabapentin 300 MG capsule (NEURONTIN     She denies symptoms of fever, cough and SOB. She feels that she is familiar with COVID and tested positive in May 2020. Advised on side effects of meds and symptoms to monitor. Encouraged follow up on diabetes with PCP . Lab Results   Component Value Date    LABA1C 14.0 2020    LABA1C 14.0 2020    LABA1C 14.0 2019     Lab Results   Component Value Date    .9 2019    .0 10/10/2013    .9 2012          Patient contacted regarding Brandon Man. Discussed COVID-19 related testing which was not done at this time. Test results were not done. Patient informed of results, if available? No    Care Transition Nurse/ Ambulatory Care Manager contacted the patient by telephone to perform post discharge assessment. Call within 2 business days of discharge: Yes. Verified name and  with patient as identifiers. Provided introduction to self, and explanation of the CTN/ACM role, and reason for call due to risk factors for infection and/or exposure to COVID-19. Symptoms reviewed with patient who verbalized the following symptoms: flank pain, no new symptoms and no worsening symptoms. Due to no new or worsening symptoms encounter was not routed to provider for escalation. Discussed follow-up appointments. If no appointment was previously scheduled, appointment scheduling offered: [facility] follow up appointment(s): No future appointments.   Non-[facility] follow up appointment(s):     Non-face-to-face services provided:  Obtained and reviewed discharge summary and/or continuity of care documents  Assessment and support for treatment adherence and medication management-discussed presciribed and side effects. Advance Care Planning:   Does patient have an Advance Directive:  not on file. Patient has following risk factors of: diabetes. CTN/ACM reviewed discharge instructions, medical action plan and red flags such as increased shortness of breath, increasing fever and signs of decompensation with patient who verbalized understanding. Discussed exposure protocols and quarantine with CDC Guidelines What to do if you are sick with coronavirus disease 2019.  Patient was given an opportunity for questions and concerns. The patient agrees to contact the Flu clinic hot line and PCP office for questions related to their healthcare. CTN/ACM provided contact information for future needs. Reviewed and educated patient on any new and changed medications related to discharge diagnosis     Patient/family/caregiver given information for GetWell Loop and agrees to enroll yes  Patient's preferred e-mail: [email or web address]    Patient's preferred phone [phone] ([phone]  Based on Loop alert triggers, patient will be contacted by nurse care manager for worsening symptoms. Pt will be further monitored by COVID Loop Team based on severity of symptoms and risk factors.

## 2020-12-29 ENCOUNTER — HOSPITAL ENCOUNTER (EMERGENCY)
Age: 40
Discharge: HOME | End: 2020-12-29

## 2020-12-29 VITALS
TEMPERATURE: 98.5 F | HEART RATE: 101 BPM | OXYGEN SATURATION: 99 % | RESPIRATION RATE: 16 BRPM | DIASTOLIC BLOOD PRESSURE: 68 MMHG | SYSTOLIC BLOOD PRESSURE: 98 MMHG

## 2020-12-29 LAB
A/G RATIO: 1.4 (ref 1.1–2.2)
ALBUMIN SERPL-MCNC: 4.3 G/DL (ref 3.4–5)
ALP BLD-CCNC: 164 U/L (ref 40–129)
ALT SERPL-CCNC: 13 U/L (ref 10–40)
ANION GAP SERPL CALCULATED.3IONS-SCNC: 9 MMOL/L (ref 3–16)
AST SERPL-CCNC: 12 U/L (ref 15–37)
BASOPHILS ABSOLUTE: 0 K/UL (ref 0–0.2)
BASOPHILS RELATIVE PERCENT: 0.4 %
BILIRUB SERPL-MCNC: 0.4 MG/DL (ref 0–1)
BILIRUBIN URINE: NEGATIVE
BLOOD, URINE: NEGATIVE
BUN BLDV-MCNC: 12 MG/DL (ref 7–20)
CALCIUM SERPL-MCNC: 9.6 MG/DL (ref 8.3–10.6)
CHLORIDE BLD-SCNC: 99 MMOL/L (ref 99–110)
CLARITY: CLEAR
CO2: 28 MMOL/L (ref 21–32)
COLOR: YELLOW
CREAT SERPL-MCNC: <0.5 MG/DL (ref 0.6–1.1)
EOSINOPHILS ABSOLUTE: 0 K/UL (ref 0–0.6)
EOSINOPHILS RELATIVE PERCENT: 0.9 %
GFR AFRICAN AMERICAN: >60
GFR NON-AFRICAN AMERICAN: >60
GLOBULIN: 3 G/DL
GLUCOSE BLD-MCNC: 304 MG/DL
GLUCOSE BLD-MCNC: 304 MG/DL (ref 70–99)
GLUCOSE BLD-MCNC: 440 MG/DL (ref 70–99)
GLUCOSE URINE: >=1000 MG/DL
HCT VFR BLD CALC: 42.1 % (ref 36–48)
HEMOGLOBIN: 14.5 G/DL (ref 12–16)
KETONES, URINE: NEGATIVE MG/DL
LEUKOCYTE ESTERASE, URINE: NEGATIVE
LYMPHOCYTES ABSOLUTE: 2.5 K/UL (ref 1–5.1)
LYMPHOCYTES RELATIVE PERCENT: 46.5 %
MCH RBC QN AUTO: 31 PG (ref 26–34)
MCHC RBC AUTO-ENTMCNC: 34.5 G/DL (ref 31–36)
MCV RBC AUTO: 89.8 FL (ref 80–100)
MICROSCOPIC EXAMINATION: ABNORMAL
MONOCYTES ABSOLUTE: 0.5 K/UL (ref 0–1.3)
MONOCYTES RELATIVE PERCENT: 8.9 %
NEUTROPHILS ABSOLUTE: 2.3 K/UL (ref 1.7–7.7)
NEUTROPHILS RELATIVE PERCENT: 43.3 %
NITRITE, URINE: NEGATIVE
PDW BLD-RTO: 12.5 % (ref 12.4–15.4)
PERFORMED ON: ABNORMAL
PH UA: 6.5 (ref 5–8)
PLATELET # BLD: 217 K/UL (ref 135–450)
PMV BLD AUTO: 9.2 FL (ref 5–10.5)
POTASSIUM SERPL-SCNC: 3.8 MMOL/L (ref 3.5–5.1)
PROTEIN UA: NEGATIVE MG/DL
RBC # BLD: 4.69 M/UL (ref 4–5.2)
SODIUM BLD-SCNC: 136 MMOL/L (ref 136–145)
SPECIFIC GRAVITY UA: 1.01 (ref 1–1.03)
TOTAL PROTEIN: 7.3 G/DL (ref 6.4–8.2)
URINE TYPE: ABNORMAL
UROBILINOGEN, URINE: 0.2 E.U./DL
WBC # BLD: 5.3 K/UL (ref 4–11)

## 2020-12-29 PROCEDURE — 80053 COMPREHEN METABOLIC PANEL: CPT

## 2020-12-29 PROCEDURE — 2580000003 HC RX 258: Performed by: NURSE PRACTITIONER

## 2020-12-29 PROCEDURE — 6370000000 HC RX 637 (ALT 250 FOR IP): Performed by: NURSE PRACTITIONER

## 2020-12-29 PROCEDURE — 85025 COMPLETE CBC W/AUTO DIFF WBC: CPT

## 2020-12-29 PROCEDURE — 81003 URINALYSIS AUTO W/O SCOPE: CPT

## 2020-12-29 PROCEDURE — 96372 THER/PROPH/DIAG INJ SC/IM: CPT

## 2020-12-29 PROCEDURE — 99284 EMERGENCY DEPT VISIT MOD MDM: CPT

## 2020-12-29 RX ORDER — HYDROCODONE BITARTRATE AND ACETAMINOPHEN 5; 325 MG/1; MG/1
2 TABLET ORAL ONCE
Status: COMPLETED | OUTPATIENT
Start: 2020-12-29 | End: 2020-12-29

## 2020-12-29 RX ORDER — HYDROCODONE BITARTRATE AND ACETAMINOPHEN 5; 325 MG/1; MG/1
1 TABLET ORAL EVERY 6 HOURS PRN
Qty: 10 TABLET | Refills: 0 | Status: SHIPPED | OUTPATIENT
Start: 2020-12-29 | End: 2021-01-01

## 2020-12-29 RX ORDER — 0.9 % SODIUM CHLORIDE 0.9 %
1000 INTRAVENOUS SOLUTION INTRAVENOUS ONCE
Status: COMPLETED | OUTPATIENT
Start: 2020-12-29 | End: 2020-12-29

## 2020-12-29 RX ADMIN — SODIUM CHLORIDE 1000 ML: 9 INJECTION, SOLUTION INTRAVENOUS at 22:29

## 2020-12-29 RX ADMIN — INSULIN LISPRO 6 UNITS: 100 INJECTION, SOLUTION INTRAVENOUS; SUBCUTANEOUS at 22:32

## 2020-12-29 RX ADMIN — HYDROCODONE BITARTRATE AND ACETAMINOPHEN 2 TABLET: 5; 325 TABLET ORAL at 21:19

## 2020-12-29 ASSESSMENT — PAIN SCALES - GENERAL
PAINLEVEL_OUTOF10: 3
PAINLEVEL_OUTOF10: 6
PAINLEVEL_OUTOF10: 5

## 2020-12-29 ASSESSMENT — PAIN DESCRIPTION - PAIN TYPE: TYPE: ACUTE PAIN

## 2020-12-29 ASSESSMENT — PAIN DESCRIPTION - LOCATION: LOCATION: GENERALIZED

## 2020-12-30 NOTE — ED NOTES
Pt scripts x1 instructed to follow up with PCP. Assessed per Damien Villalta NP.      Marilee García, KALPESH  86/04/24 2122

## 2020-12-31 NOTE — ED PROVIDER NOTES
[address] [place] [address]  ED  EMERGENCY DEPARTMENT ENCOUNTER      This patient was not seen and evaluated by the attending physician. Pt Name: Nikita Monroe  MRN: [id number]  [place] 1980  Date of evaluation: 12/29/2020  Provider: IRASEMA Allan - CNP-C  PCP: Dara Wang DO      History provided by the patient. CHIEFCOMPLAINT:     Chief Complaint   Patient presents with    Flank Pain     L sided flank pain and vaginal pain, burning to touch     Vaginal Pain    Foot Pain       HISTORY OF PRESENT ILLNESS:      Nikita Monroe is a 38 y.o. female who presents to [address] [place] [address]  ED with complaints of pain all over. Patient states that she recently had shingles to her left flank, states that since then she is just had diffuse worsening pain all over. She states that her skin just feels very sensitive. Patient shingles rash has subsided but she states that the pain still wraps around her left flank but now extends to her whole body. Patient states that she keeps calling her primary care doctor but he just keeps upping her medication, will not see her. She states that the pain wraps around her left flank down into her groin. She is here for further evaluation. LOCATION:generalized skin  QUALITY:ache  SEVERITY:6  DURATION:several weeks  MODIFYING FACTORS:none noted    Nursing Notes were reviewed     REVIEW OF SYSTEMS:     Review of Systems  All systems, a total of 10, are reviewed and negative except for those that were just noted in history present illness. PAST MEDICAL HISTORY:     Past Medical History:   Diagnosis Date    Abdominal pain, acute, right lower quadrant 5/15/2013    Anxiety     Arthritis     Left Knee    Bilateral ovarian cysts 8/13/2013    Blood transfusion reaction     Cellulitis and abscess of trunk 1/29/2014    Pt was seen in ED today for bleeding from incision after taking a fall this morning.       COVID-19  Depression     Diabetes mellitus ([facility]     [id number]    Diverticula of colon 2013    DM2 (diabetes mellitus, type 2) ([facility] 2016    Insomnia     Insomnia secondary to situational depression 2014    Left carpal tunnel syndrome 2018    MDRO (multiple drug resistant organisms) resistance     poss MRSA after hysterectomy treated with antibiotics    OA (osteoarthritis) of knee 2014    Obesity (BMI 30.0-34.9) 2017    Ovarian cyst     Plantar fasciitis, left 2014         SURGICAL HISTORY:      Past Surgical History:   Procedure Laterality Date    BREAST CYST ASPIRATION       SECTION      CHOLECYSTECTOMY      COLONOSCOPY      polyps    HYSTERECTOMY  2008    LAPAROSCOPY      lysis of adhesions    LAPAROTOMY  2014    LAPAROTOMY, BILATERAL SALPINGO - OOPHORECTOMY    SALPINGO-OOPHORECTOMY  2014         CURRENT MEDICATIONS:       Discharge Medication List as of 2020 11:30 PM      CONTINUE these medications which have NOT CHANGED    Details   pregabalin (LYRICA) 50 MG capsule 1 twice daily and 3 at night, Disp-150 capsule, R-0Normal      Liraglutide (VICTOZA) 18 MG/3ML SOPN SC injection Inject 1.2 mg into the skin dailyHistorical Med      Insulin Glargine, 2 Unit Dial, (TOUJEO MAX SOLOSTAR) 300 UNIT/ML SOPN Inject into the skinHistorical Med      topiramate (TOPAMAX) 25 MG tablet 1 hs for 10 days then 2 hs, Disp-60 tablet,R-3Normal      rizatriptan (MAXALT-MLT) 10 MG disintegrating tablet Take one tablet by mouth daily as needed for headache, may repeat in 2 hours if needed, Disp-9 tablet, R-3Normal      metFORMIN (GLUCOPHAGE) 1000 MG tablet Take 1 tablet by mouth daily (with breakfast), Disp-30 tablet, R-1Please consider 90 day supplies to promote better adherenceNormal      butalbital-APAP-caffeine (FIORICET) -40 MG CAPS per capsule Take 1 capsule by mouth 2 times daily as needed for Headaches, Disp-20 capsule, R-0Normal ibuprofen (ADVIL;MOTRIN) 600 MG tablet Take 1 tablet by mouth every 8 hours as needed for Pain, Disp-30 tablet, R-0Print      naproxen (EC NAPROSYN) 500 MG EC tablet Take 1 tablet by mouth 2 times daily (with meals) for 14 days, Disp-60 tablet, R-1Normal      fluticasone (FLONASE) 50 MCG/ACT nasal spray 2 sprays by Nasal route daily, Disp-1 Bottle, R-0Normal      insulin lispro (HUMALOG KWIKPEN) 100 UNIT/ML pen INJECT 20 UNITS SUBCUTANEOUSLY THREE TIMES DAILY BEFORE MEAL(S), Disp-15 pen, R-3Please consider 90 day supplies to promote better adherenceNormal      Insulin Pen Needle (B-D UF III MINI PEN NEEDLES) 31G X 5 MM MISC 4 TIMES DAILY Starting Thu 1/24/2019, Disp-100 each, R-0, Print      blood glucose test strips (ASCENSIA AUTODISC VI;ONE TOUCH ULTRA TEST VI) strip Disp-100 strip, R-5, NormalDX: E11.9-One touch ultra strips. FSBS 3 times daily      Lancets MISC Disp-100 each, R-5, NormalDX: E 11.9-Uses insulin.  FSBS 3 times daily-Delica lancets for one touch ultra II      glucose monitoring kit (FREESTYLE) monitoring kit Disp-1 kit, R-0, NormalDx E11.9-One touch ultra II meter-uses tid               ALLERGIES:    Morphine    FAMILY HISTORY:       Family History   Problem Relation Age of Onset    Cancer Mother         ovarian    Diabetes Mother     High Blood Pressure Mother     Hypertension Mother     Diabetes Brother     Hypertension Brother     Diabetes Maternal Grandmother     Cancer Maternal Grandfather         stomach    Prostate Cancer Paternal Grandfather         metastatic    Breast Cancer Maternal Aunt           SOCIAL HISTORY:     Social History     Socioeconomic History    Marital status:      Spouse name: None    Number of children: None    Years of education: None    Highest education level: None   Occupational History    None   Social Needs    Financial resource strain: None    Food insecurity     Worry: None     Inability: None    Transportation needs     Medical: None Non-medical: None   Tobacco Use    Smoking status: Former Smoker     Years: 0.00     Types: Cigarettes     Quit date: 1999     Years since quittin.2    Smokeless tobacco: Never Used    Tobacco comment: smoked for 1 months when teenager   Substance and Sexual Activity    Alcohol use: Yes     Comment: social drinker  4 beer per month    Drug use: No     Comment: Hx of cocaine use in     Sexual activity: Yes     Partners: Male     Comment: painful intercourse    Lifestyle    Physical activity     Days per week: None     Minutes per session: None    Stress: None   Relationships    Social connections     Talks on phone: None     Gets together: None     Attends Scientologist service: None     Active member of club or organization: None     Attends meetings of clubs or organizations: None     Relationship status: None    Intimate partner violence     Fear of current or ex partner: None     Emotionally abused: None     Physically abused: None     Forced sexual activity: None   Other Topics Concern    None   Social History Narrative    None       SCREENINGS:             PHYSICAL EXAM:       ED Triage Vitals   BP Temp Temp Source Pulse Resp SpO2 Height Weight   20 -- --   107/73 98.5 °F (36.9 °C) Oral 110 18 100 %         Physical Exam    CONSTITUTIONAL: Awake and alert. Cooperative. Well-developed. Well-nourished. Vitals:    20 2340   BP:  107/73 101/68 98/68   Pulse: 110  95 101   Resp:  18 16 16   Temp:  98.5 °F (36.9 °C)     TempSrc:  Oral     SpO2: 100% 97% 100% 99%     HENT: Normocephalic. Atraumatic. External ears normal, without discharge. TMs clear bilaterally. Nonasal discharge. Oropharynx clear, no erythema.  Mucous membranes moist. EYES: Conjunctiva non-injected, nolid abnormalities noted. No scleral icterus. PERRL. EOM's grossly intact. Anterior chambers clear. NECK: Supple. Normal ROM. No meningismus. No thyroid tenderness or swelling noted. CARDIOVASCULAR: RRR. No Murmer. No carotid bruits. PULMONARY/CHEST WALL: Effort normal. No tachypnea. Lungs clear to ausculation. ABDOMEN: Normal BS. Soft. Nondistended. No tenderness to palpation. No guarding. No hernias noted. No splenomegaly. Back: Spine is midline. No ecchymosis. No crepituson palpation. No obvious subluxation of vertebral column. No saddle anesthesia or evidence of cauda equina. /ANORECTAL: Not assessed  MUSKULOSKELETAL: Normal ROM. No acute deformities. No edema. No tenderness to palpate. SKIN: Warm and dry. No rash noted  NEUROLOGICAL:  GCS 15. CN II-XII grossly intact. Strength is 5/5 in all extremities and sensation is intact. PSYCHIATRIC: Normal affect, normal insight and judgement. Alert and oriented x 3.         DIAGNOSTIC RESULTS:     LABS:    Results for orders placed or performed during the hospital encounter of 12/29/20   CBC auto differential   Result Value Ref Range    WBC 5.3 4.0 - 11.0 K/uL    RBC 4.69 4.00 - 5.20 M/uL    Hemoglobin 14.5 12.0 - 16.0 g/dL    Hematocrit 42.1 36.0 - 48.0 %    MCV 89.8 80.0 - 100.0 fL    MCH 31.0 26.0 - 34.0 pg    MCHC 34.5 31.0 - 36.0 g/dL    RDW 12.5 12.4 - 15.4 %    Platelets 795 168 - 103 K/uL    MPV 9.2 5.0 - 10.5 fL    Neutrophils % 43.3 %    Lymphocytes % 46.5 %    Monocytes % 8.9 %    Eosinophils % 0.9 %    Basophils % 0.4 %    Neutrophils Absolute 2.3 1.7 - 7.7 K/uL    Lymphocytes Absolute 2.5 1.0 - 5.1 K/uL    Monocytes Absolute 0.5 0.0 - 1.3 K/uL    Eosinophils Absolute 0.0 0.0 - 0.6 K/uL    Basophils Absolute 0.0 0.0 - 0.2 K/uL   Comprehensive metabolic panel   Result Value Ref Range    Sodium 136 136 - 145 mmol/L    Potassium 3.8 3.5 - 5.1 mmol/L    Chloride 99 99 - 110 mmol/L    CO2 28 21 - 32 mmol/L Anion Gap 9 3 - 16    Glucose 440 (H) 70 - 99 mg/dL    BUN 12 7 - 20 mg/dL    CREATININE <0.5 (L) 0.6 - 1.1 mg/dL    GFR Non-African American >60 >60    GFR African American >60 >60    Calcium 9.6 8.3 - 10.6 mg/dL    Total Protein 7.3 6.4 - 8.2 g/dL    Alb 4.3 3.4 - 5.0 g/dL    Albumin/Globulin Ratio 1.4 1.1 - 2.2    Total Bilirubin 0.4 0.0 - 1.0 mg/dL    Alkaline Phosphatase 164 (H) 40 - 129 U/L    ALT 13 10 - 40 U/L    AST 12 (L) 15 - 37 U/L    Globulin 3.0 g/dL   Urinalysis   Result Value Ref Range    Color, UA Yellow Straw/Yellow    Clarity, UA Clear Clear    Glucose, Ur >=1000 (A) Negative mg/dL    Bilirubin Urine Negative Negative    Ketones, Urine Negative Negative mg/dL    Specific Gravity, UA 1.010 1.005 - 1.030    Blood, Urine Negative Negative    pH, UA 6.5 5.0 - 8.0    Protein, UA Negative Negative mg/dL    Urobilinogen, Urine 0.2 <2.0 E.U./dL    Nitrite, Urine Negative Negative    Leukocyte Esterase, Urine Negative Negative    Microscopic Examination Not Indicated     Urine Type NotGiven    POCT glucose   Result Value Ref Range    Glucose 304 mg/dL   POCT Glucose   Result Value Ref Range    POC Glucose 304 (H) 70 - 99 mg/dl    Performed on ACCU-CHEK          RADIOLOGY:  All x-ray studies are viewed/reviewed by me. Formal interpretations per the radiologist are as follows: No orders to display           EKG:  See EKG interpretation by an attending physician.       PROCEDURES:   N/A    CRITICAL CARE TIME:   N/A    CONSULTS:  None      EMERGENCY DEPARTMENT COURSE andDIFFERENTIAL DIAGNOSIS/MDM:   Vitals:    Vitals:    12/29/20 1911 12/29/20 1948 12/29/20 2224 12/29/20 2340   BP:  107/73 101/68 98/68   Pulse: 110  95 101   Resp:  18 16 16   Temp:  98.5 °F (36.9 °C)     TempSrc:  Oral     SpO2: 100% 97% 100% 99%       Patient wasgiven the following medications:  Medications   HYDROcodone-acetaminophen (NORCO) 5-325 MG per tablet 2 tablet (2 tablets Oral Given 12/29/20 4047) insulin lispro (HUMALOG) injection vial 6 Units (6 Units Subcutaneous Given 12/29/20 2232)   0.9 % sodium chloride bolus (0 mLs Intravenous Stopped 12/29/20 2326)         Patient was evaluated independently by myself with the attending physician available for consultation. Patient presented to the emergency room today with complaints of pain all over, she describes her pain is more of a skin sensitivity everywhere, patient states that she is on the highest dose of Lyrica, states that she keeps calling her primary care who will not see her but simply just keeps upping her medication which is not working. Patient pain initially started with shingles, shingles rash has subsided and I considered postherpetic neuralgia although patient pain has become everywhere as opposed to the affected dermatome. I did do laboratory studies, patient glucose was significantly elevated at 440, she states that it has been out-of-control. I do think that this is contributing to the patient's discomfort, she was educated on the risks of hyperglycemia, instructed to follow-up with her primary care physician to monitor her glucose closely, she was discharged home in good condition. Patient laboratory studies, radiographic imaging, and assessment were all discussed with the patient and/orpatient family. There was shared decision-making between myself as well as the patient and/or their surrogate and we are all in agreement with discharge home. There was an opportunity for questions and all questions were answered tothe best of my ability and to the satisfaction of the patient and/or patient family. FINAL IMPRESSION:      1. Generalized pain    2.  Hyperglycemia          DISPOSITION/PLAN:   DISPOSITION Decision To Discharge      PATIENT REFERRED TO:  Rishi Hartman DO  [address]  [address] [facility] [address]  [address] [facility]    Schedule an appointment as soon as possible for a visit   For follow up DISCHARGE MEDICATIONS:  Discharge Medication List as of 12/29/2020 11:30 PM      START taking these medications    Details   HYDROcodone-acetaminophen (NORCO) 5-325 MG per tablet Take 1 tablet by mouth every 6 hours as needed for Pain for up to 3 days. , Disp-10 tablet, R-0Print                        (Please note thatportions of this note were completed with a voice recognition program.  Efforts were made to edit the dictations, but occasionally words are mis-transcribed.)    IRASEMA Espinosa CNP-C (electronicallysigned)        IRASEMA Espinosa CNP  12/30/20 8717

## 2021-01-13 ENCOUNTER — OFFICE VISIT (OUTPATIENT)
Dept: PRIMARY CARE CLINIC | Age: 41
End: 2021-01-13

## 2021-01-13 DIAGNOSIS — Z20.822 SUSPECTED COVID-19 VIRUS INFECTION: Primary | ICD-10-CM

## 2021-01-13 PROCEDURE — 99211 OFF/OP EST MAY X REQ PHY/QHP: CPT | Performed by: NURSE PRACTITIONER

## 2021-01-13 NOTE — PROGRESS NOTES
Chacha Sergeant received a viral test for COVID-19. They were educated on isolation and quarantine as appropriate. For any symptoms, they were directed to seek care from their PCP, given contact information to establish with a doctor, directed to an urgent care or the emergency room.

## 2021-01-14 LAB — SARS-COV-2: NOT DETECTED

## 2021-01-15 ENCOUNTER — TELEPHONE (OUTPATIENT)
Dept: FAMILY MEDICINE CLINIC | Age: 41
End: 2021-01-15

## 2021-01-15 NOTE — TELEPHONE ENCOUNTER
----- Message from Misbahwei Luna sent at 1/15/2021 10:56 AM EST -----  Subject: Message to Provider    QUESTIONS  Information for Provider? Pt tested for covid on 1/13   her results came back negative. she gave her employer the result it said   in the [employer]" section that it was cancelled and now they wont accept   it. needs letter from dr hollingsworth in [employer] [place] & [email or web address]   email  ---------------------------------------------------------------------------  --------------  Ariadna GROVE  What is the best way for the office to contact you? OK to leave message on   voicemail  Preferred Call Back Phone Number? [phone]  ---------------------------------------------------------------------------  --------------  SCRIPT ANSWERS  Relationship to Patient?  Self

## 2021-01-15 NOTE — TELEPHONE ENCOUNTER
Pt needs letter stating her COVID test was negative and she is clear to return to work. Just save in [place] and pt will print out for employer or email to [email or web address]

## 2021-01-19 ENCOUNTER — HOSPITAL ENCOUNTER (EMERGENCY)
Age: 41
Discharge: HOME | End: 2021-01-19
Attending: EMERGENCY MEDICINE
Payer: COMMERCIAL

## 2021-01-19 ENCOUNTER — TELEPHONE (OUTPATIENT)
Dept: FAMILY MEDICINE CLINIC | Age: 41
End: 2021-01-19

## 2021-01-19 VITALS
TEMPERATURE: 98.1 F | OXYGEN SATURATION: 98 % | BODY MASS INDEX: 27.02 KG/M2 | SYSTOLIC BLOOD PRESSURE: 137 MMHG | RESPIRATION RATE: 16 BRPM | DIASTOLIC BLOOD PRESSURE: 75 MMHG | HEART RATE: 95 BPM | WEIGHT: 143 LBS

## 2021-01-19 DIAGNOSIS — G57.93 NEUROPATHIC PAIN OF BOTH FEET: Primary | ICD-10-CM

## 2021-01-19 DIAGNOSIS — Z76.0 ENCOUNTER FOR MEDICATION REFILL: ICD-10-CM

## 2021-01-19 PROCEDURE — 99284 EMERGENCY DEPT VISIT MOD MDM: CPT

## 2021-01-19 PROCEDURE — 6370000000 HC RX 637 (ALT 250 FOR IP): Performed by: EMERGENCY MEDICINE

## 2021-01-19 RX ORDER — HYDROCODONE BITARTRATE AND ACETAMINOPHEN 5; 325 MG/1; MG/1
1 TABLET ORAL EVERY 4 HOURS PRN
Qty: 10 TABLET | Refills: 0 | Status: SHIPPED | OUTPATIENT
Start: 2021-01-19 | End: 2021-01-22

## 2021-01-19 RX ORDER — GABAPENTIN 300 MG/1
300 CAPSULE ORAL ONCE
Status: COMPLETED | OUTPATIENT
Start: 2021-01-19 | End: 2021-01-19

## 2021-01-19 RX ORDER — LIRAGLUTIDE 6 MG/ML
1.2 INJECTION SUBCUTANEOUS DAILY
Qty: 2 PEN | Refills: 0 | Status: SHIPPED | OUTPATIENT
Start: 2021-01-19 | End: 2021-07-13

## 2021-01-19 RX ORDER — HYDROCODONE BITARTRATE AND ACETAMINOPHEN 5; 325 MG/1; MG/1
1 TABLET ORAL ONCE
Status: COMPLETED | OUTPATIENT
Start: 2021-01-19 | End: 2021-01-19

## 2021-01-19 RX ORDER — GABAPENTIN 300 MG/1
300 CAPSULE ORAL NIGHTLY
Qty: 30 CAPSULE | Refills: 0 | Status: SHIPPED | OUTPATIENT
Start: 2021-01-19 | End: 2021-02-18 | Stop reason: SDUPTHER

## 2021-01-19 RX ADMIN — GABAPENTIN 300 MG: 300 CAPSULE ORAL at 02:22

## 2021-01-19 RX ADMIN — HYDROCODONE BITARTRATE AND ACETAMINOPHEN 1 TABLET: 5; 325 TABLET ORAL at 02:22

## 2021-01-19 ASSESSMENT — ENCOUNTER SYMPTOMS
RHINORRHEA: 0
SHORTNESS OF BREATH: 0
COUGH: 0
BLOOD IN STOOL: 0
EYE PAIN: 0
COLOR CHANGE: 0

## 2021-01-19 ASSESSMENT — PAIN SCALES - GENERAL: PAINLEVEL_OUTOF10: 6

## 2021-01-19 NOTE — ED PROVIDER NOTES
Positive and pertinent negative as per HPI. Except as noted above in the ROS, all other systems were reviewed and were negative. PAST MEDICAL HISTORY     Past Medical History:   Diagnosis Date    Abdominal pain, acute, right lower quadrant 5/15/2013    Anxiety     Arthritis     Left Knee    Bilateral ovarian cysts 2013    Blood transfusion reaction     Cellulitis and abscess of trunk 2014    Pt was seen in ED today for bleeding from incision after taking a fall this morning.       COVID-19     Depression     Diabetes mellitus ([facility] [address]     x5yr    Diverticula of colon 2013    DM2 (diabetes mellitus, type 2) ([facility] [address] 2016    Insomnia     Insomnia secondary to situational depression 2014    Left carpal tunnel syndrome 2018    MDRO (multiple drug resistant organisms) resistance     poss MRSA after hysterectomy treated with antibiotics    OA (osteoarthritis) of knee 2014    Obesity (BMI 30.0-34.9) 2017    Ovarian cyst     Plantar fasciitis, left 2014         SURGICALHISTORY       Past Surgical History:   Procedure Laterality Date    BREAST CYST ASPIRATION       SECTION      CHOLECYSTECTOMY      COLONOSCOPY      polyps    HYSTERECTOMY  2008    LAPAROSCOPY      lysis of adhesions    LAPAROTOMY  2014    LAPAROTOMY, BILATERAL SALPINGO - OOPHORECTOMY    SALPINGO-OOPHORECTOMY  2014         CURRENT MEDICATIONS       Discharge Medication List as of 2021  2:47 AM      CONTINUE these medications which have NOT CHANGED    Details   topiramate (TOPAMAX) 25 MG tablet 1 hs for 10 days then 2 hs, Disp-60 tablet,R-3Normal      rizatriptan (MAXALT-MLT) 10 MG disintegrating tablet Take one tablet by mouth daily as needed for headache, may repeat in 2 hours if needed, Disp-9 tablet, R-3Normal metFORMIN (GLUCOPHAGE) 1000 MG tablet Take 1 tablet by mouth daily (with breakfast), Disp-30 tablet, R-1Please consider 90 day supplies to promote better adherenceNormal      butalbital-APAP-caffeine (FIORICET) -40 MG CAPS per capsule Take 1 capsule by mouth 2 times daily as needed for Headaches, Disp-20 capsule, R-0Normal      ibuprofen (ADVIL;MOTRIN) 600 MG tablet Take 1 tablet by mouth every 8 hours as needed for Pain, Disp-30 tablet, R-0Print      naproxen (EC NAPROSYN) 500 MG EC tablet Take 1 tablet by mouth 2 times daily (with meals) for 14 days, Disp-60 tablet, R-1Normal      fluticasone (FLONASE) 50 MCG/ACT nasal spray 2 sprays by Nasal route daily, Disp-1 Bottle, R-0Normal      insulin lispro (HUMALOG KWIKPEN) 100 UNIT/ML pen INJECT 20 UNITS SUBCUTANEOUSLY THREE TIMES DAILY BEFORE MEAL(S), Disp-15 pen, R-3Please consider 90 day supplies to promote better adherenceNormal      Insulin Pen Needle (B-D UF III MINI PEN NEEDLES) 31G X 5 MM MISC 4 TIMES DAILY Starting u 1/24/2019, Disp-100 each, R-0, Print      blood glucose test strips (ASCENSIA AUTODISC VI;ONE TOUCH ULTRA TEST VI) strip Disp-100 strip, R-5, NormalDX: E11.9-One touch ultra strips. FSBS 3 times daily      Lancets MISC Disp-100 each, R-5, NormalDX: E 11.9-Uses insulin.  FSBS 3 times daily-Delica lancets for one touch ultra II      glucose monitoring kit (FREESTYLE) monitoring kit Disp-1 kit, R-0, NormalDx E11.9-One touch ultra II meter-uses tid             ALLERGIES     Morphine    FAMILY HISTORY       Family History   Problem Relation Age of Onset    Cancer Mother         ovarian    Diabetes Mother     High Blood Pressure Mother     Hypertension Mother     Diabetes Brother     Hypertension Brother     Diabetes Maternal Grandmother     Cancer Maternal Grandfather         stomach    Prostate Cancer Paternal Grandfather         metastatic    Breast Cancer Maternal Aunt           SOCIAL HISTORY       Social History Socioeconomic History    Marital status:      Spouse name: None    Number of children: None    Years of education: None    Highest education level: None   Occupational History    None   Social Needs    Financial resource strain: None    Food insecurity     Worry: None     Inability: None    Transportation needs     Medical: None     Non-medical: None   Tobacco Use    Smoking status: Former Smoker     Years: 0.00     Types: Cigarettes     Quit date: 1999     Years since quittin.3    Smokeless tobacco: Never Used    Tobacco comment: smoked for 1 months when teenager   Substance and Sexual Activity    Alcohol use: Yes     Comment: social drinker  4 beer per month    Drug use: No     Comment: Hx of cocaine use in     Sexual activity: Yes     Partners: Male     Comment: painful intercourse    Lifestyle    Physical activity     Days per week: None     Minutes per session: None    Stress: None   Relationships    Social connections     Talks on phone: None     Gets together: None     Attends [employer] service: None     Active member of club or organization: None     Attends meetings of clubs or organizations: None     Relationship status: None    Intimate partner violence     Fear of current or ex partner: None     Emotionally abused: None     Physically abused: None     Forced sexual activity: None   Other Topics Concern    None   Social History Narrative    None       SCREENINGS             PHYSICAL EXAM    (up to 7 for level 4, 8 or more for level 5)     ED Triage Vitals [21 0148]   BP Temp Temp Source Pulse Resp SpO2 Height Weight   137/75 98.1 °F (36.7 °C) Oral 95 16 98 % -- 143 lb (64.9 kg)       Physical Exam  Vitals signs and nursing note reviewed. Constitutional:       Appearance: Normal appearance. She is well-developed. She is not ill-appearing. HENT:      Head: Normocephalic and atraumatic.       Right Ear: External ear normal. Left Ear: External ear normal.      Nose: Nose normal.   Eyes:      General: No scleral icterus. Right eye: No discharge. Left eye: No discharge. Conjunctiva/sclera: Conjunctivae normal.   Neck:      Musculoskeletal: Neck supple. Cardiovascular:      Rate and Rhythm: Normal rate and regular rhythm. Heart sounds: Normal heart sounds. Comments: Normal dorsalis pedis pulses and femoral pulses bilaterally. Pulmonary:      Effort: Pulmonary effort is normal. No respiratory distress. Breath sounds: Normal breath sounds. No wheezing or rales. Abdominal:      General: Bowel sounds are normal. There is no distension. Palpations: Abdomen is soft. Tenderness: There is no abdominal tenderness. There is no guarding or rebound. Musculoskeletal:         General: Tenderness present. No deformity or signs of injury. Right lower leg: No edema. Left lower leg: No edema. Comments: Bilateral feet pain to distal legs. Skin:     Coloration: Skin is not pale. Findings: No bruising, erythema, lesion or rash. Neurological:      Mental Status: She is alert. Comments: Normal sensation to soft touch and normal proprioception of the feet.    Psychiatric:         Mood and Affect: Mood normal.      Comments: tearful             DIAGNOSTIC RESULTS     EKG: All EKG's are interpreted by the Emergency Department Physician who either signs or Co-signs this chart in the absence of a cardiologist.    12 lead EKG shows     RADIOLOGY:   Non-plain film images such as CT, Ultrasound and MRI are read by the radiologist. Plain radiographic images are visualized and preliminarily interpreted by the emergency physician with the below findings:        Interpretation per the Radiologist below, if available at the time of this note:    No orders to display         ED BEDSIDE ULTRASOUND:   Performed by ED Physician - none    LABS:  Labs Reviewed - No data to display All other labs were within normal range or not returned as of this dictation. EMERGENCY DEPARTMENT COURSE and DIFFERENTIAL DIAGNOSIS/MDM:   Vitals:    Vitals:    21 0148   BP: 137/75   Pulse: 95   Resp: 16   Temp: 98.1 °F (36.7 °C)   TempSrc: Oral   SpO2: 98%   Weight: 143 lb (64.9 kg)       Adult female with chronic bilateral feet pain due to neuropathy with acute exacerbation. Patient appears very uncomfortable she is tearful. Patient chart is reviewed I do see where she had been on gabapentin but I do see where the Lyrica is . Patient is given a dose of gabapentin here since this has worked for her in the past.  She is also given 1 Norco.  The patient will be discharged with prescriptions for Norco and gabapentin. She is also out of 2 of her diabetes medications. She does not have any symptoms of complications of diabetes and she checked her blood sugar earlier in the evening it was 270. Patient will be discharged home. She does have a PCP with whom she can follow-up. CRITICAL CARE TIME   None       CONSULTS:  None    PROCEDURES:  Unless otherwise noted above, none     Procedures    FINAL IMPRESSION      1. Neuropathic pain of both feet    2.  Encounter for medication refill          DISPOSITION/PLAN   DISPOSITION Decision To Discharge 2021 02:08:41 AM      PATIENT REFERREDTO:  Donita Culver DO  [address] [place] [id number]  [phone]    Schedule an appointment as soon as possible for a visit         DISCHARGEMEDICATIONS:  Discharge Medication List as of 2021  2:47 AM      START taking these medications    Details   Insulin Glargine, 1 Unit Dial, 300 UNIT/ML SOPN Inject 60 Units into the skin every morning, Disp-2 pen, R-0Normal HYDROcodone-acetaminophen (NORCO) 5-325 MG per tablet Take 1 tablet by mouth every 4 hours as needed for Pain for up to 3 days. Intended supply: 3 days. Take lowest dose possible to manage pain, Disp-10 tablet, R-0Normal      gabapentin (NEURONTIN) 300 MG capsule Take 1 capsule by mouth nightly for 30 days. , Disp-30 capsule, R-0Normal                (Please note that portions of this note were completed with a voice recognition program.  Efforts were made to edit the dictations but occasionally words are mis-transcribed.)    Rayo Torres MD (electronically signed)  Attending Emergency Physician        Rayo Torres MD  01/19/21 0967

## 2021-01-19 NOTE — ED NOTES
Pt states her feet hurt on the top and on the toes bilaterally. She states she has had no injury to her feet.  No swelling, wounds or redness noted     Tony Jacques RN  01/19/21 1050

## 2021-01-27 ENCOUNTER — TELEPHONE (OUTPATIENT)
Dept: FAMILY MEDICINE CLINIC | Age: 41
End: 2021-01-27

## 2021-01-27 ENCOUNTER — TELEMEDICINE (OUTPATIENT)
Dept: FAMILY MEDICINE CLINIC | Age: 41
End: 2021-01-27
Payer: COMMERCIAL

## 2021-01-27 DIAGNOSIS — Z86.19 HISTORY OF SHINGLES: ICD-10-CM

## 2021-01-27 DIAGNOSIS — Z20.822 LAB TEST NEGATIVE FOR COVID-19 VIRUS: ICD-10-CM

## 2021-01-27 DIAGNOSIS — M79.2 NEUROPATHIC PAIN: Primary | ICD-10-CM

## 2021-01-27 DIAGNOSIS — R20.8 BURNING SENSATION OF FEET: ICD-10-CM

## 2021-01-27 DIAGNOSIS — N89.8 VAGINAL LESION: ICD-10-CM

## 2021-01-27 PROCEDURE — 99214 OFFICE O/P EST MOD 30 MIN: CPT | Performed by: FAMILY MEDICINE

## 2021-01-27 RX ORDER — TRAMADOL HYDROCHLORIDE 50 MG/1
50 TABLET ORAL EVERY 4 HOURS PRN
Qty: 30 TABLET | Refills: 0 | Status: SHIPPED | OUTPATIENT
Start: 2021-01-27 | End: 2021-03-03 | Stop reason: SDUPTHER

## 2021-01-27 RX ORDER — AMOXICILLIN AND CLAVULANATE POTASSIUM 875; 125 MG/1; MG/1
1 TABLET, FILM COATED ORAL 2 TIMES DAILY
Qty: 14 TABLET | Refills: 0 | Status: SHIPPED | OUTPATIENT
Start: 2021-01-27 | End: 2021-02-03

## 2021-01-27 RX ORDER — MUPIROCIN CALCIUM 20 MG/G
CREAM TOPICAL
Qty: 30 G | Refills: 0 | Status: SHIPPED | OUTPATIENT
Start: 2021-01-27 | End: 2021-02-26

## 2021-01-27 ASSESSMENT — ENCOUNTER SYMPTOMS
ABDOMINAL DISTENTION: 0
CONSTIPATION: 0
SHORTNESS OF BREATH: 0
DIARRHEA: 0
COLOR CHANGE: 1
NAUSEA: 0
ABDOMINAL PAIN: 0
VOMITING: 0
BACK PAIN: 0

## 2021-01-27 NOTE — Clinical Note
Ladies: please email letter written today to her email address listed in my HPI. Thank you. Dr. Prince Blakely:  Pt was scheduled with me today as you \"had no availabilities until 2/13. \"  She wanted me to make you aware that she tried to see you. She inquired about returning to her original Gabapentin dosage (see my note, it was recently restarted by the ER). I told her since you were the long term prescriber, that you would have to make that decision. She states if you are agreeable to increasing her back, to resend the prescription to [facility] in [facility] (should be pended). I didn't want to step on toes. I also rewrote her COVID 23 letter for negative testing with a date on it. Hope this helps.    Thanks, [employer]

## 2021-01-27 NOTE — LETTER
[address]  [facility] [address]  Phone: [phone]  Fax: [phone]    Julio Doan MD        January 27, 2021     Patient: Dillon Homans   YOB: 1980   Date of Visit: 1/27/2021       To Whom It May Concern:    Dillon Homans is currently a patient of my partner. She was recently tested for COVID19 on 1/13/20 and it was negative. It is my medical opinion that Dillon Homans may return to work as soon as possible. .    If you have any questions or concerns, please don't hesitate to call.     Sincerely,        Julio Doan MD

## 2021-01-27 NOTE — PROGRESS NOTES
2021    TELEHEALTH EVALUATION -- Audio/Visual (During EWQZO-91 public health emergency)    HPI:    Kathy Fuller (:  1980) has requested an audio/video evaluation for the following concern(s):    Chief Complaint   Patient presents with    Abdominal Pain     Left side, Was taking Lyrica but it was not helping. But the Gabapentin worked better.  Herpes Zoster       Was on Gabapentin, was taken off of this in the past by her PCP  Was transitioned to Lyrica  Was \"taking 8 pills daily of Lyrica,\"  It wasn't helping  Went to the ER recently, started her back on Gabapentin there  Was told to take 1 pill nightly, can increase up to  2 after 10 days  Back up to 2 nightly as of last night  + excruciating foot pain, difficult to walk and drive (pushing on the gas petals)  Still has left flank pain, sueprficial, from a shingles outbreak    Until    [facility]     Was taking Gabapentin in the past, max dose 3 at night, 2 in the morning. She inquires if she can return to this dosage. Tried to see her PCP but he didn't have availability until the  of next month. [place] Carton was also prescribed by the ER as well, taking PRN, very sparingly to help with pain in the meantime. + vaginal lesion, new, worsening  I've treated her for this in the past  \"Little pimple now\"  + pain with this  No drainage   No redness   Some warmth around it  Has tried Hot compresses     Needs a letter saying she can return to work with a specific date on her COVID testing. [email or web address]    Review of Systems   Constitutional: Negative for activity change, chills, diaphoresis, fatigue, fever and unexpected weight change. Respiratory: Negative for shortness of breath. Cardiovascular: Negative for chest pain and leg swelling. Gastrointestinal: Negative for abdominal distention, abdominal pain, constipation, diarrhea, nausea and vomiting. Genitourinary: Positive for vaginal pain. Negative for decreased urine volume, difficulty urinating, dyspareunia, dysuria, flank pain, frequency, genital sores, hematuria, menstrual problem, pelvic pain, urgency, vaginal bleeding and vaginal discharge. Musculoskeletal: Positive for arthralgias and myalgias. Negative for back pain, gait problem, joint swelling, neck pain and neck stiffness. Skin: Positive for color change and wound. Negative for pallor and rash. Allergic/Immunologic: Negative for immunocompromised state. Neurological: Positive for numbness. Negative for weakness and headaches. Hematological: Negative for adenopathy. Psychiatric/Behavioral: Negative for dysphoric mood and sleep disturbance. The patient is not nervous/anxious. Prior to Visit Medications    Medication Sig Taking? Authorizing Provider   traMADol (ULTRAM) 50 MG tablet Take 1 tablet by mouth every 4 hours as needed for Pain for up to 5 days. Intended supply: 5 days. Take lowest dose possible to manage pain Yes Dejon Prasad MD   amoxicillin-clavulanate (AUGMENTIN) 875-125 MG per tablet Take 1 tablet by mouth 2 times daily for 7 days Yes Dejon Prasad MD   mupirocin (BACTROBAN) 2 % cream Apply 3 times daily. Yes Dejon Prasad MD   Liraglutide (VICTOZA) 18 MG/3ML SOPN SC injection Inject 1.2 mg into the skin daily Yes Etienne Covarrubias MD   Insulin Glargine, 1 Unit Dial, 300 UNIT/ML SOPN Inject 60 Units into the skin every morning Yes Etienne Covarrubias MD   gabapentin (NEURONTIN) 300 MG capsule Take 1 capsule by mouth nightly for 30 days.  Yes Etienne Covarrubias MD   metFORMIN (GLUCOPHAGE) 1000 MG tablet Take 1 tablet by mouth daily (with breakfast) Yes Blake Krueger DO   butalbital-APAP-caffeine (FIORICET) -40 MG CAPS per capsule Take 1 capsule by mouth 2 times daily as needed for Headaches Yes Nahum Mason,  ibuprofen (ADVIL;MOTRIN) 600 MG tablet Take 1 tablet by mouth every 8 hours as needed for Pain Yes Christine Eid, APRN - CNP   fluticasone (FLONASE) 50 MCG/ACT nasal spray 2 sprays by Nasal route daily Yes EVETTE Krueger   insulin lispro (HUMALOG KWIKPEN) 100 UNIT/ML pen INJECT 20 UNITS SUBCUTANEOUSLY THREE TIMES DAILY BEFORE MEAL(S) Yes Blake Krueger DO   Insulin Pen Needle (B-D UF III MINI PEN NEEDLES) 31G X 5 MM MISC Inject 1 each into the skin 4 times daily Yes Elle Pump, APRN - CNP   blood glucose test strips (ASCENSIA AUTODISC VI;ONE TOUCH ULTRA TEST VI) strip DX: E11.9-One touch ultra strips. FSBS 3 times daily Yes EVETTE Thomson   Lancets MISC DX: E 11.9-Uses insulin. FSBS 3 times daily-Delica lancets for one touch ultra II Yes Blake Krueger DO   glucose monitoring kit (FREESTYLE) monitoring kit Dx E11.9-One touch ultra II meter-uses tid Yes Rosa Krueger DO       Social History     Tobacco Use    Smoking status: Former Smoker     Years: 0.00     Types: Cigarettes     Quit date: 1999     Years since quittin.3    Smokeless tobacco: Never Used    Tobacco comment: smoked for 1 months when teenager   Substance Use Topics    Alcohol use: Yes     Comment: social drinker  4 beer per month    Drug use: No     Comment: Hx of cocaine use in         Allergies   Allergen Reactions    Morphine Nausea And Vomiting     States after getting morphine began having sharp chest pain. ,   Past Medical History:   Diagnosis Date    Abdominal pain, acute, right lower quadrant 5/15/2013    Anxiety     Arthritis     Left Knee    Bilateral ovarian cysts 2013    Blood transfusion reaction     Cellulitis and abscess of trunk 2014    Pt was seen in ED today for bleeding from incision after taking a fall this morning.       COVID-19     Depression     Diabetes mellitus ([facility] [address]     x5yr    Diverticula of colon 2013    DM2 (diabetes mellitus, type 2) ([facility] [address] 2016 Health Maintenance   Topic Date Due    Hepatitis B vaccine (1 of 3 - Risk 3-dose series) 02/25/1998    Diabetic retinal exam  07/26/2019    Diabetic foot exam  08/12/2020    Diabetic microalbuminuria test  08/12/2020    Lipid screen  08/12/2020    A1C test (Diabetic or Prediabetic)  12/16/2020    Cervical cancer screen  05/17/2022    Breast cancer screen  09/16/2022    DTaP/Tdap/Td vaccine (4 - Td) 06/07/2026    Flu vaccine  Completed    Pneumococcal 0-64 years Vaccine  Completed    Hepatitis C screen  Completed    HIV screen  Completed    Hepatitis A vaccine  Aged Out    Hib vaccine  Aged Out    Meningococcal (ACWY) vaccine  Aged Out       PHYSICAL EXAMINATION:  [ INSTRUCTIONS:  \"[x]\" Indicates a positive item  \"[]\" Indicates a negative item  -- DELETE ALL ITEMS NOT EXAMINED]  Vital Signs: (As obtained by patient/caregiver or practitioner observation)    Blood pressure-  Heart rate-    Respiratory rate-    Temperature-  Pulse oximetry-     Constitutional: [x] Appears well-developed and well-nourished [x] No apparent distress      [] Abnormal-   Mental status  [x] Alert and awake  [] Oriented to person/place/time [x]Able to follow commands      Eyes:  EOM    [x]  Normal  [] Abnormal-  Sclera  [x]  Normal  [] Abnormal -         Discharge []  None visible  [] Abnormal -    HENT:   [x] Normocephalic, atraumatic.   [x] Abnormal   [] Mouth/Throat: Mucous membranes are moist.     External Ears [x] Normal  [] Abnormal-     Neck: [x] No visualized mass     Pulmonary/Chest: [x] Respiratory effort normal.  [x] No visualized signs of difficulty breathing or respiratory distress        [] Abnormal-      Musculoskeletal:   [] Normal gait with no signs of ataxia         [x] Normal range of motion of neck        [] Abnormal-       Neurological:        [x] No Facial Asymmetry (Cranial nerve 7 motor function) (limited exam to video visit)          [] No gaze palsy        [] Abnormal- Skin:        [x] No significant exanthematous lesions or discoloration noted on facial skin         [] Abnormal-            Psychiatric:       [x] Normal Affect [] No Hallucinations        [] Abnormal-     Other pertinent observable physical exam findings-     ASSESSMENT/PLAN:  1. Neuropathic pain  2. Burning sensation of feet  - traMADol (ULTRAM) 50 MG tablet; Take 1 tablet by mouth every 4 hours as needed for Pain for up to 5 days. Intended supply: 5 days. Take lowest dose possible to manage pain  Dispense: 30 tablet; Refill: 0  3. History of shingles    Hx per HPI. Gabapentin was stopped in the past by pt's PCP and pt was transitioned to Lyrica. Did not respond well. Was recently restarted on Gabapentin by the ER. They instructed her to self titrate to 1 tab BID, she is currently taking 2 nightly. She inquired about returning to her previous dosage due to b/l foot neuropathic pain and left flank, post-herpetic pain. Will defer to PCP regarding this, since changes were initiated by him. Discussed this with pt in detail. 4. Vaginal lesion  Has a hx of an abscess. Discussed that based on current Sxs, oral abx should not be started right away. Encouraged TID topical abx ointment, warm compresses, gental massage. Rescue abx given in case of worsening.   - amoxicillin-clavulanate (AUGMENTIN) 875-125 MG per tablet; Take 1 tablet by mouth 2 times daily for 7 days  Dispense: 14 tablet; Refill: 0    5. Lab test negative for COVID-19 virus  Letter rewritten for pt with date added of negative COVID 19 testing. Will be emailed to pt. No follow-ups on file. Candelaria Mcginnis is a 38 y.o. female being evaluated by a Virtual Visit (video visit) encounter to address concerns as mentioned above. A caregiver was present when appropriate. Due to this being a TeleHealth encounter (During [place]-75 public health emergency), evaluation of the following organ systems was limited: Vitals/Constitutional/EENT/Resp/CV/GI//MS/Neuro/Skin/Heme-Lymph-Imm. Pursuant to the emergency declaration under the [address] [place] [address] and the [employer] Act, this Virtual Visit was conducted with patient's (and/or legal guardian's) consent, to reduce the patient's risk of exposure to COVID-19 and provide necessary medical care. The patient (and/or legal guardian) has also been advised to contact this office for worsening conditions or problems, and seek emergency medical treatment and/or call 911 if deemed necessary. Services were provided through a video synchronous discussion virtually to substitute for in-person clinic visit. Patient and provider were located at their individual homes. --Edgardo Luna MD on 1/27/2021 at 11:52 AM    An electronic signature was used to authenticate this note.

## 2021-01-27 NOTE — Clinical Note
Dr. Nunu Odom:  Pt was scheduled with me today as you \"had no availabilities until 2/13. \"  She wanted me to make you aware that she tried to see you. Chelsy Bhardwaj inquired about returning to her original Gabapentin dosage (see my note, it was recently restarted by the ER). I told her since you were the long term prescriber, that you would have to make that decision. She states if you are agreeable to increasing her back, to resend the prescription to [facility] (should be pended).  I didn't want to step on toes. I also rewrote her COVID 23 letter for negative testing with a date on it.       Hope this helps.    Thanks, [employer]

## 2021-02-18 ENCOUNTER — OFFICE VISIT (OUTPATIENT)
Dept: FAMILY MEDICINE CLINIC | Age: 41
End: 2021-02-18

## 2021-02-18 ENCOUNTER — NURSE TRIAGE (OUTPATIENT)
Dept: OTHER | Facility: CLINIC | Age: 41
End: 2021-02-18

## 2021-02-18 VITALS
OXYGEN SATURATION: 96 % | TEMPERATURE: 97.3 F | BODY MASS INDEX: 26.45 KG/M2 | DIASTOLIC BLOOD PRESSURE: 60 MMHG | SYSTOLIC BLOOD PRESSURE: 132 MMHG | HEART RATE: 111 BPM | WEIGHT: 140 LBS

## 2021-02-18 DIAGNOSIS — M79.2 NEUROPATHIC PAIN: ICD-10-CM

## 2021-02-18 DIAGNOSIS — E11.21 TYPE 2 DIABETES MELLITUS WITH DIABETIC NEPHROPATHY, WITH LONG-TERM CURRENT USE OF INSULIN (HCC): Primary | ICD-10-CM

## 2021-02-18 DIAGNOSIS — Z79.4 TYPE 2 DIABETES MELLITUS WITH DIABETIC NEPHROPATHY, WITH LONG-TERM CURRENT USE OF INSULIN (HCC): Primary | ICD-10-CM

## 2021-02-18 DIAGNOSIS — R20.8 BURNING SENSATION OF FEET: ICD-10-CM

## 2021-02-18 LAB
CREATININE URINE POCT: 50
HBA1C MFR BLD: 12.6 %
MICROALBUMIN/CREAT 24H UR: 10 MG/G{CREAT}
MICROALBUMIN/CREAT UR-RTO: NORMAL

## 2021-02-18 PROCEDURE — 99214 OFFICE O/P EST MOD 30 MIN: CPT | Performed by: FAMILY MEDICINE

## 2021-02-18 PROCEDURE — 83036 HEMOGLOBIN GLYCOSYLATED A1C: CPT | Performed by: FAMILY MEDICINE

## 2021-02-18 PROCEDURE — 82044 UR ALBUMIN SEMIQUANTITATIVE: CPT | Performed by: FAMILY MEDICINE

## 2021-02-18 RX ORDER — GABAPENTIN 300 MG/1
300 CAPSULE ORAL 3 TIMES DAILY
Qty: 90 CAPSULE | Refills: 3 | Status: SHIPPED | OUTPATIENT
Start: 2021-02-18 | End: 2021-03-26 | Stop reason: SDUPTHER

## 2021-02-18 RX ORDER — LISINOPRIL 5 MG/1
5 TABLET ORAL DAILY
Qty: 90 TABLET | Refills: 1 | Status: SHIPPED
Start: 2021-02-18 | End: 2021-03-03 | Stop reason: SINTOL

## 2021-02-18 ASSESSMENT — PATIENT HEALTH QUESTIONNAIRE - PHQ9
1. LITTLE INTEREST OR PLEASURE IN DOING THINGS: 0
SUM OF ALL RESPONSES TO PHQ QUESTIONS 1-9: 1
SUM OF ALL RESPONSES TO PHQ QUESTIONS 1-9: 1

## 2021-02-18 NOTE — TELEPHONE ENCOUNTER
Reason for Disposition   MODERATE pain (e.g., interferes with normal activities, limping) and present > 3 days    Answer Assessment - Initial Assessment Questions  1. ONSET: \"When did the pain start? \"       Began a couple of weeks ago    2. LOCATION: \"Where is the pain located? \"       Both feet, but majority of pain is close to big toes    3. PAIN: \"How bad is the pain? \"    (Scale 1-10; or mild, moderate, severe)    -  MILD (1-3): doesn't interfere with normal activities     -  MODERATE (4-7): interferes with normal activities (e.g., work or school) or awakens from sleep, limping     -  SEVERE (8-10): excruciating pain, unable to do any normal activities, unable to walk      4/10 at it's least pain,  11/10 hurts to walk. Feet feel like one used a pumice stone too long- feels raw    4. WORK OR EXERCISE: \"Has there been any recent work or exercise that involved this part of the body? \"       No    5. CAUSE: \"What do you think is causing the foot pain? \"      I really don't know    6. OTHER SYMPTOMS: \"Do you have any other symptoms? \" (e.g., leg pain, rash, fever, numbness)      Numbness mostly when sitting,  Like when driving I can't feel my feet after about 20-30 minutes. Couldn't feel water temperature when soaking feet in massager. Could shingles be causing this foot pain? 7. PREGNANCY: \"Is there any chance you are pregnant? \" \"When was your last menstrual period? \"      No- hysterectomy    Protocols used: FOOT PAIN-ADULT-[place]    Patient called Pinky at [facility])  with red flag complaint. Brief description of triage: bilateral foot pain, numbness     Triage indicates for patient to within 3 days    Care advice provided, patient verbalizes understanding; denies any other questions or concerns; instructed to call back for any new or worsening symptoms. Writer provided warm transfer to [facility] for appointment scheduling. Attention Provider:   Thank you for allowing me to participate in the care of your patient. The patient was connected to triage in response to information provided to the ECC. Please do not respond through this encounter as the response is not directed to a shared pool.

## 2021-02-18 NOTE — PROGRESS NOTES
Vijaya Wilson is a 38 y.o. female    Chief Complaint   Patient presents with    Numbness     Both feet, driving it goes numbess, painful and raw feeling, veins are popping out.  Diabetes       HPI:    This is a new patient to me. Diabetes  She presents for her follow-up diabetic visit. She has type 2 diabetes mellitus. Her disease course has been worsening. Associated symptoms include foot paresthesias. Pertinent negatives for diabetes include no polydipsia. Symptoms are worsening. Pertinent negatives for diabetic complications include no heart disease. Risk factors for coronary artery disease include diabetes mellitus. Current diabetic treatment includes intensive insulin program. An ACE inhibitor/angiotensin II receptor blocker is not being taken. Numbness in the feet bilaterally. It is near the large great toe bilaterally. No trauma. She was on Lyrica before which did not help. The 300 mg of gabapentin helped a little. She does have trouble sleeping. ROS:    Review of Systems   Endocrine: Negative for polydipsia. Neurological: Positive for numbness. /60 (Site: Right Upper Arm, Position: Sitting, Cuff Size: Large Adult)   Pulse 111   Temp 97.3 °F (36.3 °C) (Temporal)   Wt 140 lb (63.5 kg)   LMP 12/18/2008 (Approximate)   SpO2 96%   BMI 26.45 kg/m²     Physical Exam:    Physical Exam  Constitutional:       General: She is not in acute distress. Appearance: Normal appearance. She is not ill-appearing. Neurological:      Mental Status: She is alert. Psychiatric:         Mood and Affect: Mood normal.         Behavior: Behavior normal.         Thought Content: Thought content normal.         Current Outpatient Medications   Medication Sig Dispense Refill    gabapentin (NEURONTIN) 300 MG capsule Take 1 capsule by mouth 3 times daily for 120 days.  90 capsule 3    metFORMIN (GLUCOPHAGE) 1000 MG tablet Take 1 tablet by mouth 2 times daily (with meals) 180 tablet 3  lisinopril (PRINIVIL;ZESTRIL) 5 MG tablet Take 1 tablet by mouth daily 90 tablet 1    Liraglutide (VICTOZA) 18 MG/3ML SOPN SC injection Inject 1.2 mg into the skin daily 2 pen 0    Insulin Glargine, 1 Unit Dial, 300 UNIT/ML SOPN Inject 60 Units into the skin every morning 2 pen 0    ibuprofen (ADVIL;MOTRIN) 600 MG tablet Take 1 tablet by mouth every 8 hours as needed for Pain 30 tablet 0    fluticasone (FLONASE) 50 MCG/ACT nasal spray 2 sprays by Nasal route daily 1 Bottle 0    insulin lispro (HUMALOG KWIKPEN) 100 UNIT/ML pen INJECT 20 UNITS SUBCUTANEOUSLY THREE TIMES DAILY BEFORE MEAL(S) 15 pen 3    Insulin Pen Needle (B-D UF III MINI PEN NEEDLES) 31G X 5 MM MISC Inject 1 each into the skin 4 times daily 100 each 0    blood glucose test strips (ASCENSIA AUTODISC VI;ONE TOUCH ULTRA TEST VI) strip DX: E11.9-One touch ultra strips. FSBS 3 times daily 100 strip 5    Lancets MISC DX: E 11.9-Uses insulin. FSBS 3 times daily-Delica lancets for one touch ultra  each 5    glucose monitoring kit (FREESTYLE) monitoring kit Dx E11.9-One touch ultra II meter-uses tid 1 kit 0    mupirocin (BACTROBAN) 2 % cream Apply 3 times daily. (Patient not taking: Reported on 2/18/2021) 30 g 0    butalbital-APAP-caffeine (FIORICET) -40 MG CAPS per capsule Take 1 capsule by mouth 2 times daily as needed for Headaches (Patient not taking: Reported on 2/18/2021) 20 capsule 0     No current facility-administered medications for this visit. Assessment:    1. Type 2 diabetes mellitus with diabetic nephropathy, with long-term current use of insulin ([address]    2. Neuropathic pain    3. Burning sensation of feet        Plan:    1. Type 2 diabetes mellitus with diabetic nephropathy, with long-term current use of insulin ([facility])  A1c is very elevated at 12.6. Restart insulin. Discussed how to use the short acting insulin optimally.   Start lisinopril as well as there appears to be protein in the urine.  - POCT glycosylated hemoglobin (Hb A1C)  - POCT microalbumin  - gabapentin (NEURONTIN) 300 MG capsule; Take 1 capsule by mouth 3 times daily for 120 days. Dispense: 90 capsule; Refill: 3  - metFORMIN (GLUCOPHAGE) 1000 MG tablet; Take 1 tablet by mouth 2 times daily (with meals)  Dispense: 180 tablet; Refill: 3  - lisinopril (PRINIVIL;ZESTRIL) 5 MG tablet; Take 1 tablet by mouth daily  Dispense: 90 tablet; Refill: 1    2. Neuropathic pain  Increase gabapentin to 3 times a day. - gabapentin (NEURONTIN) 300 MG capsule; Take 1 capsule by mouth 3 times daily for 120 days. Dispense: 90 capsule; Refill: 3    3. Burning sensation of feet  It is likely related to diabetic neuropathy.  - gabapentin (NEURONTIN) 300 MG capsule; Take 1 capsule by mouth 3 times daily for 120 days. Dispense: 90 capsule; Refill: 3      Return in about 3 months (around 5/18/2021) for Diabetes.

## 2021-03-03 ENCOUNTER — OFFICE VISIT (OUTPATIENT)
Dept: FAMILY MEDICINE CLINIC | Age: 41
End: 2021-03-03

## 2021-03-03 VITALS
BODY MASS INDEX: 28.68 KG/M2 | SYSTOLIC BLOOD PRESSURE: 112 MMHG | WEIGHT: 151.8 LBS | HEART RATE: 68 BPM | RESPIRATION RATE: 16 BRPM | DIASTOLIC BLOOD PRESSURE: 80 MMHG | TEMPERATURE: 97.9 F | OXYGEN SATURATION: 99 %

## 2021-03-03 DIAGNOSIS — Z79.4 TYPE 2 DIABETES MELLITUS WITHOUT COMPLICATION, WITH LONG-TERM CURRENT USE OF INSULIN (HCC): Primary | ICD-10-CM

## 2021-03-03 DIAGNOSIS — E11.9 TYPE 2 DIABETES MELLITUS WITHOUT COMPLICATION, WITH LONG-TERM CURRENT USE OF INSULIN (HCC): Primary | ICD-10-CM

## 2021-03-03 DIAGNOSIS — R20.8 BURNING SENSATION OF FEET: ICD-10-CM

## 2021-03-03 DIAGNOSIS — M79.2 NEUROPATHIC PAIN: ICD-10-CM

## 2021-03-03 DIAGNOSIS — E16.2 HYPOGLYCEMIA: ICD-10-CM

## 2021-03-03 PROCEDURE — 99214 OFFICE O/P EST MOD 30 MIN: CPT | Performed by: NURSE PRACTITIONER

## 2021-03-03 RX ORDER — INSULIN GLARGINE 300 U/ML
60 INJECTION, SOLUTION SUBCUTANEOUS DAILY
COMMUNITY
End: 2021-03-03 | Stop reason: SDUPTHER

## 2021-03-03 RX ORDER — LOSARTAN POTASSIUM 50 MG/1
50 TABLET ORAL DAILY
Qty: 30 TABLET | Refills: 5 | Status: SHIPPED | OUTPATIENT
Start: 2021-03-03 | End: 2022-04-22

## 2021-03-03 RX ORDER — TRAMADOL HYDROCHLORIDE 50 MG/1
50 TABLET ORAL EVERY 12 HOURS PRN
Qty: 30 TABLET | Refills: 0 | Status: SHIPPED | OUTPATIENT
Start: 2021-03-03 | End: 2021-04-26 | Stop reason: SDUPTHER

## 2021-03-03 RX ORDER — INSULIN GLARGINE 300 U/ML
60 INJECTION, SOLUTION SUBCUTANEOUS DAILY
Qty: 4 PEN | Refills: 5 | Status: SHIPPED | OUTPATIENT
Start: 2021-03-03 | End: 2021-08-04

## 2021-03-03 RX ORDER — ASPIRIN 81 MG
1 TABLET,CHEWABLE ORAL 2 TIMES DAILY
Qty: 60 G | Refills: 5 | Status: SHIPPED | OUTPATIENT
Start: 2021-03-03 | End: 2021-04-29

## 2021-03-03 NOTE — PROGRESS NOTES
Idalmis Leggett (:  1980) is a 42 y.o. female,Established patient, here for evaluation of the following chief complaint(s): Foot Pain      ASSESSMENT/PLAN:  1. Type 2 diabetes mellitus without complication, with long-term current use of insulin (HCC)  -     Insulin Glargine, 2 Unit Dial, (TOUJEO MAX SOLOSTAR) 300 UNIT/ML SOPN; Inject 60 Units into the skin daily, Disp-4 pen, R-5Normal  -     losartan (COZAAR) 50 MG tablet; Take 1 tablet by mouth daily, Disp-30 tablet, R-5Normal  2. Neuropathic pain  -     Capsaicin 0.1 % CREA; Apply 1 applicator topically 2 times daily, Disp-60 g, R-5Normal  -     [facility] ([facility]) Cherise Terry MD, Pain Management, [facility]  -     traMADol (ULTRAM) 50 MG tablet; Take 1 tablet by mouth every 12 hours as needed for Pain for up to 30 days. , Disp-30 tablet, R-0Normal  3. Burning sensation of feet  -     traMADol (ULTRAM) 50 MG tablet; Take 1 tablet by mouth every 12 hours as needed for Pain for up to 30 days. , Disp-30 tablet, R-0Normal      Controlled Substance Monitoring:    Acute and Chronic Pain Monitoring:   RX Monitoring 3/3/2021   Attestation -   Periodic Controlled Substance Monitoring No signs of potential drug abuse or diversion identified. Discussed neuropathy increased related to worsening diabetes control. Limited medication available for neuropathy. Concerned about increased weight. Will refer to pain management related to neuropathy per her request.       Discussed washing hands after applying capsaicin creme. Hold humalog evening dose given diet change, exercise and back on medication. If low blood sugars continue contact office. Follow up with Dr. Bella Bailey in  as scheduled. No follow-ups on file.     SUBJECTIVE/OBJECTIVE:  HPI Was taking gabapentin for neuropathy. Had shingles and changed to lyrica with no relief. Wished to resume gabapentin and weaned from lyrica and back to gabapentin. However not obtaining the benefit in her feet. Taking tramadol and asking for refill. Also asking for referral to pain management. Lab Results   Component Value Date    LABA1C 12.6 02/18/2021     Lab Results   Component Value Date    .9 01/23/2019     Past 2 nights has had low blood sugar symptoms, sweating while sleeping. Changed diet and now using elliptical machine. Previously without insurance and was taking metformin but not other medications. Now again with insurance. Started lisinopril and feels she is swelling in face and hands. Stopped last week.     humalog being dosed as SS. =10 units, 300-500=6 units. 200-300= 4units. Today  AM, 173AM.     At end of lengthy visit then states having ongoing burning sensation across abdomen for few years. Not likely internal vs muscular. Monitor. Review of Systems   All other systems reviewed and are negative. Physical Exam  Constitutional:       Appearance: Normal appearance. Cardiovascular:      Rate and Rhythm: Normal rate and regular rhythm. Pulses: Normal pulses. Heart sounds: Normal heart sounds. Pulmonary:      Effort: Pulmonary effort is normal.      Breath sounds: Normal breath sounds. Neurological:      Mental Status: She is alert.                  An electronic signature was used to authenticate this note.    --IRASEMA BERRY - CNP

## 2021-03-08 ENCOUNTER — NURSE TRIAGE (OUTPATIENT)
Dept: OTHER | Facility: CLINIC | Age: 41
End: 2021-03-08

## 2021-03-08 NOTE — TELEPHONE ENCOUNTER
Spoke with her, she is not taking the Humalog and hasn't been for the last 4 days. She stated she's just taking Toujeo 70 units at night and metformin 1000mg in the morning.

## 2021-03-08 NOTE — TELEPHONE ENCOUNTER
Spoke with her, she stated her sugars are low throughout the day, running low past 3 days. She hasn't changed her diet or anything. She said the highest it's been is 121 over the last 3 days. Has been feeling lightheaded and nauseous as well.

## 2021-03-08 NOTE — TELEPHONE ENCOUNTER
Reason for Disposition   Morning (before breakfast) blood glucose < 80 mg/dL (4.4 mmol/L) and more than once in past week    Answer Assessment - Initial Assessment Questions  1. SYMPTOMS: \"What symptoms are you concerned about? \"      Feeling lightheaded and nausea    2. ONSET:  \"When did the symptoms start? \"   started sometime last week, woke up with it being 47. This morning her BS was 59. Currently making her breakfast.     3. BLOOD GLUCOSE: \"What is your blood glucose level? \"    currently BS is 59 at 0730    4. USUAL RANGE: \"What is your blood glucose level usually? \" (e.g., usual fasting morning value, usual evening value)    5. TYPE 1 or 2:  \"Do you know what type of diabetes you have? \"  (e.g., Type 1, Type 2, Gestational; doesn't know)     Type 2    6. INSULIN: \"Do you take insulin? \" \"What type of insulin(s) do you use? What is the mode of delivery? (syringe, pen; injection or pump) \"When did you last give yourself an insulin dose? \" (i.e., time or hours/minutes ago) \"How much did you give? \" (i.e., how many units)  Taking Toujeo 70 units at night    7. DIABETES PILLS: \"Do you take any pills for your diabetes? \"  1000 mg Metformin in the morning-did not take her medication yet    8. OTHER SYMPTOMS: \"Do you have any symptoms? \" (e.g., fever, frequent urination, difficulty breathing, vomiting)    9. LOW BLOOD GLUCOSE TREATMENT: \"What have you done so far to treat the low blood glucose level? \"     Will drink orange juice    10. FOOD: \"When did you last eat or drink? \"     Grilled cheese and orange at 0400-went up to 111    11. ALONE: Zuly Cummingshy you alone right now or is someone with you? \"     Has her family with her    15. PREGNANCY: \"Is there any chance you are pregnant? \" \"When was your last menstrual period? \"    Protocols used: DIABETES - LOW BLOOD SUGAR-ADULT-[place]    Patient called Cornelia at [facility])  with red flag complaint.      Brief description of triage: Has been having low blood sugar for about a week. Early in the morning around 0400 woke up with low blood sugar, ate a grilled cheese sandwich and drank orange juice, sugar came up to 111. At 0730 her BS was 59, drank orange juice and is currently making breakfast. Is unable to find her meter to check BS right now. Not having any symptoms currently. Will feel lightheaded at times. Triage indicates for patient to call back from nurse today. Care advice provided, patient verbalizes understanding; denies any other questions or concerns; instructed to call back for any new or worsening symptoms. Writer called PCP office to bring message to PCP. Attention Provider: Thank you for allowing me to participate in the care of your patient. The patient was connected to triage in response to information provided to the ECC. Please do not respond through this encounter as the response is not directed to a shared pool.

## 2021-03-17 ENCOUNTER — APPOINTMENT (OUTPATIENT)
Dept: CT IMAGING | Age: 41
End: 2021-03-17

## 2021-03-17 ENCOUNTER — HOSPITAL ENCOUNTER (EMERGENCY)
Age: 41
Discharge: HOME | End: 2021-03-17
Attending: EMERGENCY MEDICINE

## 2021-03-17 VITALS
OXYGEN SATURATION: 100 % | SYSTOLIC BLOOD PRESSURE: 115 MMHG | BODY MASS INDEX: 28.89 KG/M2 | RESPIRATION RATE: 22 BRPM | HEART RATE: 88 BPM | WEIGHT: 153 LBS | TEMPERATURE: 98.2 F | HEIGHT: 61 IN | DIASTOLIC BLOOD PRESSURE: 68 MMHG

## 2021-03-17 DIAGNOSIS — R74.8 ELEVATED LIPASE: ICD-10-CM

## 2021-03-17 DIAGNOSIS — R10.10 PAIN OF UPPER ABDOMEN: Primary | ICD-10-CM

## 2021-03-17 LAB
A/G RATIO: 1.4 (ref 1.1–2.2)
ALBUMIN SERPL-MCNC: 4.6 G/DL (ref 3.4–5)
ALP BLD-CCNC: 147 U/L (ref 40–129)
ALT SERPL-CCNC: 9 U/L (ref 10–40)
AMORPHOUS: ABNORMAL /HPF
ANION GAP SERPL CALCULATED.3IONS-SCNC: 8 MMOL/L (ref 3–16)
AST SERPL-CCNC: 16 U/L (ref 15–37)
BACTERIA: ABNORMAL /HPF
BASOPHILS ABSOLUTE: 0 K/UL (ref 0–0.2)
BASOPHILS RELATIVE PERCENT: 0.7 %
BILIRUB SERPL-MCNC: 0.4 MG/DL (ref 0–1)
BILIRUBIN URINE: NEGATIVE
BLOOD, URINE: NEGATIVE
BUN BLDV-MCNC: 20 MG/DL (ref 7–20)
CALCIUM SERPL-MCNC: 9.9 MG/DL (ref 8.3–10.6)
CHLORIDE BLD-SCNC: 101 MMOL/L (ref 99–110)
CLARITY: CLEAR
CO2: 29 MMOL/L (ref 21–32)
COLOR: YELLOW
CREAT SERPL-MCNC: <0.5 MG/DL (ref 0.6–1.1)
EOSINOPHILS ABSOLUTE: 0.1 K/UL (ref 0–0.6)
EOSINOPHILS RELATIVE PERCENT: 1 %
EPITHELIAL CELLS, UA: ABNORMAL /HPF (ref 0–5)
GFR AFRICAN AMERICAN: >60
GFR NON-AFRICAN AMERICAN: >60
GLOBULIN: 3.2 G/DL
GLUCOSE BLD-MCNC: 189 MG/DL (ref 70–99)
GLUCOSE URINE: 250 MG/DL
HCG QUALITATIVE: NEGATIVE
HCT VFR BLD CALC: 42.1 % (ref 36–48)
HEMOGLOBIN: 14.5 G/DL (ref 12–16)
KETONES, URINE: NEGATIVE MG/DL
LEUKOCYTE ESTERASE, URINE: ABNORMAL
LIPASE: 84 U/L (ref 13–60)
LYMPHOCYTES ABSOLUTE: 3.1 K/UL (ref 1–5.1)
LYMPHOCYTES RELATIVE PERCENT: 47.3 %
MCH RBC QN AUTO: 31 PG (ref 26–34)
MCHC RBC AUTO-ENTMCNC: 34.3 G/DL (ref 31–36)
MCV RBC AUTO: 90.4 FL (ref 80–100)
MICROSCOPIC EXAMINATION: YES
MONOCYTES ABSOLUTE: 0.5 K/UL (ref 0–1.3)
MONOCYTES RELATIVE PERCENT: 8.4 %
NEUTROPHILS ABSOLUTE: 2.8 K/UL (ref 1.7–7.7)
NEUTROPHILS RELATIVE PERCENT: 42.6 %
NITRITE, URINE: NEGATIVE
PDW BLD-RTO: 13 % (ref 12.4–15.4)
PH UA: 8 (ref 5–8)
PLATELET # BLD: 262 K/UL (ref 135–450)
PMV BLD AUTO: 7.9 FL (ref 5–10.5)
POTASSIUM REFLEX MAGNESIUM: 4.4 MMOL/L (ref 3.5–5.1)
PROTEIN UA: NEGATIVE MG/DL
RBC # BLD: 4.66 M/UL (ref 4–5.2)
RBC UA: ABNORMAL /HPF (ref 0–4)
SODIUM BLD-SCNC: 138 MMOL/L (ref 136–145)
SPECIFIC GRAVITY UA: 1.01 (ref 1–1.03)
TOTAL PROTEIN: 7.8 G/DL (ref 6.4–8.2)
URINE TYPE: ABNORMAL
UROBILINOGEN, URINE: 0.2 E.U./DL
WBC # BLD: 6.5 K/UL (ref 4–11)
WBC UA: ABNORMAL /HPF (ref 0–5)

## 2021-03-17 PROCEDURE — 36415 COLL VENOUS BLD VENIPUNCTURE: CPT

## 2021-03-17 PROCEDURE — 81001 URINALYSIS AUTO W/SCOPE: CPT

## 2021-03-17 PROCEDURE — 84703 CHORIONIC GONADOTROPIN ASSAY: CPT

## 2021-03-17 PROCEDURE — 6360000002 HC RX W HCPCS: Performed by: EMERGENCY MEDICINE

## 2021-03-17 PROCEDURE — 80053 COMPREHEN METABOLIC PANEL: CPT

## 2021-03-17 PROCEDURE — 83690 ASSAY OF LIPASE: CPT

## 2021-03-17 PROCEDURE — 96374 THER/PROPH/DIAG INJ IV PUSH: CPT

## 2021-03-17 PROCEDURE — 85025 COMPLETE CBC W/AUTO DIFF WBC: CPT

## 2021-03-17 PROCEDURE — 2580000003 HC RX 258: Performed by: EMERGENCY MEDICINE

## 2021-03-17 PROCEDURE — 74177 CT ABD & PELVIS W/CONTRAST: CPT

## 2021-03-17 PROCEDURE — 99283 EMERGENCY DEPT VISIT LOW MDM: CPT

## 2021-03-17 PROCEDURE — 96375 TX/PRO/DX INJ NEW DRUG ADDON: CPT

## 2021-03-17 PROCEDURE — 6360000004 HC RX CONTRAST MEDICATION: Performed by: EMERGENCY MEDICINE

## 2021-03-17 RX ORDER — ONDANSETRON 2 MG/ML
4 INJECTION INTRAMUSCULAR; INTRAVENOUS ONCE
Status: COMPLETED | OUTPATIENT
Start: 2021-03-17 | End: 2021-03-17

## 2021-03-17 RX ORDER — 0.9 % SODIUM CHLORIDE 0.9 %
1000 INTRAVENOUS SOLUTION INTRAVENOUS ONCE
Status: COMPLETED | OUTPATIENT
Start: 2021-03-17 | End: 2021-03-17

## 2021-03-17 RX ORDER — MORPHINE SULFATE 4 MG/ML
4 INJECTION, SOLUTION INTRAMUSCULAR; INTRAVENOUS ONCE
Status: COMPLETED | OUTPATIENT
Start: 2021-03-17 | End: 2021-03-17

## 2021-03-17 RX ADMIN — ONDANSETRON 4 MG: 2 INJECTION INTRAMUSCULAR; INTRAVENOUS at 21:05

## 2021-03-17 RX ADMIN — IOPAMIDOL 75 ML: 755 INJECTION, SOLUTION INTRAVENOUS at 21:33

## 2021-03-17 RX ADMIN — MORPHINE SULFATE 4 MG: 4 INJECTION, SOLUTION INTRAMUSCULAR; INTRAVENOUS at 21:05

## 2021-03-17 RX ADMIN — SODIUM CHLORIDE 1000 ML: 9 INJECTION, SOLUTION INTRAVENOUS at 21:10

## 2021-03-17 ASSESSMENT — PAIN SCALES - GENERAL: PAINLEVEL_OUTOF10: 3
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
## 2021-03-20 LAB
A/G RATIO: 1.2 (ref 1.1–2.2)
ALBUMIN SERPL-MCNC: 4.2 G/DL (ref 3.4–5)
ALP BLD-CCNC: 166 U/L (ref 40–129)
ALT SERPL-CCNC: 41 U/L (ref 10–40)
ANION GAP SERPL CALCULATED.3IONS-SCNC: 10 MMOL/L (ref 3–16)
AST SERPL-CCNC: 18 U/L (ref 15–37)
BASOPHILS ABSOLUTE: 0 K/UL (ref 0–0.2)
BASOPHILS RELATIVE PERCENT: 0.7 %
BILIRUB SERPL-MCNC: 0.3 MG/DL (ref 0–1)
BILIRUBIN URINE: NEGATIVE
BLOOD, URINE: NEGATIVE
BUN BLDV-MCNC: 19 MG/DL (ref 7–20)
CALCIUM SERPL-MCNC: 9.4 MG/DL (ref 8.3–10.6)
CHLORIDE BLD-SCNC: 101 MMOL/L (ref 99–110)
CLARITY: CLEAR
CO2: 26 MMOL/L (ref 21–32)
COLOR: YELLOW
CREAT SERPL-MCNC: <0.5 MG/DL (ref 0.6–1.1)
EOSINOPHILS ABSOLUTE: 0.1 K/UL (ref 0–0.6)
EOSINOPHILS RELATIVE PERCENT: 1.5 %
GFR AFRICAN AMERICAN: >60
GFR NON-AFRICAN AMERICAN: >60
GLOBULIN: 3.4 G/DL
GLUCOSE BLD-MCNC: 321 MG/DL (ref 70–99)
GLUCOSE URINE: >=1000 MG/DL
HCT VFR BLD CALC: 41.3 % (ref 36–48)
HEMOGLOBIN: 14.3 G/DL (ref 12–16)
KETONES, URINE: NEGATIVE MG/DL
LEUKOCYTE ESTERASE, URINE: NEGATIVE
LIPASE: 58 U/L (ref 13–60)
LYMPHOCYTES ABSOLUTE: 3.1 K/UL (ref 1–5.1)
LYMPHOCYTES RELATIVE PERCENT: 50 %
MCH RBC QN AUTO: 30.8 PG (ref 26–34)
MCHC RBC AUTO-ENTMCNC: 34.6 G/DL (ref 31–36)
MCV RBC AUTO: 89.2 FL (ref 80–100)
MICROSCOPIC EXAMINATION: ABNORMAL
MONOCYTES ABSOLUTE: 0.6 K/UL (ref 0–1.3)
MONOCYTES RELATIVE PERCENT: 9 %
NEUTROPHILS ABSOLUTE: 2.4 K/UL (ref 1.7–7.7)
NEUTROPHILS RELATIVE PERCENT: 38.8 %
NITRITE, URINE: NEGATIVE
PDW BLD-RTO: 12.8 % (ref 12.4–15.4)
PH UA: 6.5 (ref 5–8)
PLATELET # BLD: 278 K/UL (ref 135–450)
PMV BLD AUTO: 8.4 FL (ref 5–10.5)
POTASSIUM REFLEX MAGNESIUM: 3.9 MMOL/L (ref 3.5–5.1)
PROTEIN UA: NEGATIVE MG/DL
RBC # BLD: 4.63 M/UL (ref 4–5.2)
SODIUM BLD-SCNC: 137 MMOL/L (ref 136–145)
SPECIFIC GRAVITY UA: 1.02 (ref 1–1.03)
TOTAL PROTEIN: 7.6 G/DL (ref 6.4–8.2)
URINE REFLEX TO CULTURE: ABNORMAL
URINE TYPE: ABNORMAL
UROBILINOGEN, URINE: 0.2 E.U./DL
WBC # BLD: 6.3 K/UL (ref 4–11)

## 2021-03-20 PROCEDURE — 83690 ASSAY OF LIPASE: CPT

## 2021-03-20 PROCEDURE — 84484 ASSAY OF TROPONIN QUANT: CPT

## 2021-03-20 PROCEDURE — 85025 COMPLETE CBC W/AUTO DIFF WBC: CPT

## 2021-03-20 PROCEDURE — 96374 THER/PROPH/DIAG INJ IV PUSH: CPT

## 2021-03-20 PROCEDURE — 81003 URINALYSIS AUTO W/O SCOPE: CPT

## 2021-03-20 PROCEDURE — 99284 EMERGENCY DEPT VISIT MOD MDM: CPT

## 2021-03-20 PROCEDURE — 80053 COMPREHEN METABOLIC PANEL: CPT

## 2021-03-20 PROCEDURE — 96375 TX/PRO/DX INJ NEW DRUG ADDON: CPT

## 2021-03-20 ASSESSMENT — PAIN SCALES - GENERAL: PAINLEVEL_OUTOF10: 6

## 2021-03-21 ENCOUNTER — HOSPITAL ENCOUNTER (EMERGENCY)
Age: 41
Discharge: HOME | End: 2021-03-21
Attending: EMERGENCY MEDICINE

## 2021-03-21 VITALS
RESPIRATION RATE: 16 BRPM | TEMPERATURE: 98.6 F | HEART RATE: 84 BPM | OXYGEN SATURATION: 99 % | SYSTOLIC BLOOD PRESSURE: 108 MMHG | HEIGHT: 61 IN | WEIGHT: 150 LBS | DIASTOLIC BLOOD PRESSURE: 57 MMHG | BODY MASS INDEX: 28.32 KG/M2

## 2021-03-21 DIAGNOSIS — R11.2 NON-INTRACTABLE VOMITING WITH NAUSEA, UNSPECIFIED VOMITING TYPE: ICD-10-CM

## 2021-03-21 DIAGNOSIS — R73.9 HYPERGLYCEMIA: ICD-10-CM

## 2021-03-21 DIAGNOSIS — R10.13 ABDOMINAL PAIN, EPIGASTRIC: Primary | ICD-10-CM

## 2021-03-21 LAB — TROPONIN: <0.01 NG/ML

## 2021-03-21 PROCEDURE — 6360000002 HC RX W HCPCS: Performed by: EMERGENCY MEDICINE

## 2021-03-21 PROCEDURE — 2500000003 HC RX 250 WO HCPCS: Performed by: EMERGENCY MEDICINE

## 2021-03-21 PROCEDURE — 2580000003 HC RX 258: Performed by: EMERGENCY MEDICINE

## 2021-03-21 PROCEDURE — 6370000000 HC RX 637 (ALT 250 FOR IP): Performed by: EMERGENCY MEDICINE

## 2021-03-21 RX ORDER — ONDANSETRON 2 MG/ML
4 INJECTION INTRAMUSCULAR; INTRAVENOUS ONCE
Status: COMPLETED | OUTPATIENT
Start: 2021-03-21 | End: 2021-03-21

## 2021-03-21 RX ORDER — 0.9 % SODIUM CHLORIDE 0.9 %
1000 INTRAVENOUS SOLUTION INTRAVENOUS ONCE
Status: COMPLETED | OUTPATIENT
Start: 2021-03-21 | End: 2021-03-21

## 2021-03-21 RX ORDER — DICYCLOMINE HCL 20 MG
20 TABLET ORAL ONCE
Status: COMPLETED | OUTPATIENT
Start: 2021-03-21 | End: 2021-03-21

## 2021-03-21 RX ORDER — SUCRALFATE 1 G/1
1 TABLET ORAL ONCE
Status: COMPLETED | OUTPATIENT
Start: 2021-03-21 | End: 2021-03-21

## 2021-03-21 RX ORDER — KETOROLAC TROMETHAMINE 30 MG/ML
15 INJECTION, SOLUTION INTRAMUSCULAR; INTRAVENOUS ONCE
Status: COMPLETED | OUTPATIENT
Start: 2021-03-21 | End: 2021-03-21

## 2021-03-21 RX ORDER — SUCRALFATE 1 G/1
1 TABLET ORAL 3 TIMES DAILY PRN
Qty: 20 TABLET | Refills: 0 | Status: ON HOLD | OUTPATIENT
Start: 2021-03-21 | End: 2021-05-06 | Stop reason: HOSPADM

## 2021-03-21 RX ORDER — ONDANSETRON 4 MG/1
4 TABLET, ORALLY DISINTEGRATING ORAL EVERY 8 HOURS PRN
Qty: 20 TABLET | Refills: 0 | Status: SHIPPED | OUTPATIENT
Start: 2021-03-21 | End: 2021-04-26 | Stop reason: SDUPTHER

## 2021-03-21 RX ORDER — FAMOTIDINE 20 MG/1
20 TABLET, FILM COATED ORAL 2 TIMES DAILY
Qty: 10 TABLET | Refills: 0 | Status: SHIPPED | OUTPATIENT
Start: 2021-03-21 | End: 2021-03-26

## 2021-03-21 RX ADMIN — SODIUM CHLORIDE 1000 ML: 9 INJECTION, SOLUTION INTRAVENOUS at 01:35

## 2021-03-21 RX ADMIN — KETOROLAC TROMETHAMINE 15 MG: 30 INJECTION, SOLUTION INTRAMUSCULAR at 01:31

## 2021-03-21 RX ADMIN — DICYCLOMINE HYDROCHLORIDE 20 MG: 20 TABLET ORAL at 01:32

## 2021-03-21 RX ADMIN — ONDANSETRON 4 MG: 2 INJECTION INTRAMUSCULAR; INTRAVENOUS at 01:31

## 2021-03-21 RX ADMIN — FAMOTIDINE 20 MG: 10 INJECTION, SOLUTION INTRAVENOUS at 01:32

## 2021-03-21 RX ADMIN — SUCRALFATE 1 G: 1 TABLET ORAL at 01:32

## 2021-03-21 RX ADMIN — MAGNESIUM HYDROXIDE/ALUMINUM HYDROXICE/SIMETHICONE: 120; 1200; 1200 SUSPENSION ORAL at 01:32

## 2021-03-21 ASSESSMENT — PAIN SCALES - GENERAL
PAINLEVEL_OUTOF10: 6
PAINLEVEL_OUTOF10: 3

## 2021-03-21 ASSESSMENT — ENCOUNTER SYMPTOMS
VOMITING: 1
ABDOMINAL PAIN: 1
COLOR CHANGE: 0
CHEST TIGHTNESS: 0
NAUSEA: 1
DIARRHEA: 0
BACK PAIN: 0
SHORTNESS OF BREATH: 0
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
## 2021-03-21 NOTE — ED NOTES
Patient sleeping at this time.       Jasmine MotaThe [facility]  03/21/21 8438

## 2021-03-22 ENCOUNTER — NURSE TRIAGE (OUTPATIENT)
Dept: OTHER | Facility: CLINIC | Age: 41
End: 2021-03-22

## 2021-03-22 NOTE — TELEPHONE ENCOUNTER
Reason for Disposition   Requesting regular office appointment    Answer Assessment - Initial Assessment Questions  1. REASON FOR CALL or QUESTION: \"What is your reason for calling today? \" or \"How can I best help you? \" or \"What question do you have that I can help answer? \"      [employer] was in ED on Thursday and Saturday for abdominal pain. Also c/o constipation. Warm transfer to [address] at [facility] for appt scheduling.     Protocols used: INFORMATION ONLY CALL - NO TRIAGE-ADULT-

## 2021-03-26 ENCOUNTER — TELEMEDICINE (OUTPATIENT)
Dept: FAMILY MEDICINE CLINIC | Age: 41
End: 2021-03-26

## 2021-03-26 DIAGNOSIS — R10.10 UPPER ABDOMINAL PAIN: Primary | ICD-10-CM

## 2021-03-26 PROCEDURE — 99214 OFFICE O/P EST MOD 30 MIN: CPT | Performed by: NURSE PRACTITIONER

## 2021-03-26 RX ORDER — OMEPRAZOLE 40 MG/1
40 CAPSULE, DELAYED RELEASE ORAL
Qty: 30 CAPSULE | Refills: 0 | Status: SHIPPED | OUTPATIENT
Start: 2021-03-26 | End: 2021-04-09

## 2021-03-26 ASSESSMENT — ENCOUNTER SYMPTOMS
NAUSEA: 0
CONSTIPATION: 1
ABDOMINAL PAIN: 1
RECTAL PAIN: 0
ANAL BLEEDING: 0
ABDOMINAL DISTENTION: 1
DIARRHEA: 0
BLOOD IN STOOL: 0
VOMITING: 0
RESPIRATORY NEGATIVE: 1

## 2021-03-26 NOTE — PROGRESS NOTES
3/26/2021    TELEHEALTH EVALUATION -- Audio/Visual (During KHRLF-23 public health emergency)    HPI:    Jena Mast (:  1980) has requested an audio/video evaluation for the following concern(s):    Pt scheduled a VV today for an ER f/u. She was seen in the ER on 3/17/2021 and 3/20/2021 for abdominal pain and bloating. She was treated with a GI cocktail and Carafate which helped with her pain. She was given a referral to GI, but states that they have not called her back. Taking the Carafate and Pepcid as directed, but states that she is still having severe pain. She states that the pain is constant, no changes with eating. Pain is worse when supine. Denies reflux or heartburn. States that the pain is epigastric. Labs from ER visit reviewed, as well as CT done 3/17/2021. Denies bloody, dark/tarry stools. She states that her last BM was 3 days ago. Denies constipation or diarrhea. Review of Systems   Constitutional: Negative. Respiratory: Negative. Cardiovascular: Negative. Gastrointestinal: Positive for abdominal distention, abdominal pain and constipation. Negative for anal bleeding, blood in stool, diarrhea, nausea, rectal pain and vomiting. Genitourinary: Negative. Skin: Negative. Prior to Visit Medications    Medication Sig Taking?  Authorizing Provider   omeprazole (PRILOSEC) 40 MG delayed release capsule Take 1 capsule by mouth every morning (before breakfast) Yes IRASEMA Siddiqui CNP   sucralfate (CARAFATE) 1 GM tablet Take 1 tablet by mouth 3 times daily as needed (abdominal pain) Yes Mayra Miranda MD   ondansetron (ZOFRAN ODT) 4 MG disintegrating tablet Take 1 tablet by mouth every 8 hours as needed for Nausea Yes Mayra Miranda MD   Insulin Glargine, 2 Unit Dial, (TOUJEO MAX SOLOSTAR) 300 UNIT/ML SOPN Inject 60 Units into the skin daily Yes IRASEMA Christie CNP   Capsaicin 0.1 % CREA Apply 1 applicator topically 2 times daily Yes Sarah Blocker, APRN - CNP   losartan (COZAAR) 50 MG tablet Take 1 tablet by mouth daily Yes Sarah Blocker, APRN - CNP   gabapentin (NEURONTIN) 300 MG capsule Take 1 capsule by mouth 3 times daily for 120 days. Yes Kayla Morse DO   metFORMIN (GLUCOPHAGE) 1000 MG tablet Take 1 tablet by mouth 2 times daily (with meals) Yes Kayla Morse DO   Liraglutide (VICTOZA) 18 MG/3ML SOPN SC injection Inject 1.2 mg into the skin daily Yes Sybil Ramirez MD   ibuprofen (ADVIL;MOTRIN) 600 MG tablet Take 1 tablet by mouth every 8 hours as needed for Pain Yes IRASEMA Michel CNP   fluticasone (FLONASE) 50 MCG/ACT nasal spray 2 sprays by Nasal route daily Yes EVETTE Armijo   insulin lispro (HUMALOG KWIKPEN) 100 UNIT/ML pen INJECT 20 UNITS SUBCUTANEOUSLY THREE TIMES DAILY BEFORE MEAL(S) Yes Blake Krueger DO   Insulin Pen Needle (B-D UF III MINI PEN NEEDLES) 31G X 5 MM MISC Inject 1 each into the skin 4 times daily Yes IRASEMA Liu CNP   blood glucose test strips (ASCENSIA AUTODISC VI;ONE TOUCH ULTRA TEST VI) strip DX: E11.9-One touch ultra strips. FSBS 3 times daily Yes EVETTE Alfaro   Lancets MISC DX: E 11.9-Uses insulin. FSBS 3 times daily-Delica lancets for one touch ultra II Yes Blake Krueger DO   glucose monitoring kit (FREESTYLE) monitoring kit Dx E11.9-One touch ultra II meter-uses tid Yes Blake Krueger DO   traMADol (ULTRAM) 50 MG tablet Take 1 tablet by mouth every 12 hours as needed for Pain for up to 30 days.   Patient not taking: Reported on 3/26/2021  Sarah Blocker, APRN - CNP   butalbital-APAP-caffeine (FIORICET) -40 MG CAPS per capsule Take 1 capsule by mouth 2 times daily as needed for Headaches  Patient not taking: Reported on 3/26/2021  Michelle Alford DO       Social History     Tobacco Use    Smoking status: Former Smoker     Years: 0.00     Types: Cigarettes     Quit date: 1999     Years since quittin.5    Smokeless tobacco: Never Used  Tobacco comment: smoked for 1 months when teenager   Substance Use Topics    Alcohol use: Yes     Comment: social drinker  4 beer per month    Drug use: No     Comment: Hx of cocaine use in         Allergies   Allergen Reactions    Lisinopril Swelling    Morphine Nausea And Vomiting     States after getting morphine began having sharp chest pain. ,   Past Medical History:   Diagnosis Date    Abdominal pain, acute, right lower quadrant 5/15/2013    Anxiety     Arthritis     Left Knee    Bilateral ovarian cysts 2013    Blood transfusion reaction     Cellulitis and abscess of trunk 2014    Pt was seen in ED today for bleeding from incision after taking a fall this morning.  COVID-19     Depression     Diabetes mellitus ([facility] [address]     x5yr    Diverticula of colon 2013    DM2 (diabetes mellitus, type 2) ([facility] [address] 2016    Insomnia     Insomnia secondary to situational depression 2014    Left carpal tunnel syndrome 2018    MDRO (multiple drug resistant organisms) resistance     poss MRSA after hysterectomy treated with antibiotics    OA (osteoarthritis) of knee 2014    Obesity (BMI 30.0-34.9) 2017    Ovarian cyst     Plantar fasciitis, left 2014   ,   Past Surgical History:   Procedure Laterality Date    BREAST CYST ASPIRATION       SECTION      CHOLECYSTECTOMY      COLONOSCOPY      polyps    HYSTERECTOMY  2008    LAPAROSCOPY      lysis of adhesions    LAPAROTOMY  2014    LAPAROTOMY, BILATERAL SALPINGO - OOPHORECTOMY    SALPINGO-OOPHORECTOMY  2014   ,   Social History     Tobacco Use    Smoking status: Former Smoker     Years: 0.00     Types: Cigarettes     Quit date: 1999     Years since quittin.5    Smokeless tobacco: Never Used    Tobacco comment: smoked for 1 months when teenager   Substance Use Topics    Alcohol use: Yes     Comment: social drinker  4 beer per month    Drug use:  No Comment: Hx of cocaine use in 2004   ,   Family History   Problem Relation Age of Onset    Cancer Mother         ovarian    Diabetes Mother     High Blood Pressure Mother     Hypertension Mother     Diabetes Brother     Hypertension Brother     Diabetes Maternal Grandmother     Cancer Maternal Grandfather         stomach    Prostate Cancer Paternal Grandfather         metastatic    Breast Cancer Maternal Aunt    ,   Immunization History   Administered Date(s) Administered    Influenza Virus Vaccine 01/17/2014, 12/03/2014, 01/11/2016    Influenza, Intradermal, Preservative free 11/08/2012    Influenza, Quadv, IM, PF (6 mo and older Fluzone, Flulaval, Fluarix, and 3 yrs and older Afluria) 11/27/2017, 10/07/2020    Pneumococcal Polysaccharide ([id number]) 01/17/2014, 12/03/2014    Tdap (Boostrix, Adacel) 03/17/2014, 02/21/2015, 06/07/2016   ,   Health Maintenance   Topic Date Due    COVID-19 Vaccine (1) Never done    Hepatitis B vaccine (1 of 3 - Risk 3-dose series) Never done    Diabetic retinal exam  07/26/2019    Diabetic foot exam  08/12/2020    Lipid screen  08/12/2020    A1C test (Diabetic or Prediabetic)  05/18/2021    Diabetic microalbuminuria test  02/18/2022    Potassium monitoring  03/20/2022    Creatinine monitoring  03/20/2022    Cervical cancer screen  05/17/2022    Breast cancer screen  09/16/2022    DTaP/Tdap/Td vaccine (4 - Td) 06/07/2026    Flu vaccine  Completed    Pneumococcal 0-64 years Vaccine  Completed    Hepatitis C screen  Completed    HIV screen  Completed    Hepatitis A vaccine  Aged Out    Hib vaccine  Aged Out    Meningococcal (ACWY) vaccine  Aged Out       PHYSICAL EXAMINATION:  [ INSTRUCTIONS:  \"[x]\" Indicates a positive item  \"[]\" Indicates a negative item  -- DELETE ALL ITEMS NOT EXAMINED]  Vital Signs: (As obtained by patient/caregiver or practitioner observation)    Blood pressure-  Heart rate-    Respiratory rate-    Temperature-  Pulse oximetry- Constitutional: [] Appears well-developed and well-nourished [] No apparent distress      [] Abnormal-   Mental status  [] Alert and awake  [] Oriented to person/place/time []Able to follow commands      Eyes:  EOM    []  Normal  [] Abnormal-  Sclera  []  Normal  [] Abnormal -         Discharge []  None visible  [] Abnormal -    HENT:   [] Normocephalic, atraumatic. [] Abnormal   [] Mouth/Throat: Mucous membranes are moist.     External Ears [] Normal  [] Abnormal-     Neck: [] No visualized mass     Pulmonary/Chest: [] Respiratory effort normal.  [] No visualized signs of difficulty breathing or respiratory distress        [] Abnormal-      Musculoskeletal:   [] Normal gait with no signs of ataxia         [] Normal range of motion of neck        [] Abnormal-       Neurological:        [] No Facial Asymmetry (Cranial nerve 7 motor function) (limited exam to video visit)          [] No gaze palsy        [] Abnormal-         Skin:        [] No significant exanthematous lesions or discoloration noted on facial skin         [] Abnormal-            Psychiatric:       [] Normal Affect [] No Hallucinations        [] Abnormal-     Other pertinent observable physical exam findings-     ASSESSMENT/PLAN:  1. Upper abdominal pain  See HPI. Pt has been to the ER twice in the last 9 days for upper abdominal pain and bloating. I have rev'd her labs and abdomen/pelvis CT results. She was referred to GI, but states that she can not get them to call her back. I agreed with the ER plan of care. I have placed a new referral to [facility]. I recommended that she stop taking the Nexium, and started her on omeprazole. - omeprazole (PRILOSEC) 40 MG delayed release capsule; Take 1 capsule by mouth every morning (before breakfast)  Dispense: 30 capsule; Refill: 0  - Paula Issa MD, Gastroenterology, [facility]      Return if symptoms worsen or fail to improve.     Candelaria Mcginnis, was evaluated through a synchronous (real-time) audio-video encounter. The patient (or guardian if applicable) is aware that this is a billable service. Verbal consent to proceed has been obtained within the past 12 months. The visit was conducted pursuant to the emergency declaration under the [facility], [address] [place] [address] authority and the [employer] Act. Patient identification was verified, and a caregiver was present when appropriate. The patient was located in a state where the provider was credentialed to provide care. Total time spent on this encounter: 30 minutes    --IRASEMA Grant CNP on 3/26/2021 at 11:55 AM    An electronic signature was used to authenticate this note.

## 2021-03-30 ENCOUNTER — HOSPITAL ENCOUNTER (EMERGENCY)
Age: 41
Discharge: HOME | End: 2021-03-30
Attending: EMERGENCY MEDICINE

## 2021-03-30 VITALS
BODY MASS INDEX: 28.32 KG/M2 | WEIGHT: 150 LBS | HEIGHT: 61 IN | SYSTOLIC BLOOD PRESSURE: 118 MMHG | OXYGEN SATURATION: 99 % | HEART RATE: 72 BPM | DIASTOLIC BLOOD PRESSURE: 75 MMHG | RESPIRATION RATE: 16 BRPM | TEMPERATURE: 97.4 F

## 2021-03-30 DIAGNOSIS — R10.9 FLANK PAIN: Primary | ICD-10-CM

## 2021-03-30 DIAGNOSIS — R10.11 RIGHT UPPER QUADRANT ABDOMINAL PAIN: ICD-10-CM

## 2021-03-30 LAB
A/G RATIO: 1.4 (ref 1.1–2.2)
ALBUMIN SERPL-MCNC: 4 G/DL (ref 3.4–5)
ALP BLD-CCNC: 118 U/L (ref 40–129)
ALT SERPL-CCNC: 21 U/L (ref 10–40)
ANION GAP SERPL CALCULATED.3IONS-SCNC: 8 MMOL/L (ref 3–16)
AST SERPL-CCNC: 15 U/L (ref 15–37)
BASOPHILS ABSOLUTE: 0 K/UL (ref 0–0.2)
BASOPHILS RELATIVE PERCENT: 0.4 %
BILIRUB SERPL-MCNC: 0.5 MG/DL (ref 0–1)
BILIRUBIN URINE: NEGATIVE
BLOOD, URINE: NEGATIVE
BUN BLDV-MCNC: 16 MG/DL (ref 7–20)
CALCIUM SERPL-MCNC: 9.7 MG/DL (ref 8.3–10.6)
CHLORIDE BLD-SCNC: 102 MMOL/L (ref 99–110)
CLARITY: CLEAR
CO2: 28 MMOL/L (ref 21–32)
COLOR: YELLOW
CREAT SERPL-MCNC: <0.5 MG/DL (ref 0.6–1.1)
EOSINOPHILS ABSOLUTE: 0.2 K/UL (ref 0–0.6)
EOSINOPHILS RELATIVE PERCENT: 2.6 %
GFR AFRICAN AMERICAN: >60
GFR NON-AFRICAN AMERICAN: >60
GLOBULIN: 2.9 G/DL
GLUCOSE BLD-MCNC: 136 MG/DL (ref 70–99)
GLUCOSE URINE: NEGATIVE MG/DL
HCT VFR BLD CALC: 36.4 % (ref 36–48)
HEMOGLOBIN: 12.5 G/DL (ref 12–16)
KETONES, URINE: NEGATIVE MG/DL
LEUKOCYTE ESTERASE, URINE: NEGATIVE
LIPASE: 23 U/L (ref 13–60)
LYMPHOCYTES ABSOLUTE: 2.9 K/UL (ref 1–5.1)
LYMPHOCYTES RELATIVE PERCENT: 42.7 %
MCH RBC QN AUTO: 30.7 PG (ref 26–34)
MCHC RBC AUTO-ENTMCNC: 34.4 G/DL (ref 31–36)
MCV RBC AUTO: 89.1 FL (ref 80–100)
MICROSCOPIC EXAMINATION: NORMAL
MONOCYTES ABSOLUTE: 0.6 K/UL (ref 0–1.3)
MONOCYTES RELATIVE PERCENT: 9.3 %
NEUTROPHILS ABSOLUTE: 3.1 K/UL (ref 1.7–7.7)
NEUTROPHILS RELATIVE PERCENT: 45 %
NITRITE, URINE: NEGATIVE
PDW BLD-RTO: 12.6 % (ref 12.4–15.4)
PH UA: 6.5 (ref 5–8)
PLATELET # BLD: 257 K/UL (ref 135–450)
PMV BLD AUTO: 7.8 FL (ref 5–10.5)
POTASSIUM SERPL-SCNC: 3.5 MMOL/L (ref 3.5–5.1)
PROTEIN UA: NEGATIVE MG/DL
RBC # BLD: 4.08 M/UL (ref 4–5.2)
SODIUM BLD-SCNC: 138 MMOL/L (ref 136–145)
SPECIFIC GRAVITY UA: 1.02 (ref 1–1.03)
TOTAL PROTEIN: 6.9 G/DL (ref 6.4–8.2)
URINE TYPE: NORMAL
UROBILINOGEN, URINE: 0.2 E.U./DL
WBC # BLD: 6.8 K/UL (ref 4–11)

## 2021-03-30 PROCEDURE — 96372 THER/PROPH/DIAG INJ SC/IM: CPT

## 2021-03-30 PROCEDURE — 83690 ASSAY OF LIPASE: CPT

## 2021-03-30 PROCEDURE — 85025 COMPLETE CBC W/AUTO DIFF WBC: CPT

## 2021-03-30 PROCEDURE — 80053 COMPREHEN METABOLIC PANEL: CPT

## 2021-03-30 PROCEDURE — 99285 EMERGENCY DEPT VISIT HI MDM: CPT

## 2021-03-30 PROCEDURE — 81003 URINALYSIS AUTO W/O SCOPE: CPT

## 2021-03-30 PROCEDURE — 6360000002 HC RX W HCPCS: Performed by: EMERGENCY MEDICINE

## 2021-03-30 PROCEDURE — 6370000000 HC RX 637 (ALT 250 FOR IP): Performed by: EMERGENCY MEDICINE

## 2021-03-30 RX ORDER — ACETAMINOPHEN 500 MG
1000 TABLET ORAL ONCE
Status: COMPLETED | OUTPATIENT
Start: 2021-03-30 | End: 2021-03-30

## 2021-03-30 RX ORDER — PROMETHAZINE HYDROCHLORIDE 25 MG/ML
25 INJECTION, SOLUTION INTRAMUSCULAR; INTRAVENOUS ONCE
Status: COMPLETED | OUTPATIENT
Start: 2021-03-30 | End: 2021-03-30

## 2021-03-30 RX ADMIN — PROMETHAZINE HYDROCHLORIDE 25 MG: 25 INJECTION INTRAMUSCULAR; INTRAVENOUS at 03:18

## 2021-03-30 RX ADMIN — ACETAMINOPHEN 1000 MG: 500 TABLET ORAL at 03:18

## 2021-03-30 ASSESSMENT — PAIN DESCRIPTION - PAIN TYPE
TYPE: ACUTE PAIN
TYPE: ACUTE PAIN

## 2021-03-30 ASSESSMENT — ENCOUNTER SYMPTOMS
RHINORRHEA: 0
BACK PAIN: 1
ABDOMINAL PAIN: 1
SHORTNESS OF BREATH: 0

## 2021-03-30 ASSESSMENT — PAIN DESCRIPTION - LOCATION
LOCATION: FLANK
LOCATION: FLANK

## 2021-03-30 NOTE — ED PROVIDER NOTES
[facility]  ED  EMERGENCY DEPARTMENT ENCOUNTER      Pt Name: Rosana Culver  MRN: [id number]  Armsgarettgfnoel 1980  Date of evaluation: 3/30/2021  Provider: Pilar Moore MD    Florencio Friend       Chief Complaint   Patient presents with    Flank Pain     right side flank pain          HISTORY OF PRESENT ILLNESS   (Location/Symptom, Timing/Onset,Context/Setting, Quality, Duration, Modifying Factors, Severity)  Note limiting factors. Rosana Culver is a 42 y.o. female who presents to the emergency department for right upper quadrant and flank pain. The patient states it has been going since her last emergency room visit which upon review was on 17 March. She said that her pain was initially in the epigastric region but she feels like it has moved across her right upper quadrant and to the right flank. She denies any urinary symptoms. She has had a cholecystectomy. She is also had a hysterectomy. She states that she has tried medications for pain including Biofreeze and she has not gotten any relief. She was unable to sleep so she decided to come to the emergency room. Nursing notes were reviewed. REVIEW OF SYSTEMS    (2-9 systems for level 4, 10 or more for level 5)     Review of Systems   Constitutional: Negative for fever. HENT: Negative for rhinorrhea. Respiratory: Negative for shortness of breath. Cardiovascular: Negative for chest pain. Gastrointestinal: Positive for abdominal pain. Endocrine: Negative for polyuria. Genitourinary: Negative for dysuria. Musculoskeletal: Positive for back pain. Skin: Negative for rash. Neurological: Negative for headaches. Hematological: Does not bruise/bleed easily.          PAST MEDICAL HISTORY     Past Medical History:   Diagnosis Date    Abdominal pain, acute, right lower quadrant 5/15/2013    Anxiety     Arthritis     Left Knee    Bilateral ovarian cysts 8/13/2013    Blood transfusion reaction     Cellulitis and abscess of trunk 2014    Pt was seen in ED today for bleeding from incision after taking a fall this morning.  COVID-19     Depression     Diabetes mellitus ([facility] [address]     x5yr    Diverticula of colon 2013    DM2 (diabetes mellitus, type 2) ([facility] [address] 2016    Insomnia     Insomnia secondary to situational depression 2014    Left carpal tunnel syndrome 2018    MDRO (multiple drug resistant organisms) resistance     poss MRSA after hysterectomy treated with antibiotics    OA (osteoarthritis) of knee 2014    Obesity (BMI 30.0-34.9) 2017    Ovarian cyst     Plantar fasciitis, left 2014         SURGICALHISTORY       Past Surgical History:   Procedure Laterality Date    BREAST CYST ASPIRATION       SECTION      CHOLECYSTECTOMY      COLONOSCOPY      polyps    HYSTERECTOMY  2008    LAPAROSCOPY      lysis of adhesions    LAPAROTOMY  2014    LAPAROTOMY, BILATERAL SALPINGO - OOPHORECTOMY    SALPINGO-OOPHORECTOMY  2014         CURRENT MEDICATIONS       Discharge Medication List as of 3/30/2021  4:02 AM      CONTINUE these medications which have NOT CHANGED    Details   gabapentin (NEURONTIN) 300 MG capsule Take 1 capsule by mouth 3 times daily for 30 days. , Disp-90 capsule, R-0Normal      omeprazole (PRILOSEC) 40 MG delayed release capsule Take 1 capsule by mouth every morning (before breakfast), Disp-30 capsule, R-0Normal      sucralfate (CARAFATE) 1 GM tablet Take 1 tablet by mouth 3 times daily as needed (abdominal pain), Disp-20 tablet, R-0Print      ondansetron (ZOFRAN ODT) 4 MG disintegrating tablet Take 1 tablet by mouth every 8 hours as needed for Nausea, Disp-20 tablet, R-0Print      Insulin Glargine, 2 Unit Dial, (TOUJEO MAX SOLOSTAR) 300 UNIT/ML SOPN Inject 60 Units into the skin daily, Disp-4 pen, R-5Normal      Capsaicin 0.1 % CREA Apply 1 applicator topically 2 times daily, Disp-60 g, R-5Normal      losartan (COZAAR) 50 MG tablet Take 1 tablet by mouth daily, Disp-30 tablet, R-5Normal      traMADol (ULTRAM) 50 MG tablet Take 1 tablet by mouth every 12 hours as needed for Pain for up to 30 days. , Disp-30 tablet, R-0Normal      metFORMIN (GLUCOPHAGE) 1000 MG tablet Take 1 tablet by mouth 2 times daily (with meals), Disp-180 tablet, R-3Please consider 90 day supplies to promote better adherenceNormal      Liraglutide (VICTOZA) 18 MG/3ML SOPN SC injection Inject 1.2 mg into the skin daily, Disp-2 pen, R-0Normal      butalbital-APAP-caffeine (FIORICET) -40 MG CAPS per capsule Take 1 capsule by mouth 2 times daily as needed for Headaches, Disp-20 capsule, R-0Normal      ibuprofen (ADVIL;MOTRIN) 600 MG tablet Take 1 tablet by mouth every 8 hours as needed for Pain, Disp-30 tablet, R-0Print      fluticasone (FLONASE) 50 MCG/ACT nasal spray 2 sprays by Nasal route daily, Disp-1 Bottle, R-0Normal      insulin lispro (HUMALOG KWIKPEN) 100 UNIT/ML pen INJECT 20 UNITS SUBCUTANEOUSLY THREE TIMES DAILY BEFORE MEAL(S), Disp-15 pen, R-3Please consider 90 day supplies to promote better adherenceNormal      Insulin Pen Needle (B-D UF III MINI PEN NEEDLES) 31G X 5 MM MISC 4 TIMES DAILY Starting u 1/24/2019, Disp-100 each, R-0, Print      blood glucose test strips (ASCENSIA AUTODISC VI;ONE TOUCH ULTRA TEST VI) strip Disp-100 strip, R-5, NormalDX: E11.9-One touch ultra strips. FSBS 3 times daily      Lancets MISC Disp-100 each, R-5, NormalDX: E 11.9-Uses insulin.  FSBS 3 times daily-Delica lancets for one touch ultra II      glucose monitoring kit (FREESTYLE) monitoring kit Disp-1 kit, R-0, NormalDx E11.9-One touch ultra II meter-uses tid             ALLERGIES     Lisinopril and Morphine    FAMILY HISTORY       Family History   Problem Relation Age of Onset    Cancer Mother         ovarian    Diabetes Mother     High Blood Pressure Mother     Hypertension Mother     Diabetes Brother     Hypertension Brother     Diabetes Maternal Grandmother     Cancer Maternal Grandfather         stomach    Prostate Cancer Paternal Grandfather         metastatic    Breast Cancer Maternal Aunt           SOCIAL HISTORY       Social History     Socioeconomic History    Marital status:      Spouse name: None    Number of children: None    Years of education: None    Highest education level: None   Occupational History    None   Social Needs    Financial resource strain: None    Food insecurity     Worry: None     Inability: None    Transportation needs     Medical: None     Non-medical: None   Tobacco Use    Smoking status: Former Smoker     Years: 0.00     Types: Cigarettes     Quit date: 1999     Years since quittin.5    Smokeless tobacco: Never Used    Tobacco comment: smoked for 1 months when teenager   Substance and Sexual Activity    Alcohol use: Yes     Comment: social drinker  4 beer per month    Drug use: No     Comment: Hx of cocaine use in     Sexual activity: Yes     Partners: Male     Comment: painful intercourse    Lifestyle    Physical activity     Days per week: None     Minutes per session: None    Stress: None   Relationships    Social connections     Talks on phone: None     Gets together: None     Attends Buddhism service: None     Active member of club or organization: None     Attends meetings of clubs or organizations: None     Relationship status: None    Intimate partner violence     Fear of current or ex partner: None     Emotionally abused: None     Physically abused: None     Forced sexual activity: None   Other Topics Concern    None   Social History Narrative    None       SCREENINGS             PHYSICAL EXAM    (up to 7 for level 4, 8 or more for level 5)     ED Triage Vitals   BP Temp Temp Source Pulse Resp SpO2 Height Weight   21 0239 21 0239 21 0239 21 0239 21 0239 21 0239 21 0238 21 0238   129/89 98 °F (36.7 °C) Oral 88 16 93 this and she has been referred to gastroenterology. I recommend that she continues with her outpatient treatment plan. Today I do not find any findings based on history and physical exam nor diagnostic work-up that would indicate a risk for serious or life-threatening condition. Patient is afebrile she is not tachycardic or tachypneic, no urinary findings and the rest of her blood work are unremarkable. Patient is agreeable with the treatment plan. CRITICAL CARE TIME   None       CONSULTS:  None    PROCEDURES:       Procedures    FINAL IMPRESSION      1. Flank pain    2.  Right upper quadrant abdominal pain          DISPOSITION/PLAN   DISPOSITION Decision To Discharge 03/30/2021 03:53:51 AM      PATIENT REFERREDTO:  Lucille Armenta [address] [address]  [phone]    Schedule an appointment as soon as possible for a visit in 2 days        Please follow-up with gastroenterology as previously recommended          DISCHARGEMEDICATIONS:  Discharge Medication List as of 3/30/2021  4:02 AM             (Please note that portions of this note were completed with a voice recognition program.  Efforts were made to edit the dictations but occasionally words are mis-transcribed.)    Zackary Lorenzo MD (electronically signed)  Attending Emergency Physician        Zackary Lorenzo MD  03/30/21 [id number]

## 2021-04-08 NOTE — PROGRESS NOTES
167 lb (75.8 kg)   02/13/20 167 lb (75.8 kg)   02/10/20 166 lb (75.3 kg)   12/04/19 170 lb (77.1 kg)   11/12/19 171 lb (77.6 kg)   09/05/19 187 lb (84.8 kg)   08/12/19 183 lb 9.6 oz (83.3 kg)   07/16/19 187 lb (84.8 kg)   06/27/19 187 lb (84.8 kg)   06/24/19 187 lb (84.8 kg)   05/13/19 191 lb (86.6 kg)   05/08/19 192 lb (87.1 kg)   04/30/19 191 lb 6.4 oz (86.8 kg)   04/08/19 182 lb 14.4 oz (83 kg)   03/13/19 180 lb (81.6 kg)   03/04/19 180 lb (81.6 kg)   02/28/19 180 lb (81.6 kg)   02/22/19 180 lb (81.6 kg)   02/09/19 178 lb (80.7 kg)   01/24/19 176 lb (79.8 kg)   01/23/19 176 lb 9.6 oz (80.1 kg)   01/07/19 180 lb (81.6 kg)   09/18/18 181 lb (82.1 kg)   09/07/18 179 lb (81.2 kg)   08/27/18 182 lb (82.6 kg)   08/01/18 178 lb (80.7 kg)   07/12/18 187 lb (84.8 kg)   06/20/18 187 lb (84.8 kg)   04/20/18 184 lb (83.5 kg)   03/29/18 184 lb (83.5 kg)   02/27/18 181 lb (82.1 kg)   02/16/18 184 lb 3.2 oz (83.6 kg)   01/12/18 178 lb (80.7 kg)   12/15/17 181 lb (82.1 kg)   11/27/17 178 lb (80.7 kg)       No components found for: HGBA1C  BP Readings from Last 3 Encounters:   04/09/21 129/89   03/30/21 118/75   03/21/21 (!) 108/57     Health Maintenance   Topic Date Due    COVID-19 Vaccine (1) Never done    Hepatitis B vaccine (1 of 3 - Risk 3-dose series) Never done    Diabetic retinal exam  07/26/2019    Diabetic foot exam  08/12/2020    Lipid screen  08/12/2020    A1C test (Diabetic or Prediabetic)  05/18/2021    Diabetic microalbuminuria test  02/18/2022    Potassium monitoring  03/30/2022    Creatinine monitoring  03/30/2022    Cervical cancer screen  05/17/2022    Breast cancer screen  09/16/2022    DTaP/Tdap/Td vaccine (4 - Td) 06/07/2026    Flu vaccine  Completed    Pneumococcal 0-64 years Vaccine  Completed    Hepatitis C screen  Completed    HIV screen  Completed    Hepatitis A vaccine  Aged Out    Hib vaccine  Aged Out    Meningococcal (ACWY) vaccine  Aged Out       No components found for: [facility]     PAST MEDICAL HISTORY     Past Medical History:   Diagnosis Date    Abdominal pain, acute, right lower quadrant 5/15/2013    Anxiety     Arthritis     Left Knee    Bilateral ovarian cysts 2013    Blood transfusion reaction     Cellulitis and abscess of trunk 2014    Pt was seen in ED today for bleeding from incision after taking a fall this morning.       COVID-19     Depression     Diabetes mellitus ([facility] [address]     [id number]    Diverticula of colon 2013    DM2 (diabetes mellitus, type 2) ([facility] [address] 2016    Insomnia     Insomnia secondary to situational depression 2014    Left carpal tunnel syndrome 2018    MDRO (multiple drug resistant organisms) resistance     poss MRSA after hysterectomy treated with antibiotics    OA (osteoarthritis) of knee 2014    Obesity (BMI 30.0-34.9) 2017    Ovarian cyst     Plantar fasciitis, left 2014     FAMILY HISTORY     Family History   Problem Relation Age of Onset    Cancer Mother         ovarian    Diabetes Mother     High Blood Pressure Mother     Hypertension Mother     Diabetes Brother     Hypertension Brother     Diabetes Maternal Grandmother     Cancer Maternal Grandfather         stomach    Prostate Cancer Paternal Grandfather         metastatic    Breast Cancer Maternal Aunt      SOCIAL HISTORY     Social History     Socioeconomic History    Marital status:      Spouse name: Not on file    Number of children: Not on file    Years of education: Not on file    Highest education level: Not on file   Occupational History    Not on file   Social Needs    Financial resource strain: Not on file    Food insecurity     Worry: Not on file     Inability: Not on file    Transportation needs     Medical: Not on file     Non-medical: Not on file   Tobacco Use    Smoking status: Former Smoker     Years: 0.00     Types: Cigarettes     Quit date: 1999     Years since quittin.5    Smokeless tobacco: Never Used    Tobacco comment: smoked for 1 months when teenager   Substance and Sexual Activity    Alcohol use: Yes     Comment: social drinker  4 beer per month    Drug use: No     Comment: Hx of cocaine use in     Sexual activity: Yes     Partners: Male     Comment: painful intercourse    Lifestyle    Physical activity     Days per week: Not on file     Minutes per session: Not on file    Stress: Not on file   Relationships    Social connections     Talks on phone: Not on file     Gets together: Not on file     Attends [employer] service: Not on file     Active member of club or organization: Not on file     Attends meetings of clubs or organizations: Not on file     Relationship status: Not on file    Intimate partner violence     Fear of current or ex partner: Not on file     Emotionally abused: Not on file     Physically abused: Not on file     Forced sexual activity: Not on file   Other Topics Concern    Not on file   Social History Narrative    Not on file     SURGICAL HISTORY     Past Surgical History:   Procedure Laterality Date    BREAST CYST ASPIRATION       SECTION      CHOLECYSTECTOMY      COLONOSCOPY  2007    polyps    HYSTERECTOMY  2008    LAPAROSCOPY      lysis of adhesions    LAPAROTOMY  2014    LAPAROTOMY, BILATERAL SALPINGO - OOPHORECTOMY    SALPINGO-OOPHORECTOMY  2014     CURRENT MEDICATIONS   (This list may include medications prescribed during this encounter as epic can not insert only the list prior to this encounter.)  Current Outpatient Rx   Medication Sig Dispense Refill    metFORMIN (GLUCOPHAGE) 1000 MG tablet Take 1 tablet by mouth 2 times daily (with meals) 180 tablet 3    bisacodyl (DULCOLAX) 5 MG EC tablet Take 4 tablets by mouth once for 1 dose Take as directed for colonoscopy.  4 tablet 0    polyethylene glycol-electrolytes (COLYTE) 240 g SOLR solution Take 4,000 mLs by mouth once for 1 dose 1 Bottle 0    magnesium citrate solution Take 296 mLs by mouth once for 1 dose 296 mL 0    gabapentin (NEURONTIN) 300 MG capsule Take 1 capsule by mouth 3 times daily for 30 days. 90 capsule 0    omeprazole (PRILOSEC) 40 MG delayed release capsule Take 1 capsule by mouth every morning (before breakfast) 30 capsule 0    ondansetron (ZOFRAN ODT) 4 MG disintegrating tablet Take 1 tablet by mouth every 8 hours as needed for Nausea 20 tablet 0    Insulin Glargine, 2 Unit Dial, (TOUJEO MAX SOLOSTAR) 300 UNIT/ML SOPN Inject 60 Units into the skin daily 4 pen 5    losartan (COZAAR) 50 MG tablet Take 1 tablet by mouth daily 30 tablet 5    Liraglutide (VICTOZA) 18 MG/3ML SOPN SC injection Inject 1.2 mg into the skin daily 2 pen 0    fluticasone (FLONASE) 50 MCG/ACT nasal spray 2 sprays by Nasal route daily 1 Bottle 0    Insulin Pen Needle (B-D UF III MINI PEN NEEDLES) 31G X 5 MM MISC Inject 1 each into the skin 4 times daily 100 each 0    blood glucose test strips (ASCENSIA AUTODISC VI;ONE TOUCH ULTRA TEST VI) strip DX: E11.9-One touch ultra strips. FSBS 3 times daily 100 strip 5    Lancets MISC DX: E 11.9-Uses insulin.  FSBS 3 times daily-Delica lancets for one touch ultra  each 5    glucose monitoring kit (FREESTYLE) monitoring kit Dx E11.9-One touch ultra II meter-uses tid 1 kit 0    sucralfate (CARAFATE) 1 GM tablet Take 1 tablet by mouth 3 times daily as needed (abdominal pain) 20 tablet 0    Capsaicin 0.1 % CREA Apply 1 applicator topically 2 times daily (Patient not taking: Reported on 4/9/2021) 60 g 5    butalbital-APAP-caffeine (FIORICET) -40 MG CAPS per capsule Take 1 capsule by mouth 2 times daily as needed for Headaches (Patient not taking: Reported on 3/26/2021) 20 capsule 0    ibuprofen (ADVIL;MOTRIN) 600 MG tablet Take 1 tablet by mouth every 8 hours as needed for Pain (Patient not taking: Reported on 4/9/2021) 30 tablet 0    insulin lispro (HUMALOG KWIKPEN) 100 UNIT/ML pen INJECT 20 UNITS SUBCUTANEOUSLY stent, elective endoscopic procedures should be delayed until they have received the minimum duration of recommended antiplatlet therapy and it can safely be held. Again if unsure, patient should discuss with prescribing physician/service. If anticoagulation can not be stopped, endoscopic procedures can still be performed either diagnostically at a somewhat higher risk. Understand that any therapeutic procedure where anything beyond looking is performed, carries higher risks. For this reason without overt bleeding other testing       such as cologuard may be more appropriate. High risk endoscopic procedures that require stopping antiplatelet and anticoagulation therapy include polypectomy, biliary or pancreatic sphincterotomy, pneumatic or bougie dilation, PEG placement, therapeutic balloon-assisted enteroscopy, EUS and FNA, tumor ablation by any technique,       cystogastrostomy,and treatment of varices. Order Specific Question:   Screening or Diagnostic? Answer:   Diagnostic    Covid-19 Ambulatory     Standing Status:   Future     Standing Expiration Date:   4/9/2022     Scheduling Instructions:      Saline media preferred given current shortage of viral transport media but both acceptable     Order Specific Question:   Status     Answer:   Asymptomatic/Surveillance (e.g. pre-op/pre-procedure, pre-delivery, transfer)     Order Specific Question:   Reason for Test     Answer:   Upcoming elective surgery/procedure/delivery, return to work, or discharge to another facility    EGD     Scheduling Instructions:      Schedule with anesthesia provided diprivan sedation. Please provide prep of choice instructions and prescription. General guidelines for holding blood thinners/anticoagulants around endoscopic procedure are but patients are encouraged to check with their prescribing physician.             The patient may hold Plavix, Effient, Brilinta 5 days prior to the procedure unless:       A drug eluting stent has been placed within past 12 months. A nondrug eluting stent has been placed within past 1 month. Coumadin may be held 4 days prior to the procedure unless:        Mechanical mitral valve replacement (requires heparin bridge while Coumadin held and is managed by pharmacy)      Pradaxa, Xarelto, Eliquis may be held 2-3 days prior to procedure. According to pharmacokinetics of the drug, package insert, cardiology practice patterns, and T1/2 of theses drugs (12 hrs), Eliquis and Xarelto are held 48hrs prior to any procedure, including major surgical procedures w/o       increased bleeding.  That is usually the standard of care, as coagulation would/should be normalized at 48hrs. Every attempt should be made to maintain ASA 81mg per day throughout the [place]-operative period in patients with diagnosis of ASHD. These recommendations may need to be modified by the provider/ based on risk /benefit analysis of the procedure and the patients history. If anticoagulation can not be held because recent cardiac stent, elective endoscopic procedures should be delayed until they have received the minimum duration of recommended antiplatlet therapy and it can safely be held. Again if unsure, patient should discuss with prescribing physician/service. If anticoagulation can not be stopped, endoscopic procedures can still be performed either diagnostically at a somewhat higher risk. Understand that any therapeutic procedure where anything beyond looking is performed, carries higher risks. For this reason without overt bleeding other testing       such as cologuard may be more appropriate.               High risk endoscopic procedures that require stopping antiplatelet and anticoagulation therapy include polypectomy, biliary or pancreatic sphincterotomy, pneumatic or bougie dilation, PEG placement, therapeutic balloon-assisted enteroscopy, EUS and FNA, tumor ablation by any technique,       cystogastrostomy,and treatment of varices. Order Specific Question:   Screening or Diagnostic? Answer:   Diagnostic       ORDERED FUTURE/PENDING TESTS     Lab Frequency Next Occurrence   LACHO DIGITAL SCREEN W OR WO CAD BILATERAL Once 03/03/2020       FOLLOWUP   Return if symptoms worsen or fail to improve.           Daryle Smolder 4/8/21 5:03 PM EDT    CC:  Mary Lou Krueger, DO

## 2021-04-09 ENCOUNTER — INITIAL CONSULT (OUTPATIENT)
Dept: GASTROENTEROLOGY | Age: 41
End: 2021-04-09

## 2021-04-09 ENCOUNTER — TELEPHONE (OUTPATIENT)
Dept: GASTROENTEROLOGY | Age: 41
End: 2021-04-09

## 2021-04-09 VITALS
SYSTOLIC BLOOD PRESSURE: 129 MMHG | DIASTOLIC BLOOD PRESSURE: 89 MMHG | HEART RATE: 78 BPM | WEIGHT: 146.6 LBS | HEIGHT: 61 IN | BODY MASS INDEX: 27.68 KG/M2

## 2021-04-09 DIAGNOSIS — R10.10 UPPER ABDOMINAL PAIN: ICD-10-CM

## 2021-04-09 DIAGNOSIS — Z86.010 PERSONAL HISTORY OF COLONIC POLYPS: ICD-10-CM

## 2021-04-09 DIAGNOSIS — R10.13 EPIGASTRIC PAIN: Primary | ICD-10-CM

## 2021-04-09 PROCEDURE — 99245 OFF/OP CONSLTJ NEW/EST HI 55: CPT | Performed by: INTERNAL MEDICINE

## 2021-04-09 RX ORDER — OMEPRAZOLE 40 MG/1
40 CAPSULE, DELAYED RELEASE ORAL
Qty: 30 CAPSULE | Refills: 1 | Status: SHIPPED
Start: 2021-04-09 | End: 2021-04-26 | Stop reason: ALTCHOICE

## 2021-04-09 RX ORDER — POLYETHYLENE GLYCOL 3350, SODIUM CHLORIDE, POTASSIUM CHLORIDE, SODIUM BICARBONATE, AND SODIUM SULFATE 240; 5.84; 2.98; 6.72; 22.72 G/4L; G/4L; G/4L; G/4L; G/4L
4000 POWDER, FOR SOLUTION ORAL ONCE
Qty: 1 BOTTLE | Refills: 0 | Status: SHIPPED | OUTPATIENT
Start: 2021-04-09 | End: 2021-04-09

## 2021-04-09 NOTE — PATIENT INSTRUCTIONS
black and look like tar, or they have streaks of blood.     · You vomit. Watch closely for changes in your health, and be sure to contact your doctor if:    · You do not get better as expected. Where can you learn more? Go to [email or web address] org and sign in to your [employer] account. Enter F788 in the [employer] box to learn more about \"Peptic Ulcer Disease: Care Instructions. \"     If you do not have an account, please click on the \"Sign Up Now\" link. Current as of: April 15, 2020               Content Version: 12.8  © 8219-6618 [employer]wise, Incorporated. Care instructions adapted under license by [facility] ([facility]). If you have questions about a medical condition or this instruction, always ask your healthcare professional. [address] any warranty or liability for your use of this information.

## 2021-04-12 ENCOUNTER — TELEPHONE (OUTPATIENT)
Dept: GASTROENTEROLOGY | Age: 41
End: 2021-04-12

## 2021-04-12 NOTE — TELEPHONE ENCOUNTER
Brenda Palma from [facility] called to say they cannot get the medication for colonoscopy prep. Can they use Miralax instead? His call back number is [phone].

## 2021-04-26 ENCOUNTER — OFFICE VISIT (OUTPATIENT)
Dept: FAMILY MEDICINE CLINIC | Age: 41
End: 2021-04-26

## 2021-04-26 ENCOUNTER — NURSE TRIAGE (OUTPATIENT)
Dept: OTHER | Facility: CLINIC | Age: 41
End: 2021-04-26

## 2021-04-26 VITALS
WEIGHT: 148.6 LBS | HEART RATE: 79 BPM | BODY MASS INDEX: 29.17 KG/M2 | TEMPERATURE: 98.2 F | DIASTOLIC BLOOD PRESSURE: 72 MMHG | OXYGEN SATURATION: 98 % | HEIGHT: 60 IN | SYSTOLIC BLOOD PRESSURE: 110 MMHG

## 2021-04-26 DIAGNOSIS — M79.2 NEUROPATHIC PAIN: ICD-10-CM

## 2021-04-26 DIAGNOSIS — R20.8 BURNING SENSATION OF FEET: ICD-10-CM

## 2021-04-26 DIAGNOSIS — R10.84 GENERALIZED ABDOMINAL PAIN: Primary | ICD-10-CM

## 2021-04-26 PROCEDURE — 99213 OFFICE O/P EST LOW 20 MIN: CPT | Performed by: FAMILY MEDICINE

## 2021-04-26 RX ORDER — DICYCLOMINE HYDROCHLORIDE 10 MG/1
10 CAPSULE ORAL
Qty: 90 CAPSULE | Refills: 0 | Status: SHIPPED | OUTPATIENT
Start: 2021-04-26 | End: 2021-07-02

## 2021-04-26 RX ORDER — TRAMADOL HYDROCHLORIDE 50 MG/1
50 TABLET ORAL EVERY 12 HOURS PRN
Qty: 30 TABLET | Refills: 0 | Status: SHIPPED | OUTPATIENT
Start: 2021-04-26 | End: 2021-05-26

## 2021-04-26 RX ORDER — PANTOPRAZOLE SODIUM 40 MG/1
40 TABLET, DELAYED RELEASE ORAL
Qty: 60 TABLET | Refills: 1 | Status: ON HOLD | OUTPATIENT
Start: 2021-04-26 | End: 2021-05-06 | Stop reason: SDUPTHER

## 2021-04-26 RX ORDER — ONDANSETRON 4 MG/1
4 TABLET, ORALLY DISINTEGRATING ORAL EVERY 8 HOURS PRN
Qty: 30 TABLET | Refills: 3 | Status: SHIPPED | OUTPATIENT
Start: 2021-04-26 | End: 2021-08-04

## 2021-04-26 ASSESSMENT — ENCOUNTER SYMPTOMS: ABDOMINAL PAIN: 1
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
## 2021-04-26 NOTE — PROGRESS NOTES
Ashli Navarro is a 42 y.o. female    Chief Complaint   Patient presents with    Abdominal Pain     RUQ AND LUQ pain     Back Pain     began today        HPI:    Abdominal Pain  This is a recurrent problem. The current episode started in the past 7 days. The onset quality is undetermined. The problem occurs daily. The problem has been waxing and waning. The pain is located in the generalized abdominal region. The abdominal pain does not radiate. Pertinent negatives include no dysuria, fever or hematuria. Nothing aggravates the pain. The pain is relieved by nothing. She has tried proton pump inhibitors for the symptoms. The treatment provided no relief. Prior diagnostic workup includes CT scan. Her past medical history is significant for abdominal surgery. ROS:    Review of Systems   Constitutional: Negative for fever. Gastrointestinal: Positive for abdominal pain. Genitourinary: Negative for dysuria and hematuria. /72 (Site: Right Upper Arm, Position: Sitting, Cuff Size: Medium Adult)   Pulse 79   Temp 98.2 °F (36.8 °C) (Temporal)   Ht 4' 11.57\" (1.513 m)   Wt 148 lb 9.6 oz (67.4 kg)   LMP 12/18/2008 (Approximate)   SpO2 98%   BMI 29.44 kg/m²     Physical Exam:    Physical Exam  Constitutional:       General: She is not in acute distress. Appearance: Normal appearance. She is not ill-appearing or toxic-appearing. HENT:      Head: Normocephalic. Abdominal:      General: Abdomen is flat. There is no distension. Palpations: There is no mass. Tenderness: There is abdominal tenderness. Neurological:      Mental Status: She is alert. Psychiatric:         Mood and Affect: Mood normal.         Behavior: Behavior normal.         Thought Content:  Thought content normal.         Current Outpatient Medications   Medication Sig Dispense Refill    pantoprazole (PROTONIX) 40 MG tablet Take 1 tablet by mouth 2 times daily (before meals) 60 tablet 1    ondansetron (ZOFRAN ODT) 4 MG disintegrating tablet Take 1 tablet by mouth every 8 hours as needed for Nausea 30 tablet 3    traMADol (ULTRAM) 50 MG tablet Take 1 tablet by mouth every 12 hours as needed for Pain for up to 30 days. 30 tablet 0    dicyclomine (BENTYL) 10 MG capsule Take 1 capsule by mouth 4 times daily (before meals and nightly) Do not fill until desired. 90 capsule 0    metFORMIN (GLUCOPHAGE) 1000 MG tablet Take 1 tablet by mouth 2 times daily (with meals) 180 tablet 3    gabapentin (NEURONTIN) 300 MG capsule Take 1 capsule by mouth 3 times daily for 30 days. 90 capsule 0    Insulin Glargine, 2 Unit Dial, (TOUJEO MAX SOLOSTAR) 300 UNIT/ML SOPN Inject 60 Units into the skin daily 4 pen 5    losartan (COZAAR) 50 MG tablet Take 1 tablet by mouth daily 30 tablet 5    Liraglutide (VICTOZA) 18 MG/3ML SOPN SC injection Inject 1.2 mg into the skin daily 2 pen 0    fluticasone (FLONASE) 50 MCG/ACT nasal spray 2 sprays by Nasal route daily 1 Bottle 0    Insulin Pen Needle (B-D UF III MINI PEN NEEDLES) 31G X 5 MM MISC Inject 1 each into the skin 4 times daily 100 each 0    blood glucose test strips (ASCENSIA AUTODISC VI;ONE TOUCH ULTRA TEST VI) strip DX: E11.9-One touch ultra strips. FSBS 3 times daily 100 strip 5    Lancets MISC DX: E 11.9-Uses insulin.  FSBS 3 times daily-Delica lancets for one touch ultra  each 5    glucose monitoring kit (FREESTYLE) monitoring kit Dx E11.9-One touch ultra II meter-uses tid 1 kit 0    magnesium citrate solution Take 296 mLs by mouth once for 1 dose 296 mL 0    sucralfate (CARAFATE) 1 GM tablet Take 1 tablet by mouth 3 times daily as needed (abdominal pain) 20 tablet 0    Capsaicin 0.1 % CREA Apply 1 applicator topically 2 times daily (Patient not taking: Reported on 4/9/2021) 60 g 5    butalbital-APAP-caffeine (FIORICET) -40 MG CAPS per capsule Take 1 capsule by mouth 2 times daily as needed for Headaches (Patient not taking: Reported on 3/26/2021) 20 capsule 0    ibuprofen (ADVIL;MOTRIN) 600 MG tablet Take 1 tablet by mouth every 8 hours as needed for Pain (Patient not taking: Reported on 4/9/2021) 30 tablet 0    insulin lispro (HUMALOG KWIKPEN) 100 UNIT/ML pen INJECT 20 UNITS SUBCUTANEOUSLY THREE TIMES DAILY BEFORE MEAL(S) (Patient not taking: Reported on 4/9/2021) 15 pen 3     No current facility-administered medications for this visit. Assessment:    1. Generalized abdominal pain    2. Neuropathic pain    3. Burning sensation of feet        Plan:    1. Generalized abdominal pain  Patient notices a bulge and her symptoms are worse with coughing or bearing down. It was difficult to assess if she had a hernia or not. Recent CT was unremarkable. Does not sound an ulcer. She feels better when she wears something tight around her abdomen. She has had multiple abdominal surgeries before  raising suspicion for hernia. - Mandeep Swanson MD, General Surgery, [facility]    2. Neuropathic pain  - traMADol (ULTRAM) 50 MG tablet; Take 1 tablet by mouth every 12 hours as needed for Pain for up to 30 days. Dispense: 30 tablet; Refill: 0    3. Burning sensation of feet  - traMADol (ULTRAM) 50 MG tablet; Take 1 tablet by mouth every 12 hours as needed for Pain for up to 30 days. Dispense: 30 tablet; Refill: 0      Patient to return to clinic if symptoms worsen or fail to improve.

## 2021-04-29 NOTE — PROGRESS NOTES
Obstructive Sleep Apnea (CHANDU) Screening     Patient:  Crystal Beltran    YOB: 1980      Medical Record #:  [id number]                     Date:  4/29/2021     1. Are you a loud and/or regular snorer? []  Yes       [x] No    2. Have you been observed to gasp or stop breathing during sleep? []  Yes       [x] No    3. Do you feel tired or groggy upon awakening or do you awaken with a headache?           []  Yes       [x] No    4. Are you often tired or fatigued during the wake time hours? []  Yes       [x] No    5. Do you fall asleep sitting, reading, watching TV or driving? []  Yes       [x] No    6. Do you often have problems with memory or concentration? []  Yes       [x] No    **If patient's score is ? 3 they are considered high risk for CHANDU. An Anesthesia provider will evaluate the patient and develop a plan of care the day of surgery. Note:  If the patient's BMI is more than 35 kg m¯² , has neck circumference > 40 cm, and/or high blood pressure the risk is greater (© American Sleep Apnea Association, 2006).

## 2021-04-29 NOTE — PROGRESS NOTES
Bring a drivers license and or picture ID/insurance card and any deductible required by [employer]- call prior to DOS  To determine if one is needed    Arrive 1 hour  Before your exam  Make sure you have a  and that they stay with in 24 hours after Your exam-   You are not allowed to operate a vehicle for 24 hours following your exam  Leave all jewelry and valuables at home/ no make up or nail polish   Follow your colon prep instructions that was sent to you by your physician/Its important that you follow the oral intake  Restrictions  according to  instructions as this process cleans your colon so your physician can visualize the colon   If you are still passing brown solid stool the morning of your procedure contact your Gi doctor  If you are on insulin contact PCP to adjust dosing according to change in diet for exam/ if you are taking blood thinners this to needs to be addressed with physician in the preop days prior to exam - this may or may not need to be adjusted for the exam please confirm with your ordering physician   Any questions regarding these instructions  Or the procedure contact your GI physician.

## 2021-04-30 ENCOUNTER — HOSPITAL ENCOUNTER (OUTPATIENT)
Age: 41
Discharge: HOME | End: 2021-04-30

## 2021-04-30 DIAGNOSIS — Z86.010 PERSONAL HISTORY OF COLONIC POLYPS: ICD-10-CM

## 2021-04-30 DIAGNOSIS — R10.13 EPIGASTRIC PAIN: ICD-10-CM

## 2021-04-30 LAB — SARS-COV-2: NOT DETECTED

## 2021-04-30 PROCEDURE — U0003 INFECTIOUS AGENT DETECTION BY NUCLEIC ACID (DNA OR RNA); SEVERE ACUTE RESPIRATORY SYNDROME CORONAVIRUS 2 (SARS-COV-2) (CORONAVIRUS DISEASE [COVID-19]), AMPLIFIED PROBE TECHNIQUE, MAKING USE OF HIGH THROUGHPUT TECHNOLOGIES AS DESCRIBED BY CMS-2020-01-R: HCPCS

## 2021-04-30 PROCEDURE — U0005 INFEC AGEN DETEC AMPLI PROBE: HCPCS

## 2021-05-04 ENCOUNTER — ANESTHESIA EVENT (OUTPATIENT)
Dept: ENDOSCOPY | Age: 41
End: 2021-05-04

## 2021-05-06 ENCOUNTER — HOSPITAL ENCOUNTER (OUTPATIENT)
Age: 41
Setting detail: OUTPATIENT SURGERY
Discharge: HOME | End: 2021-05-06
Attending: INTERNAL MEDICINE | Admitting: INTERNAL MEDICINE

## 2021-05-06 ENCOUNTER — ANESTHESIA (OUTPATIENT)
Dept: ENDOSCOPY | Age: 41
End: 2021-05-06

## 2021-05-06 VITALS
HEIGHT: 61 IN | SYSTOLIC BLOOD PRESSURE: 94 MMHG | RESPIRATION RATE: 16 BRPM | DIASTOLIC BLOOD PRESSURE: 57 MMHG | HEART RATE: 76 BPM | BODY MASS INDEX: 27.94 KG/M2 | TEMPERATURE: 96.8 F | OXYGEN SATURATION: 99 % | WEIGHT: 148 LBS

## 2021-05-06 VITALS — DIASTOLIC BLOOD PRESSURE: 32 MMHG | OXYGEN SATURATION: 100 % | SYSTOLIC BLOOD PRESSURE: 76 MMHG

## 2021-05-06 DIAGNOSIS — Z86.010 HX OF COLONIC POLYPS: ICD-10-CM

## 2021-05-06 DIAGNOSIS — R10.13 EPIGASTRIC PAIN: ICD-10-CM

## 2021-05-06 LAB
GLUCOSE BLD-MCNC: 155 MG/DL (ref 70–99)
GLUCOSE BLD-MCNC: 162 MG/DL (ref 70–99)
PERFORMED ON: ABNORMAL
PERFORMED ON: ABNORMAL

## 2021-05-06 PROCEDURE — 3700000000 HC ANESTHESIA ATTENDED CARE: Performed by: INTERNAL MEDICINE

## 2021-05-06 PROCEDURE — 6360000002 HC RX W HCPCS: Performed by: NURSE ANESTHETIST, CERTIFIED REGISTERED

## 2021-05-06 PROCEDURE — 43239 EGD BIOPSY SINGLE/MULTIPLE: CPT | Performed by: INTERNAL MEDICINE

## 2021-05-06 PROCEDURE — 3700000001 HC ADD 15 MINUTES (ANESTHESIA): Performed by: INTERNAL MEDICINE

## 2021-05-06 PROCEDURE — 3609027000 HC COLONOSCOPY: Performed by: INTERNAL MEDICINE

## 2021-05-06 PROCEDURE — 88342 IMHCHEM/IMCYTCHM 1ST ANTB: CPT

## 2021-05-06 PROCEDURE — 3609012400 HC EGD TRANSORAL BIOPSY SINGLE/MULTIPLE: Performed by: INTERNAL MEDICINE

## 2021-05-06 PROCEDURE — 45378 DIAGNOSTIC COLONOSCOPY: CPT | Performed by: INTERNAL MEDICINE

## 2021-05-06 PROCEDURE — 2709999900 HC NON-CHARGEABLE SUPPLY: Performed by: INTERNAL MEDICINE

## 2021-05-06 PROCEDURE — 7100000010 HC PHASE II RECOVERY - FIRST 15 MIN: Performed by: INTERNAL MEDICINE

## 2021-05-06 PROCEDURE — 2500000003 HC RX 250 WO HCPCS: Performed by: ANESTHESIOLOGY

## 2021-05-06 PROCEDURE — 88305 TISSUE EXAM BY PATHOLOGIST: CPT

## 2021-05-06 PROCEDURE — 7100000011 HC PHASE II RECOVERY - ADDTL 15 MIN: Performed by: INTERNAL MEDICINE

## 2021-05-06 PROCEDURE — 2500000003 HC RX 250 WO HCPCS: Performed by: NURSE ANESTHETIST, CERTIFIED REGISTERED

## 2021-05-06 PROCEDURE — 2580000003 HC RX 258: Performed by: INTERNAL MEDICINE

## 2021-05-06 RX ORDER — PROPOFOL 10 MG/ML
INJECTION, EMULSION INTRAVENOUS PRN
Status: DISCONTINUED | OUTPATIENT
Start: 2021-05-06 | End: 2021-05-06 | Stop reason: SDUPTHER

## 2021-05-06 RX ORDER — SODIUM CHLORIDE, SODIUM LACTATE, POTASSIUM CHLORIDE, CALCIUM CHLORIDE 600; 310; 30; 20 MG/100ML; MG/100ML; MG/100ML; MG/100ML
INJECTION, SOLUTION INTRAVENOUS CONTINUOUS PRN
Status: DISCONTINUED | OUTPATIENT
Start: 2021-05-06 | End: 2021-05-06 | Stop reason: ALTCHOICE

## 2021-05-06 RX ORDER — SODIUM CHLORIDE, SODIUM LACTATE, POTASSIUM CHLORIDE, CALCIUM CHLORIDE 600; 310; 30; 20 MG/100ML; MG/100ML; MG/100ML; MG/100ML
INJECTION, SOLUTION INTRAVENOUS CONTINUOUS
Status: DISCONTINUED | OUTPATIENT
Start: 2021-05-06 | End: 2021-05-06 | Stop reason: HOSPADM

## 2021-05-06 RX ORDER — LIDOCAINE HYDROCHLORIDE 10 MG/ML
0.1 INJECTION, SOLUTION EPIDURAL; INFILTRATION; INTRACAUDAL; PERINEURAL ONCE
Status: DISCONTINUED | OUTPATIENT
Start: 2021-05-06 | End: 2021-05-06 | Stop reason: HOSPADM

## 2021-05-06 RX ORDER — SODIUM CHLORIDE 0.9 % (FLUSH) 0.9 %
10 SYRINGE (ML) INJECTION PRN
Status: DISCONTINUED | OUTPATIENT
Start: 2021-05-06 | End: 2021-05-06 | Stop reason: HOSPADM

## 2021-05-06 RX ORDER — LIDOCAINE HYDROCHLORIDE 20 MG/ML
INJECTION, SOLUTION INFILTRATION; PERINEURAL PRN
Status: DISCONTINUED | OUTPATIENT
Start: 2021-05-06 | End: 2021-05-06 | Stop reason: SDUPTHER

## 2021-05-06 RX ORDER — PANTOPRAZOLE SODIUM 40 MG/1
40 TABLET, DELAYED RELEASE ORAL DAILY
Qty: 60 TABLET | Refills: 2 | Status: SHIPPED | OUTPATIENT
Start: 2021-05-06 | End: 2021-07-13 | Stop reason: DRUGHIGH

## 2021-05-06 RX ORDER — SODIUM CHLORIDE 9 MG/ML
25 INJECTION, SOLUTION INTRAVENOUS PRN
Status: DISCONTINUED | OUTPATIENT
Start: 2021-05-06 | End: 2021-05-06 | Stop reason: HOSPADM

## 2021-05-06 RX ORDER — SODIUM CHLORIDE 0.9 % (FLUSH) 0.9 %
10 SYRINGE (ML) INJECTION EVERY 12 HOURS SCHEDULED
Status: DISCONTINUED | OUTPATIENT
Start: 2021-05-06 | End: 2021-05-06 | Stop reason: HOSPADM

## 2021-05-06 RX ADMIN — LIDOCAINE HYDROCHLORIDE 100 MG: 20 INJECTION, SOLUTION INFILTRATION; PERINEURAL at 07:47

## 2021-05-06 RX ADMIN — FAMOTIDINE 20 MG: 10 INJECTION, SOLUTION INTRAVENOUS at 07:37

## 2021-05-06 RX ADMIN — PROPOFOL 460 MG: 10 INJECTION, EMULSION INTRAVENOUS at 07:47

## 2021-05-06 RX ADMIN — PHENYLEPHRINE HYDROCHLORIDE 100 MCG: 10 INJECTION INTRAVENOUS at 08:13

## 2021-05-06 RX ADMIN — PHENYLEPHRINE HYDROCHLORIDE 100 MCG: 10 INJECTION INTRAVENOUS at 07:57

## 2021-05-06 RX ADMIN — PHENYLEPHRINE HYDROCHLORIDE 100 MCG: 10 INJECTION INTRAVENOUS at 08:06

## 2021-05-06 RX ADMIN — SODIUM CHLORIDE, POTASSIUM CHLORIDE, SODIUM LACTATE AND CALCIUM CHLORIDE: 600; 310; 30; 20 INJECTION, SOLUTION INTRAVENOUS at 07:34

## 2021-05-06 ASSESSMENT — PAIN SCALES - GENERAL: PAINLEVEL_OUTOF10: 0

## 2021-05-06 NOTE — PROGRESS NOTES
POCT      Blood Glucose: 162      (Normal Range 70-99)    PT:      (Normal Range 9.8 - 13)    INR:      (Normal Range 0.86-1.14)

## 2021-05-06 NOTE — ANESTHESIA PRE PROCEDURE
Department of Anesthesiology  Preprocedure Note       Name:  Erlinda Monsivais   Age:  42 y.o.  :  1980                                          MRN:  [id number]         Date:  2021      Surgeon: Kyler Leblanc):  Leon Friedman MD    Procedure: Procedure(s):  COLONOSCOPY WITH ANESTHESIA  EGD WITH ANESTHESIA    Medications prior to admission:   Prior to Admission medications    Medication Sig Start Date End Date Taking? Authorizing Provider   pantoprazole (PROTONIX) 40 MG tablet Take 1 tablet by mouth 2 times daily (before meals) 21   Kayla Morse, DO   ondansetron (ZOFRAN ODT) 4 MG disintegrating tablet Take 1 tablet by mouth every 8 hours as needed for Nausea 21   Kayla Morse, DO   traMADol (ULTRAM) 50 MG tablet Take 1 tablet by mouth every 12 hours as needed for Pain for up to 30 days. 21  Kayla Morse DO   dicyclomine (BENTYL) 10 MG capsule Take 1 capsule by mouth 4 times daily (before meals and nightly) Do not fill until desired. 21   Kayla Morse DO   metFORMIN (GLUCOPHAGE) 1000 MG tablet Take 1 tablet by mouth 2 times daily (with meals) 21   Leon Friedman MD   magnesium citrate solution Take 296 mLs by mouth once for 1 dose 21  Leon Friedman MD   gabapentin (NEURONTIN) 300 MG capsule Take 1 capsule by mouth 3 times daily for 30 days.  3/27/21 4/26/21  Blake Krueger,    sucralfate (CARAFATE) 1 GM tablet Take 1 tablet by mouth 3 times daily as needed (abdominal pain) 3/21/21 3/26/21  Jeronimo Quiroz MD   Insulin Glargine, 2 Unit Dial, (TOUJEO MAX SOLOSTAR) 300 UNIT/ML SOPN Inject 60 Units into the skin daily 3/3/21   IRASEMA Lopez CNP   losartan (COZAAR) 50 MG tablet Take 1 tablet by mouth daily 3/3/21   IRASEMA Lopez - CNP   Liraglutide (VICTOZA) 18 MG/3ML SOPN SC injection Inject 1.2 mg into the skin daily 21   Hany Cantrell MD   fluticasone (FLONASE) 50 MCG/ACT nasal spray 2 sprays by Nasal route daily 11/12/19   EVETTE Faulkner   insulin lispro (HUMALOG KWIKPEN) 100 UNIT/ML pen INJECT 20 UNITS SUBCUTANEOUSLY THREE TIMES DAILY BEFORE MEAL(S)  Patient not taking: Reported on 4/9/2021 8/5/19   Blake Krueger DO   Insulin Pen Needle (B-D UF III MINI PEN NEEDLES) 31G X 5 MM MISC Inject 1 each into the skin 4 times daily 1/24/19   IRASEMA Haley - CNP   blood glucose test strips (ASCENSIA AUTODISC VI;ONE TOUCH ULTRA TEST VI) strip DX: E11.9-One touch ultra strips. FSBS 3 times daily 8/1/18   EVETTE Monahan   Lancets MISC DX: E 11.9-Uses insulin. FSBS 3 times daily-Delica lancets for one touch ultra II 6/10/16   Blake Krueger DO   glucose monitoring kit (FREESTYLE) monitoring kit Dx E11.9-One touch ultra II meter-uses tid 6/10/16   Blake Krueger DO       Current medications:    Current Facility-Administered Medications   Medication Dose Route Frequency Provider Last Rate Last Admin    lactated ringers infusion   Intravenous Continuous Violet Talley MD        lidocaine PF 1 % injection 0.1 mL  0.1 mL Intradermal Once Violet Talley MD        lactated ringers infusion   Intravenous Continuous Ester Zapata MD        sodium chloride flush 0.9 % injection 10 mL  10 mL Intravenous 2 times per day Ester Zapata MD        sodium chloride flush 0.9 % injection 10 mL  10 mL Intravenous PRN Ester Zapata MD        0.9 % sodium chloride infusion  25 mL Intravenous PRN Ester Zapata MD        famotidine (PEPCID) injection 20 mg  20 mg Intravenous Once Ester Zapata MD           Allergies: Allergies   Allergen Reactions    Lisinopril Swelling    Lisinopril-Hydrochlorothiazide Swelling    Morphine Nausea And Vomiting     States after getting morphine began having sharp chest pain.         Problem List:    Patient Active Problem List   Diagnosis Code    OA (osteoarthritis) of knee M17.10    Type II or unspecified type diabetes mellitus with ophthalmic manifestations, not stated as uncontrolled(250.50) E11.39    Elevated alkaline phosphatase level R74.8    Type 2 diabetes mellitus without complication, with long-term current use of insulin (HCC) E11.9, Z79.4    Obesity (BMI 30.0-34. 9) E66.9    Cervical pain (neck) M54.2    Burning sensation of feet R20.8    Insomnia due to medical condition G47.01    Vitreous floaters of left eye H43.392    Left carpal tunnel syndrome G56.02    Closed avulsion fracture of middle phalanx of finger S62.629A    Acute transudative otitis media H65.199    Dental caries K02.9    Fracture of tooth S02. 5XXA    Otalgia of right ear H92.01    History of shingles Z86.19    Neuropathic pain M79.2       Past Medical History:        Diagnosis Date    Abdominal pain, acute, right lower quadrant 5/15/2013    Anxiety     Arthritis     Left Knee    Bilateral ovarian cysts 2013    Blood transfusion reaction     Cellulitis and abscess of trunk 2014    Pt was seen in ED today for bleeding from incision after taking a fall this morning.       COVID-19 2020    Depression     Diabetes mellitus ([address]     x5yr    Diverticula of colon 2013    DM2 (diabetes mellitus, type 2) [address] [address] 2016    Insomnia     Insomnia secondary to situational depression 2014    Left carpal tunnel syndrome 2018    MDRO (multiple drug resistant organisms) resistance     poss MRSA after hysterectomy treated with antibiotics    OA (osteoarthritis) of knee 2014    Obesity (BMI 30.0-34.9) 2017    Ovarian cyst     Plantar fasciitis, left 2014       Past Surgical History:        Procedure Laterality Date    BREAST CYST ASPIRATION       SECTION      CHOLECYSTECTOMY      COLONOSCOPY  2007    polyps    HYSTERECTOMY  2008    LAPAROSCOPY      lysis of adhesions    LAPAROTOMY  2014    LAPAROTOMY, BILATERAL SALPINGO - OOPHORECTOMY    SALPINGO-OOPHORECTOMY  2014       Social History:    Social History     Tobacco Use    Smoking status: Former Smoker     Years: 0.00     Types: Cigarettes     Quit date: 1999     Years since quittin.6    Smokeless tobacco: Never Used    Tobacco comment: smoked for 1 months when teenager   Substance Use Topics    Alcohol use: Yes     Comment: social drinker  4 beer per month                                Counseling given: Not Answered  Comment: smoked for 1 months when teenager      Vital Signs (Current):   Vitals:    21 1452 21 0716   BP:  107/66   Pulse:  90   Resp:  16   Temp:  96.8 °F (36 °C)   TempSrc:  Temporal   SpO2:  97%   Weight: 148 lb (67.1 kg)    Height: 5' 1\" (1.549 m)                                               BP Readings from Last 3 Encounters:   21 107/66   21 110/72   21 129/89       NPO Status: Time of last liquid consumption:                         Time of last solid consumption:                         Date of last liquid consumption: 21                        Date of last solid food consumption: 21    BMI:   Wt Readings from Last 3 Encounters:   21 148 lb (67.1 kg)   21 148 lb 9.6 oz (67.4 kg)   21 146 lb 9.6 oz (66.5 kg)     Body mass index is 27.96 kg/m².     CBC:   Lab Results   Component Value Date    WBC 6.8 2021    RBC 4.08 2021    HGB 12.5 2021    HCT 36.4 2021    MCV 89.1 2021    RDW 12.6 2021     2021       CMP:   Lab Results   Component Value Date     2021    K 3.5 2021    K 3.9 2021     2021    CO2 28 2021    BUN 16 2021    CREATININE <0.5 2021    GFRAA >60 2021    GFRAA >60 2013    AGRATIO 1.4 2021    LABGLOM >60 2021    GLUCOSE 136 2021    PROT 6.9 2021    PROT 7.3 2013    CALCIUM 9.7 2021    BILITOT 0.5 2021    ALKPHOS 118 2021    AST 15 2021    ALT 21 2021 POC Tests: No results for input(s): POCGLU, POCNA, POCK, POCCL, POCBUN, POCHEMO, POCHCT in the last 72 hours. Coags:   Lab Results   Component Value Date    PROTIME 11.4 05/11/2020    INR 0.98 05/11/2020    APTT 33.6 03/10/2020       HCG (If Applicable):   Lab Results   Component Value Date    PREGTESTUR Negative 05/24/2014        ABGs: No results found for: PHART, PO2ART, ANA6MFZ, TXN8BLI, BEART, Z0NZGHFZ     Type & Screen (If Applicable):  No results found for: LABABO, LABRH    Drug/Infectious Status (If Applicable):  No results found for: HIV, HEPCAB    COVID-19 Screening (If Applicable):   Lab Results   Component Value Date    COVID19 Not Detected 04/30/2021    COVID19 NOT DETECTED 11/16/2020           Anesthesia Evaluation  Patient summary reviewed and Nursing notes reviewed  Airway: Mallampati: I  TM distance: >3 FB   Neck ROM: full  Mouth opening: > = 3 FB Dental: normal exam         Pulmonary:Negative Pulmonary ROS and normal exam  breath sounds clear to auscultation                             Cardiovascular:Negative CV ROS            Rhythm: regular  Rate: normal                    Neuro/Psych:   (+) psychiatric history:            GI/Hepatic/Renal: Neg GI/Hepatic/Renal ROS            Endo/Other:    (+) DiabetesType II DM, , : arthritis: OA., .                 Abdominal:           Vascular: negative vascular ROS. Anesthesia Plan      MAC     ASA 2       Induction: intravenous. Anesthetic plan and risks discussed with patient. Plan discussed with CRNA.                   Amanda Winter MD   5/6/2021

## 2021-05-06 NOTE — H&P
[id number] [facility],  [address] [place] [address] Ave  [employer], [address] [place] [address]  Phone: [phone] COMPLAINT     Abdominal pain    HPI     Thank you Pita Fuentes DO for asking me to see Deneen Vaca in consultation. Deneen Vaca is a 42 y.o. female  [2] White [1] with medical history of anxiety disorder, depression, diabetes mellitus type 2, ovarian cyst, cholecystectomy seen independently with referral for epigastric pain of 3 months duration. Pain is sharp/burning, intermittent, radiates to the upper sides. Associated with bloating, nausea, dark tarry stools and constipation with bowel movement 2-3 days. Denies vomiting, fever, chills, unintentional weight loss, diarrhea, hematochezia. Reports previously motrin and naproxen for leg pains, discontinued 2 weeks ago. Patient was seen and [facility] ED on 3/30/2021 and 3/17/2021 with complaints of right upper quadrant abdominal pain flank pain. Labs including BMP, LFTs, lipase, WBC were unremarkable. CT abdomen and pelvis 3/17/2021 noted no acute abnormality. Managed with Mylanta, lidocaine with improvement. Toradol in the ED and discharged to follow-up with GI.     Last Encounter Reviewed: None  Pertinent PMH, FH, SH is reviewed below. Last EGD: None  Last Colonoscopy: polyps 12-13 years ago in [facility]     Review of available records reveals:   Wt Readings from Last 50 Encounters:   04/29/21 148 lb (67.1 kg)   04/26/21 148 lb 9.6 oz (67.4 kg)   04/09/21 146 lb 9.6 oz (66.5 kg)   03/30/21 150 lb (68 kg)   03/20/21 150 lb (68 kg)   03/17/21 153 lb (69.4 kg)   03/03/21 151 lb 12.8 oz (68.9 kg)   02/18/21 140 lb (63.5 kg)   01/19/21 143 lb (64.9 kg)   12/15/20 147 lb (66.7 kg)   11/30/20 144 lb (65.3 kg)   11/14/20 147 lb (66.7 kg)   09/29/20 154 lb (69.9 kg)   09/16/20 151 lb 12.8 oz (68.9 kg)   05/10/20 146 lb (66.2 kg)   03/10/20 161 lb (73 kg)   03/03/20 172 lb 3.2 oz (78.1 kg)   02/29/20 167 lb (75.8 kg)   02/13/20 167 lb (75.8 kg)   02/10/20 166 lb (75.3 kg)   12/04/19 170 lb (77.1 kg)   11/12/19 171 lb (77.6 kg)   09/05/19 187 lb (84.8 kg)   08/12/19 183 lb 9.6 oz (83.3 kg)   07/16/19 187 lb (84.8 kg)   06/27/19 187 lb (84.8 kg)   06/24/19 187 lb (84.8 kg)   05/13/19 191 lb (86.6 kg)   05/08/19 192 lb (87.1 kg)   04/30/19 191 lb 6.4 oz (86.8 kg)   04/08/19 182 lb 14.4 oz (83 kg)   03/13/19 180 lb (81.6 kg)   03/04/19 180 lb (81.6 kg)   02/28/19 180 lb (81.6 kg)   02/22/19 180 lb (81.6 kg)   02/09/19 178 lb (80.7 kg)   01/24/19 176 lb (79.8 kg)   01/23/19 176 lb 9.6 oz (80.1 kg)   01/07/19 180 lb (81.6 kg)   09/18/18 181 lb (82.1 kg)   09/07/18 179 lb (81.2 kg)   08/27/18 182 lb (82.6 kg)   08/01/18 178 lb (80.7 kg)   07/12/18 187 lb (84.8 kg)   06/20/18 187 lb (84.8 kg)   04/20/18 184 lb (83.5 kg)   03/29/18 184 lb (83.5 kg)   02/27/18 181 lb (82.1 kg)   02/16/18 184 lb 3.2 oz (83.6 kg)   01/12/18 178 lb (80.7 kg)       No components found for: HGBA1C  BP Readings from Last 3 Encounters:   04/26/21 110/72   04/09/21 129/89   03/30/21 118/75     Health Maintenance   Topic Date Due    COVID-19 Vaccine (1) Never done    Hepatitis B vaccine (1 of 3 - Risk 3-dose series) Never done    Diabetic retinal exam  07/26/2019    Diabetic foot exam  08/12/2020    Lipid screen  08/12/2020    A1C test (Diabetic or Prediabetic)  05/18/2021    Diabetic microalbuminuria test  02/18/2022    Potassium monitoring  03/30/2022    Creatinine monitoring  03/30/2022    Cervical cancer screen  05/17/2022    Breast cancer screen  09/16/2022    DTaP/Tdap/Td vaccine (4 - Td) 06/07/2026    Flu vaccine  Completed    Pneumococcal 0-64 years Vaccine  Completed    Hepatitis C screen  Completed    HIV screen  Completed    Hepatitis A vaccine  Aged Out    Hib vaccine  Aged Out    Meningococcal (ACWY) vaccine  Aged Out       No components found for: "[employer]     PAST MEDICAL HISTORY     Past Medical History:   Diagnosis Date    Abdominal pain, acute, right lower quadrant 5/15/2013    Anxiety     Arthritis     Left Knee    Bilateral ovarian cysts 2013    Blood transfusion reaction     Cellulitis and abscess of trunk 2014    Pt was seen in ED today for bleeding from incision after taking a fall this morning.       COVID-19 2020    Depression     Diabetes mellitus [address] [address]     x5yr    Diverticula of colon 2013    DM2 (diabetes mellitus, type 2) ([facility] [address] 2016    Insomnia     Insomnia secondary to situational depression 2014    Left carpal tunnel syndrome 2018    MDRO (multiple drug resistant organisms) resistance     poss MRSA after hysterectomy treated with antibiotics    OA (osteoarthritis) of knee 2014    Obesity (BMI 30.0-34.9) 2017    Ovarian cyst     Plantar fasciitis, left 2014     FAMILY HISTORY     Family History   Problem Relation Age of Onset    Cancer Mother         ovarian    Diabetes Mother     High Blood Pressure Mother     Hypertension Mother     Diabetes Brother     Hypertension Brother     Diabetes Maternal Grandmother     Cancer Maternal Grandfather         stomach    Prostate Cancer Paternal Grandfather         metastatic    Breast Cancer Maternal Aunt      SOCIAL HISTORY     Social History     Socioeconomic History    Marital status:      Spouse name: Not on file    Number of children: Not on file    Years of education: Not on file    Highest education level: Not on file   Occupational History    Not on file   Social Needs    Financial resource strain: Not on file    Food insecurity     Worry: Not on file     Inability: Not on file    Transportation needs     Medical: Not on file     Non-medical: Not on file   Tobacco Use    Smoking status: Former Smoker     Years: 0.00     Types: Cigarettes     Quit date: 1999     Years since quittin.6    Smokeless tobacco: Never Used    Tobacco comment: smoked for 1 months when teenager   Substance and Sexual Activity    Alcohol use: Yes     Comment: social drinker  4 beer per month    Drug use: No     Comment: Hx of cocaine use in 2004- 9 months     Sexual activity: Yes     Partners: Male     Comment: painful intercourse    Lifestyle    Physical activity     Days per week: Not on file     Minutes per session: Not on file    Stress: Not on file   Relationships    Social connections     Talks on phone: Not on file     Gets together: Not on file     Attends Mos[employer] service: Not on file     Active member of club or organization: Not on file     Attends meetings of clubs or organizations: Not on file     Relationship status: Not on file    Intimate partner violence     Fear of current or ex partner: Not on file     Emotionally abused: Not on file     Physically abused: Not on file     Forced sexual activity: Not on file   Other Topics Concern    Not on file   Social History Narrative    Not on file     SURGICAL HISTORY     Past Surgical History:   Procedure Laterality Date    BREAST CYST ASPIRATION       SECTION      CHOLECYSTECTOMY      COLONOSCOPY  2007    polyps    HYSTERECTOMY  2008    LAPAROSCOPY      lysis of adhesions    LAPAROTOMY  2014    LAPAROTOMY, BILATERAL SALPINGO - OOPHORECTOMY    SALPINGO-OOPHORECTOMY  2014     CURRENT MEDICATIONS   (This list may include medications prescribed during this encounter as epic can not insert only the list prior to this encounter.)    ALLERGIES     Allergies   Allergen Reactions    Lisinopril Swelling    Lisinopril-Hydrochlorothiazide Swelling    Morphine Nausea And Vomiting     States after getting morphine began having sharp chest pain.       IMMUNIZATIONS     Immunization History   Administered Date(s) Administered    Influenza Virus Vaccine 2014, 2014, 2016    Influenza, Intradermal, Preservative free 2012    Influenza, Quadv, IM, PF (6 mo and older Fluzone, Flulaval, Fluarix, and 3 yrs and older Afluria) 11/27/2017, 10/07/2020    Pneumococcal Polysaccharide ([id number]) 01/17/2014, 12/03/2014    Tdap (Boostrix, Adacel) 03/17/2014, 02/21/2015, 06/07/2016     REVIEW OF SYSTEMS   See HPI for further details and pertinent postiives. Negative for the following:  Constitutional: Negative for weight change. Negative for appetite change and fatigue. HENT: Negative for nosebleeds, sore throat, mouth sores, and voice change. Respiratory: Negative for cough, choking and chest tightness. Cardiovascular: Negative for chest pain   Gastrointestinal: See HPI  Musculoskeletal: Negative for arthralgias. Skin: Negative for pallor. Neurological: Negative for weakness and light-headedness. Hematological: Negative for adenopathy. Does not bruise/bleed easily. Psychiatric/Behavioral: Negative for suicidal ideas. PHYSICAL EXAM   VITAL SIGNS: Ht 5' 1\" (1.549 m)   Wt 148 lb (67.1 kg)   LMP 12/18/2008 (Approximate)   BMI 27.96 kg/m²   Wt Readings from Last 3 Encounters:   04/29/21 148 lb (67.1 kg)   04/26/21 148 lb 9.6 oz (67.4 kg)   04/09/21 146 lb 9.6 oz (66.5 kg)     Constitutional: Well developed, Well nourished, No acute distress, Non-toxic appearance. HENT: Normocephalic, Atraumatic, Bilateral external ears normal, Oropharynx moist, No oral exudates, Nose normal.   Eyes: Conjunctiva normal, No discharge. Neck: Normal range of motion, No tenderness, Supple, No stridor. Lymphatic: No cervical, subclavian, or axillary lymphadenopathy. Cardiovascular: Normal heart rate, Normal rhythm, No murmurs, No rubs, No gallops. Thorax & Lungs: Normal breath sounds, No respiratory distress, No wheezing, No chest tenderness. No gynecomastia. Abdomen:  normal bowel sounds, soft, non tender, non distended, scars consistent with stated surgeries, no hernias  Rectal:  Deferred. Skin: Warm, Dry, No erythema, No rash. No bruising.    Lower Extremities: Intact distal pulses, No edema, No tenderness, No cyanosis, No clubbing. Neurologic: Alert & oriented x 3, Normal motor function, Normal sensory function, No focal deficits noted. RADIOLOGY/PROCEDURES   No results found. FINAL IMPRESSION   Epigastric pain; likely due to peptic ulcer disease in the setting of NSAID use and dark tarry stools. -     EGD with biopsy  -     Covid-19 Ambulatory; Future  -      Omeprazole 40 mg orally twice daily  -      Avoid NSAIDs     Esophagogastroduodenoscopy (EGD) with alternatives, rationale, benefits and risks, not limited to bleeding, infection, perforation, need of surgery, prolong hospital stay and anesthesia side effects were explained. Patient verbalized understanding and agrees to proceed with EGD.         Personal history of colonic polyps  -     COLONOSCOPY W/ OR W/O BIOPSY  -     Covid-19 Ambulatory; Future     Colonoscopy with alternatives, rationale, benefits and risks, not limited to bleeding, infection, perforation, need of surgery, prolong hospital stay and anesthesia side effects were explained.  Patient verbalized understanding and agrees to proceed with colonoscopy.        Orders Placed This Encounter   Procedures    Protime-INR     All pt on anti coagulant that apply     Standing Status:   Standing     Number of Occurrences:   1    Discharge instructions     Standing Status:   Standing     Number of Occurrences:   1    Nursing communication pre-op     Monitor pt with Dinemap/pulse oximetry/cardiac monitor: t  Post procedure:titrate oxygen to keep O2>90%   Discharge when awake and VSS     Standing Status:   Standing     Number of Occurrences:   1    Vital signs per unit routine     Standing Status:   Standing     Number of Occurrences:   1    Notify physician for     Notify physician for pulse less than 40 or greater than 120, respiratory rate less than 12 or greater than 25, temperature greater than 101.3 F (76.3 C), systolic BP less than 90 or greater than 886, diastolic BP less than 50 or greater than 100. Standing Status:   Standing     Number of Occurrences:   1    Initiate Oxygen Therapy Protocol     - If patient has any of the following conditions, initiate oxygen therapy: SpO2 less than 92%, Cyanosis, Chest Pain, Dyspnea, Home oxygen, or Altered level of consciousness    - If oxygen therapy initiated, enter the RT51 Nasal cannula oxygen order using Per Protocol order mode using the defaulted order parameters and titrate as specified in that order    - If oxygen therapy initiated, notify provider         Standing Status:   Standing     Number of Occurrences:   5    POC Pregnancy Urine Qual     All eligible females     Standing Status:   Standing     Number of Occurrences:   1    POCT Glucose     All diabetics     Standing Status:   Standing     Number of Occurrences:   1    Blood glucose - POCT     Standing Status:   Standing     Number of Occurrences:   1    Insert peripheral IV     Standing Status:   Standing     Number of Occurrences:   1       ORDERED FUTURE/PENDING TESTS     Lab Frequency Next Occurrence   Initiate Oxygen Therapy Protocol DAILY (RT)        FOLLOWUP   No follow-ups on file.           Yannick Gonzalez 5/6/21 7:18 AM EDT    CC:  Basia Krueger DO

## 2021-05-06 NOTE — OP NOTE
[address] ,  [address] [facility]  Phone: [phone] [facility],  [address] [facility], [address] [place] [address]  Phone: [phone]   B:[phone]    EGD & Colonoscopy Procedure Note    Patient: Ashely Zuleta  : 1980    Procedure: Esophagogastroduodenoscopy with biopsy and Colonoscopy     Date:  2021     Endoscopist:  Kimmy Dailey MD    Referring Physician:  Fer Orellana DO    Preoperative Diagnosis:  Epigastric pain [R10.13]  Hx of colonic polyps [Z86.010]    Postoperative Diagnosis:  See impression    Anesthesia: Anesthesia: MAC  Sedation: Propofol per anesthesia  Start Time:   Stop Time: 813  ASA Class: 3  Mallampati: I (soft palate, uvula, fauces, tonsillar pillars visible)    Indications: This is a 42y.o. year old female  with medical history of anxiety disorder, depression, diabetes mellitus type 2, ovarian cyst, cholecystectomy seen independently with referral for epigastric pain of 3 months duration. Last Colonoscopy: polyps 12-13 years ago in [facility]     Procedure Details  Informed consent was obtained for the procedure, including sedation. Risks of perforation, hemorrhage, adverse drug reaction and aspiration were discussed. The patient was placed in the left lateral decubitus position. Based on the pre-procedure assessment, including review of the patient's medical history, medications, allergies, and review of systems, she had been deemed to be an appropriate candidate for conscious sedation; she was therefore sedated with the medications listed below. The patient was monitored continuously with ECG tracing, pulse oximetry, blood pressure monitoring, and direct observations. A gastroscope was inserted into the mouth and advanced under direct vision to second portion of the duodenum.   A careful inspection was made as the gastroscope was withdrawn, including a retroflexed view of the proximal stomach including views of the incisura and cardia; findings and interventions are described below. Rectal examination was performed. There were no external hemorrhoids, fissures or skin tags. The colonoscope was inserted into the rectum and advanced under direct vision to the terminal ileum. The right colon was examined twice as this increases polyp detection especially if other right colon polyps, older age, male, or haider syndrome. When segments could not be distended with CO2 or air, it was filled/distended with water. The quality of the colonic preparation was good. A careful inspection was made as the colonoscope was withdrawn, including a retroflexed view of the rectum; findings and interventions are described below. Appropriate photodocumentation Was Obtained: Terminal ileum, appendiceal orifice and ileocecal valve. If photos taken, they were ordered to be scanned into the medical record. EGD Findings:   Normal upper and mid esophagus. No evidence of esophagitis or Guevara's esophagus. Regular Z-line at 36 cm from incisors  Mild erythematous mucosa in antrum and body of stomach suggestive of mild gastritis. Biopsies taken from antrum, incisura and body of stomach to evaluate for H. Pylori. Normal duodenal bulb and second portion of duodenum. Colonoscopy Findings:   Small sized non bleeding diverticula in the sigmoid colon. Normal appearing terminal ileum. Medium sized non bleeding internal hemorrhoids. Otherwise normal appearing colon mucosa. - PREP: MiraLAX  - Overall difficulty: minimal in degree  - Abdominal pressure: no  - Change in position: no  - Anesthesia issues: no  - Medivator use: no    Specimens: Was Obtained    Complications:   None; patient tolerated the procedure well. Disposition:   PACU - hemodynamically stable. Estimated Blood loss:  none    Withdrawal Time:  7 minutes    EGD impression:   Normal upper and mid esophagus.    Regular Z-line at 36 cm from incisors  Mild gastritis. Biopsied  Normal duodenal bulb and second portion of duodenum. Colonoscopy impression:   Small sized non bleeding diverticula in the sigmoid colon. Normal appearing terminal ileum. Medium sized non bleeding internal hemorrhoids. Otherwise normal appearing colon mucosa.     Recommendations:  -Decrease to omeprazole 40 mg once daily  -Await pathology.  -High-fiber diet  -Repeat colonoscopy in 10 years.  -Follow-up in 2 months        Luigi Marie 5/6/21 7:53 AM EDT

## 2021-05-10 ENCOUNTER — NURSE TRIAGE (OUTPATIENT)
Dept: OTHER | Facility: CLINIC | Age: 41
End: 2021-05-10

## 2021-05-10 NOTE — TELEPHONE ENCOUNTER
Received call from [address] at [facility]) Bertha with Red Flag Complaint. Brief description of triage:   Pt is calling with c/o abdominal pain. Pain has been ongoing for about 3 months. Pt had an EGD/colonoscpy last week. She was told to follow up in 2 months, however pt states that her pain is worsening. 2nd level triage completed with Dr. Sarina John; provider recommends patient be seen this Wednesday 5/12/2021. Pt is instructed to go to the ED if pain worsens before then. Care advice provided, patient verbalizes understanding; denies any other questions or concerns; instructed to call back for any new or worsening symptoms. Writer provided warm transfer to Jose Maria Fontenot at [facility] for appointment scheduling. Attention Provider: Thank you for allowing me to participate in the care of your patient. The patient was connected to triage in response to information provided to the E[facility]. Please do not respond through this encounter as the response is not directed to a shared pool. Reason for Disposition   Constant abdominal pain lasting > 2 hours    Answer Assessment - Initial Assessment Questions  1. LOCATION: \"Where does it hurt? \"       Center, 2 inches above her belly button    2. RADIATION: \"Does the pain shoot anywhere else? \" (e.g., chest, back)      Denies     3. ONSET: \"When did the pain begin? \" (e.g., minutes, hours or days ago)       3 months ago    4. SUDDEN: \"Gradual or sudden onset?\"        5. PATTERN \"Does the pain come and go, or is it constant? \"     - If constant: \"Is it getting better, staying the same, or worsening? \"       (Note: Constant means the pain never goes away completely; most serious pain is constant and it progresses)      - If intermittent: \"How long does it last?\" \"Do you have pain now? \"      (Note: Intermittent means the pain goes away completely between bouts)      Pain is constant     6. SEVERITY: \"How bad is the pain? \"  (e.g., Scale 1-10; mild, moderate,

## 2021-05-12 ENCOUNTER — OFFICE VISIT (OUTPATIENT)
Dept: FAMILY MEDICINE CLINIC | Age: 41
End: 2021-05-12

## 2021-05-12 VITALS
HEIGHT: 61 IN | BODY MASS INDEX: 26.39 KG/M2 | HEART RATE: 81 BPM | DIASTOLIC BLOOD PRESSURE: 82 MMHG | OXYGEN SATURATION: 99 % | SYSTOLIC BLOOD PRESSURE: 122 MMHG | WEIGHT: 139.8 LBS

## 2021-05-12 DIAGNOSIS — E11.9 TYPE 2 DIABETES MELLITUS WITHOUT COMPLICATION, WITH LONG-TERM CURRENT USE OF INSULIN (HCC): ICD-10-CM

## 2021-05-12 DIAGNOSIS — R10.84 GENERALIZED ABDOMINAL PAIN: Primary | ICD-10-CM

## 2021-05-12 DIAGNOSIS — Z79.4 TYPE 2 DIABETES MELLITUS WITHOUT COMPLICATION, WITH LONG-TERM CURRENT USE OF INSULIN (HCC): ICD-10-CM

## 2021-05-12 LAB — HBA1C MFR BLD: 7.8 %

## 2021-05-12 PROCEDURE — 3051F HG A1C>EQUAL 7.0%<8.0%: CPT | Performed by: FAMILY MEDICINE

## 2021-05-12 PROCEDURE — 99212 OFFICE O/P EST SF 10 MIN: CPT | Performed by: FAMILY MEDICINE

## 2021-05-12 PROCEDURE — 83036 HEMOGLOBIN GLYCOSYLATED A1C: CPT | Performed by: FAMILY MEDICINE

## 2021-05-12 RX ORDER — HYDROCODONE BITARTRATE AND ACETAMINOPHEN 5; 325 MG/1; MG/1
1 TABLET ORAL 2 TIMES DAILY PRN
Qty: 28 TABLET | Refills: 0 | Status: SHIPPED | OUTPATIENT
Start: 2021-05-12 | End: 2021-06-22 | Stop reason: SDUPTHER

## 2021-05-12 ASSESSMENT — ENCOUNTER SYMPTOMS
CHEST TIGHTNESS: 0
ABDOMINAL PAIN: 1
CONSTIPATION: 0
BLOOD IN STOOL: 0
COUGH: 0
DIARRHEA: 0
VOMITING: 0
SHORTNESS OF BREATH: 0
NAUSEA: 0

## 2021-05-12 NOTE — PATIENT INSTRUCTIONS
Generalized abdominal pain  -     Lanre Varela MD, Gynecology, [facility]  -     HYDROcodone-acetaminophen [facility]) 5-325 MG per tablet; Take 1 tablet by mouth 2 times daily as needed for Pain for up to 14 days. Refer back to her gynecologist since he was the last 1 to do any surgery on her adhesions along with gynecological surgery. Stop taking tramadol and prescription sent for Norco 5 mg to be used twice a day if needed.   Type 2 diabetes mellitus without complication, with long-term current use of insulin ([facility])  -     POCT glycosylated hemoglobin (Hb A1C)  A1c 7.8 and the last one was 12continue present medications of nighttime insulin and Metforminsee me 4 months

## 2021-05-12 NOTE — PROGRESS NOTES
Subjective:      Patient ID: Adair Remy is a 42 y.o. female. HPI  Patient in for persistent upper abdominal discomfort but can be generalized. She has been in the ER numerous times with numerous CT scans and blood work and all within normal limits. She does admit to having several abdominal surgeries in the past and has had some adhesions taken care of surgically several times. The pain feels like it did back during those episodes but definitely worse. She always has some discomfort and then intermittently it gets worseshe is on tramadol right now which does nothingshe has been on antispasmodic past which does nothing. Over the past several years she has lost a significant amount of weight. Diabeteslast A1c 4 to 5 months ago was 12. She is currently on Metformin and nighttime insulinshe has discontinued her regular insulin and Victoza. She denies any other issues to discuss. Prior to Visit Medications :  Medication HYDROcodone-acetaminophen (NORCO) 5-325 MG per tablet, Sig Take 1 tablet by mouth 2 times daily as needed for Pain for up to 14 days. , Taking? Yes, Authorizing Provider Blake Krueger, DO    Medication Insulin Pen Needle (B-D UF III MINI PEN NEEDLES) 31G X 5 MM MISC, Sig Inject 1 each into the skin 4 times daily, Taking? Yes, Authorizing Provider Blake Krueger, DO    Medication pantoprazole (PROTONIX) 40 MG tablet, Sig Take 1 tablet by mouth daily  Patient taking differently: Take 40 mg by mouth 2 times daily , Taking? Yes, Authorizing Provider Jose Tavera MD    Medication ondansetron (ZOFRAN ODT) 4 MG disintegrating tablet, Sig Take 1 tablet by mouth every 8 hours as needed for Nausea, Taking? Yes, Authorizing Provider Joseph Sarmiento, DO    Medication traMADol (ULTRAM) 50 MG tablet, Sig Take 1 tablet by mouth every 12 hours as needed for Pain for up to 30 days. , Taking?  Yes, Authorizing Provider Joseph Sarmiento, DO    Medication dicyclomine (BENTYL) 10 MG capsule, Sig Take 1 DO Pati      Past Medical History:  5/15/2013: Abdominal pain, acute, right lower quadrant  No date: Anxiety  No date: Arthritis      Comment:  Left Knee  8/13/2013: Bilateral ovarian cysts  No date: Blood transfusion reaction  1/29/2014: Cellulitis and abscess of trunk      Comment:  Pt was seen in ED today for bleeding from incision after               taking a fall this morning. 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 See HPI   HENT: Negative for congestion and postnasal drip. Eyes: Negative for visual disturbance. Respiratory: Negative for cough, chest tightness and shortness of breath. Cardiovascular: Negative for chest pain, palpitations and leg swelling. Gastrointestinal: Positive for abdominal pain. Negative for blood in stool, constipation, diarrhea, nausea and vomiting. Genitourinary: Negative for dysuria, frequency and hematuria. Musculoskeletal: Negative for arthralgias and myalgias. Skin: Negative for rash. Neurological: Negative for tremors and headaches. Psychiatric/Behavioral: Positive for sleep disturbance. The patient is not nervous/anxious. Objective:   Physical Exam      Physical Exam  Constitutional:       General: She is not in acute distress. Appearance: Normal appearance. She is well-developed and normal weight. She is not ill-appearing. HENT:      Head: Normocephalic. Eyes:      General: No scleral icterus. Neck:      Musculoskeletal: Neck supple. Thyroid: No thyromegaly. Vascular: No carotid bruit. Cardiovascular:      Rate and Rhythm: Normal rate and regular rhythm. Heart sounds: Normal heart sounds. Pulmonary:      Effort: Pulmonary effort is normal.      Breath sounds: Normal breath sounds. Abdominal:      General: Bowel sounds are normal. There is no distension. Palpations: Abdomen is soft. There is no mass. Comments: Tenderness noted upper abdomen. Musculoskeletal: Normal range of motion. General: No tenderness. Lymphadenopathy:      Cervical: No cervical adenopathy. Skin:     General: Skin is warm and dry. Coloration: Skin is not pale. Neurological:      Mental Status: She is alert and oriented to person, place, and time. Motor: No abnormal muscle tone. Psychiatric:         Mood and Affect: Mood normal.         Behavior: Behavior normal.         Thought Content: Thought content normal.         Judgment: Judgment normal.         Assessment:       Diagnosis Orders   1. Generalized abdominal pain  Chelsea Maldonado MD, Gynecology, [facility]    HYDROcodone-acetaminophen [facility]) 5-325 MG per tablet   2. Type 2 diabetes mellitus without complication, with long-term current use of insulin ([facility])  POCT glycosylated hemoglobin (Hb A1C)         Plan:      Lynne Costa was seen today for abdominal pain and nausea. Diagnoses and all orders for this visit:    Generalized abdominal pain  -     Chelsea Maldonado MD, Gynecology, [facility]  -     HYDROcodone-acetaminophen [facility]) 5-325 MG per tablet; Take 1 tablet by mouth 2 times daily as needed for Pain for up to 14 days. Refer back to her gynecologist since he was the last 1 to do any surgery on her adhesions along with gynecological surgery.  Stop taking tramadol and prescription sent for Norco 5 mg to be used twice a day if needed. Type 2 diabetes mellitus without complication, with long-term current use of insulin ([facility])  -     POCT glycosylated hemoglobin (Hb A1C)  A1c 7.8 and the last one was 12continue present medications of nighttime insulin and Metforminsee me 4 months    This is been approximately a 15 to 20-minute visit.      Blake Krueger, DO

## 2021-05-13 RX ORDER — DOXEPIN HYDROCHLORIDE 10 MG/1
10 CAPSULE ORAL NIGHTLY
Qty: 30 CAPSULE | Refills: 0 | Status: SHIPPED | OUTPATIENT
Start: 2021-05-13 | End: 2021-07-13

## 2021-05-14 DIAGNOSIS — R20.8 BURNING SENSATION OF FEET: ICD-10-CM

## 2021-05-14 DIAGNOSIS — M79.2 NEUROPATHIC PAIN: ICD-10-CM

## 2021-05-14 DIAGNOSIS — E11.21 TYPE 2 DIABETES MELLITUS WITH DIABETIC NEPHROPATHY, WITH LONG-TERM CURRENT USE OF INSULIN (HCC): ICD-10-CM

## 2021-05-14 DIAGNOSIS — Z79.4 TYPE 2 DIABETES MELLITUS WITH DIABETIC NEPHROPATHY, WITH LONG-TERM CURRENT USE OF INSULIN (HCC): ICD-10-CM

## 2021-05-14 RX ORDER — GABAPENTIN 300 MG/1
CAPSULE ORAL
Qty: 90 CAPSULE | Refills: 0 | OUTPATIENT
Start: 2021-05-14 | End: 2021-06-13

## 2021-05-19 LAB
A/G RATIO: 1.2 (ref 1.1–2.2)
ALBUMIN SERPL-MCNC: 3.8 G/DL (ref 3.4–5)
ALP BLD-CCNC: 100 U/L (ref 40–129)
ALT SERPL-CCNC: 12 U/L (ref 10–40)
AMORPHOUS: ABNORMAL /HPF
ANION GAP SERPL CALCULATED.3IONS-SCNC: 9 MMOL/L (ref 3–16)
AST SERPL-CCNC: 11 U/L (ref 15–37)
BACTERIA: ABNORMAL /HPF
BASOPHILS ABSOLUTE: 0 K/UL (ref 0–0.2)
BASOPHILS RELATIVE PERCENT: 0.6 %
BILIRUB SERPL-MCNC: 0.4 MG/DL (ref 0–1)
BILIRUBIN URINE: NEGATIVE
BLOOD, URINE: NEGATIVE
BUN BLDV-MCNC: 9 MG/DL (ref 7–20)
CALCIUM SERPL-MCNC: 9.4 MG/DL (ref 8.3–10.6)
CHLORIDE BLD-SCNC: 100 MMOL/L (ref 99–110)
CLARITY: ABNORMAL
CO2: 28 MMOL/L (ref 21–32)
COLOR: YELLOW
CREAT SERPL-MCNC: <0.5 MG/DL (ref 0.6–1.1)
EOSINOPHILS ABSOLUTE: 0.1 K/UL (ref 0–0.6)
EOSINOPHILS RELATIVE PERCENT: 2.1 %
EPITHELIAL CELLS, UA: ABNORMAL /HPF (ref 0–5)
GFR AFRICAN AMERICAN: >60
GFR NON-AFRICAN AMERICAN: >60
GLOBULIN: 3.1 G/DL
GLUCOSE BLD-MCNC: 236 MG/DL (ref 70–99)
GLUCOSE URINE: 500 MG/DL
HCT VFR BLD CALC: 36.6 % (ref 36–48)
HEMOGLOBIN: 12.7 G/DL (ref 12–16)
KETONES, URINE: NEGATIVE MG/DL
LEUKOCYTE ESTERASE, URINE: ABNORMAL
LYMPHOCYTES ABSOLUTE: 2.7 K/UL (ref 1–5.1)
LYMPHOCYTES RELATIVE PERCENT: 49.7 %
MCH RBC QN AUTO: 30.4 PG (ref 26–34)
MCHC RBC AUTO-ENTMCNC: 34.6 G/DL (ref 31–36)
MCV RBC AUTO: 87.8 FL (ref 80–100)
MICROSCOPIC EXAMINATION: YES
MONOCYTES ABSOLUTE: 0.5 K/UL (ref 0–1.3)
MONOCYTES RELATIVE PERCENT: 8.3 %
NEUTROPHILS ABSOLUTE: 2.2 K/UL (ref 1.7–7.7)
NEUTROPHILS RELATIVE PERCENT: 39.3 %
NITRITE, URINE: NEGATIVE
PDW BLD-RTO: 12.6 % (ref 12.4–15.4)
PH UA: 7.5 (ref 5–8)
PLATELET # BLD: 245 K/UL (ref 135–450)
PMV BLD AUTO: 8.1 FL (ref 5–10.5)
POTASSIUM SERPL-SCNC: 3.2 MMOL/L (ref 3.5–5.1)
PROTEIN UA: NEGATIVE MG/DL
RBC # BLD: 4.17 M/UL (ref 4–5.2)
RBC UA: ABNORMAL /HPF (ref 0–4)
SODIUM BLD-SCNC: 137 MMOL/L (ref 136–145)
SPECIFIC GRAVITY UA: 1.02 (ref 1–1.03)
TOTAL PROTEIN: 6.9 G/DL (ref 6.4–8.2)
URINE TYPE: ABNORMAL
UROBILINOGEN, URINE: 0.2 E.U./DL
WBC # BLD: 5.5 K/UL (ref 4–11)
WBC UA: ABNORMAL /HPF (ref 0–5)

## 2021-05-19 PROCEDURE — 83690 ASSAY OF LIPASE: CPT

## 2021-05-19 PROCEDURE — 83735 ASSAY OF MAGNESIUM: CPT

## 2021-05-19 PROCEDURE — 80053 COMPREHEN METABOLIC PANEL: CPT

## 2021-05-19 PROCEDURE — 81001 URINALYSIS AUTO W/SCOPE: CPT

## 2021-05-19 PROCEDURE — 85025 COMPLETE CBC W/AUTO DIFF WBC: CPT

## 2021-05-19 PROCEDURE — 87077 CULTURE AEROBIC IDENTIFY: CPT

## 2021-05-19 PROCEDURE — 87086 URINE CULTURE/COLONY COUNT: CPT

## 2021-05-19 ASSESSMENT — PAIN DESCRIPTION - PAIN TYPE: TYPE: ACUTE PAIN

## 2021-05-20 ENCOUNTER — HOSPITAL ENCOUNTER (EMERGENCY)
Age: 41
Discharge: HOME | End: 2021-05-20

## 2021-05-20 VITALS
DIASTOLIC BLOOD PRESSURE: 77 MMHG | OXYGEN SATURATION: 99 % | BODY MASS INDEX: 25.51 KG/M2 | RESPIRATION RATE: 16 BRPM | HEART RATE: 94 BPM | WEIGHT: 135 LBS | TEMPERATURE: 98.2 F | SYSTOLIC BLOOD PRESSURE: 122 MMHG

## 2021-05-20 DIAGNOSIS — E87.6 HYPOKALEMIA: ICD-10-CM

## 2021-05-20 DIAGNOSIS — E11.65 TYPE 2 DIABETES MELLITUS WITH HYPERGLYCEMIA, UNSPECIFIED WHETHER LONG TERM INSULIN USE (HCC): ICD-10-CM

## 2021-05-20 DIAGNOSIS — R10.13 ABDOMINAL PAIN, EPIGASTRIC: Primary | ICD-10-CM

## 2021-05-20 LAB
LIPASE: 19 U/L (ref 13–60)
MAGNESIUM: 1.8 MG/DL (ref 1.8–2.4)
ORGANISM: ABNORMAL
URINE CULTURE, ROUTINE: ABNORMAL

## 2021-05-20 PROCEDURE — 6360000002 HC RX W HCPCS: Performed by: PHYSICIAN ASSISTANT

## 2021-05-20 PROCEDURE — 6370000000 HC RX 637 (ALT 250 FOR IP): Performed by: PHYSICIAN ASSISTANT

## 2021-05-20 PROCEDURE — 96372 THER/PROPH/DIAG INJ SC/IM: CPT

## 2021-05-20 PROCEDURE — 99284 EMERGENCY DEPT VISIT MOD MDM: CPT

## 2021-05-20 RX ORDER — KETOROLAC TROMETHAMINE 30 MG/ML
30 INJECTION, SOLUTION INTRAMUSCULAR; INTRAVENOUS ONCE
Status: COMPLETED | OUTPATIENT
Start: 2021-05-20 | End: 2021-05-20

## 2021-05-20 RX ORDER — OXYCODONE HYDROCHLORIDE 5 MG/1
5 TABLET ORAL ONCE
Status: COMPLETED | OUTPATIENT
Start: 2021-05-20 | End: 2021-05-20

## 2021-05-20 RX ORDER — ONDANSETRON 4 MG/1
4 TABLET, ORALLY DISINTEGRATING ORAL ONCE
Status: COMPLETED | OUTPATIENT
Start: 2021-05-20 | End: 2021-05-20

## 2021-05-20 RX ORDER — POTASSIUM CHLORIDE 20 MEQ/1
40 TABLET, EXTENDED RELEASE ORAL ONCE
Status: COMPLETED | OUTPATIENT
Start: 2021-05-20 | End: 2021-05-20

## 2021-05-20 RX ADMIN — ONDANSETRON 4 MG: 4 TABLET, ORALLY DISINTEGRATING ORAL at 01:13

## 2021-05-20 RX ADMIN — KETOROLAC TROMETHAMINE 30 MG: 30 INJECTION, SOLUTION INTRAMUSCULAR; INTRAVENOUS at 01:13

## 2021-05-20 RX ADMIN — POTASSIUM CHLORIDE 40 MEQ: 1500 TABLET, EXTENDED RELEASE ORAL at 01:12

## 2021-05-20 RX ADMIN — OXYCODONE HYDROCHLORIDE 5 MG: 5 TABLET ORAL at 01:13

## 2021-05-20 ASSESSMENT — ENCOUNTER SYMPTOMS
BACK PAIN: 0
ABDOMINAL DISTENTION: 0
DIARRHEA: 0
NAUSEA: 0
EYES NEGATIVE: 1
CONSTIPATION: 0
COLOR CHANGE: 0
ABDOMINAL PAIN: 1
CHEST TIGHTNESS: 0
VOMITING: 0
COUGH: 0
SHORTNESS OF BREATH: 0

## 2021-05-20 NOTE — ED PROVIDER NOTES
Respiratory: Negative for cough, chest tightness and shortness of breath. Cardiovascular: Negative for chest pain. Gastrointestinal: Positive for abdominal pain. Negative for abdominal distention, constipation, diarrhea, nausea and vomiting. Genitourinary: Negative for dysuria. Musculoskeletal: Negative for back pain and neck pain. Skin: Negative for color change. Neurological: Negative for headaches. All other systems reviewed and are negative. Except as noted above in the ROS, all other systems were reviewed and negative. PAST MEDICAL HISTORY:     Past Medical History:   Diagnosis Date    Abdominal pain, acute, right lower quadrant 5/15/2013    Anxiety     Arthritis     Left Knee    Bilateral ovarian cysts 2013    Blood transfusion reaction     Cellulitis and abscess of trunk 2014    Pt was seen in ED today for bleeding from incision after taking a fall this morning.       COVID-19 2020    Depression     Diabetes mellitus ([facility] [address]     x5yr    Diverticula of colon 2013    DM2 (diabetes mellitus, type 2) ([facility] [address] 2016    Insomnia     Insomnia secondary to situational depression 2014    Left carpal tunnel syndrome 2018    MDRO (multiple drug resistant organisms) resistance     poss MRSA after hysterectomy treated with antibiotics    OA (osteoarthritis) of knee 2014    Obesity (BMI 30.0-34.9) 2017    Ovarian cyst     Plantar fasciitis, left 2014         SURGICAL HISTORY:      Past Surgical History:   Procedure Laterality Date    BREAST CYST ASPIRATION       SECTION      CHOLECYSTECTOMY      COLONOSCOPY  2007    polyps    COLONOSCOPY N/A 2021    COLONOSCOPY WITH ANESTHESIA performed by Luigi Marie MD at [address]  2008    LAPAROSCOPY      lysis of adhesions    LAPAROTOMY  2014    LAPAROTOMY, BILATERAL SALPINGO - OOPHORECTOMY    SALPINGO-OOPHORECTOMY  2014    UPPER GASTROINTESTINAL ENDOSCOPY N/A 5/6/2021    EGD BIOPSY performed by Prince Enoch MD at [address]:       Discharge Medication List as of 5/20/2021  1:47 AM      CONTINUE these medications which have NOT CHANGED    Details   doxepin (SINEQUAN) 10 MG capsule Take 1 capsule by mouth nightly, Disp-30 capsule, R-0Normal      HYDROcodone-acetaminophen (NORCO) 5-325 MG per tablet Take 1 tablet by mouth 2 times daily as needed for Pain for up to 14 days. , Disp-28 tablet, R-0Normal      Insulin Pen Needle (B-D UF III MINI PEN NEEDLES) 31G X 5 MM MISC 4 TIMES DAILY Starting Sat 5/8/2021, Disp-100 each, R-3, Normal      pantoprazole (PROTONIX) 40 MG tablet Take 1 tablet by mouth daily, Disp-60 tablet, R-2Normal      ondansetron (ZOFRAN ODT) 4 MG disintegrating tablet Take 1 tablet by mouth every 8 hours as needed for Nausea, Disp-30 tablet, R-3Normal      traMADol (ULTRAM) 50 MG tablet Take 1 tablet by mouth every 12 hours as needed for Pain for up to 30 days. , Disp-30 tablet, R-0Print      dicyclomine (BENTYL) 10 MG capsule Take 1 capsule by mouth 4 times daily (before meals and nightly) Do not fill until desired., Disp-90 capsule, R-0Normal      metFORMIN (GLUCOPHAGE) 1000 MG tablet Take 1 tablet by mouth 2 times daily (with meals), Disp-180 tablet, R-3Please consider 90 day supplies to promote better adherenceNormal      magnesium citrate solution Take 296 mLs by mouth once for 1 dose, Disp-296 mL, R-0Normal      gabapentin (NEURONTIN) 300 MG capsule Take 1 capsule by mouth 3 times daily for 30 days. , Disp-90 capsule, R-0Normal      Insulin Glargine, 2 Unit Dial, (TOUJEO MAX SOLOSTAR) 300 UNIT/ML SOPN Inject 60 Units into the skin daily, Disp-4 pen, R-5Normal      losartan (COZAAR) 50 MG tablet Take 1 tablet by mouth daily, Disp-30 tablet, R-5Normal      Liraglutide (VICTOZA) 18 MG/3ML SOPN SC injection Inject 1.2 mg into the skin daily, Disp-2 pen, R-0Normal      fluticasone (FLONASE) 50 MCG/ACT nasal spray 2 sprays by Nasal route daily, Disp-1 Bottle, R-0Normal      insulin lispro (HUMALOG KWIKPEN) 100 UNIT/ML pen INJECT 20 UNITS SUBCUTANEOUSLY THREE TIMES DAILY BEFORE MEAL(S), Disp-15 pen, R-3Please consider 90 day supplies to promote better adherenceNormal      blood glucose test strips (ASCENSIA AUTODISC VI;ONE TOUCH ULTRA TEST VI) strip Disp-100 strip, R-5, NormalDX: E11.9-One touch ultra strips. FSBS 3 times daily      Lancets MISC Disp-100 each, R-5, NormalDX: E 11.9-Uses insulin.  FSBS 3 times daily-Delica lancets for one touch ultra II      glucose monitoring kit (FREESTYLE) monitoring kit Disp-1 kit, R-0, NormalDx E11.9-One touch ultra II meter-uses tid               ALLERGIES:    Lisinopril, Lisinopril-hydrochlorothiazide, and Morphine    FAMILY HISTORY:       Family History   Problem Relation Age of Onset    Cancer Mother         ovarian    Diabetes Mother     High Blood Pressure Mother     Hypertension Mother     Diabetes Brother     Hypertension Brother     Diabetes Maternal Grandmother     Cancer Maternal Grandfather         stomach    Prostate Cancer Paternal Grandfather         metastatic    Breast Cancer Maternal Aunt           SOCIAL HISTORY:       Social History     Socioeconomic History    Marital status:      Spouse name: None    Number of children: None    Years of education: None    Highest education level: None   Occupational History    None   Tobacco Use    Smoking status: Former Smoker     Years: 0.00     Types: Cigarettes     Quit date: 1999     Years since quittin.6    Smokeless tobacco: Never Used    Tobacco comment: smoked for 1 months when teenager   Vaping Use    Vaping Use: Never used   Substance and Sexual Activity    Alcohol use: Yes     Comment: social drinker  4 beer per month    Drug use: No     Comment: Hx of cocaine use in 2004-  months     Sexual activity: Yes     Partners: Male     Comment: painful intercourse    Other Topics Concern    None   Social History Narrative    None     Social Determinants of Health     Financial Resource Strain:     Difficulty of Paying Living Expenses:    Food Insecurity:     Worried About Running Out of Food in the Last Year:     Ran Out of Food in the Last Year:    Transportation Needs:     Lack of Transportation (Medical):  Lack of Transportation (Non-Medical):    Physical Activity:     Days of Exercise per Week:     Minutes of Exercise per Session:    Stress:     Feeling of Stress :    Social Connections:     Frequency of Communication with Friends and Family:     Frequency of Social Gatherings with Friends and Family:     Attends Sabianism Services:     Active Member of Clubs or Organizations:     Attends Club or Organization Meetings:     Marital Status:    Intimate Partner Violence:     Fear of Current or Ex-Partner:     Emotionally Abused:     Physically Abused:     Sexually Abused:        SCREENINGS:             PHYSICAL EXAM:       ED Triage Vitals [05/19/21 2311]   BP Temp Temp Source Pulse Resp SpO2 Height Weight   125/76 98.3 °F (36.8 °C) Oral 102 16 99 % -- 135 lb (61.2 kg)       Physical Exam    CONSTITUTIONAL: Awake and alert. Cooperative. Well-developed. Well-nourished. Non-toxic. No acute distress. HENT: Normocephalic. Atraumatic. External ears normal, without discharge. No nasal discharge. Oropharynx clear. Mucous membranes moist.  EYES: Conjunctiva non-injected. No scleral icterus. PERRL. EOM's grossly intact. NECK: Supple. Normal ROM. CARDIOVASCULAR: RRR. No [place]. Intact distal pulses. PULMONARY/CHEST WALL: Effort normal. No tachypnea. Lungs clear to ausculation. ABDOMEN: Normal BS. Soft. Nondistended. Epigastric tenderness to palpate. No guarding. No rigidity. No palpable mass. /ANORECTAL: Not assessed  MUSKULOSKELETAL: Normal ROM. No acute deformities. No edema. No tenderness to palpate. SKIN: Warm and dry.  No rash.  NEUROLOGICAL: Alert and oriented x 3. GCS 15. CN II-XII grossly intact. Strength is 5/5 in all extremities and sensation is intact. Normal gait.    PSYCHIATRIC: Normal affect        DIAGNOSTICRESULTS:     LABS:    Results for orders placed or performed during the hospital encounter of 05/20/21   CBC Auto Differential   Result Value Ref Range    WBC 5.5 4.0 - 11.0 K/uL    RBC 4.17 4.00 - 5.20 M/uL    Hemoglobin 12.7 12.0 - 16.0 g/dL    Hematocrit 36.6 36.0 - 48.0 %    MCV 87.8 80.0 - 100.0 fL    MCH 30.4 26.0 - 34.0 pg    MCHC 34.6 31.0 - 36.0 g/dL    RDW 12.6 12.4 - 15.4 %    Platelets 337 855 - 160 K/uL    MPV 8.1 5.0 - 10.5 fL    Neutrophils % 39.3 %    Lymphocytes % 49.7 %    Monocytes % 8.3 %    Eosinophils % 2.1 %    Basophils % 0.6 %    Neutrophils Absolute 2.2 1.7 - 7.7 K/uL    Lymphocytes Absolute 2.7 1.0 - 5.1 K/uL    Monocytes Absolute 0.5 0.0 - 1.3 K/uL    Eosinophils Absolute 0.1 0.0 - 0.6 K/uL    Basophils Absolute 0.0 0.0 - 0.2 K/uL   Comprehensive metabolic panel   Result Value Ref Range    Sodium 137 136 - 145 mmol/L    Potassium 3.2 (L) 3.5 - 5.1 mmol/L    Chloride 100 99 - 110 mmol/L    CO2 28 21 - 32 mmol/L    Anion Gap 9 3 - 16    Glucose 236 (H) 70 - 99 mg/dL    BUN 9 7 - 20 mg/dL    CREATININE <0.5 (L) 0.6 - 1.1 mg/dL    GFR Non-African American >60 >60    GFR African American >60 >60    Calcium 9.4 8.3 - 10.6 mg/dL    Total Protein 6.9 6.4 - 8.2 g/dL    Albumin 3.8 3.4 - 5.0 g/dL    Albumin/Globulin Ratio 1.2 1.1 - 2.2    Total Bilirubin 0.4 0.0 - 1.0 mg/dL    Alkaline Phosphatase 100 40 - 129 U/L    ALT 12 10 - 40 U/L    AST 11 (L) 15 - 37 U/L    Globulin 3.1 g/dL   Urinalysis, reflex to microscopic   Result Value Ref Range    Color, UA Yellow Straw/Yellow    Clarity, UA SL CLOUDY (A) Clear    Glucose, Ur 500 (A) Negative mg/dL    Bilirubin Urine Negative Negative    Ketones, Urine Negative Negative mg/dL    Specific Gravity, UA 1.020 1.005 - 1.030    Blood, Urine Negative Negative pH, UA 7.5 5.0 - 8.0    Protein, UA Negative Negative mg/dL    Urobilinogen, Urine 0.2 <2.0 E.U./dL    Nitrite, Urine Negative Negative    Leukocyte Esterase, Urine TRACE (A) Negative    Microscopic Examination YES     Urine Type NotGiven    Microscopic Urinalysis   Result Value Ref Range    WBC, UA 6-9 (A) 0 - 5 /HPF    RBC, UA 0-2 0 - 4 /HPF    Epithelial Cells, UA 2-5 0 - 5 /HPF    Bacteria, UA Rare (A) None Seen /HPF    Amorphous, UA 2+ /HPF   Magnesium   Result Value Ref Range    Magnesium 1.80 1.80 - 2.40 mg/dL   Lipase   Result Value Ref Range    Lipase 19.0 13.0 - 60.0 U/L         PROCEDURES:   N/A    CRITICAL CARE TIME:       None      CONSULTS:  None      EMERGENCY DEPARTMENT COURSE and DIFFERENTIAL DIAGNOSIS/MDM:   Vitals:    Vitals:    05/19/21 2311 05/20/21 0101 05/20/21 0200   BP: 125/76  122/77   Pulse: 102 78 94   Resp: 16  16   Temp: 98.3 °F (36.8 °C)  98.2 °F (36.8 °C)   TempSrc: Oral  Oral   SpO2: 99% 99% 99%   Weight: 135 lb (61.2 kg)         Patient was given the following medications:  Medications   potassium chloride (KLOR-CON M) extended release tablet 40 mEq (40 mEq Oral Given 5/20/21 0112)   ketorolac (TORADOL) injection 30 mg (30 mg Intramuscular Given 5/20/21 0113)   oxyCODONE (ROXICODONE) immediate release tablet 5 mg (5 mg Oral Given 5/20/21 0113)   ondansetron (ZOFRAN-ODT) disintegrating tablet 4 mg (4 mg Oral Given 5/20/21 0113)         I have evaluated this patient in the ED. Old records were reviewed. Patient describes ongoing abdominal pain that is not new. It has been going on for months. She has been seen by primary care and GI. She is on nausea medicine, PPI and Bentyl. She has been prescribed [place] by primary care. She has been given referrals to OB/GYN and general surgery. She is here out of frustration and inability to sleep tonight. I sat and spoke with the patient at length. Labs are done on the patient.   I reviewed prior records including CT abdomen and pelvis less than 2 months ago. Tonight patient is given Toradol 30 mg IM with oxycodone 5 mg orally and Zofran 4 mg ODT. She is given KCl 40 mEq orally for mild hypokalemia discovered on labs. CBC white count normal at 5.5 with H&H 12.7 and 36.6  CMP mild hypokalemia at 3.2 with hyperglycemia 236. Otherwise normal including magnesium at 1.8  Lipase normal at 19  Urinalysis shows 500 glucose, trace leukocytes, 6-9 WBCs and rare bacteria. I will add urine culture but patient not symptomatic in terms of urinary tract infection. Patient patient hemodynamically stable. No indication to image the patient tonight. Patient understands this and is agreeable. She has appropriate outpatient follow-up. I recommended continued follow-up with specialist and did recommend returning if symptoms change or worsen. I made her aware of her hypokalemia and hyperglycemia. I estimate there is LOW risk for ACUTE APPENDICITIS, PYELONEPHRITIS, BOWEL OBSTRUCTION, CHOLECYSTITIS, DIVERTICULITIS, INCARCERATED HERNIA, PANCREATITIS, PELVIC INFLAMMATORY DISEASE, PERFORATED BOWEL or ULCER, PREGNANCY/ECTOPIC PREGNANCY, or TUBO-OVARIAN ABSCESS, thus I consider the discharge disposition reasonable. Also, there is no evidence or peritonitis, sepsis, or toxicity. Ashli Navarro and I have discussed the diagnosis and risks, and we agree with discharging home to follow-up with their primary doctor. We also discussed returning to the Emergency Department immediately if new or worsening symptoms occur. We have discussed the symptoms which are most concerning (e.g., bloody stool, fever, changing or worsening pain, vomiting) that necessitate immediate return. FINAL IMPRESSION:      1. Abdominal pain, epigastric    2. Hypokalemia    3.  Type 2 diabetes mellitus with hyperglycemia, unspecified whether long term insulin use (HCC)          DISPOSITION/PLAN:   DISPOSITION     DISCHARGE    PATIENT REFERRED TO:  Timmy Krueger,   [address]          Continue with planned general surgery follow-up            DISCHARGE MEDICATIONS:  Discharge Medication List as of 5/20/2021  1:47 AM                     (Please note thatportions of this note were completed with a voice recognition program.  Efforts were made to edit the dictations, but occasionally words are mis-transcribed.)    Cecily Burris PA-C (electronicallysigned)              Allegra Fosterma  05/20/21 0216

## 2021-05-25 DIAGNOSIS — M79.2 NEUROPATHIC PAIN: ICD-10-CM

## 2021-05-25 DIAGNOSIS — R20.8 BURNING SENSATION OF FEET: ICD-10-CM

## 2021-05-25 DIAGNOSIS — E11.21 TYPE 2 DIABETES MELLITUS WITH DIABETIC NEPHROPATHY, WITH LONG-TERM CURRENT USE OF INSULIN (HCC): ICD-10-CM

## 2021-05-25 DIAGNOSIS — Z79.4 TYPE 2 DIABETES MELLITUS WITH DIABETIC NEPHROPATHY, WITH LONG-TERM CURRENT USE OF INSULIN (HCC): ICD-10-CM

## 2021-05-25 RX ORDER — GABAPENTIN 300 MG/1
300 CAPSULE ORAL 3 TIMES DAILY
Qty: 90 CAPSULE | Refills: 2 | Status: SHIPPED
Start: 2021-05-25 | End: 2021-06-22 | Stop reason: DRUGHIGH

## 2021-06-01 RX ORDER — AMOXICILLIN 875 MG/1
875 TABLET, COATED ORAL 2 TIMES DAILY
Qty: 14 TABLET | Refills: 0 | Status: SHIPPED | OUTPATIENT
Start: 2021-06-01 | End: 2021-06-08

## 2021-06-07 ENCOUNTER — HOSPITAL ENCOUNTER (EMERGENCY)
Age: 41
Discharge: HOME | End: 2021-06-08
Attending: EMERGENCY MEDICINE
Payer: COMMERCIAL

## 2021-06-07 DIAGNOSIS — R10.13 ABDOMINAL PAIN, EPIGASTRIC: Primary | ICD-10-CM

## 2021-06-07 LAB
A/G RATIO: 1.2 (ref 1.1–2.2)
ALBUMIN SERPL-MCNC: 4.2 G/DL (ref 3.4–5)
ALP BLD-CCNC: 97 U/L (ref 40–129)
ALT SERPL-CCNC: 10 U/L (ref 10–40)
ANION GAP SERPL CALCULATED.3IONS-SCNC: 11 MMOL/L (ref 3–16)
AST SERPL-CCNC: 13 U/L (ref 15–37)
BASOPHILS ABSOLUTE: 0 K/UL (ref 0–0.2)
BASOPHILS RELATIVE PERCENT: 0.5 %
BILIRUB SERPL-MCNC: 0.6 MG/DL (ref 0–1)
BILIRUBIN URINE: NEGATIVE
BLOOD, URINE: NEGATIVE
BUN BLDV-MCNC: 8 MG/DL (ref 7–20)
CALCIUM SERPL-MCNC: 9.9 MG/DL (ref 8.3–10.6)
CHLORIDE BLD-SCNC: 101 MMOL/L (ref 99–110)
CLARITY: CLEAR
CO2: 23 MMOL/L (ref 21–32)
COLOR: YELLOW
CREAT SERPL-MCNC: <0.5 MG/DL (ref 0.6–1.1)
EOSINOPHILS ABSOLUTE: 0.1 K/UL (ref 0–0.6)
EOSINOPHILS RELATIVE PERCENT: 1 %
GFR AFRICAN AMERICAN: >60
GFR NON-AFRICAN AMERICAN: >60
GLOBULIN: 3.4 G/DL
GLUCOSE BLD-MCNC: 99 MG/DL (ref 70–99)
GLUCOSE URINE: NEGATIVE MG/DL
HCG QUALITATIVE: NEGATIVE
HCT VFR BLD CALC: 36.8 % (ref 36–48)
HEMOGLOBIN: 13.2 G/DL (ref 12–16)
KETONES, URINE: NEGATIVE MG/DL
LEUKOCYTE ESTERASE, URINE: NEGATIVE
LYMPHOCYTES ABSOLUTE: 3 K/UL (ref 1–5.1)
LYMPHOCYTES RELATIVE PERCENT: 49.9 %
MCH RBC QN AUTO: 31.1 PG (ref 26–34)
MCHC RBC AUTO-ENTMCNC: 35.9 G/DL (ref 31–36)
MCV RBC AUTO: 86.4 FL (ref 80–100)
MICROSCOPIC EXAMINATION: NORMAL
MONOCYTES ABSOLUTE: 0.5 K/UL (ref 0–1.3)
MONOCYTES RELATIVE PERCENT: 8.8 %
NEUTROPHILS ABSOLUTE: 2.4 K/UL (ref 1.7–7.7)
NEUTROPHILS RELATIVE PERCENT: 39.8 %
NITRITE, URINE: NEGATIVE
PDW BLD-RTO: 13 % (ref 12.4–15.4)
PH UA: 6.5 (ref 5–8)
PLATELET # BLD: 221 K/UL (ref 135–450)
PMV BLD AUTO: 8 FL (ref 5–10.5)
POTASSIUM SERPL-SCNC: 3.3 MMOL/L (ref 3.5–5.1)
PROTEIN UA: NEGATIVE MG/DL
RBC # BLD: 4.26 M/UL (ref 4–5.2)
SODIUM BLD-SCNC: 135 MMOL/L (ref 136–145)
SPECIFIC GRAVITY UA: 1.01 (ref 1–1.03)
TOTAL PROTEIN: 7.6 G/DL (ref 6.4–8.2)
URINE TYPE: NORMAL
UROBILINOGEN, URINE: 0.2 E.U./DL
WBC # BLD: 6 K/UL (ref 4–11)

## 2021-06-07 PROCEDURE — 85025 COMPLETE CBC W/AUTO DIFF WBC: CPT

## 2021-06-07 PROCEDURE — 84703 CHORIONIC GONADOTROPIN ASSAY: CPT

## 2021-06-07 PROCEDURE — 80053 COMPREHEN METABOLIC PANEL: CPT

## 2021-06-07 PROCEDURE — 81003 URINALYSIS AUTO W/O SCOPE: CPT

## 2021-06-07 PROCEDURE — 99283 EMERGENCY DEPT VISIT LOW MDM: CPT

## 2021-06-07 ASSESSMENT — PAIN SCALES - GENERAL: PAINLEVEL_OUTOF10: 6

## 2021-06-07 ASSESSMENT — PAIN DESCRIPTION - PAIN TYPE: TYPE: ACUTE PAIN

## 2021-06-07 ASSESSMENT — PAIN DESCRIPTION - LOCATION: LOCATION: ABDOMEN

## 2021-06-07 ASSESSMENT — PAIN DESCRIPTION - ORIENTATION: ORIENTATION: LOWER

## 2021-06-08 VITALS
HEIGHT: 61 IN | OXYGEN SATURATION: 96 % | HEART RATE: 90 BPM | WEIGHT: 135 LBS | BODY MASS INDEX: 25.49 KG/M2 | TEMPERATURE: 98 F | RESPIRATION RATE: 16 BRPM | SYSTOLIC BLOOD PRESSURE: 103 MMHG | DIASTOLIC BLOOD PRESSURE: 60 MMHG

## 2021-06-08 DIAGNOSIS — R10.84 GENERALIZED ABDOMINAL PAIN: Primary | ICD-10-CM

## 2021-06-08 PROCEDURE — 6370000000 HC RX 637 (ALT 250 FOR IP): Performed by: EMERGENCY MEDICINE

## 2021-06-08 RX ORDER — ALUMINA, MAGNESIA, AND SIMETHICONE 2400; 2400; 240 MG/30ML; MG/30ML; MG/30ML
15 SUSPENSION ORAL EVERY 4 HOURS PRN
Qty: 1 BOTTLE | Refills: 0 | Status: SHIPPED | OUTPATIENT
Start: 2021-06-08 | End: 2021-08-04

## 2021-06-08 RX ORDER — HYOSCYAMINE SULFATE 0.12 MG/1
TABLET SUBLINGUAL
Qty: 30 EACH | Refills: 0 | Status: SHIPPED | OUTPATIENT
Start: 2021-06-08 | End: 2021-06-11 | Stop reason: SDUPTHER

## 2021-06-08 RX ADMIN — HYOSCYAMINE SULFATE 250 MCG: 0.12 TABLET ORAL; SUBLINGUAL at 00:41

## 2021-06-08 RX ADMIN — MAGNESIUM HYDROXIDE/ALUMINUM HYDROXICE/SIMETHICONE: 120; 1200; 1200 SUSPENSION ORAL at 00:42

## 2021-06-08 ASSESSMENT — PAIN SCALES - GENERAL: PAINLEVEL_OUTOF10: 0

## 2021-06-11 ENCOUNTER — PATIENT MESSAGE (OUTPATIENT)
Dept: FAMILY MEDICINE CLINIC | Age: 41
End: 2021-06-11

## 2021-06-11 RX ORDER — HYOSCYAMINE SULFATE 0.12 MG/1
TABLET SUBLINGUAL
Qty: 60 EACH | Refills: 1 | Status: SHIPPED | OUTPATIENT
Start: 2021-06-11 | End: 2021-07-02 | Stop reason: SDUPTHER

## 2021-06-11 NOTE — TELEPHONE ENCOUNTER
From: Yessica Sagastume  To: Loy Gregg DO  Sent: 6/11/2021 8:32 AM EDT  Subject: Prescription Question    Good morning Dr Plata Po,  I will be out of lesvin by this weekend. I will need a refill hopefully I can pick it up today. Thank you. I use the outpatient pharmacy at [facility] .

## 2021-06-14 ENCOUNTER — INITIAL CONSULT (OUTPATIENT)
Dept: SURGERY | Age: 41
End: 2021-06-14

## 2021-06-14 VITALS
BODY MASS INDEX: 25.49 KG/M2 | HEART RATE: 121 BPM | SYSTOLIC BLOOD PRESSURE: 103 MMHG | DIASTOLIC BLOOD PRESSURE: 76 MMHG | HEIGHT: 61 IN | WEIGHT: 135 LBS

## 2021-06-14 DIAGNOSIS — R10.84 GENERALIZED ABDOMINAL PAIN: ICD-10-CM

## 2021-06-14 PROCEDURE — 99244 OFF/OP CNSLTJ NEW/EST MOD 40: CPT | Performed by: SURGERY

## 2021-06-14 NOTE — PROGRESS NOTES
Department of General Surgery Consult    PATIENT NAME: Zack Martinez   YOB: 1980    ADMISSION DATE: No admission date for patient encounter. TODAY'S DATE: 6/14/2021    Reason for Consult:  Abdominal pain    Chief Complaint: Worsening abdominal pain    Requesting Physician:  Dr. Lee Saenz:              The patient is a 42 y.o. female who presents with mid epigastric abdominal pain radiating to the lower abdomen which has been progressively getting worse over the past three months. She states that the pain is constant and worsens with eating food and certain body positions. Additionally, she states that she has been feeling full more rapidly which also exacerbates her pain. Taking Levsin, using an abdominal binder, and warm compress alleviates the pain. She describes the pain as a tearing sensation and rates it an 8/10. Associated symptoms include nausea, vomiting, bloating, and constipation. Denies chest pain, sob, diarrhea. Past Medical History:        Diagnosis Date    Abdominal pain, acute, right lower quadrant 5/15/2013    Anxiety     Arthritis     Left Knee    Bilateral ovarian cysts 8/13/2013    Blood transfusion reaction     Cellulitis and abscess of trunk 1/29/2014    Pt was seen in ED today for bleeding from incision after taking a fall this morning.       COVID-19 05/2020    Depression     Diabetes mellitus ([facility] [address]     x5yr    Diverticula of colon 8/13/2013    DM2 (diabetes mellitus, type 2) ([facility] [address] 7/12/2016    Insomnia     Insomnia secondary to situational depression 2/27/2014    Left carpal tunnel syndrome 7/12/2018    MDRO (multiple drug resistant organisms) resistance     poss MRSA after hysterectomy treated with antibiotics    OA (osteoarthritis) of knee 5/13/2014    Obesity (BMI 30.0-34.9) 9/21/2017    Ovarian cyst     Plantar fasciitis, left 5/13/2014       Past Surgical History:        Procedure Laterality Date    BREAST CYST ASPIRATION       SECTION      CHOLECYSTECTOMY      COLONOSCOPY  2007    polyps    COLONOSCOPY N/A 2021    COLONOSCOPY WITH ANESTHESIA performed by Miesha Rodríguez MD at [address]      LAPAROSCOPY      lysis of adhesions    LAPAROTOMY  2014    LAPAROTOMY, BILATERAL SALPINGO - OOPHORECTOMY    SALPINGO-OOPHORECTOMY  2014    UPPER GASTROINTESTINAL ENDOSCOPY N/A 2021    EGD BIOPSY performed by Miesha Rodríguez MD at [address]       Current Medications:   No current facility-administered medications for this visit. Prior to Admission medications    Medication Sig Start Date End Date Taking? Authorizing Provider   Hyoscyamine Sulfate SL (LEVSIN/SL) 0.125 MG SUBL 1-2 tabs by mouth every 4-6 hours as needed for abdominal cramps 21  Yes Blake Krueger DO   aluminum & magnesium hydroxide-simethicone (MAALOX ADVANCED MAX ST) 400-400-40 MG/5ML SUSP Take 15 mLs by mouth every 4 hours as needed (nausea or reflux) 21  Yes Dominic Stephens MD   gabapentin (NEURONTIN) 300 MG capsule Take 1 capsule by mouth 3 times daily for 30 days. 21 Yes Blake Krueger DO   doxepin (SINEQUAN) 10 MG capsule Take 1 capsule by mouth nightly 21  Yes Blake Krueger DO   Insulin Pen Needle (B-D UF III MINI PEN NEEDLES) 31G X 5 MM MISC Inject 1 each into the skin 4 times daily 21  Yes Blake Krueger DO   pantoprazole (PROTONIX) 40 MG tablet Take 1 tablet by mouth daily  Patient taking differently: Take 40 mg by mouth 2 times daily  21  Yes Miesha Rodríguez MD   ondansetron (ZOFRAN ODT) 4 MG disintegrating tablet Take 1 tablet by mouth every 8 hours as needed for Nausea 21  Yes Kayla Morse DO   dicyclomine (BENTYL) 10 MG capsule Take 1 capsule by mouth 4 times daily (before meals and nightly) Do not fill until desired.  21  Yes Kayla Morse DO   metFORMIN (GLUCOPHAGE) 1000 MG tablet Take 1 tablet by mouth 2 times daily (with meals) 4/9/21  Yes Jet Gudino MD   Insulin Glargine, 2 Unit Dial, (TOUJEO MAX SOLOSTAR) 300 UNIT/ML SOPN Inject 60 Units into the skin daily 3/3/21  Yes IRASEMA Terrazas CNP   losartan (COZAAR) 50 MG tablet Take 1 tablet by mouth daily 3/3/21  Yes IRASEMA Terrazas CNP   Liraglutide (VICTOZA) 18 MG/3ML SOPN SC injection Inject 1.2 mg into the skin daily 1/19/21  Yes Kala Trevino MD   fluticasone [facility]) 50 MCG/ACT nasal spray 2 sprays by Nasal route daily 11/12/19  Yes EVETTE Montes De [place]   insulin lispro (HUMALOG KWIKPEN) 100 UNIT/ML pen INJECT 20 UNITS SUBCUTANEOUSLY THREE TIMES DAILY BEFORE MEAL(S) 8/5/19  Yes Blake Krueger DO   Lancets MISC DX: E 11.9-Uses insulin. FSBS 3 times daily-Delica lancets for one touch ultra II 6/10/16  Yes Blake Krueger DO   glucose monitoring kit (FREESTYLE) monitoring kit Dx E11.9-One touch ultra II meter-uses tid 6/10/16  Yes Blake Krueger DO   magnesium citrate solution Take 296 mLs by mouth once for 1 dose 4/9/21 4/9/21  Jet Gudino MD   blood glucose test strips (ASCENSIA AUTODISC VI;ONE TOUCH ULTRA TEST VI) strip DX: E11.9-One touch ultra strips.  FSBS 3 times daily 8/1/18   EVETTE Rockwell        Allergies:  Lisinopril, Lisinopril-hydrochlorothiazide, and Morphine    Social History:       Family History:        Problem Relation Age of Onset    Cancer Mother         ovarian    Diabetes Mother     High Blood Pressure Mother     Hypertension Mother     Diabetes Brother     Hypertension Brother     Diabetes Maternal Grandmother     Cancer Maternal Grandfather         stomach    Prostate Cancer Paternal Grandfather         metastatic    Breast Cancer Maternal Aunt        REVIEW OF SYSTEMS:  CONSTITUTIONAL:  negative  HEENT:  negative  RESPIRATORY:  negative  CARDIOVASCULAR:  negative  GASTROINTESTINAL:  positive for nausea, vomiting, constipation, abdominal pain and abdominal distention  GENITOURINARY: control diabetes  - Continue Levsin PRN  - Follow up to discuss test results      Electronically signed by Leopoldo Phillip [address] [facility]  Surgery  [id number]    Patient seen and agree with above. Reports postprandial pain and early satiety. No fevers or chills. Some emesis.  +constipation. Given her symptoms and negative CT discussed possibility of gastroparesis, especially given prior A1C levels. Plan for gastric emptying study. Will also get thyroid studies.     Dorita Real MD

## 2021-06-22 ENCOUNTER — OFFICE VISIT (OUTPATIENT)
Dept: FAMILY MEDICINE CLINIC | Age: 41
End: 2021-06-22

## 2021-06-22 ENCOUNTER — NURSE TRIAGE (OUTPATIENT)
Dept: OTHER | Facility: CLINIC | Age: 41
End: 2021-06-22

## 2021-06-22 VITALS
OXYGEN SATURATION: 99 % | HEART RATE: 120 BPM | SYSTOLIC BLOOD PRESSURE: 132 MMHG | DIASTOLIC BLOOD PRESSURE: 74 MMHG | BODY MASS INDEX: 24.37 KG/M2 | WEIGHT: 129 LBS

## 2021-06-22 DIAGNOSIS — M79.2 NEUROPATHIC PAIN: ICD-10-CM

## 2021-06-22 DIAGNOSIS — R20.8 BURNING SENSATION OF FEET: ICD-10-CM

## 2021-06-22 DIAGNOSIS — R10.84 GENERALIZED ABDOMINAL PAIN: ICD-10-CM

## 2021-06-22 DIAGNOSIS — R10.84 GENERALIZED ABDOMINAL PAIN: Primary | ICD-10-CM

## 2021-06-22 LAB
BILIRUBIN, POC: NORMAL
BLOOD URINE, POC: NORMAL
CLARITY, POC: NORMAL
COLOR, POC: NORMAL
GLUCOSE URINE, POC: 500
KETONES, POC: 40
LEUKOCYTE EST, POC: NORMAL
NITRITE, POC: NORMAL
PH, POC: 6.5
PROTEIN, POC: NORMAL
SPECIFIC GRAVITY, POC: >=1.03
T3 FREE: 3.2 PG/ML (ref 2.3–4.2)
TSH REFLEX: 0.51 UIU/ML (ref 0.27–4.2)
UROBILINOGEN, POC: 0.2

## 2021-06-22 PROCEDURE — 99213 OFFICE O/P EST LOW 20 MIN: CPT | Performed by: FAMILY MEDICINE

## 2021-06-22 PROCEDURE — 81002 URINALYSIS NONAUTO W/O SCOPE: CPT | Performed by: FAMILY MEDICINE

## 2021-06-22 RX ORDER — HYDROCODONE BITARTRATE AND ACETAMINOPHEN 5; 325 MG/1; MG/1
1 TABLET ORAL 2 TIMES DAILY PRN
Qty: 28 TABLET | Refills: 0 | Status: SHIPPED | OUTPATIENT
Start: 2021-06-22 | End: 2021-07-13 | Stop reason: SDUPTHER

## 2021-06-22 RX ORDER — GABAPENTIN 800 MG/1
800 TABLET ORAL 3 TIMES DAILY
Qty: 120 TABLET | Refills: 2 | Status: SHIPPED | OUTPATIENT
Start: 2021-06-22 | End: 2021-11-12

## 2021-06-22 ASSESSMENT — ENCOUNTER SYMPTOMS: ABDOMINAL PAIN: 1

## 2021-06-22 NOTE — PROGRESS NOTES
times daily for 90 days. May take 2 tablets at bedtime. 120 tablet 2    HYDROcodone-acetaminophen (NORCO) 5-325 MG per tablet Take 1 tablet by mouth 2 times daily as needed for Pain for up to 14 days. 28 tablet 0    Hyoscyamine Sulfate SL (LEVSIN/SL) 0.125 MG SUBL 1-2 tabs by mouth every 4-6 hours as needed for abdominal cramps 60 each 1    aluminum & magnesium hydroxide-simethicone (MAALOX ADVANCED MAX ST) 400-400-40 MG/5ML SUSP Take 15 mLs by mouth every 4 hours as needed (nausea or reflux) 1 Bottle 0    doxepin (SINEQUAN) 10 MG capsule Take 1 capsule by mouth nightly 30 capsule 0    Insulin Pen Needle (B-D UF III MINI PEN NEEDLES) 31G X 5 MM MISC Inject 1 each into the skin 4 times daily 100 each 3    pantoprazole (PROTONIX) 40 MG tablet Take 1 tablet by mouth daily (Patient taking differently: Take 40 mg by mouth 2 times daily ) 60 tablet 2    ondansetron (ZOFRAN ODT) 4 MG disintegrating tablet Take 1 tablet by mouth every 8 hours as needed for Nausea 30 tablet 3    dicyclomine (BENTYL) 10 MG capsule Take 1 capsule by mouth 4 times daily (before meals and nightly) Do not fill until desired. 90 capsule 0    metFORMIN (GLUCOPHAGE) 1000 MG tablet Take 1 tablet by mouth 2 times daily (with meals) 180 tablet 3    magnesium citrate solution Take 296 mLs by mouth once for 1 dose 296 mL 0    Insulin Glargine, 2 Unit Dial, (TOUJEO MAX SOLOSTAR) 300 UNIT/ML SOPN Inject 60 Units into the skin daily 4 pen 5    losartan (COZAAR) 50 MG tablet Take 1 tablet by mouth daily 30 tablet 5    Liraglutide (VICTOZA) 18 MG/3ML SOPN SC injection Inject 1.2 mg into the skin daily 2 pen 0    fluticasone (FLONASE) 50 MCG/ACT nasal spray 2 sprays by Nasal route daily 1 Bottle 0    insulin lispro (HUMALOG KWIKPEN) 100 UNIT/ML pen INJECT 20 UNITS SUBCUTANEOUSLY THREE TIMES DAILY BEFORE MEAL(S) 15 pen 3    blood glucose test strips (ASCENSIA AUTODISC VI;ONE TOUCH ULTRA TEST VI) strip DX: E11.9-One touch ultra strips.  FSBS 3 times daily 100 strip 5    Lancets MISC DX: E 11.9-Uses insulin. FSBS 3 times daily-Delica lancets for one touch ultra  each 5    glucose monitoring kit (FREESTYLE) monitoring kit Dx E11.9-One touch ultra II meter-uses tid 1 kit 0     No current facility-administered medications for this visit. Assessment:    1. Generalized abdominal pain    2. Neuropathic pain    3. Burning sensation of feet        Plan:    1. Generalized abdominal pain  Unclear etiology. Possibly gastroparesis or IBS with constipation. Get the gastric emptying study completed. If unremarkable she will try the Linzess. 2. Neuropathic pain  Increase gabapentin. 3. Burning sensation of feet  Increase gabapentin. Urine was completely normal.  I do not have any suspicion for UTI at this time. Patient to return to clinic if symptoms worsen or fail to improve.

## 2021-06-22 NOTE — TELEPHONE ENCOUNTER
Reason for Disposition   Constant abdominal pain lasting > 2 hours    Answer Assessment - Initial Assessment Questions  1. LOCATION: \"Where does it hurt? \"       Abdominal pain, feet are also painful    2. RADIATION: \"Does the pain shoot anywhere else? \" (e.g., chest, back)   pain shoots everywhere - pt reports that the pain is throughout her body    3. ONSET: \"When did the pain begin? \" (e.g., minutes, hours or days ago)   About a week to 10 days ago    4. SUDDEN: \"Gradual or sudden onset? \"      Gradual    5. PATTERN \"Does the pain come and go, or is it constant? \"     - If constant: \"Is it getting better, staying the same, or worsening? \"       (Note: Constant means the pain never goes away completely; most serious pain is constant and it progresses)      - If intermittent: \"How long does it last?\" \"Do you have pain now? \"      (Note: Intermittent means the pain goes away completely between bouts)   Constant for the last two days    6. SEVERITY: \"How bad is the pain? \"  (e.g., Scale 1-10; mild, moderate, or severe)    - MILD (1-3): doesn't interfere with normal activities, abdomen soft and not tender to touch     - MODERATE (4-7): interferes with normal activities or awakens from sleep, tender to touch     - SEVERE (8-10): excruciating pain, doubled over, unable to do any normal activities     Moderate    7. RECURRENT SYMPTOM: \"Have you ever had this type of abdominal pain before? \" If so, ask: \"When was the last time? \" and \"What happened that time? \"      Not had pain like this before    8. CAUSE: \"What do you think is causing the abdominal pain? \"     Is seeing a GI MD for the abdominal pain    9. RELIEVING/AGGRAVATING FACTORS: \"What makes it better or worse? \" (e.g., movement, antacids, bowel movement)    Hot water of shower makes it feel better. Pt reports that when moving around, it feels like pins, needles and glass is poking her throughout her body    10.  OTHER SYMPTOMS: \"Has there been any vomiting, diarrhea,

## 2021-07-01 ENCOUNTER — HOSPITAL ENCOUNTER (OUTPATIENT)
Dept: NUCLEAR MEDICINE | Age: 41
Discharge: HOME | End: 2021-07-01
Payer: COMMERCIAL

## 2021-07-01 ENCOUNTER — HOSPITAL ENCOUNTER (EMERGENCY)
Age: 41
Discharge: HOME | End: 2021-07-01
Attending: EMERGENCY MEDICINE
Payer: COMMERCIAL

## 2021-07-01 VITALS
OXYGEN SATURATION: 99 % | WEIGHT: 128 LBS | HEIGHT: 61 IN | SYSTOLIC BLOOD PRESSURE: 122 MMHG | BODY MASS INDEX: 24.17 KG/M2 | HEART RATE: 90 BPM | RESPIRATION RATE: 18 BRPM | TEMPERATURE: 98 F | DIASTOLIC BLOOD PRESSURE: 85 MMHG

## 2021-07-01 DIAGNOSIS — R10.84 GENERALIZED ABDOMINAL PAIN: Primary | ICD-10-CM

## 2021-07-01 DIAGNOSIS — R10.84 GENERALIZED ABDOMINAL PAIN: ICD-10-CM

## 2021-07-01 LAB
A/G RATIO: 1.4 (ref 1.1–2.2)
ALBUMIN SERPL-MCNC: 4.5 G/DL (ref 3.4–5)
ALP BLD-CCNC: 90 U/L (ref 40–129)
ALT SERPL-CCNC: 20 U/L (ref 10–40)
ANION GAP SERPL CALCULATED.3IONS-SCNC: 13 MMOL/L (ref 3–16)
AST SERPL-CCNC: 11 U/L (ref 15–37)
BASOPHILS ABSOLUTE: 0 K/UL (ref 0–0.2)
BASOPHILS RELATIVE PERCENT: 0.6 %
BILIRUB SERPL-MCNC: 0.5 MG/DL (ref 0–1)
BUN BLDV-MCNC: 17 MG/DL (ref 7–20)
CALCIUM SERPL-MCNC: 9.8 MG/DL (ref 8.3–10.6)
CHLORIDE BLD-SCNC: 98 MMOL/L (ref 99–110)
CO2: 26 MMOL/L (ref 21–32)
CREAT SERPL-MCNC: <0.5 MG/DL (ref 0.6–1.1)
EOSINOPHILS ABSOLUTE: 0.1 K/UL (ref 0–0.6)
EOSINOPHILS RELATIVE PERCENT: 1.2 %
GFR AFRICAN AMERICAN: >60
GFR NON-AFRICAN AMERICAN: >60
GLOBULIN: 3.2 G/DL
GLUCOSE BLD-MCNC: 198 MG/DL (ref 70–99)
GLUCOSE BLD-MCNC: 223 MG/DL (ref 70–99)
HCG QUALITATIVE: NEGATIVE
HCT VFR BLD CALC: 40.1 % (ref 36–48)
HEMOGLOBIN: 14.1 G/DL (ref 12–16)
LACTIC ACID: 1.3 MMOL/L (ref 0.4–2)
LIPASE: 23 U/L (ref 13–60)
LYMPHOCYTES ABSOLUTE: 1.7 K/UL (ref 1–5.1)
LYMPHOCYTES RELATIVE PERCENT: 34.9 %
MCH RBC QN AUTO: 31.1 PG (ref 26–34)
MCHC RBC AUTO-ENTMCNC: 35.2 G/DL (ref 31–36)
MCV RBC AUTO: 88.2 FL (ref 80–100)
MONOCYTES ABSOLUTE: 0.4 K/UL (ref 0–1.3)
MONOCYTES RELATIVE PERCENT: 7.3 %
NEUTROPHILS ABSOLUTE: 2.8 K/UL (ref 1.7–7.7)
NEUTROPHILS RELATIVE PERCENT: 56 %
PDW BLD-RTO: 12.8 % (ref 12.4–15.4)
PERFORMED ON: ABNORMAL
PLATELET # BLD: 203 K/UL (ref 135–450)
PMV BLD AUTO: 8.3 FL (ref 5–10.5)
POTASSIUM SERPL-SCNC: 4.1 MMOL/L (ref 3.5–5.1)
RBC # BLD: 4.55 M/UL (ref 4–5.2)
SODIUM BLD-SCNC: 137 MMOL/L (ref 136–145)
TOTAL PROTEIN: 7.7 G/DL (ref 6.4–8.2)
WBC # BLD: 5 K/UL (ref 4–11)

## 2021-07-01 PROCEDURE — A9541 TC99M SULFUR COLLOID: HCPCS | Performed by: SURGERY

## 2021-07-01 PROCEDURE — 83690 ASSAY OF LIPASE: CPT

## 2021-07-01 PROCEDURE — 85025 COMPLETE CBC W/AUTO DIFF WBC: CPT

## 2021-07-01 PROCEDURE — 99284 EMERGENCY DEPT VISIT MOD MDM: CPT

## 2021-07-01 PROCEDURE — 80053 COMPREHEN METABOLIC PANEL: CPT

## 2021-07-01 PROCEDURE — 84703 CHORIONIC GONADOTROPIN ASSAY: CPT

## 2021-07-01 PROCEDURE — 3430000000 HC RX DIAGNOSTIC RADIOPHARMACEUTICAL: Performed by: SURGERY

## 2021-07-01 PROCEDURE — 78264 GASTRIC EMPTYING IMG STUDY: CPT

## 2021-07-01 PROCEDURE — 83605 ASSAY OF LACTIC ACID: CPT

## 2021-07-01 RX ORDER — HYOSCYAMINE SULFATE 0.12 MG/1
1 TABLET SUBLINGUAL 2 TIMES DAILY
Qty: 15 EACH | Refills: 0 | Status: SHIPPED | OUTPATIENT
Start: 2021-07-01 | End: 2021-07-02

## 2021-07-01 RX ADMIN — Medication 1 MILLICURIE: at 10:00

## 2021-07-01 ASSESSMENT — ENCOUNTER SYMPTOMS
NAUSEA: 0
VOMITING: 0
ABDOMINAL PAIN: 1
COUGH: 0
CHEST TIGHTNESS: 0
DIARRHEA: 0
CONSTIPATION: 0
SHORTNESS OF BREATH: 0
BACK PAIN: 0

## 2021-07-01 ASSESSMENT — PAIN SCALES - GENERAL
PAINLEVEL_OUTOF10: 4
PAINLEVEL_OUTOF10: 2

## 2021-07-01 ASSESSMENT — PAIN DESCRIPTION - PAIN TYPE: TYPE: ACUTE PAIN;CHRONIC PAIN

## 2021-07-01 ASSESSMENT — PAIN DESCRIPTION - LOCATION: LOCATION: ABDOMEN
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
## 2021-07-01 NOTE — ED NOTES
Pt scripts x1 instructed to follow up with PCP.  Assessed per MD.     Chitra Rose, KALPESH  29/57/39 [id number]
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
## 2021-07-01 NOTE — ED PROVIDER NOTES
I independently examined and evaluated Nikita Monroe. All diagnostic, treatment, and disposition decisions were made by myself in conjunction with the YENFIER, Germania Law. For all further details of the patient's emergency department visit, please see their documentation. 42 yr old femlae here with chronic epigastirc abd pain. She has had extensive workup as outpatinte including a gastric emtpying study today. She is upset here because she is not getting answers. He is als upset with her home situation. She is living with her spouse and her spouses, ex, who is the mother to his children. She feels very stressed. Vitals:    07/01/21 1714   BP: 122/85   Pulse: 90   Resp: 18   Temp:    SpO2: 99%     On exam she is tearful,mild epigastric ttp. Heart RRR. resp unlabored.        Results for orders placed or performed during the hospital encounter of 07/01/21   CBC Auto Differential   Result Value Ref Range    WBC 5.0 4.0 - 11.0 K/uL    RBC 4.55 4.00 - 5.20 M/uL    Hemoglobin 14.1 12.0 - 16.0 g/dL    Hematocrit 40.1 36.0 - 48.0 %    MCV 88.2 80.0 - 100.0 fL    MCH 31.1 26.0 - 34.0 pg    MCHC 35.2 31.0 - 36.0 g/dL    RDW 12.8 12.4 - 15.4 %    Platelets 121 814 - 975 K/uL    MPV 8.3 5.0 - 10.5 fL    Neutrophils % 56.0 %    Lymphocytes % 34.9 %    Monocytes % 7.3 %    Eosinophils % 1.2 %    Basophils % 0.6 %    Neutrophils Absolute 2.8 1.7 - 7.7 K/uL    Lymphocytes Absolute 1.7 1.0 - 5.1 K/uL    Monocytes Absolute 0.4 0.0 - 1.3 K/uL    Eosinophils Absolute 0.1 0.0 - 0.6 K/uL    Basophils Absolute 0.0 0.0 - 0.2 K/uL   Comprehensive Metabolic Panel   Result Value Ref Range    Sodium 137 136 - 145 mmol/L    Potassium 4.1 3.5 - 5.1 mmol/L    Chloride 98 (L) 99 - 110 mmol/L    CO2 26 21 - 32 mmol/L    Anion Gap 13 3 - 16    Glucose 223 (H) 70 - 99 mg/dL    BUN 17 7 - 20 mg/dL    CREATININE <0.5 (L) 0.6 - 1.1 mg/dL    GFR Non-African American >60 >60    GFR African American >60 >60    Calcium 9.8 8.3 - 10.6 mg/dL    Total Protein 7.7 6.4 - 8.2 g/dL    Albumin 4.5 3.4 - 5.0 g/dL    Albumin/Globulin Ratio 1.4 1.1 - 2.2    Total Bilirubin 0.5 0.0 - 1.0 mg/dL    Alkaline Phosphatase 90 40 - 129 U/L    ALT 20 10 - 40 U/L    AST 11 (L) 15 - 37 U/L    Globulin 3.2 g/dL   Lipase   Result Value Ref Range    Lipase 23.0 13.0 - 60.0 U/L   Lactic Acid, Plasma   Result Value Ref Range    Lactic Acid 1.3 0.4 - 2.0 mmol/L   HCG Qualitative, Serum   Result Value Ref Range    hCG Qual Negative Detects HCG level >10 MIU/mL   POCT Glucose   Result Value Ref Range    POC Glucose 198 (H) 70 - 99 mg/dl    Performed on ACCU-CHEK      No orders to display     At the time of my exam, the patient is feeling relived because she just got the results of her gastric emptying study and it showed gastroparesis. She feels relieved to have a diagnosis. SW has spoken to her about resources regarding her home situation. She would like to go home and is feeling relieved. I do not think she needs further ER workup.      Obdulia Witt MD  07/04/21 2509

## 2021-07-01 NOTE — ED NOTES
Pt reports that she wants to go home d/t knowing that we will not find a reason for her abd pain today in the ED. Pt sees GI and general surgeon.  RN notified [address] [place] [address] SAADIA Flores  07/01/21 0818

## 2021-07-02 ENCOUNTER — TELEPHONE (OUTPATIENT)
Dept: FAMILY MEDICINE CLINIC | Age: 41
End: 2021-07-02

## 2021-07-02 RX ORDER — HYOSCYAMINE SULFATE 0.12 MG/1
TABLET SUBLINGUAL
Qty: 60 EACH | Refills: 1 | Status: SHIPPED | OUTPATIENT
Start: 2021-07-02 | End: 2021-11-11

## 2021-07-02 NOTE — TELEPHONE ENCOUNTER
Per previous conversation patient has gastroparesis & was told to schedule with  to discuss in detail treatment plan. Please advise if anything should be prescribed in the meantime.

## 2021-07-02 NOTE — TELEPHONE ENCOUNTER
----- Message from Karl Smith sent at 7/2/2021  4:12 PM EDT -----  Subject: Message to Provider    QUESTIONS  Information for Provider? Patient was seen at [facility] ER 7/1/21 as   well as had Gastric Dump. Spoke with Dr. Alberto Kessler via email regardingSarahi Bray Appt 7/13/21 is booked @ 9:30. Would like a Rx to relieve pain in   mean time. ---------------------------------------------------------------------------  --------------  Luis GROVE  What is the best way for the office to contact you? OK to leave message on   voicemail, OK to respond with electronic message via [employer] portal (only   for patients who have registered [employer] account)  Preferred Call Back Phone Number? [phone]  ---------------------------------------------------------------------------  --------------  SCRIPT ANSWERS  Relationship to Patient?  Self

## 2021-07-02 NOTE — TELEPHONE ENCOUNTER
.  Last office visit 2021     Last written 21 60 with 1      Next office visit scheduled 2021    Requested Prescriptions     Pending Prescriptions Disp Refills    Hyoscyamine Sulfate SL (LEVSIN/SL) 0.125 MG SUBL 60 each 1     Si-2 tabs by mouth every 4-6 hours as needed for abdominal cramps

## 2021-07-06 RX ORDER — METOCLOPRAMIDE 5 MG/1
TABLET ORAL
Qty: 30 TABLET | Refills: 0 | Status: SHIPPED | OUTPATIENT
Start: 2021-07-06 | End: 2021-07-13

## 2021-07-06 NOTE — TELEPHONE ENCOUNTER
Prescription has been called in for her gastroparesis-tell her to keep the appointment she has and to notify me if any involuntary muscle twitches anywhere on her body.

## 2021-07-06 NOTE — TELEPHONE ENCOUNTER
Spoke with her and let her know Rx was sent in, she stated her friend has told her she twitches in her sleep (her hands and mouth).

## 2021-07-07 NOTE — TELEPHONE ENCOUNTER
Left VM for her to call back, I also have a [employer] form here for her that she needs to complete, I have put Dr. Burt Moment info in but she needs to fill out her part before Dr. Dwight Espinal will sign it.

## 2021-07-13 ENCOUNTER — OFFICE VISIT (OUTPATIENT)
Dept: FAMILY MEDICINE CLINIC | Age: 41
End: 2021-07-13

## 2021-07-13 VITALS
WEIGHT: 123.8 LBS | SYSTOLIC BLOOD PRESSURE: 122 MMHG | HEART RATE: 87 BPM | HEIGHT: 61 IN | BODY MASS INDEX: 23.37 KG/M2 | DIASTOLIC BLOOD PRESSURE: 82 MMHG | OXYGEN SATURATION: 94 %

## 2021-07-13 DIAGNOSIS — K91.1 DUMPING SYNDROME: Primary | ICD-10-CM

## 2021-07-13 DIAGNOSIS — R10.84 GENERALIZED ABDOMINAL PAIN: ICD-10-CM

## 2021-07-13 PROCEDURE — 99213 OFFICE O/P EST LOW 20 MIN: CPT | Performed by: FAMILY MEDICINE

## 2021-07-13 RX ORDER — PANTOPRAZOLE SODIUM 40 MG/1
40 TABLET, DELAYED RELEASE ORAL 2 TIMES DAILY
Qty: 60 TABLET | Refills: 0 | Status: SHIPPED | OUTPATIENT
Start: 2021-07-13 | End: 2022-09-26

## 2021-07-13 RX ORDER — HYDROCODONE BITARTRATE AND ACETAMINOPHEN 5; 325 MG/1; MG/1
1 TABLET ORAL DAILY PRN
Qty: 14 TABLET | Refills: 0 | Status: SHIPPED | OUTPATIENT
Start: 2021-07-13 | End: 2021-07-27

## 2021-07-13 RX ORDER — DOXEPIN HYDROCHLORIDE 25 MG/1
CAPSULE ORAL
Qty: 60 CAPSULE | Refills: 3 | Status: SHIPPED | OUTPATIENT
Start: 2021-07-13 | End: 2022-05-11 | Stop reason: SDUPTHER

## 2021-07-13 ASSESSMENT — ENCOUNTER SYMPTOMS
CHEST TIGHTNESS: 0
DIARRHEA: 1
SHORTNESS OF BREATH: 0
CONSTIPATION: 0
BLOOD IN STOOL: 0
BACK PAIN: 1
COUGH: 0
ABDOMINAL PAIN: 1

## 2021-07-13 NOTE — PROGRESS NOTES
Subjective:      Patient ID: Linette Martin is a 42 y.o. female. HPI  Patient in for checkup after seeing general surgeon and getting a dumping syndrome test which apparently was positive-started Reglan about a week ago and that along with the [address] she is having watery stools 4-5 times per day which is just the opposite of what she had before this whole thing started which is a bowel movement every 4 to 5 days-overall she is improved-antispasmodic tablet is approximately twice a day-the Linzess is 145 mg daily and the Reglan is twice a day-watery bowel movements started after she started the Reglan. She has lost a few more pounds. Another problem is staying asleep-she has been on trazodone which did not work and started on doxepin 10 mg and that is not helping so far. He denies any other issues to discuss. Prior to Visit Medications :  Medication doxepin (SINEQUAN) 25 MG capsule, Sig 2 hs, Taking? Yes, Authorizing Provider Blake Krueger, DO    Medication HYDROcodone-acetaminophen (NORCO) 5-325 MG per tablet, Sig Take 1 tablet by mouth daily as needed for Pain for up to 14 days. , Taking? Yes, Authorizing Provider Blake Krueger, DO    Medication metoclopramide (REGLAN) 5 MG tablet, Sig 1 tablet twice a day about 1 hour prior to the 2 biggest meals of the day, Taking? Yes, Authorizing Provider Blake Krueger, DO    Medication Hyoscyamine Sulfate SL (LEVSIN/SL) 0.125 MG SUBL, Sig 1-2 tabs by mouth every 4-6 hours as needed for abdominal cramps, Taking? Yes, Authorizing Provider Sarthak Krueger, DO    Medication linaclotide (LINZESS) 145 MCG capsule, Sig Take 1 capsule by mouth every morning (before breakfast), Taking? Yes, Authorizing Provider Angeles Mcelroy, DO    Medication gabapentin (NEURONTIN) 800 MG tablet, Sig Take 1 tablet by mouth 3 times daily for 90 days. May take 2 tablets at bedtime. , Taking?  Yes, Authorizing Provider Angeles Mcelroy, DO    Medication aluminum & magnesium Sig Inject 60 Units into the skin daily, Taking? , Authorizing Provider Dani Cervantes APRN - CNP    Medication Liraglutide (VICTOZA) 18 MG/3ML SOPN SC injection, Sig Inject 1.2 mg into the skin daily, Taking? , Authorizing Provider Jeff Rodriguez MD    Medication blood glucose test strips (ASCENSIA AUTODISC VI;ONE TOUCH ULTRA TEST VI) strip, Sig DX: E11.9-One touch ultra strips. FSBS 3 times daily, Taking? , Authorizing Provider EVETTE Rae      Past Medical History:  5/15/2013: Abdominal pain, acute, right lower quadrant  No date: Anxiety  No date: Arthritis      Comment:  Left Knee  8/13/2013: Bilateral ovarian cysts  No date: Blood transfusion reaction  1/29/2014: Cellulitis and abscess of trunk      Comment:  Pt was seen in ED today for bleeding from incision after               taking a fall this morning. 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 Eyes: Negative for visual disturbance. Respiratory: Negative for cough, chest tightness and shortness of breath. Cardiovascular: Negative for chest pain, palpitations and leg swelling. Gastrointestinal: Positive for abdominal pain and diarrhea. Negative for blood in stool and constipation. Genitourinary: Negative for frequency and hematuria. Musculoskeletal: Positive for back pain.  Negative for arthralgias and myalgias. Skin: Negative for rash. Neurological: Negative for tremors and headaches. Psychiatric/Behavioral: Positive for sleep disturbance. The patient is not nervous/anxious. Objective:   Physical Exam      Physical Exam  Constitutional:       General: She is not in acute distress. Appearance: Normal appearance. She is well-developed and normal weight. She is not ill-appearing. HENT:      Head: Normocephalic. Eyes:      General: No scleral icterus. Conjunctiva/sclera: Conjunctivae normal.   Neck:      Thyroid: No thyromegaly. Vascular: No carotid bruit. Cardiovascular:      Rate and Rhythm: Normal rate and regular rhythm. Heart sounds: Normal heart sounds. Pulmonary:      Effort: Pulmonary effort is normal.      Breath sounds: Normal breath sounds. Abdominal:      General: Bowel sounds are normal. There is no distension. Palpations: Abdomen is soft. There is no mass. Comments: Mild tenderness on palpation of the epigastric area. Musculoskeletal:         General: No tenderness. Cervical back: Neck supple. Left lower leg: No edema. Lymphadenopathy:      Cervical: No cervical adenopathy. Skin:     General: Skin is warm and dry. Coloration: Skin is not pale. Neurological:      Mental Status: She is alert and oriented to person, place, and time. Motor: No abnormal muscle tone. Gait: Gait normal.   Psychiatric:         Mood and Affect: Mood normal.         Behavior: Behavior normal.         Thought Content: Thought content normal.         Judgment: Judgment normal.         Assessment:       Diagnosis Orders   1. Dumping syndrome     2.  Generalized abdominal pain  HYDROcodone-acetaminophen (NORCO) 5-325 MG per tablet         Plan:      Gene Ibanez was seen today for follow-up from hospital.    Diagnoses and all orders for this visit:    Dumping syndrome  Stop taking the Reglan send me a note on Friday with an update on the multiple bowel movements-continue Linzess and spasm medication as needed. Generalized abdominal pain  -     HYDROcodone-acetaminophen (NORCO) 5-325 MG per tablet; Take 1 tablet by mouth daily as needed for Pain for up to 14 days. As above  Other orders  -     doxepin (SINEQUAN) 25 MG capsule; 2 hs  Insomnia-take 20 mg of doxepin for the next 3 nights-prescription sent for 25 mg and if no results with the extra doxepin then start 50 mg of doxepin on Friday and send me a note next week with an update. This is been approximately a 20-minute visit with the patient. During the visit chart was reviewed for testing and has been done and consult by general surgery.      Blake Krueger, DO

## 2021-07-13 NOTE — PATIENT INSTRUCTIONS
Dumping syndrome  Stop taking the Reglan send me a note on Friday with an update on the multiple bowel movements-continue Linzess and spasm medication as needed. Generalized abdominal pain  -     HYDROcodone-acetaminophen (NORCO) 5-325 MG per tablet; Take 1 tablet by mouth daily as needed for Pain for up to 14 days. As above  Other orders  -     doxepin (SINEQUAN) 25 MG capsule; 2 hs  Insomnia-take 20 mg of doxepin for the next 3 nights-prescription sent for 25 mg and if no results with the extra doxepin then start 50 mg of doxepin on Friday and send me a note next week with an update.

## 2021-07-20 ENCOUNTER — TELEPHONE (OUTPATIENT)
Dept: GASTROENTEROLOGY | Age: 41
End: 2021-07-20

## 2021-07-20 NOTE — TELEPHONE ENCOUNTER
Pt called and said Dr. Satish Basilio had sent an order for gastric empyting study and she had it done on 6/14. She would like results and instructions of what she needs to do. She is scheduled to see Dr. Austin Montaño on 8/4. Please call her.

## 2021-08-02 ENCOUNTER — NURSE TRIAGE (OUTPATIENT)
Dept: OTHER | Facility: CLINIC | Age: 41
End: 2021-08-02

## 2021-08-02 ENCOUNTER — HOSPITAL ENCOUNTER (EMERGENCY)
Age: 41
Discharge: HOME | End: 2021-08-02
Payer: COMMERCIAL

## 2021-08-02 VITALS
HEIGHT: 61 IN | BODY MASS INDEX: 22.28 KG/M2 | SYSTOLIC BLOOD PRESSURE: 113 MMHG | TEMPERATURE: 98.1 F | DIASTOLIC BLOOD PRESSURE: 87 MMHG | OXYGEN SATURATION: 98 % | WEIGHT: 118 LBS | HEART RATE: 78 BPM | RESPIRATION RATE: 18 BRPM

## 2021-08-02 DIAGNOSIS — R55 SYNCOPE AND COLLAPSE: Primary | ICD-10-CM

## 2021-08-02 DIAGNOSIS — R10.84 GENERALIZED ABDOMINAL PAIN: ICD-10-CM

## 2021-08-02 LAB
A/G RATIO: 1.4 (ref 1.1–2.2)
ALBUMIN SERPL-MCNC: 4.4 G/DL (ref 3.4–5)
ALP BLD-CCNC: 82 U/L (ref 40–129)
ALT SERPL-CCNC: 10 U/L (ref 10–40)
ANION GAP SERPL CALCULATED.3IONS-SCNC: 13 MMOL/L (ref 3–16)
AST SERPL-CCNC: 11 U/L (ref 15–37)
BASOPHILS ABSOLUTE: 0 K/UL (ref 0–0.2)
BASOPHILS RELATIVE PERCENT: 0.7 %
BILIRUB SERPL-MCNC: 0.5 MG/DL (ref 0–1)
BUN BLDV-MCNC: 12 MG/DL (ref 7–20)
CALCIUM SERPL-MCNC: 10.1 MG/DL (ref 8.3–10.6)
CHLORIDE BLD-SCNC: 101 MMOL/L (ref 99–110)
CO2: 25 MMOL/L (ref 21–32)
CREAT SERPL-MCNC: <0.5 MG/DL (ref 0.6–1.1)
EOSINOPHILS ABSOLUTE: 0 K/UL (ref 0–0.6)
EOSINOPHILS RELATIVE PERCENT: 0.9 %
GFR AFRICAN AMERICAN: >60
GFR NON-AFRICAN AMERICAN: >60
GLOBULIN: 3.2 G/DL
GLUCOSE BLD-MCNC: 306 MG/DL (ref 70–99)
HCG QUALITATIVE: NEGATIVE
HCT VFR BLD CALC: 41.3 % (ref 36–48)
HEMOGLOBIN: 14.6 G/DL (ref 12–16)
LYMPHOCYTES ABSOLUTE: 1.9 K/UL (ref 1–5.1)
LYMPHOCYTES RELATIVE PERCENT: 43.5 %
MCH RBC QN AUTO: 31.2 PG (ref 26–34)
MCHC RBC AUTO-ENTMCNC: 35.2 G/DL (ref 31–36)
MCV RBC AUTO: 88.6 FL (ref 80–100)
MONOCYTES ABSOLUTE: 0.4 K/UL (ref 0–1.3)
MONOCYTES RELATIVE PERCENT: 8.4 %
NEUTROPHILS ABSOLUTE: 2.1 K/UL (ref 1.7–7.7)
NEUTROPHILS RELATIVE PERCENT: 46.5 %
PDW BLD-RTO: 13.1 % (ref 12.4–15.4)
PLATELET # BLD: 197 K/UL (ref 135–450)
PMV BLD AUTO: 8.3 FL (ref 5–10.5)
POTASSIUM REFLEX MAGNESIUM: 3.7 MMOL/L (ref 3.5–5.1)
RBC # BLD: 4.67 M/UL (ref 4–5.2)
SODIUM BLD-SCNC: 139 MMOL/L (ref 136–145)
TOTAL PROTEIN: 7.6 G/DL (ref 6.4–8.2)
TROPONIN: <0.01 NG/ML
WBC # BLD: 4.5 K/UL (ref 4–11)

## 2021-08-02 PROCEDURE — 85025 COMPLETE CBC W/AUTO DIFF WBC: CPT

## 2021-08-02 PROCEDURE — 84703 CHORIONIC GONADOTROPIN ASSAY: CPT

## 2021-08-02 PROCEDURE — 93005 ELECTROCARDIOGRAM TRACING: CPT | Performed by: PHYSICIAN ASSISTANT

## 2021-08-02 PROCEDURE — 84484 ASSAY OF TROPONIN QUANT: CPT

## 2021-08-02 PROCEDURE — 80053 COMPREHEN METABOLIC PANEL: CPT

## 2021-08-02 PROCEDURE — 99284 EMERGENCY DEPT VISIT MOD MDM: CPT

## 2021-08-02 ASSESSMENT — PAIN DESCRIPTION - LOCATION: LOCATION: ABDOMEN

## 2021-08-02 ASSESSMENT — PAIN DESCRIPTION - PAIN TYPE: TYPE: ACUTE PAIN

## 2021-08-02 ASSESSMENT — PAIN SCALES - GENERAL
PAINLEVEL_OUTOF10: 4
PAINLEVEL_OUTOF10: 4

## 2021-08-02 ASSESSMENT — PAIN DESCRIPTION - ORIENTATION: ORIENTATION: LEFT;UPPER;RIGHT

## 2021-08-02 NOTE — TELEPHONE ENCOUNTER
Received call from Josie at [facility] with Red Flag Complaint. Brief description of triage: yesterday she blacked out and had a seizure, she has no history of seizures. No symptoms today. Triage indicates for patient to go to ED now per Dr. Dulce Maria Ochoa. Care advice provided, patient verbalizes understanding; denies any other questions or concerns; instructed to call back for any new or worsening symptoms. Attention Provider: Thank you for allowing me to participate in the care of your patient. The patient was connected to triage in response to information provided to the [facility]. Please do not respond through this encounter as the response is not directed to a shared pool. Reason for Disposition   First seizure ever    Answer Assessment - Initial Assessment Questions  1. ONSET: \"How long did the seizure last?\" (Minutes)       1 minute    2. CONTENT: \"Describe what happened during the seizure. Did the body become stiff? Was there any jerking? \"       I fell to the ground and they said I was shaking    3. CIRCUMSTANCE: \"What was the individual doing when the seizure began? \"       Sitting down in the chair    4. MENTAL STATUS: \"Does he know who he is, who you are, and where he is? \"       Was confused when she woke up    5. PRIOR SEIZURES: \"Has the individual had a seizure (convulsion) before? \" If so, ask: \"When was the last time? \" and \"What happened last time? \"      Denies    6. EPILEPSY: \"Does the individual have epilepsy? \" (note: check for medical ID bharath)      Denies    7. MEDICATIONS: \"Does the individual take anticonvulsant medications? \" (e.g., yes/no, compliance, any recent changes)      Denies    8. INJURY: \"Did the individual hurt himself during the seizure? \" (e.g., head, tongue)      Denies    9. OTHER SYMPTOMS: [place] Sessions there any other symptoms? \" (e.g., fever, headache)      Denies    10. PREGNANCY: \"Is there any chance you are pregnant? \" \"When was your last menstrual period? \" Denies    Protocols used: [id number]

## 2021-08-02 NOTE — ED PROVIDER NOTES
I did not have face-to-face interaction with this patient. I did not discuss the case with the advanced practice provider. I was available for consultation on the patient if deemed necessary by advanced practice provider. EKG  The Ekg interpreted by me shows  normal sinus rhythm with a rate of 86  Axis is   Normal  QTc is  normal  Intervals and Durations are unremarkable.       ST Segments: no acute change  No significant change from prior EKG dated 10 may 2020            Anastacio Gill MD  08/02/21 [id number]

## 2021-08-02 NOTE — ED PROVIDER NOTES
[facility] Emergency Department    CHIEF COMPLAINT  Seizures (pt was sitting outside yesterday, she went to get up and she \"felt weird the next thing I know my friend was standing over me\" friend report I was shaking   no head injury. friend reports she was disoriented. )      SHARED SERVICE VISIT  Evaluated by YENIFER. My supervising physician was available for consultation. HISTORY OF PRESENT ILLNESS  Anila Aggarwal is a 42 y.o. female who presents to the ED complaining of a episode yesterday in which she passed out. Patient states that she was sitting outside on a hammock for about an hour. On arising out of the hammock she states that she felt lightheaded, had tunnel vision felt like he was going to pass out. Her friend states that she did have some shaking movements but was back at baseline at 1 minute. Anastasiya falling the event her friend states that she was slightly confused. Patient states she remembers hearing her friend say her name as she was on the ground. No injury to the tongue, head, neck or loss of bowel or bladder function. Denies any prior events like this in the past.  Patient states she has not been eating or drinking a lot over the past few days secondary to chronic abdominal pain from gastroparesis. She has been evaluated by GI in the past including endoscopy and colonoscopy. She reports that she has lost a lot of weight. No chest pain, shortness of breath, history of structural heart disease. No other complaints, modifying factors or associated symptoms. Nursing notes reviewed. Past Medical History:   Diagnosis Date    Abdominal pain, acute, right lower quadrant 5/15/2013    Anxiety     Arthritis     Left Knee    Bilateral ovarian cysts 8/13/2013    Blood transfusion reaction     Cellulitis and abscess of trunk 1/29/2014    Pt was seen in ED today for bleeding from incision after taking a fall this morning.       COVID-19 2020    Depression     Diabetes mellitus ([facility]     x5yr    Diverticula of colon 2013    DM2 (diabetes mellitus, type 2) ([facility] 2016    Insomnia     Insomnia secondary to situational depression 2014    Left carpal tunnel syndrome 2018    MDRO (multiple drug resistant organisms) resistance     poss MRSA after hysterectomy treated with antibiotics    OA (osteoarthritis) of knee 2014    Obesity (BMI 30.0-34.9) 2017    Ovarian cyst     Plantar fasciitis, left 2014     Past Surgical History:   Procedure Laterality Date    BREAST CYST ASPIRATION       SECTION      CHOLECYSTECTOMY      COLONOSCOPY  2007    polyps    COLONOSCOPY N/A 2021    COLONOSCOPY WITH ANESTHESIA performed by Bernice Lazar MD [address]   2008    LAPAROSCOPY      lysis of adhesions    LAPAROTOMY  2014    LAPAROTOMY, BILATERAL SALPINGO - OOPHORECTOMY    SALPINGO-OOPHORECTOMY  2014    UPPER GASTROINTESTINAL ENDOSCOPY N/A 2021    EGD BIOPSY performed by Bernice Lazar MD at [address] [place] [address]     Family History   Problem Relation Age of Onset   Dexter Seb Cancer Mother         ovarian    Diabetes Mother     High Blood Pressure Mother     Hypertension Mother     Diabetes Brother     Hypertension Brother     Diabetes Maternal Grandmother     Cancer Maternal Grandfather         stomach    Prostate Cancer Paternal Grandfather         metastatic    Breast Cancer Maternal Aunt      Social History     Socioeconomic History    Marital status:      Spouse name: Not on file    Number of children: Not on file    Years of education: Not on file    Highest education level: Not on file   Occupational History    Not on file   Tobacco Use    Smoking status: Former Smoker     Years: 0.00     Types: Cigarettes     Quit date: 1999     Years since quittin.8    Smokeless tobacco: Never Used    Tobacco comment: smoked for 1 months when teenager   Vaping Use    Vaping Use: Never used   Substance and Sexual Activity    Alcohol use: Yes     Comment: social drinker  4 beer per month    Drug use: No     Comment: Hx of cocaine use in 2004- 9 months     Sexual activity: Yes     Partners: Male     Comment: painful intercourse    Other Topics Concern    Not on file   Social History Narrative    Not on file     Social Determinants of Health     Financial Resource Strain:     Difficulty of Paying Living Expenses:    Food Insecurity:     Worried About Running Out of Food in the Last Year:     [address] in the Last Year:    Transportation Needs:     Lack of Transportation (Medical):  Lack of Transportation (Non-Medical):    Physical Activity:     Days of Exercise per Week:     Minutes of Exercise per Session:    Stress:     Feeling of Stress :    Social Connections:     Frequency of Communication with Friends and Family:     Frequency of Social Gatherings with Friends and Family:     Attends Hinduism Services:     Active Member of Clubs or Organizations:     Attends Club or Organization Meetings:     Marital Status:    Intimate Partner Violence:     Fear of Current or Ex-Partner:     Emotionally Abused:     Physically Abused:     Sexually Abused:      No current facility-administered medications for this encounter. Current Outpatient Medications   Medication Sig Dispense Refill    doxepin (SINEQUAN) 25 MG capsule 2 hs 60 capsule 3    pantoprazole (PROTONIX) 40 MG tablet Take 1 tablet by mouth 2 times daily 60 tablet 0    linaclotide (LINZESS) 145 MCG capsule Take 1 capsule by mouth every morning (before breakfast) 30 capsule 3    gabapentin (NEURONTIN) 800 MG tablet Take 1 tablet by mouth 3 times daily for 90 days. May take 2 tablets at bedtime.  120 tablet 2    ondansetron (ZOFRAN ODT) 4 MG disintegrating tablet Take 1 tablet by mouth every 8 hours as needed for Nausea 30 tablet 3    metFORMIN (GLUCOPHAGE) 1000 MG tablet Take 1 tablet by mouth 2 times daily (with meals) 180 tablet 3    losartan (COZAAR) 50 MG tablet Take 1 tablet by mouth daily 30 tablet 5    insulin lispro (HUMALOG KWIKPEN) 100 UNIT/ML pen INJECT 20 UNITS SUBCUTANEOUSLY THREE TIMES DAILY BEFORE MEAL(S) 15 pen 3    Hyoscyamine Sulfate SL (LEVSIN/SL) 0.125 MG SUBL 1-2 tabs by mouth every 4-6 hours as needed for abdominal cramps 60 each 1    aluminum & magnesium hydroxide-simethicone (MAALOX ADVANCED MAX ST) 400-400-40 MG/5ML SUSP Take 15 mLs by mouth every 4 hours as needed (nausea or reflux) 1 Bottle 0    Insulin Pen Needle (B-D UF III MINI PEN NEEDLES) 31G X 5 MM MISC Inject 1 each into the skin 4 times daily 100 each 3    Insulin Glargine, 2 Unit Dial, (TOUJEO MAX SOLOSTAR) 300 UNIT/ML SOPN Inject 60 Units into the skin daily 4 pen 5    fluticasone (FLONASE) 50 MCG/ACT nasal spray 2 sprays by Nasal route daily 1 Bottle 0    blood glucose test strips (ASCENSIA AUTODISC VI;ONE TOUCH ULTRA TEST VI) strip DX: E11.9-One touch ultra strips. FSBS 3 times daily 100 strip 5    Lancets MISC DX: E 11.9-Uses insulin. FSBS 3 times daily-Delica lancets for one touch ultra  each 5    glucose monitoring kit (FREESTYLE) monitoring kit Dx E11.9-One touch ultra II meter-uses tid 1 kit 0     Allergies   Allergen Reactions    Lisinopril Swelling    Lisinopril-Hydrochlorothiazide Swelling    Morphine Nausea And Vomiting     States indigestion, but can take with Pepcid prior. REVIEW OF SYSTEMS  10 systems reviewed, pertinent positives per HPI otherwise noted to be negative    PHYSICAL EXAM  BP 98/75   Pulse 104   Temp 98.1 °F (36.7 °C)   Resp 20   Ht 5' 1\" (1.549 m)   Wt 118 lb (53.5 kg)   LMP 12/18/2008 (Approximate)   SpO2 96%   BMI 22.30 kg/m²   GENERAL APPEARANCE: Awake and alert. Cooperative. HEAD: Normocephalic. Atraumatic. EYES: PERRL. EOM's grossly intact. ENT: Mucous membranes are moist.   NECK: Supple. HEART: RRR.  No murmurs. LUNGS: Respirations unlabored. CTAB. Good air exchange. Speaking comfortably in full sentences. ABDOMEN: Soft. Non-distended. Non-tender. No guarding or rebound. No masses. No organomegaly. EXTREMITIES: No peripheral edema. Moves all extremities equally. All extremities neurovascularly intact. SKIN: Warm and dry. No acute rashes. NEUROLOGICAL: Alert and oriented. CN's 2-12 intact. No gross facial drooping. Strength 5/5, sensation intact. PSYCHIATRIC: Normal mood and affect. RADIOLOGY  No orders to display         LABS  Labs Reviewed   COMPREHENSIVE METABOLIC PANEL W/ REFLEX TO MG FOR LOW K - Abnormal; Notable for the following components:       Result Value    Glucose 306 (*)     CREATININE <0.5 (*)     AST 11 (*)     All other components within normal limits    Narrative:     Performed at:  [place] [address] [facility]   Phone [phone]   CBC WITH AUTO DIFFERENTIAL    Narrative:     Performed at:  [address], [facility]   Phone [phone]   TROPONIN    Narrative:     Performed at:  [address], [facility]   Phone [phone]   HCG, SERUM, QUALITATIVE    Narrative:     Performed at:  [address], [facility]   Phone [phone]   URINALYSIS       PROCEDURES  Unless otherwise noted below, none  Procedures    MDM  MDM  79-year-old female with history of gastroparesis presents emergency department for evaluation of a suspected syncopal episode. She spoke with her primary care provider who was current concerned for seizure versus syncope and referred to the emergency department. She has no prior history of epilepsy. Patient does have a story suggestive of a vasovagal reaction.   She was lying flat for extended period of time outside and upon arising became symptomatic. She also reports decreased oral intake secondary to gastroparesis. She had no chest pain or shortness of breath and no history of structural heart disease. EKG was performed which demonstrated a ventricular rate 86 bpm, , QRS 84, , QTc calculation 435. Patient's troponin was within normal limits. Pregnancy within normal limits no significant electrolyte abnormalities aside from elevated glucose which appears to be similar compared to her prior readings. Accordance with the [place] syncope risk score, patient is considered low risk for serious and life-threatening etiology of the syncope. Recommend she follow-up with her primary care provider for further evaluation. Discussed with patient at length different methods for increasing her nutritional intake such as supplementation such as Ensure and Ensure clear. Recommend she follow-up with GI for continue abdominal pain Josh to gastroparesis. [place] Syncope Risk Score from [email or web address]  on 8/2/2021  ** All calculations should be rechecked by clinician prior to use **    RESULT SUMMARY:  -3 points  [facility] Syncope Risk Score    Very low  risk  0.4% risk of 30-day serious adverse event (death, arrhythmia, MI -- full list in Evidence)      INPUTS:  Predisposition to vasovagal symptoms --> -1 = Yes  Heart disease history --> 0 = No  sBP 180 mmHg --> 0 = No  Elevated troponin --> 0 = No  Abnormal QRS axis --> 0 = No  QRS duration &gt;130 ms --> 0 = No  Corrected QT interval >480 ms --> 0 = No  ED diagnosis --> -2 = Vasovagal syncope    EKG: normal EKG, normal sinus rhythm. DISPOSITION  Patient was discharged to home in good condition. CLINICAL IMPRESSION  1. Syncope and collapse    2.  Generalized abdominal pain           Lashae Lockwood PA-C  08/02/21 [address] [facility] [address] PA-C  08/02/21 [id number]

## 2021-08-04 ENCOUNTER — OFFICE VISIT (OUTPATIENT)
Dept: GASTROENTEROLOGY | Age: 41
End: 2021-08-04

## 2021-08-04 VITALS
BODY MASS INDEX: 22.09 KG/M2 | DIASTOLIC BLOOD PRESSURE: 62 MMHG | SYSTOLIC BLOOD PRESSURE: 100 MMHG | WEIGHT: 117 LBS | HEIGHT: 61 IN

## 2021-08-04 DIAGNOSIS — E11.43 GASTROPARESIS DUE TO DM (HCC): ICD-10-CM

## 2021-08-04 DIAGNOSIS — K59.04 CHRONIC IDIOPATHIC CONSTIPATION: Primary | ICD-10-CM

## 2021-08-04 DIAGNOSIS — K31.84 GASTROPARESIS DUE TO DM (HCC): ICD-10-CM

## 2021-08-04 LAB
EKG ATRIAL RATE: 86 BPM
EKG DIAGNOSIS: NORMAL
EKG P AXIS: 60 DEGREES
EKG P-R INTERVAL: 146 MS
EKG Q-T INTERVAL: 364 MS
EKG QRS DURATION: 84 MS
EKG QTC CALCULATION (BAZETT): 435 MS
EKG R AXIS: 76 DEGREES
EKG T AXIS: 51 DEGREES
EKG VENTRICULAR RATE: 86 BPM

## 2021-08-04 PROCEDURE — 93010 ELECTROCARDIOGRAM REPORT: CPT | Performed by: INTERNAL MEDICINE

## 2021-08-04 PROCEDURE — 3051F HG A1C>EQUAL 7.0%<8.0%: CPT | Performed by: INTERNAL MEDICINE

## 2021-08-04 PROCEDURE — 99214 OFFICE O/P EST MOD 30 MIN: CPT | Performed by: INTERNAL MEDICINE

## 2021-08-04 RX ORDER — POLYETHYLENE GLYCOL 3350 17 G/17G
17 POWDER, FOR SOLUTION ORAL 2 TIMES DAILY
Qty: 527 G | Refills: 2 | Status: SHIPPED | OUTPATIENT
Start: 2021-08-04 | End: 2021-09-03

## 2021-08-04 RX ORDER — METOCLOPRAMIDE 5 MG/1
5 TABLET ORAL 3 TIMES DAILY
Qty: 90 TABLET | Refills: 1 | Status: SHIPPED | OUTPATIENT
Start: 2021-08-04 | End: 2021-11-28 | Stop reason: SDUPTHER

## 2021-08-04 NOTE — PATIENT INSTRUCTIONS
Patient Education        Constipation: Care Instructions  Your Care Instructions     Constipation means that you have a hard time passing stools (bowel movements). People pass stools from 3 times a day to once every 3 days. What is normal for you may be different. Constipation may occur with pain in the rectum and cramping. The pain may get worse when you try to pass stools. Sometimes there are small amounts of bright red blood on toilet paper or the surface of stools. This is because of enlarged veins near the rectum (hemorrhoids). A few changes in your diet and lifestyle may help you avoid ongoing constipation. Your doctor may also prescribe medicine to help loosen your stool. Some medicines can cause constipation. These include pain medicines and antidepressants. Tell your doctor about all the medicines you take. Your doctor may want to make a medicine change to ease your symptoms. Follow-up care is a key part of your treatment and safety. Be sure to make and go to all appointments, and call your doctor if you are having problems. It's also a good idea to know your test results and keep a list of the medicines you take. How can you care for yourself at home? · Drink plenty of fluids. If you have kidney, heart, or liver disease and have to limit fluids, talk with your doctor before you increase the amount of fluids you drink. · Include high-fiber foods in your diet each day. These include fruits, vegetables, beans, and whole grains. · Get at least 30 minutes of exercise on most days of the week. Walking is a good choice. You also may want to do other activities, such as running, swimming, cycling, or playing tennis or team sports. · Take a fiber supplement, such as Citrucel or Metamucil, every day. Read and follow all instructions on the label. · Schedule time each day for a bowel movement. A daily routine may help. Take your time having your bowel movement.   · Support your feet with a small step stool when you sit on the toilet. This helps flex your hips and places your pelvis in a squatting position. · Your doctor may recommend an over-the-counter laxative to relieve your constipation. Examples are Milk of Magnesia and MiraLax. Read and follow all instructions on the label. Do not use laxatives on a long-term basis. When should you call for help? Call your doctor now or seek immediate medical care if:    · You have new or worse belly pain.     · You have new or worse nausea or vomiting.     · You have blood in your stools. Watch closely for changes in your health, and be sure to contact your doctor if:    · Your constipation is getting worse.     · You do not get better as expected. Where can you learn more? Go to https://TerraWi.ComQi. org and sign in to your Quotations Book account. Enter 21 [phone] in the Fungos box to learn more about \"Constipation: Care Instructions. \"     If you do not have an account, please click on the \"Sign Up Now\" link. Current as of: October 19, 2020               Content Version: 12.9  © 3480-9561 [employer]. Care instructions adapted under license by [facility] ([facility]). If you have questions about a medical condition or this instruction, always ask your healthcare professional. [place] [address] any warranty or liability for your use of this information. Patient Education        Gastroparesis: Care Instructions  Overview     When you have gastroparesis, your stomach takes a lot longer to empty. This delay can cause belly pain, bloating, and belching. It also can cause hiccups, heartburn, nausea or vomiting. You may not feel like eating. These symptoms may come and go. They most often occur during and after meals. You may feel full after only a few bites of food. This condition occurs when the nerves or muscles to the stomach don't work properly. Diabetes is one of the most common causes of this nerve damage. Gastroparesis can make it harder to control your blood sugar levels. But keeping your blood sugar levels under control may help with your symptoms. Parkinson's disease, stroke, and some medicines can also cause this condition. Home treatment can often help. Follow-up care is a key part of your treatment and safety. Be sure to make and go to all appointments, and call your doctor if you are having problems. It's also a good idea to know your test results and keep a list of the medicines you take. How can you care for yourself at home? · Eat several small meals each day rather than three large meals. · Eat foods that are low in fiber and fat. · If your doctor suggests it, take medicines that help the stomach empty more quickly. These are called motility agents. When should you call for help? Call your doctor now or seek immediate medical care if:    · You are vomiting.     · You have new or worse belly pain.     · You have a fever.     · You cannot pass stools or gas. Watch closely for changes in your health, and be sure to contact your doctor if you have any problems. Where can you learn more? Go to https://MyWedding.ShoppinPal. org and sign in to your EnGeneIC account. Enter M106 in the [facility] box to learn more about \"Gastroparesis: Care Instructions. \"     If you do not have an account, please click on the \"Sign Up Now\" link. Current as of: February 10, 2021               Content Version: 12.9  © 6983-4281 Healthwise, [facility]. Care instructions adapted under license by [facility] ([facility]). If you have questions about a medical condition or this instruction, always ask your healthcare professional. [place] [address] any warranty or liability for your use of this information.

## 2021-08-04 NOTE — PROGRESS NOTES
[address]  [address] [facility], [place]   Phone: [phone]       CHIEF COMPLAINT  Chief Complaint   Patient presents with    Follow-up     hosp f/u        SUBJECTIVE  Loly Bejarano is a 42 y.o. female with medical history of anxiety disorder, depression, diabetes mellitus type 2, ovarian cyst, cholecystectomy who returns for follow-up of epigastric pain. Patient underwent gastric emptying scan on 7/1/2021 with findings of delayed gastric emptying consistent with gastroparesis. She reports constipation with one hard bowel movement weekly. Workup with EGD and colonoscopy on 5/6/21 was unremarkable except sigmoid diverticulosis and internal hemorrhoids. Denies  fever, chills, unintentional weight loss,  diarrhea, hematochezia or melenic stools. Date of last office visit and details: 4/9/21  42 y.o. female  [2] White [1] with medical history of anxiety disorder, depression, diabetes mellitus type 2, ovarian cyst, cholecystectomy seen independently with referral for epigastric pain of 3 months duration. Pain is sharp/burning, intermittent, radiates to the upper sides. Associated with bloating, nausea, dark tarry stools and constipation with bowel movement 2-3 days. Denies vomiting, fever, chills, unintentional weight loss, diarrhea, hematochezia. Reports previously motrin and naproxen for leg pains, discontinued 2 weeks ago. Patient was seen and [id number] [facility] ED on 3/30/2021 and 3/17/2021 with complaints of right upper quadrant abdominal pain flank pain. Labs including BMP, LFTs, lipase, WBC were unremarkable. CT abdomen and pelvis 3/17/2021 noted no acute abnormality. Managed with Mylanta, lidocaine with improvement. Toradol in the ED and discharged to follow-up with GI.     Last Encounter Reviewed: None  Pertinent PMH, FH, SH is reviewed below. Last EGD 5/6/21: Normal upper and mid esophagus.  No evidence of esophagitis or Guevara's esophagus. Regular Z-line at 36 cm from incisors. Mild mild gastritis. Biopsied (path -mild inactive chronic gastritis, negative for intestinal metaplasia and H. pylori). Normal duodenal bulb and second portion of duodenum. Last Colonoscopy 5/6/21: Small sized non bleeding diverticula in the sigmoid colon. Normal appearing terminal ileum. Medium sized non bleeding internal hemorrhoids. Otherwise normal appearing colon mucosa. Repeat colonoscopy in 10 years. PAST MEDICAL HISTORY     Past Medical History:   Diagnosis Date    Abdominal pain, acute, right lower quadrant 5/15/2013    Anxiety     Arthritis     Left Knee    Bilateral ovarian cysts 8/13/2013    Blood transfusion reaction     Cellulitis and abscess of trunk 1/29/2014    Pt was seen in ED today for bleeding from incision after taking a fall this morning.       COVID-19 05/2020    Depression     Diabetes mellitus ([facility] [address]     x5yr    Diverticula of colon 8/13/2013    DM2 (diabetes mellitus, type 2) ([facility] [address] 7/12/2016    Insomnia     Insomnia secondary to situational depression 2/27/2014    Left carpal tunnel syndrome 7/12/2018    MDRO (multiple drug resistant organisms) resistance     poss MRSA after hysterectomy treated with antibiotics    OA (osteoarthritis) of knee 5/13/2014    Obesity (BMI 30.0-34.9) 9/21/2017    Ovarian cyst     Plantar fasciitis, left 5/13/2014     FAMILY HISTORY     Family History   Problem Relation Age of Onset    Cancer Mother         ovarian    Diabetes Mother     High Blood Pressure Mother     Hypertension Mother     Diabetes Brother     Hypertension Brother     Diabetes Maternal Grandmother     Cancer Maternal Grandfather         stomach    Prostate Cancer Paternal Grandfather         metastatic    Breast Cancer Maternal Aunt      SOCIAL HISTORY     Social History     Socioeconomic History    Marital status:      Spouse name: Not on file    Number of children: Not on file    Years of education: Not on file    Highest education level: Not on file   Occupational History    Not on file   Tobacco Use    Smoking status: Former Smoker     Years: 0.00     Types: Cigarettes     Quit date: 1999     Years since quittin.8    Smokeless tobacco: Never Used    Tobacco comment: smoked for 1 months when teenager   Vaping Use    Vaping Use: Never used   Substance and Sexual Activity    Alcohol use: Yes     Comment: social drinker  4 beer per month    Drug use: No     Comment: Hx of cocaine use in 2004- 9 months     Sexual activity: Yes     Partners: Male     Comment: painful intercourse    Other Topics Concern    Not on file   Social History Narrative    Not on file     Social Determinants of Health     Financial Resource Strain:     Difficulty of Paying Living Expenses:    Food Insecurity:     Worried About Running Out of Food in the Last Year:     Ran Out of Food in the Last Year:    Transportation Needs:     Lack of Transportation (Medical):      Lack of Transportation (Non-Medical):    Physical Activity:     Days of Exercise per Week:     Minutes of Exercise per Session:    Stress:     Feeling of Stress :    Social Connections:     Frequency of Communication with Friends and Family:     Frequency of Social Gatherings with Friends and Family:     Attends [employer] Services:     Active Member of Clubs or Organizations:     Attends Club or Organization Meetings:     Marital Status:    Intimate Partner Violence:     Fear of Current or Ex-Partner:     Emotionally Abused:     Physically Abused:     Sexually Abused:      SURGICAL HISTORY     Past Surgical History:   Procedure Laterality Date    BREAST CYST ASPIRATION  2013     SECTION  2001    CHOLECYSTECTOMY      COLONOSCOPY  2007    polyps    COLONOSCOPY N/A 2021    COLONOSCOPY WITH ANESTHESIA performed by Geeta Laughlin MD at [address]   lysis of adhesions    LAPAROTOMY  1/2014    LAPAROTOMY, BILATERAL SALPINGO - OOPHORECTOMY    SALPINGO-OOPHORECTOMY  1/2014    UPPER GASTROINTESTINAL ENDOSCOPY N/A 5/6/2021    EGD BIOPSY performed by Brody Laura MD at [facility]   (This list may include medications prescribed during this encounter as epic can not insert only the list prior to this encounter.)  Current Outpatient Rx   Medication Sig Dispense Refill    linaclotide (LINZESS) 145 MCG capsule Take 2 capsules by mouth every morning (before breakfast) 30 capsule 3    metoclopramide (REGLAN) 5 MG tablet Take 1 tablet by mouth 3 times daily 90 tablet 1    doxepin (SINEQUAN) 25 MG capsule 2 hs 60 capsule 3    pantoprazole (PROTONIX) 40 MG tablet Take 1 tablet by mouth 2 times daily 60 tablet 0    Hyoscyamine Sulfate SL (LEVSIN/SL) 0.125 MG SUBL 1-2 tabs by mouth every 4-6 hours as needed for abdominal cramps 60 each 1    gabapentin (NEURONTIN) 800 MG tablet Take 1 tablet by mouth 3 times daily for 90 days. May take 2 tablets at bedtime. 120 tablet 2    Insulin Pen Needle (B-D UF III MINI PEN NEEDLES) 31G X 5 MM MISC Inject 1 each into the skin 4 times daily 100 each 3    metFORMIN (GLUCOPHAGE) 1000 MG tablet Take 1 tablet by mouth 2 times daily (with meals) 180 tablet 3    Insulin Glargine, 2 Unit Dial, (TOUJEO MAX SOLOSTAR) 300 UNIT/ML SOPN Inject 60 Units into the skin daily 4 pen 5    losartan (COZAAR) 50 MG tablet Take 1 tablet by mouth daily 30 tablet 5    fluticasone (FLONASE) 50 MCG/ACT nasal spray 2 sprays by Nasal route daily 1 Bottle 0    insulin lispro (HUMALOG KWIKPEN) 100 UNIT/ML pen INJECT 20 UNITS SUBCUTANEOUSLY THREE TIMES DAILY BEFORE MEAL(S) 15 pen 3    Lancets MISC DX: E 11.9-Uses insulin.  FSBS 3 times daily-Delica lancets for one touch ultra  each 5    glucose monitoring kit (FREESTYLE) monitoring kit Dx E11.9-One touch ultra II meter-uses tid 1 kit 0     ALLERGIES     Allergies Allergen Reactions    Lisinopril Swelling    Lisinopril-Hydrochlorothiazide Swelling    Morphine Nausea And Vomiting     States indigestion, but can take with Pepcid prior. IMMUNIZATIONS     Immunization History   Administered Date(s) Administered    Influenza Virus Vaccine 01/17/2014, 12/03/2014, 01/11/2016    Influenza, Intradermal, Preservative free 11/08/2012    Influenza, Quadv, IM, PF (6 mo and older Fluzone, Flulaval, Fluarix, and 3 yrs and older Afluria) 11/27/2017, 10/07/2020    Pneumococcal Polysaccharide ([id number]) 01/17/2014, 12/03/2014    Tdap (Boostrix, Adacel) 03/17/2014, 02/21/2015, 06/07/2016       REVIEW OF SYSTEMS   See HPI for further details and pertinent postiives. Negative for the following:  Constitutional: Negative for weight change. Negative for appetite change and fatigue. HENT: Negative for nosebleeds, sore throat, mouth sores, and voice change. Respiratory: Negative for cough, choking and chest tightness. Cardiovascular: Negative for chest pain   Gastrointestinal: See HPI  Musculoskeletal: Negative for arthralgias. Skin: Negative for pallor. Neurological: Negative for weakness and light-headedness. Hematological: Negative for adenopathy. Does not bruise/bleed easily. Psychiatric/Behavioral: Negative for suicidal ideas. PHYSICAL EXAM   VITAL SIGNS: /62   Ht 5' 1\" (1.549 m)   Wt 117 lb (53.1 kg)   LMP 12/18/2008 (Approximate)   BMI 22.11 kg/m²   Wt Readings from Last 3 Encounters:   08/04/21 117 lb (53.1 kg)   08/02/21 118 lb (53.5 kg)   07/13/21 123 lb 12.8 oz (56.2 kg)     Constitutional: Well developed, Well nourished, No acute distress, Non-toxic appearance. HENT: Normocephalic, Atraumatic, Bilateral external ears normal, Oropharynx moist, No oral exudates, Nose normal.   Eyes: Conjunctiva normal, No discharge. Neck: Normal range of motion, No tenderness, Supple, No stridor.    Cardiovascular: Normal heart rate, Normal rhythm, No murmurs, No rubs, No gallops. Thorax & Lungs: Normal breath sounds, No respiratory distress, No wheezing, No chest tendernes  Abdomen: normal bowel sounds, soft, non tender, non distended, no hernias,    Rectal:  Deferred. Skin: Warm, Dry, No erythema, No rash. No bruising. No spider hemangiomas. Back: No tenderness  Lower Extremities: Intact distal pulses, No edema, No tenderness, No cyanosis, No clubbing. Neurologic: Alert & oriented x 3, Normal motor function, Normal sensory function, No focal deficits noted. No results found. FINAL IMPRESSION   Eugenio Foreman was seen today for follow-up. Diagnoses and all orders for this visit:    Chronic idiopathic constipation  -     Increase to linaclotide (LINZESS) 145 MCG capsule; Take 2 capsules by mouth every morning (before breakfast)  -     Advised on increased hydration. Gastroparesis due to DM ([address]  -     Improved HbA1c from 14% on 3/3/20 to 7.8% on 5/12/21; Continue tight glycemic control  -     metoclopramide (REGLAN) 5 MG tablet; Take 1 tablet by mouth 3 times daily  -     Small frequent meals      No orders of the defined types were placed in this encounter. ORDERED FUTURE/PENDING TESTS     Lab Frequency Next Occurrence       FOLLOWUP   Return in about 3 months (around 11/4/2021).           Marilu Keith MD 8/4/21 8:22 AM EDT    CC:  Dacia Krueger DO

## 2021-08-26 ENCOUNTER — HOSPITAL ENCOUNTER (EMERGENCY)
Age: 41
Discharge: HOME | End: 2021-08-26
Attending: EMERGENCY MEDICINE

## 2021-08-26 VITALS
TEMPERATURE: 98.5 F | SYSTOLIC BLOOD PRESSURE: 122 MMHG | RESPIRATION RATE: 18 BRPM | DIASTOLIC BLOOD PRESSURE: 88 MMHG | OXYGEN SATURATION: 99 % | HEART RATE: 91 BPM

## 2021-08-26 DIAGNOSIS — R10.9 LEFT FLANK PAIN: Primary | ICD-10-CM

## 2021-08-26 DIAGNOSIS — E87.6 HYPOKALEMIA: ICD-10-CM

## 2021-08-26 LAB
A/G RATIO: 1.6 (ref 1.1–2.2)
ALBUMIN SERPL-MCNC: 4.1 G/DL (ref 3.4–5)
ALP BLD-CCNC: 72 U/L (ref 40–129)
ALT SERPL-CCNC: 23 U/L (ref 10–40)
ANION GAP SERPL CALCULATED.3IONS-SCNC: 15 MMOL/L (ref 3–16)
AST SERPL-CCNC: 13 U/L (ref 15–37)
BACTERIA: ABNORMAL /HPF
BASOPHILS ABSOLUTE: 0.1 K/UL (ref 0–0.2)
BASOPHILS RELATIVE PERCENT: 1.2 %
BILIRUB SERPL-MCNC: 0.6 MG/DL (ref 0–1)
BILIRUBIN URINE: ABNORMAL
BLOOD, URINE: NEGATIVE
BUN BLDV-MCNC: 7 MG/DL (ref 7–20)
CALCIUM SERPL-MCNC: 9.4 MG/DL (ref 8.3–10.6)
CHLORIDE BLD-SCNC: 99 MMOL/L (ref 99–110)
CLARITY: ABNORMAL
CO2: 23 MMOL/L (ref 21–32)
COLOR: YELLOW
CREAT SERPL-MCNC: <0.5 MG/DL (ref 0.6–1.1)
EOSINOPHILS ABSOLUTE: 0 K/UL (ref 0–0.6)
EOSINOPHILS RELATIVE PERCENT: 0.3 %
EPITHELIAL CELLS, UA: ABNORMAL /HPF (ref 0–5)
GFR AFRICAN AMERICAN: >60
GFR NON-AFRICAN AMERICAN: >60
GLOBULIN: 2.6 G/DL
GLUCOSE BLD-MCNC: 161 MG/DL (ref 70–99)
GLUCOSE URINE: NEGATIVE MG/DL
HCT VFR BLD CALC: 39 % (ref 36–48)
HEMOGLOBIN: 13.8 G/DL (ref 12–16)
KETONES, URINE: 40 MG/DL
LEUKOCYTE ESTERASE, URINE: ABNORMAL
LIPASE: 22 U/L (ref 13–60)
LYMPHOCYTES ABSOLUTE: 2 K/UL (ref 1–5.1)
LYMPHOCYTES RELATIVE PERCENT: 44.8 %
MAGNESIUM: 1.8 MG/DL (ref 1.8–2.4)
MCH RBC QN AUTO: 31 PG (ref 26–34)
MCHC RBC AUTO-ENTMCNC: 35.5 G/DL (ref 31–36)
MCV RBC AUTO: 87.4 FL (ref 80–100)
MICROSCOPIC EXAMINATION: YES
MONOCYTES ABSOLUTE: 0.5 K/UL (ref 0–1.3)
MONOCYTES RELATIVE PERCENT: 11.4 %
NEUTROPHILS ABSOLUTE: 1.9 K/UL (ref 1.7–7.7)
NEUTROPHILS RELATIVE PERCENT: 42.3 %
NITRITE, URINE: NEGATIVE
PDW BLD-RTO: 13.4 % (ref 12.4–15.4)
PH UA: 7 (ref 5–8)
PLATELET # BLD: 190 K/UL (ref 135–450)
PMV BLD AUTO: 8.2 FL (ref 5–10.5)
POTASSIUM REFLEX MAGNESIUM: 3 MMOL/L (ref 3.5–5.1)
PROTEIN UA: NEGATIVE MG/DL
RBC # BLD: 4.46 M/UL (ref 4–5.2)
RBC UA: ABNORMAL /HPF (ref 0–4)
SODIUM BLD-SCNC: 137 MMOL/L (ref 136–145)
SPECIFIC GRAVITY UA: 1.02 (ref 1–1.03)
TOTAL PROTEIN: 6.7 G/DL (ref 6.4–8.2)
URINE REFLEX TO CULTURE: YES
URINE TYPE: ABNORMAL
UROBILINOGEN, URINE: 1 E.U./DL
WBC # BLD: 4.4 K/UL (ref 4–11)
WBC UA: ABNORMAL /HPF (ref 0–5)

## 2021-08-26 PROCEDURE — 80053 COMPREHEN METABOLIC PANEL: CPT

## 2021-08-26 PROCEDURE — 81001 URINALYSIS AUTO W/SCOPE: CPT

## 2021-08-26 PROCEDURE — 96374 THER/PROPH/DIAG INJ IV PUSH: CPT

## 2021-08-26 PROCEDURE — 99284 EMERGENCY DEPT VISIT MOD MDM: CPT

## 2021-08-26 PROCEDURE — 85025 COMPLETE CBC W/AUTO DIFF WBC: CPT

## 2021-08-26 PROCEDURE — 93005 ELECTROCARDIOGRAM TRACING: CPT | Performed by: EMERGENCY MEDICINE

## 2021-08-26 PROCEDURE — 83690 ASSAY OF LIPASE: CPT

## 2021-08-26 PROCEDURE — 2500000003 HC RX 250 WO HCPCS: Performed by: EMERGENCY MEDICINE

## 2021-08-26 PROCEDURE — 2580000003 HC RX 258: Performed by: EMERGENCY MEDICINE

## 2021-08-26 PROCEDURE — 87086 URINE CULTURE/COLONY COUNT: CPT

## 2021-08-26 PROCEDURE — 96372 THER/PROPH/DIAG INJ SC/IM: CPT

## 2021-08-26 PROCEDURE — 6370000000 HC RX 637 (ALT 250 FOR IP): Performed by: EMERGENCY MEDICINE

## 2021-08-26 PROCEDURE — 83735 ASSAY OF MAGNESIUM: CPT

## 2021-08-26 PROCEDURE — 6360000002 HC RX W HCPCS: Performed by: EMERGENCY MEDICINE

## 2021-08-26 PROCEDURE — 96375 TX/PRO/DX INJ NEW DRUG ADDON: CPT

## 2021-08-26 RX ORDER — PROMETHAZINE HYDROCHLORIDE 25 MG/ML
25 INJECTION, SOLUTION INTRAMUSCULAR; INTRAVENOUS ONCE
Status: COMPLETED | OUTPATIENT
Start: 2021-08-26 | End: 2021-08-26

## 2021-08-26 RX ORDER — ACYCLOVIR 400 MG/1
800 TABLET ORAL
Qty: 70 TABLET | Refills: 0 | Status: SHIPPED | OUTPATIENT
Start: 2021-08-26 | End: 2021-09-02

## 2021-08-26 RX ORDER — KETOROLAC TROMETHAMINE 30 MG/ML
15 INJECTION, SOLUTION INTRAMUSCULAR; INTRAVENOUS ONCE
Status: COMPLETED | OUTPATIENT
Start: 2021-08-26 | End: 2021-08-26

## 2021-08-26 RX ORDER — 0.9 % SODIUM CHLORIDE 0.9 %
1000 INTRAVENOUS SOLUTION INTRAVENOUS ONCE
Status: COMPLETED | OUTPATIENT
Start: 2021-08-26 | End: 2021-08-26

## 2021-08-26 RX ORDER — LIDOCAINE 4 G/G
1 PATCH TOPICAL ONCE
Status: DISCONTINUED | OUTPATIENT
Start: 2021-08-26 | End: 2021-08-27 | Stop reason: HOSPADM

## 2021-08-26 RX ADMIN — PROMETHAZINE HYDROCHLORIDE 25 MG: 25 INJECTION INTRAMUSCULAR; INTRAVENOUS at 20:25

## 2021-08-26 RX ADMIN — KETOROLAC TROMETHAMINE 15 MG: 30 INJECTION, SOLUTION INTRAMUSCULAR; INTRAVENOUS at 19:55

## 2021-08-26 RX ADMIN — SODIUM CHLORIDE 1000 ML: 9 INJECTION, SOLUTION INTRAVENOUS at 19:54

## 2021-08-26 RX ADMIN — FAMOTIDINE 20 MG: 10 INJECTION, SOLUTION INTRAVENOUS at 19:54

## 2021-08-26 RX ADMIN — POTASSIUM BICARBONATE 40 MEQ: 782 TABLET, EFFERVESCENT ORAL at 22:19

## 2021-08-26 ASSESSMENT — PAIN DESCRIPTION - LOCATION: LOCATION: FLANK

## 2021-08-26 ASSESSMENT — PAIN SCALES - GENERAL
PAINLEVEL_OUTOF10: 10
PAINLEVEL_OUTOF10: 4

## 2021-08-26 NOTE — ED PROVIDER NOTES
EKG interpretation:  Normal sinus rhythm, rate 85, normal axis, QTC upper limits of normal, no acute ST or T wave changes from prior on 8/2/2021     Dhiraj Antony MD  08/26/21 1900    CHIEF COMPLAINT  Flank Pain (Patient arrives via walk in with c/o left flank pain extending into her LUQ and lower chest. Patient states this has been going on for 2 days. )      HISTORY OF PRESENT ILLNESS  Anyi Gerardo is a 42 y.o. female with a history of diabetes, gastroparesis, arthritis, cholecystectomy, hysterectomy, anxiety, depression who presents to the ED complaining of left flank pain. Symptoms ongoing for the past 2 days. Patient reports constant moderate pain with itching and burning of her left flank which at times radiates to her left upper quadrant. She reports some nausea but denies emesis, diarrhea, constipation, hematochezia, melena, dysuria, gross hematuria, vaginal discharge, fever, chills, chest pain, dyspnea. Reports prior history of shingles to this area however denies seeing any visible rashes. No other complaints, modifying factors or associated symptoms. I have reviewed the following from the nursing documentation. Past Medical History:   Diagnosis Date    Abdominal pain, acute, right lower quadrant 5/15/2013    Anxiety     Arthritis     Left Knee    Bilateral ovarian cysts 8/13/2013    Blood transfusion reaction     Cellulitis and abscess of trunk 1/29/2014    Pt was seen in ED today for bleeding from incision after taking a fall this morning.       COVID-19 05/2020    Depression     Diabetes mellitus ([address]     x5yr    Diverticula of colon 8/13/2013    DM2 (diabetes mellitus, type 2) ([address] 7/12/2016    Insomnia     Insomnia secondary to situational depression 2/27/2014    Left carpal tunnel syndrome 7/12/2018    MDRO (multiple drug resistant organisms) resistance     poss MRSA after hysterectomy treated with antibiotics    OA (osteoarthritis) of knee 5/13/2014    Obesity (BMI 30.0-34.9) 2017    Ovarian cyst     Plantar fasciitis, left 2014     Past Surgical History:   Procedure Laterality Date    BREAST CYST ASPIRATION       SECTION      CHOLECYSTECTOMY      COLONOSCOPY  2007    polyps    COLONOSCOPY N/A 2021    COLONOSCOPY WITH ANESTHESIA performed by Rudy Gonzalez MD at [address]  2008    LAPAROSCOPY      lysis of adhesions    LAPAROTOMY  2014    LAPAROTOMY, BILATERAL SALPINGO - OOPHORECTOMY    SALPINGO-OOPHORECTOMY  2014    UPPER GASTROINTESTINAL ENDOSCOPY N/A 2021    EGD BIOPSY performed by Rudy Gonzalez MD at [facility] History   Problem Relation Age of Onset   [facility] Cancer Mother         ovarian    Diabetes Mother     High Blood Pressure Mother     Hypertension Mother     Diabetes Brother     Hypertension Brother     Diabetes Maternal Grandmother     Cancer Maternal Grandfather         stomach    Prostate Cancer Paternal Grandfather         metastatic    Breast Cancer Maternal Aunt      Social History     Socioeconomic History    Marital status:      Spouse name: Not on file    Number of children: Not on file    Years of education: Not on file    Highest education level: Not on file   Occupational History    Not on file   Tobacco Use    Smoking status: Former Smoker     Years: 0.00     Types: Cigarettes     Quit date: 1999     Years since quittin.9    Smokeless tobacco: Never Used    Tobacco comment: smoked for 1 months when teenager   Vaping Use    Vaping Use: Never used   Substance and Sexual Activity    Alcohol use: Not Currently    Drug use: Yes     Types: Marijuana     Comment: Hx of cocaine use in 2004- 9 months     Sexual activity: Yes     Partners: Male     Comment: painful intercourse    Other Topics Concern    Not on file   Social History Narrative    Not on file     Social Determinants of Health     Financial Resource Strain:     Difficulty of Paying Living Expenses:    Food Insecurity:     Worried About Running Out of Food in the Last Year:     [address] in the Last Year:    Transportation Needs:     Lack of Transportation (Medical):      Lack of Transportation (Non-Medical):    Physical Activity:     Days of Exercise per Week:     Minutes of Exercise per Session:    Stress:     Feeling of Stress :    Social Connections:     Frequency of Communication with Friends and Family:     Frequency of Social Gatherings with Friends and Family:     Attends [employer] Services:     Active Member of Clubs or Organizations:     Attends Club or Organization Meetings:     Marital Status:    Intimate Partner Violence:     Fear of Current or Ex-Partner:     Emotionally Abused:     Physically Abused:     Sexually Abused:      Current Facility-Administered Medications   Medication Dose Route Frequency Provider Last Rate Last Admin    potassium bicarb-citric acid (EFFER-K) effervescent tablet 40 mEq  40 mEq Oral Once Carlos Dailey MD        lidocaine 4 % external patch 1 patch  1 patch Transdermal Once Carlos Dailey MD         Current Outpatient Medications   Medication Sig Dispense Refill    acyclovir (ZOVIRAX) 400 MG tablet Take 2 tablets by mouth 5 times daily for 7 days 70 tablet 0    linaclotide (LINZESS) 145 MCG capsule Take 2 capsules by mouth every morning (before breakfast) 30 capsule 3    metoclopramide (REGLAN) 5 MG tablet Take 1 tablet by mouth 3 times daily 90 tablet 1    polyethylene glycol (MIRALAX) 17 g packet Take 17 g by mouth 2 times daily 527 g 2    doxepin (SINEQUAN) 25 MG capsule 2 hs 60 capsule 3    pantoprazole (PROTONIX) 40 MG tablet Take 1 tablet by mouth 2 times daily 60 tablet 0    Hyoscyamine Sulfate SL (LEVSIN/SL) 0.125 MG SUBL 1-2 tabs by mouth every 4-6 hours as needed for abdominal cramps 60 each 1    gabapentin (NEURONTIN) 800 MG tablet Take 1 tablet by mouth 3 times daily for 90 days. May take 2 tablets at bedtime. 120 tablet 2    Insulin Pen Needle (B-D UF III MINI PEN NEEDLES) 31G X 5 MM MISC Inject 1 each into the skin 4 times daily 100 each 3    metFORMIN (GLUCOPHAGE) 1000 MG tablet Take 1 tablet by mouth 2 times daily (with meals) 180 tablet 3    losartan (COZAAR) 50 MG tablet Take 1 tablet by mouth daily 30 tablet 5    fluticasone (FLONASE) 50 MCG/ACT nasal spray 2 sprays by Nasal route daily 1 Bottle 0    insulin lispro (HUMALOG KWIKPEN) 100 UNIT/ML pen INJECT 20 UNITS SUBCUTANEOUSLY THREE TIMES DAILY BEFORE MEAL(S) 15 pen 3    Lancets MISC DX: E 11.9-Uses insulin. FSBS 3 times daily-Delica lancets for one touch ultra  each 5    glucose monitoring kit (FREESTYLE) monitoring kit Dx E11.9-One touch ultra II meter-uses tid 1 kit 0     Allergies   Allergen Reactions    Lisinopril Swelling    Lisinopril-Hydrochlorothiazide Swelling    Morphine Nausea And Vomiting     States indigestion, but can take with Pepcid prior. REVIEW OF SYSTEMS  10 systems reviewed, pertinent positives per HPI otherwise noted to be negative. PHYSICAL EXAM  /74   Pulse 96   Temp 98.5 °F (36.9 °C) (Oral)   Resp 18   LMP 12/18/2008 (Approximate)   SpO2 100%   GENERAL APPEARANCE: Awake and alert. Cooperative. Tearful but nontoxic. HEAD: Normocephalic. Atraumatic. EYES: PERRL. EOM's grossly intact. ENT: Mucous membranes are moist.   NECK: Supple, trachea midline. HEART: RRR. Normal S1, S2. No murmurs, rubs or gallops. LUNGS: Respirations unlabored. CTAB. Good air exchange. No wheezes, rales, or rhonchi. Speaking comfortably in full sentences. ABDOMEN: Soft. Non-distended. Mild epigastric tenderness which patient states is chronic, possibly related to gastroparesis. No guarding or rebound. Normal Bowel sounds. Mild to moderate left flank tenderness to light palpation but no convincing CVA pain with percussion. EXTREMITIES: No peripheral edema. MAEE.  No acute deformities. SKIN: Warm and dry. No acute rashes. NEUROLOGICAL: Alert and oriented X 3. CN II-XII intact. No gross facial drooping. Strength 5/5, sensation intact. PSYCHIATRIC: Appears slightly anxious but overall reasonable. LABS  I have reviewed all labs for this visit.    Results for orders placed or performed during the hospital encounter of 08/26/21   CBC Auto Differential   Result Value Ref Range    WBC 4.4 4.0 - 11.0 K/uL    RBC 4.46 4.00 - 5.20 M/uL    Hemoglobin 13.8 12.0 - 16.0 g/dL    Hematocrit 39.0 36.0 - 48.0 %    MCV 87.4 80.0 - 100.0 fL    MCH 31.0 26.0 - 34.0 pg    MCHC 35.5 31.0 - 36.0 g/dL    RDW 13.4 12.4 - 15.4 %    Platelets 239 303 - 802 K/uL    MPV 8.2 5.0 - 10.5 fL    Neutrophils % 42.3 %    Lymphocytes % 44.8 %    Monocytes % 11.4 %    Eosinophils % 0.3 %    Basophils % 1.2 %    Neutrophils Absolute 1.9 1.7 - 7.7 K/uL    Lymphocytes Absolute 2.0 1.0 - 5.1 K/uL    Monocytes Absolute 0.5 0.0 - 1.3 K/uL    Eosinophils Absolute 0.0 0.0 - 0.6 K/uL    Basophils Absolute 0.1 0.0 - 0.2 K/uL   Comprehensive Metabolic Panel w/ Reflex to MG   Result Value Ref Range    Sodium 137 136 - 145 mmol/L    Potassium reflex Magnesium 3.0 (L) 3.5 - 5.1 mmol/L    Chloride 99 99 - 110 mmol/L    CO2 23 21 - 32 mmol/L    Anion Gap 15 3 - 16    Glucose 161 (H) 70 - 99 mg/dL    BUN 7 7 - 20 mg/dL    CREATININE <0.5 (L) 0.6 - 1.1 mg/dL    GFR Non-African American >60 >60    GFR African American >60 >60    Calcium 9.4 8.3 - 10.6 mg/dL    Total Protein 6.7 6.4 - 8.2 g/dL    Albumin 4.1 3.4 - 5.0 g/dL    Albumin/Globulin Ratio 1.6 1.1 - 2.2    Total Bilirubin 0.6 0.0 - 1.0 mg/dL    Alkaline Phosphatase 72 40 - 129 U/L    ALT 23 10 - 40 U/L    AST 13 (L) 15 - 37 U/L    Globulin 2.6 g/dL   Lipase   Result Value Ref Range    Lipase 22.0 13.0 - 60.0 U/L   Urinalysis Reflex to Culture    Specimen: Urine, clean catch   Result Value Ref Range    Color, UA Yellow Straw/Yellow    Clarity, UA CLOUDY (A) Clear

## 2021-08-27 LAB
EKG ATRIAL RATE: 85 BPM
EKG DIAGNOSIS: NORMAL
EKG P AXIS: 72 DEGREES
EKG P-R INTERVAL: 146 MS
EKG Q-T INTERVAL: 388 MS
EKG QRS DURATION: 86 MS
EKG QTC CALCULATION (BAZETT): 461 MS
EKG R AXIS: 87 DEGREES
EKG T AXIS: 62 DEGREES
EKG VENTRICULAR RATE: 85 BPM
URINE CULTURE, ROUTINE: NORMAL

## 2021-08-27 PROCEDURE — 93010 ELECTROCARDIOGRAM REPORT: CPT | Performed by: INTERNAL MEDICINE

## 2021-09-07 NOTE — TELEPHONE ENCOUNTER
Per phone encounter 7-2-2021, [employer] patient assistance form completed & faxed. I called for status update. (20 min wait time)    After speaking to a representative, they are missing proof of income & copies of the front & back of the insurance card. Spoke to patient & she will drop off to the  proof of income.  can you ask if patient has any current insurance & if so make a copy. Thanks.

## 2021-09-07 NOTE — TELEPHONE ENCOUNTER
PT said Josh Torres was working on [place] for [place] , but she hasn't heard anything back. PT wants to know if she needs to do anything else. Advise pt.

## 2021-09-17 ENCOUNTER — OFFICE VISIT (OUTPATIENT)
Dept: FAMILY MEDICINE CLINIC | Age: 41
End: 2021-09-17

## 2021-09-17 VITALS
SYSTOLIC BLOOD PRESSURE: 96 MMHG | WEIGHT: 109 LBS | OXYGEN SATURATION: 98 % | DIASTOLIC BLOOD PRESSURE: 62 MMHG | HEART RATE: 110 BPM | BODY MASS INDEX: 20.6 KG/M2

## 2021-09-17 DIAGNOSIS — I10 ESSENTIAL HYPERTENSION: ICD-10-CM

## 2021-09-17 DIAGNOSIS — E78.2 MIXED HYPERLIPIDEMIA: ICD-10-CM

## 2021-09-17 DIAGNOSIS — E11.9 TYPE 2 DIABETES MELLITUS WITHOUT COMPLICATION, WITH LONG-TERM CURRENT USE OF INSULIN (HCC): Primary | ICD-10-CM

## 2021-09-17 DIAGNOSIS — Z79.4 TYPE 2 DIABETES MELLITUS WITHOUT COMPLICATION, WITH LONG-TERM CURRENT USE OF INSULIN (HCC): Primary | ICD-10-CM

## 2021-09-17 PROCEDURE — 99214 OFFICE O/P EST MOD 30 MIN: CPT | Performed by: FAMILY MEDICINE

## 2021-09-17 PROCEDURE — 3051F HG A1C>EQUAL 7.0%<8.0%: CPT | Performed by: FAMILY MEDICINE

## 2021-09-17 RX ORDER — PROMETHAZINE HYDROCHLORIDE 25 MG/1
25 TABLET ORAL 3 TIMES DAILY PRN
Qty: 30 TABLET | Refills: 5 | Status: SHIPPED | OUTPATIENT
Start: 2021-09-17 | End: 2022-03-29

## 2021-09-17 RX ORDER — ONDANSETRON 4 MG/1
4 TABLET, ORALLY DISINTEGRATING ORAL EVERY 8 HOURS PRN
Qty: 30 TABLET | Refills: 1 | Status: SHIPPED | OUTPATIENT
Start: 2021-09-17 | End: 2021-12-09

## 2021-09-17 SDOH — ECONOMIC STABILITY: FOOD INSECURITY: WITHIN THE PAST 12 MONTHS, YOU WORRIED THAT YOUR FOOD WOULD RUN OUT BEFORE YOU GOT MONEY TO BUY MORE.: SOMETIMES TRUE

## 2021-09-17 SDOH — ECONOMIC STABILITY: FOOD INSECURITY: WITHIN THE PAST 12 MONTHS, THE FOOD YOU BOUGHT JUST DIDN'T LAST AND YOU DIDN'T HAVE MONEY TO GET MORE.: NEVER TRUE

## 2021-09-17 ASSESSMENT — SOCIAL DETERMINANTS OF HEALTH (SDOH): HOW HARD IS IT FOR YOU TO PAY FOR THE VERY BASICS LIKE FOOD, HOUSING, MEDICAL CARE, AND HEATING?: HARD

## 2021-09-17 ASSESSMENT — ENCOUNTER SYMPTOMS: NAUSEA: 1

## 2021-09-17 NOTE — PROGRESS NOTES
Remy Culp is a 42 y.o. female    Chief Complaint   Patient presents with    Diabetes    Hypertension    Hyperlipidemia       HPI:    Diabetes  She presents for her follow-up diabetic visit. She has type 2 diabetes mellitus. Her disease course has been improving. Pertinent negatives for diabetes include no polydipsia. Risk factors for coronary artery disease include diabetes mellitus. An ACE inhibitor/angiotensin II receptor blocker is being taken. Hypertension  This is a chronic problem. The current episode started more than 1 year ago. The problem is unchanged. The problem is controlled. Past treatments include angiotensin blockers. The current treatment provides significant improvement. There are no compliance problems. There is no history of kidney disease. Hyperlipidemia  This is a chronic problem. The current episode started more than 1 year ago. The problem is uncontrolled. Recent lipid tests were reviewed and are high. Exacerbating diseases include diabetes. Risk factors for coronary artery disease include diabetes mellitus and hypertension. The 10-year ASCVD risk score (Latoya Alvarado et al., 2013) is: 1.1%    Values used to calculate the score:      Age: 42 years      Sex: Female      Is Non- : No      Diabetic: Yes      Tobacco smoker: No      Systolic Blood Pressure: 96 mmHg      Is BP treated: No      HDL Cholesterol: 52 mg/dL      Total Cholesterol: 235 mg/dL        ROS:    Review of Systems   Gastrointestinal: Positive for nausea. Endocrine: Negative for polydipsia. BP 96/62   Pulse 110   Wt 109 lb (49.4 kg)   LMP 12/18/2008 (Approximate)   SpO2 98%   BMI 20.60 kg/m²     Physical Exam:    Physical Exam  Constitutional:       General: She is not in acute distress. Appearance: Normal appearance. She is normal weight. She is not ill-appearing or toxic-appearing. HENT:      Head: Normocephalic.    Neurological:      Mental Status: She is alert.   Psychiatric:         Mood and Affect: Mood normal.         Behavior: Behavior normal.         Thought Content: Thought content normal.         Current Outpatient Medications   Medication Sig Dispense Refill    ondansetron (ZOFRAN ODT) 4 MG disintegrating tablet Take 1 tablet by mouth every 8 hours as needed for Nausea or Vomiting 30 tablet 1    promethazine (PHENERGAN) 25 MG tablet Take 1 tablet by mouth 3 times daily as needed for Nausea 30 tablet 5    linaclotide (LINZESS) 145 MCG capsule Take 2 capsules by mouth every morning (before breakfast) 30 capsule 3    metoclopramide (REGLAN) 5 MG tablet Take 1 tablet by mouth 3 times daily 90 tablet 1    doxepin (SINEQUAN) 25 MG capsule 2 hs 60 capsule 3    pantoprazole (PROTONIX) 40 MG tablet Take 1 tablet by mouth 2 times daily 60 tablet 0    Hyoscyamine Sulfate SL (LEVSIN/SL) 0.125 MG SUBL 1-2 tabs by mouth every 4-6 hours as needed for abdominal cramps 60 each 1    gabapentin (NEURONTIN) 800 MG tablet Take 1 tablet by mouth 3 times daily for 90 days. May take 2 tablets at bedtime. 120 tablet 2    Insulin Pen Needle (B-D UF III MINI PEN NEEDLES) 31G X 5 MM MISC Inject 1 each into the skin 4 times daily 100 each 3    metFORMIN (GLUCOPHAGE) 1000 MG tablet Take 1 tablet by mouth 2 times daily (with meals) 180 tablet 3    losartan (COZAAR) 50 MG tablet Take 1 tablet by mouth daily 30 tablet 5    fluticasone (FLONASE) 50 MCG/ACT nasal spray 2 sprays by Nasal route daily 1 Bottle 0    insulin lispro (HUMALOG KWIKPEN) 100 UNIT/ML pen INJECT 20 UNITS SUBCUTANEOUSLY THREE TIMES DAILY BEFORE MEAL(S) 15 pen 3    Lancets MISC DX: E 11.9-Uses insulin. FSBS 3 times daily-Delica lancets for one touch ultra  each 5    glucose monitoring kit (FREESTYLE) monitoring kit Dx E11.9-One touch ultra II meter-uses tid 1 kit 0     No current facility-administered medications for this visit. Assessment:    1.  Type 2 diabetes mellitus without complication, with long-term current use of insulin ([facility] [address]    2. Essential hypertension    3. Mixed hyperlipidemia        Plan:    1. Type 2 diabetes mellitus without complication, with long-term current use of insulin ([facility])  Stable. Continue current medications. 7.9  - POCT glycosylated hemoglobin (Hb A1C)  - CBC Auto Differential  - Comprehensive Metabolic Panel  - Magnesium    2. Essential hypertension  Stable. Continue current medications if SBP is greater than 140/90  - Magnesium    3. Mixed hyperlipidemia  Stable. Continue current medications. - Vitamin B12 & Folate  - Lipid Panel  - Magnesium      Return in about 6 months (around 3/17/2022) for Diabetes, Hypertension, Hyperlipidemia.

## 2021-09-18 LAB
A/G RATIO: 1.6 (ref 1.1–2.2)
ALBUMIN SERPL-MCNC: 4.4 G/DL (ref 3.4–5)
ALP BLD-CCNC: 96 U/L (ref 40–129)
ALT SERPL-CCNC: 25 U/L (ref 10–40)
ANION GAP SERPL CALCULATED.3IONS-SCNC: 19 MMOL/L (ref 3–16)
AST SERPL-CCNC: 15 U/L (ref 15–37)
BASOPHILS ABSOLUTE: 0 K/UL (ref 0–0.2)
BASOPHILS RELATIVE PERCENT: 0.6 %
BILIRUB SERPL-MCNC: 0.6 MG/DL (ref 0–1)
BUN BLDV-MCNC: 15 MG/DL (ref 7–20)
CALCIUM SERPL-MCNC: 9.7 MG/DL (ref 8.3–10.6)
CHLORIDE BLD-SCNC: 99 MMOL/L (ref 99–110)
CHOLESTEROL, TOTAL: 171 MG/DL (ref 0–199)
CO2: 22 MMOL/L (ref 21–32)
CREAT SERPL-MCNC: 0.5 MG/DL (ref 0.6–1.1)
EOSINOPHILS ABSOLUTE: 0 K/UL (ref 0–0.6)
EOSINOPHILS RELATIVE PERCENT: 0.5 %
FOLATE: 6.88 NG/ML (ref 4.78–24.2)
GFR AFRICAN AMERICAN: >60
GFR NON-AFRICAN AMERICAN: >60
GLOBULIN: 2.7 G/DL
GLUCOSE BLD-MCNC: 274 MG/DL (ref 70–99)
HCT VFR BLD CALC: 39.4 % (ref 36–48)
HDLC SERPL-MCNC: 53 MG/DL (ref 40–60)
HEMOGLOBIN: 13.6 G/DL (ref 12–16)
LDL CHOLESTEROL CALCULATED: 96 MG/DL
LYMPHOCYTES ABSOLUTE: 2 K/UL (ref 1–5.1)
LYMPHOCYTES RELATIVE PERCENT: 39.7 %
MAGNESIUM: 2.1 MG/DL (ref 1.8–2.4)
MCH RBC QN AUTO: 31.2 PG (ref 26–34)
MCHC RBC AUTO-ENTMCNC: 34.4 G/DL (ref 31–36)
MCV RBC AUTO: 90.8 FL (ref 80–100)
MONOCYTES ABSOLUTE: 0.3 K/UL (ref 0–1.3)
MONOCYTES RELATIVE PERCENT: 6.5 %
NEUTROPHILS ABSOLUTE: 2.6 K/UL (ref 1.7–7.7)
NEUTROPHILS RELATIVE PERCENT: 52.7 %
PDW BLD-RTO: 13.8 % (ref 12.4–15.4)
PLATELET # BLD: 194 K/UL (ref 135–450)
PMV BLD AUTO: 9.5 FL (ref 5–10.5)
POTASSIUM SERPL-SCNC: 3.9 MMOL/L (ref 3.5–5.1)
RBC # BLD: 4.34 M/UL (ref 4–5.2)
SODIUM BLD-SCNC: 140 MMOL/L (ref 136–145)
TOTAL PROTEIN: 7.1 G/DL (ref 6.4–8.2)
TRIGL SERPL-MCNC: 111 MG/DL (ref 0–150)
VITAMIN B-12: 416 PG/ML (ref 211–911)
VLDLC SERPL CALC-MCNC: 22 MG/DL
WBC # BLD: 5 K/UL (ref 4–11)

## 2021-09-27 ENCOUNTER — TELEPHONE (OUTPATIENT)
Dept: FAMILY MEDICINE CLINIC | Age: 41
End: 2021-09-27

## 2021-09-28 ENCOUNTER — TELEPHONE (OUTPATIENT)
Dept: FAMILY MEDICINE CLINIC | Age: 41
End: 2021-09-28

## 2021-09-28 RX ORDER — GABAPENTIN 300 MG/1
CAPSULE ORAL
COMMUNITY
Start: 2021-08-04 | End: 2021-11-12

## 2021-09-28 RX ORDER — INSULIN GLARGINE 300 U/ML
1 INJECTION, SOLUTION SUBCUTANEOUS
COMMUNITY
Start: 2021-08-04 | End: 2022-02-17 | Stop reason: SDUPTHER

## 2021-09-28 NOTE — TELEPHONE ENCOUNTER
I do not see a script in the current med list for Toujeo/Insulin glargine. Is patient still taking this medication? If so can you please reach out to the patient and find out who is handeling the prescription coverage for her plan. Thank you.

## 2021-09-28 NOTE — TELEPHONE ENCOUNTER
Tasneem Baez is not covered by insurance   A covered alternative may be available    KEY: [id number]  DX: constipation

## 2021-09-29 NOTE — TELEPHONE ENCOUNTER
Please check coverage for this patient. I do not see insurance coverage at this time. However, Dr. Audrey Zayas ordered the Cristi Hawking and based on the notes it is needed and there are not really other options for this medication class. If we have copy of denial from insurance please scan to media if she has no insurance at this time please provide financial assistance info to patients to have filled at [employer]. Let me know.  Thanks, Jonnie Ma

## 2021-09-29 NOTE — TELEPHONE ENCOUNTER
I will check into this. Patient previously submitted [employer] patient assistance form, I will check the status. I know patient previously was without insurance, a new insurance was due to start but I do not have a copy on file. Per [employer] As of 9/8, missing proof on income, any insurance cards, and QTY/sig/Refills is missing on our end of the form. I have sent patient a message requesting missing information so we can fax.

## 2021-10-05 NOTE — TELEPHONE ENCOUNTER
Dr. Alonzo Morales was handling the results of the test and appt with Dr. Aurora Ding made. No surgery is needed at this time. Left message for patient to call the office back. clear

## 2021-11-11 DIAGNOSIS — R20.8 BURNING SENSATION OF FEET: ICD-10-CM

## 2021-11-11 DIAGNOSIS — M79.2 NEUROPATHIC PAIN: ICD-10-CM

## 2021-11-11 NOTE — TELEPHONE ENCOUNTER
Refill Request - Controlled Substance    Last Seen Department: 9/17/2021  Last Seen by PCP: 9/17/2021    Last Written: 8/4/21     Last UDS: n/a    Med Agreement Signed On: n/a    Next Appointment: 11/11/2021      Future appointment scheduled    Requested Prescriptions     Pending Prescriptions Disp Refills    gabapentin (NEURONTIN) 800 MG tablet [Pharmacy Med Name: GABAPENTIN 800MG TABS] 120 tablet 2     Sig: TAKE 1 TABLET BY MOUTH 3 TIMES A DAY, MAY TAKE 2 TABLETS AT BEDTIME AS DIRECTED

## 2021-11-12 RX ORDER — GABAPENTIN 800 MG/1
TABLET ORAL
Qty: 120 TABLET | Refills: 0 | Status: SHIPPED | OUTPATIENT
Start: 2021-11-12 | End: 2021-12-29

## 2021-11-28 ENCOUNTER — HOSPITAL ENCOUNTER (EMERGENCY)
Age: 41
Discharge: HOME | End: 2021-11-28
Attending: EMERGENCY MEDICINE
Payer: MEDICAID

## 2021-11-28 ENCOUNTER — APPOINTMENT (OUTPATIENT)
Dept: CT IMAGING | Age: 41
End: 2021-11-28
Payer: MEDICAID

## 2021-11-28 VITALS
HEIGHT: 61 IN | BODY MASS INDEX: 20.77 KG/M2 | TEMPERATURE: 98.9 F | SYSTOLIC BLOOD PRESSURE: 112 MMHG | DIASTOLIC BLOOD PRESSURE: 74 MMHG | OXYGEN SATURATION: 100 % | HEART RATE: 75 BPM | RESPIRATION RATE: 18 BRPM | WEIGHT: 110 LBS

## 2021-11-28 DIAGNOSIS — R74.8 ELEVATED LIPASE: ICD-10-CM

## 2021-11-28 DIAGNOSIS — R10.9 ABDOMINAL PAIN, UNSPECIFIED ABDOMINAL LOCATION: Primary | ICD-10-CM

## 2021-11-28 LAB
A/G RATIO: 1.2 (ref 1.1–2.2)
ALBUMIN SERPL-MCNC: 3.9 G/DL (ref 3.4–5)
ALP BLD-CCNC: 112 U/L (ref 40–129)
ALT SERPL-CCNC: 15 U/L (ref 10–40)
ANION GAP SERPL CALCULATED.3IONS-SCNC: 10 MMOL/L (ref 3–16)
AST SERPL-CCNC: 14 U/L (ref 15–37)
BASOPHILS ABSOLUTE: 0 K/UL (ref 0–0.2)
BASOPHILS RELATIVE PERCENT: 0.8 %
BILIRUB SERPL-MCNC: 0.5 MG/DL (ref 0–1)
BILIRUBIN URINE: NEGATIVE
BLOOD, URINE: NEGATIVE
BUN BLDV-MCNC: 11 MG/DL (ref 7–20)
CALCIUM SERPL-MCNC: 9.5 MG/DL (ref 8.3–10.6)
CHLORIDE BLD-SCNC: 104 MMOL/L (ref 99–110)
CLARITY: CLEAR
CO2: 26 MMOL/L (ref 21–32)
COLOR: YELLOW
CREAT SERPL-MCNC: <0.5 MG/DL (ref 0.6–1.1)
EOSINOPHILS ABSOLUTE: 0.2 K/UL (ref 0–0.6)
EOSINOPHILS RELATIVE PERCENT: 4.2 %
EPITHELIAL CELLS, UA: NORMAL /HPF (ref 0–5)
GFR AFRICAN AMERICAN: >60
GFR NON-AFRICAN AMERICAN: >60
GLUCOSE BLD-MCNC: 211 MG/DL (ref 70–99)
GLUCOSE URINE: 250 MG/DL
HCT VFR BLD CALC: 40.4 % (ref 36–48)
HEMOGLOBIN: 13.7 G/DL (ref 12–16)
KETONES, URINE: NEGATIVE MG/DL
LEUKOCYTE ESTERASE, URINE: ABNORMAL
LIPASE: 67 U/L (ref 13–60)
LYMPHOCYTES ABSOLUTE: 2.4 K/UL (ref 1–5.1)
LYMPHOCYTES RELATIVE PERCENT: 46.6 %
MCH RBC QN AUTO: 30.5 PG (ref 26–34)
MCHC RBC AUTO-ENTMCNC: 33.8 G/DL (ref 31–36)
MCV RBC AUTO: 90.1 FL (ref 80–100)
MICROSCOPIC EXAMINATION: YES
MONOCYTES ABSOLUTE: 0.4 K/UL (ref 0–1.3)
MONOCYTES RELATIVE PERCENT: 7.7 %
NEUTROPHILS ABSOLUTE: 2.1 K/UL (ref 1.7–7.7)
NEUTROPHILS RELATIVE PERCENT: 40.7 %
NITRITE, URINE: NEGATIVE
PDW BLD-RTO: 12.8 % (ref 12.4–15.4)
PH UA: 6 (ref 5–8)
PLATELET # BLD: 187 K/UL (ref 135–450)
PMV BLD AUTO: 9.2 FL (ref 5–10.5)
POTASSIUM SERPL-SCNC: 3.9 MMOL/L (ref 3.5–5.1)
PROTEIN UA: NEGATIVE MG/DL
RBC # BLD: 4.48 M/UL (ref 4–5.2)
RBC UA: NORMAL /HPF (ref 0–4)
SODIUM BLD-SCNC: 140 MMOL/L (ref 136–145)
SPECIFIC GRAVITY UA: 1.02 (ref 1–1.03)
TOTAL PROTEIN: 7.2 G/DL (ref 6.4–8.2)
URINE REFLEX TO CULTURE: ABNORMAL
URINE TYPE: ABNORMAL
UROBILINOGEN, URINE: 0.2 E.U./DL
WBC # BLD: 5.1 K/UL (ref 4–11)
WBC UA: NORMAL /HPF (ref 0–5)

## 2021-11-28 PROCEDURE — 85025 COMPLETE CBC W/AUTO DIFF WBC: CPT

## 2021-11-28 PROCEDURE — 80053 COMPREHEN METABOLIC PANEL: CPT

## 2021-11-28 PROCEDURE — 6360000004 HC RX CONTRAST MEDICATION: Performed by: EMERGENCY MEDICINE

## 2021-11-28 PROCEDURE — 81001 URINALYSIS AUTO W/SCOPE: CPT

## 2021-11-28 PROCEDURE — 99284 EMERGENCY DEPT VISIT MOD MDM: CPT

## 2021-11-28 PROCEDURE — 96375 TX/PRO/DX INJ NEW DRUG ADDON: CPT

## 2021-11-28 PROCEDURE — 74177 CT ABD & PELVIS W/CONTRAST: CPT

## 2021-11-28 PROCEDURE — 6360000002 HC RX W HCPCS: Performed by: EMERGENCY MEDICINE

## 2021-11-28 PROCEDURE — 2580000003 HC RX 258: Performed by: EMERGENCY MEDICINE

## 2021-11-28 PROCEDURE — 96361 HYDRATE IV INFUSION ADD-ON: CPT

## 2021-11-28 PROCEDURE — 83690 ASSAY OF LIPASE: CPT

## 2021-11-28 PROCEDURE — 96374 THER/PROPH/DIAG INJ IV PUSH: CPT

## 2021-11-28 RX ORDER — METOCLOPRAMIDE 10 MG/1
10 TABLET ORAL 3 TIMES DAILY
Qty: 30 TABLET | Refills: 0 | Status: SHIPPED | OUTPATIENT
Start: 2021-11-28 | End: 2022-03-29

## 2021-11-28 RX ORDER — 0.9 % SODIUM CHLORIDE 0.9 %
1000 INTRAVENOUS SOLUTION INTRAVENOUS ONCE
Status: COMPLETED | OUTPATIENT
Start: 2021-11-28 | End: 2021-11-28

## 2021-11-28 RX ORDER — KETOROLAC TROMETHAMINE 30 MG/ML
15 INJECTION, SOLUTION INTRAMUSCULAR; INTRAVENOUS ONCE
Status: COMPLETED | OUTPATIENT
Start: 2021-11-28 | End: 2021-11-28

## 2021-11-28 RX ORDER — METOCLOPRAMIDE HYDROCHLORIDE 5 MG/ML
10 INJECTION INTRAMUSCULAR; INTRAVENOUS ONCE
Status: COMPLETED | OUTPATIENT
Start: 2021-11-28 | End: 2021-11-28

## 2021-11-28 RX ADMIN — SODIUM CHLORIDE 1000 ML: 9 INJECTION, SOLUTION INTRAVENOUS at 06:43

## 2021-11-28 RX ADMIN — IOPAMIDOL 75 ML: 755 INJECTION, SOLUTION INTRAVENOUS at 07:56

## 2021-11-28 RX ADMIN — METOCLOPRAMIDE HYDROCHLORIDE 10 MG: 5 INJECTION INTRAMUSCULAR; INTRAVENOUS at 06:44

## 2021-11-28 RX ADMIN — KETOROLAC TROMETHAMINE 15 MG: 30 INJECTION, SOLUTION INTRAMUSCULAR; INTRAVENOUS at 06:44

## 2021-11-28 ASSESSMENT — ENCOUNTER SYMPTOMS
COLOR CHANGE: 0
VOMITING: 0
SHORTNESS OF BREATH: 0
ABDOMINAL PAIN: 1
WHEEZING: 0
TROUBLE SWALLOWING: 0
NAUSEA: 1
STRIDOR: 0
FACIAL SWELLING: 0
VOICE CHANGE: 0

## 2021-11-28 ASSESSMENT — PAIN SCALES - GENERAL
PAINLEVEL_OUTOF10: 4
PAINLEVEL_OUTOF10: 2
PAINLEVEL_OUTOF10: 3

## 2021-11-28 ASSESSMENT — PAIN DESCRIPTION - ORIENTATION: ORIENTATION: RIGHT;LOWER

## 2021-11-28 ASSESSMENT — PAIN DESCRIPTION - PAIN TYPE: TYPE: ACUTE PAIN

## 2021-11-28 ASSESSMENT — PAIN DESCRIPTION - LOCATION: LOCATION: ABDOMEN

## 2021-11-28 NOTE — Clinical Note
Tristin Mena was seen and treated in our emergency department on 11/28/2021. She may return to work on 12/01/2021. If you have any questions or concerns, please don't hesitate to call.       Radha Souza MD

## 2021-11-28 NOTE — ED PROVIDER NOTES
[facility]  ED  eMERGENCY dEPARTMENT eNCOUnter      Pt Name: Houston Macias  MRN: [id number]  [place] 1980  Date of evaluation: 11/28/2021  Provider: Ko Black MD    CHIEF COMPLAINT       Chief Complaint   Patient presents with    Abdominal Pain     RLQ starting yesterday, worse this morning; radiates from RLQ into right flank and LLQ         HISTORY OF PRESENT ILLNESS   (Location/Symptom, Timing/Onset, Context/Setting, Quality, Duration, Modifying Factors, Severity)  Note limiting factors. Houston Macias is a 42 y.o. female with a complex past medical history including but not limited to gastroparesis and diabetes who reports right lower quadrant abdominal pain and nausea starting yesterday and worsening this morning. Patient ports her symptoms are moderate constant and worsening. She reports walking bending or pushing on her abdomen worsens her pain and laying still improves it. She denies any dysuria or vaginal bleeding. She denies any cough chest pain shortness of breath or respiratory symptoms. HPI    Nursing Notes were reviewed. REVIEW OFSYSTEMS    (2-9 systems for level 4, 10 or more for level 5)     Review of Systems   Constitutional: Negative for appetite change, fever and unexpected weight change. HENT: Negative for facial swelling, trouble swallowing and voice change. Eyes: Negative for visual disturbance. Respiratory: Negative for shortness of breath, wheezing and stridor. Cardiovascular: Negative for chest pain and palpitations. Gastrointestinal: Positive for abdominal pain and nausea. Negative for vomiting. Genitourinary: Negative for dysuria and vaginal bleeding. Musculoskeletal: Negative for neck pain and neck stiffness. Skin: Negative for color change and wound. Neurological: Negative for seizures and syncope. Psychiatric/Behavioral: Negative for self-injury and suicidal ideas.        Except as noted above the remainder of sublingual tablet TAKE 1 TO 2 TABLETS BY MOUTH EVERY 4 TO 6 HOURS AS NEEDED FOR ABDOMINAL CRAMPS. .. MAX 12 TABS/DAY. ..  Qty: 60 tablet, Refills: 1      TOUJEO MAX SOLOSTAR 300 UNIT/ML SOPN Inject 1 pen into the muscle      ondansetron (ZOFRAN ODT) 4 MG disintegrating tablet Take 1 tablet by mouth every 8 hours as needed for Nausea or Vomiting  Qty: 30 tablet, Refills: 1      promethazine (PHENERGAN) 25 MG tablet Take 1 tablet by mouth 3 times daily as needed for Nausea  Qty: 30 tablet, Refills: 5      linaclotide (LINZESS) 145 MCG capsule Take 2 capsules by mouth every morning (before breakfast)  Qty: 30 capsule, Refills: 3      doxepin (SINEQUAN) 25 MG capsule 2 hs  Qty: 60 capsule, Refills: 3      pantoprazole (PROTONIX) 40 MG tablet Take 1 tablet by mouth 2 times daily  Qty: 60 tablet, Refills: 0      Insulin Pen Needle (B-D UF III MINI PEN NEEDLES) 31G X 5 MM MISC Inject 1 each into the skin 4 times daily  Qty: 100 each, Refills: 3    Associated Diagnoses: Type 2 diabetes mellitus without complication, with long-term current use of insulin ([facility])      metFORMIN (GLUCOPHAGE) 1000 MG tablet Take 1 tablet by mouth 2 times daily (with meals)  Qty: 180 tablet, Refills: 3    Comments: Please consider 90 day supplies to promote better adherence      losartan (COZAAR) 50 MG tablet Take 1 tablet by mouth daily  Qty: 30 tablet, Refills: 5    Associated Diagnoses: Type 2 diabetes mellitus without complication, with long-term current use of insulin ([facility])      fluticasone (FLONASE) 50 MCG/ACT nasal spray 2 sprays by Nasal route daily  Qty: 1 Bottle, Refills: 0    Associated Diagnoses: Viral URI      insulin lispro (HUMALOG KWIKPEN) 100 UNIT/ML pen INJECT 20 UNITS SUBCUTANEOUSLY THREE TIMES DAILY BEFORE MEAL(S)  Qty: 15 pen, Refills: 3    Comments: Please consider 90 day supplies to promote better adherence      Lancets MISC DX: E 11.9-Uses insulin.  FSBS 3 times daily-Delica lancets for one touch ultra II  Qty: 100 each, Refills: 5      glucose monitoring kit (FREESTYLE) monitoring kit Dx E11.9-One touch ultra II meter-uses tid  Qty: 1 kit, Refills: 0             ALLERGIES     Lisinopril, Lisinopril-hydrochlorothiazide, and Morphine    FAMILY HISTORY       Family History   Problem Relation Age of Onset    Cancer Mother         ovarian    Diabetes Mother     High Blood Pressure Mother     Hypertension Mother     Diabetes Brother     Hypertension Brother     Diabetes Maternal Grandmother     Cancer Maternal Grandfather         stomach    Prostate Cancer Paternal Grandfather         metastatic    Breast Cancer Maternal Aunt           SOCIAL HISTORY       Social History     Socioeconomic History    Marital status:      Spouse name: None    Number of children: None    Years of education: None    Highest education level: None   Occupational History    None   Tobacco Use    Smoking status: Former Smoker     Years: 0.00     Types: Cigarettes     Quit date: 1999     Years since quittin.2    Smokeless tobacco: Never Used    Tobacco comment: smoked for 1 months when teenager   Vaping Use    Vaping Use: Never used   Substance and Sexual Activity    Alcohol use: Not Currently    Drug use: Yes     Types: Marijuana (Weed)     Comment: Hx of cocaine use in 2004- 9 months     Sexual activity: Yes     Partners: Male     Comment: painful intercourse    Other Topics Concern    None   Social History Narrative    None     Social Determinants of Health     Financial Resource Strain: High Risk    Difficulty of Paying Living Expenses: Hard   Food Insecurity: Food Insecurity Present    Worried About Running Out of Food in the Last Year: Sometimes true    Zaynab of Food in the Last Year: Never true   Transportation Needs:     Lack of Transportation (Medical): Not on file    Lack of Transportation (Non-Medical):  Not on file   Physical Activity:     Days of Exercise per Week: Not on file    Minutes of Exercise per Session: Not on file   Stress:     Feeling of Stress : Not on file   Social Connections:     Frequency of Communication with Friends and Family: Not on file    Frequency of Social Gatherings with Friends and Family: Not on file    Attends [employer] Services: Not on file    Active Member of [address] [place] [address] or Organizations: Not on file    Attends Club or Organization Meetings: Not on file    Marital Status: Not on file   Intimate Partner Violence:     Fear of Current or Ex-Partner: Not on file    Emotionally Abused: Not on file    Physically Abused: Not on file    Sexually Abused: Not on file   Housing Stability:     Unable to Pay for Housing in the Last Year: Not on file    Number of [place] in the Last Year: Not on file    Unstable Housing in the Last Year: Not on file         PHYSICAL EXAM    (up to 7 for level 4, 8 or more for level 5)     ED Triage Vitals [11/28/21 0544]   BP Temp Temp Source Pulse Resp SpO2 Height Weight   103/62 98.9 °F (37.2 °C) Oral 76 16 98 % 5' 1\" (1.549 m) 110 lb (49.9 kg)       Physical Exam  Vitals and nursing note reviewed. Constitutional:       Appearance: She is well-developed. She is not diaphoretic. HENT:      Head: Normocephalic and atraumatic. Right Ear: External ear normal.      Left Ear: External ear normal.   Eyes:      Conjunctiva/sclera: Conjunctivae normal.   Neck:      Vascular: No JVD. Trachea: No tracheal deviation. Cardiovascular:      Rate and Rhythm: Normal rate. Pulmonary:      Effort: Pulmonary effort is normal. No respiratory distress. Breath sounds: Normal breath sounds. No wheezing. Abdominal:      General: There is no distension. Palpations: Abdomen is soft. Tenderness: There is abdominal tenderness in the right upper quadrant and right lower quadrant. There is no guarding or rebound. Comments: Right lower quadrant and right upper quadrant tenderness to palpation.   No left lower quadrant or left-sided tenderness on my exam.  No rebound, guarding, peritoneal signs. Musculoskeletal:         General: No tenderness or deformity. Normal range of motion. Cervical back: Neck supple. Skin:     General: Skin is warm and dry. Neurological:      Mental Status: She is alert. Cranial Nerves: No cranial nerve deficit. DIAGNOSTIC RESULTS       RADIOLOGY:     Interpretation per the Radiologist below, if available at the time of this note:    CT ABDOMEN PELVIS W IV CONTRAST Additional Contrast? None   Final Result   No acute abdominopelvic abnormality.              LABS:  Labs Reviewed   COMPREHENSIVE METABOLIC PANEL - Abnormal; Notable for the following components:       Result Value    Glucose 211 (*)     CREATININE <0.5 (*)     AST 14 (*)     All other components within normal limits    Narrative:     Performed at:  [address], [facility] [employer]   Phone [phone]   LIPASE - Abnormal; Notable for the following components:    Lipase 67.0 (*)     All other components within normal limits    Narrative:     Performed at:  [place] [address] [employer]   Phone [phone]   URINE RT REFLEX TO CULTURE - Abnormal; Notable for the following components:    Glucose, Ur 250 (*)     Leukocyte Esterase, Urine SMALL (*)     All other components within normal limits    Narrative:     Performed at:  [address], [facility] [employer]   Phone [phone]   CBC WITH AUTO DIFFERENTIAL    Narrative:     Performed at:  [place] [address] [employer]   Phone [phone]   MICROSCOPIC URINALYSIS    Narrative:     Performed at:  [place] [address] [employer]   Phone [phone]       All otherlabs were within normal range or not returned as of this dictation. EMERGENCY DEPARTMENT COURSE and DIFFERENTIAL DIAGNOSIS/MDM:   Vitals:    Vitals:    11/28/21 9439 11/28/21 0840 11/28/21 0841 11/28/21 0845   BP:    112/74   Pulse:    75   Resp:    18   Temp:       TempSrc:       SpO2: 100% 100% 100% 100%   Weight:       Height:             MDM  Vital signs within normal limits. Laboratory evaluation is unremarkable except for mildly elevated lipase. CT abdomen pelvis does not show any acute emergent pathology. I discussed the elevated lipase level with the patient. No signs of acute appendicitis at this time. The patient is given IV fluids and Reglan report substantial improvement in symptoms. I feel patient is appropriate for discharge home with p.o. Reglan, prior care follow-up, and very strict ER return precautions for new or worsening symptoms such as uncontrolled pain, inability tolerate p.o., or fever. Patient expresses understanding and agreement with this plan and is discharged home. I estimate there is low risk for acute appendicitis, bowl obstruction, cholecystitis, diverticulitis, incarcerated hernia, mesenteric ischemia, pancreatitis, perforated bowl or ulcer, or abdominal aortic aneurism, thus I consider the discharge disposition reasonable. Also, there is no evidence or peritonitis, sepsis or toxicity. We have discussed the diagnosis and risks and agree with discharging home to follow-up with their primary doctor. We also discussed returning to the Emergency Department immediately if new or worsening symptoms occur and have discussed the symptoms which are most concerning (e.g., bloody stool, fever, changing or worsening pain, vomiting) that necessitate immediate return. Procedures    FINAL IMPRESSION      1. Abdominal pain, unspecified abdominal location    2.  Elevated lipase          DISPOSITION/PLAN   DISPOSITION  Discharge      PATIENT REFERRED TO:  Rafa Serrano, DO  [facility]  [address]    In 2 days      [facility]  ED  [facility]  [address]  [phone]    If symptoms worsen      (Please note that portions of this note were completed with a voice recognition program.  Efforts were made to edit the dictations but occasionally words aremis-transcribed. )    Pippa Olvera MD (electronically signed)  Attending Emergency Physician           Pippa Olvera MD  11/28/21 5486

## 2021-12-06 ENCOUNTER — TELEPHONE (OUTPATIENT)
Dept: FAMILY MEDICINE CLINIC | Age: 41
End: 2021-12-06

## 2021-12-06 ENCOUNTER — NURSE TRIAGE (OUTPATIENT)
Dept: OTHER | Facility: CLINIC | Age: 41
End: 2021-12-06

## 2021-12-06 ENCOUNTER — HOSPITAL ENCOUNTER (EMERGENCY)
Age: 41
Discharge: LEFT BEFORE DISCHARGE | End: 2021-12-06

## 2021-12-06 VITALS
WEIGHT: 120 LBS | DIASTOLIC BLOOD PRESSURE: 72 MMHG | HEIGHT: 61 IN | OXYGEN SATURATION: 100 % | HEART RATE: 86 BPM | RESPIRATION RATE: 14 BRPM | TEMPERATURE: 97 F | BODY MASS INDEX: 22.66 KG/M2 | SYSTOLIC BLOOD PRESSURE: 106 MMHG

## 2021-12-06 ASSESSMENT — PAIN SCALES - GENERAL: PAINLEVEL_OUTOF10: 4

## 2021-12-06 ASSESSMENT — PAIN DESCRIPTION - LOCATION: LOCATION: ABDOMEN

## 2021-12-06 ASSESSMENT — PAIN DESCRIPTION - PAIN TYPE: TYPE: ACUTE PAIN

## 2021-12-06 NOTE — TELEPHONE ENCOUNTER
touch      - SEVERE (8-10): excruciating pain, doubled over, unable to do any normal activities        4/10. All morning, 0300    7. RECURRENT SYMPTOM: \"Have you ever had this type of abdominal pain before? \" If so, ask: \"When was the last time? \" and \"What happened that time? \"       Recently diagnosed with pancreatitis. 8. AGGRAVATING FACTORS: \"Does anything seem to cause this pain? \" (e.g., foods, stress, alcohol)      Bumps when in the car, movement. Random sharp pain. 9. CARDIAC SYMPTOMS: \"Do you have any of the following symptoms: chest pain, difficulty breathing, sweating, nausea? \"      Denies    10. OTHER SYMPTOMS: \"Do you have any other symptoms? \" (e.g., fever, vomiting, diarrhea)        Denies fever. Nausea. Denies emesis. States bowels are \"more watery\"    11. PREGNANCY: \"Is there any chance you are pregnant? \" \"When was your last menstrual period? \"        hysterectomy    Protocols used: ABDOMINAL PAIN - UPPER-ADULT-[place]

## 2021-12-06 NOTE — TELEPHONE ENCOUNTER
Telephone call regarding - ABDOMINAL PAIN    Onset - with a gradual onset    When did pain start - a week ago    Pain intensity 1-10 Scale - 4     Location of pain - lower midline radiates to rlq    Associated symptoms - Nausea, Vomiting, Diarrhea and Loss of appetite    Aggravating factors - None     Relieving factors - none        Is there anything else you would like to share with your PCP - Was seen at [facility] and she said they wanted to admit her but she opted to go home. Pain is worsening, it does make her double over. Per ER note I recommended her go back to ER due to symptoms have worsened.  Understanding voiced    Future Appointments   Date Time Provider Tomy Thayer   3/21/2022  4:00 PM DO TAYLOR Estevez

## 2021-12-07 NOTE — ED NOTES
Followed up with Bret Moritz on 12/6/2021 at 11:27 PM. Patient left the ED with a disposition of LWBS after triage on . Patient cited wait time as reason. Advised patient to follow up with a primary care physician or return to the Emergency Department if symptoms worsen.    SAADIA Sanders RN  12/06/21 7513

## 2021-12-08 ENCOUNTER — TELEPHONE (OUTPATIENT)
Dept: FAMILY MEDICINE CLINIC | Age: 41
End: 2021-12-08

## 2021-12-08 NOTE — TELEPHONE ENCOUNTER
lucas and spoke with pt she stated she was advised to go to the ED however she went they were to busy so she left went back later that evening and there were even more pt that evening and she left. Pt stated that the pain has eased up some however she is sleeping a lot and most times can not even make it to the restroom without soiling herself. Please advise where to schedule pt at or what to do as to wanting her to return to the ED.

## 2021-12-08 NOTE — TELEPHONE ENCOUNTER
----- Message from [facility] sent at 12/8/2021 10:03 AM EST -----  Subject: Appointment Request    Reason for Call: Routine Existing Condition Follow Up    QUESTIONS  Type of Appointment? Established Patient  Reason for appointment request? No appointments available during search  Additional Information for Provider? follow up and blood work , client is   having stomach issues associated with gastropariesis   ---------------------------------------------------------------------------  --------------  CALL BACK INFO  What is the best way for the office to contact you? OK to leave message on   voicemail  Preferred Call Back Phone Number? [phone]  ---------------------------------------------------------------------------  --------------  SCRIPT ANSWERS  Relationship to Patient? Self  Is this follow up request related to routine Diabetes Management? No  Have you been diagnosed with, awaiting test results for, or told that you   are suspected of having COVID-19 (Coronavirus)? (If patient has tested   negative or was tested as a requirement for work, school, or travel and   not based on symptoms, answer no)? No  Within the past two weeks have you developed any of the following symptoms   (answer no if symptoms have been present longer than 2 weeks or began   more than 2 weeks ago)? Fever or Chills, Cough, Shortness of breath or   difficulty breathing, Loss of taste or smell, Sore throat, Nasal   congestion, Sneezing or runny nose, Fatigue or generalized body aches   (answer no if pain is specific to a body part e.g. back pain), Diarrhea,   Headache? No  Have you had close contact with someone with COVID-19 in the last 14 days? No  (Service Expert  click yes below to proceed with [facility] As Usual   Scheduling)?  Yes

## 2021-12-09 ENCOUNTER — OFFICE VISIT (OUTPATIENT)
Dept: FAMILY MEDICINE CLINIC | Age: 41
End: 2021-12-09
Payer: MEDICAID

## 2021-12-09 ENCOUNTER — HOSPITAL ENCOUNTER (EMERGENCY)
Age: 41
Discharge: HOME | End: 2021-12-09
Payer: MEDICAID

## 2021-12-09 ENCOUNTER — APPOINTMENT (OUTPATIENT)
Dept: ULTRASOUND IMAGING | Age: 41
End: 2021-12-09
Payer: MEDICAID

## 2021-12-09 VITALS
WEIGHT: 106 LBS | HEART RATE: 84 BPM | TEMPERATURE: 97.9 F | DIASTOLIC BLOOD PRESSURE: 68 MMHG | BODY MASS INDEX: 20.03 KG/M2 | SYSTOLIC BLOOD PRESSURE: 107 MMHG | OXYGEN SATURATION: 98 %

## 2021-12-09 VITALS
WEIGHT: 106 LBS | DIASTOLIC BLOOD PRESSURE: 73 MMHG | BODY MASS INDEX: 20.01 KG/M2 | SYSTOLIC BLOOD PRESSURE: 118 MMHG | RESPIRATION RATE: 16 BRPM | HEART RATE: 74 BPM | TEMPERATURE: 98 F | HEIGHT: 61 IN | OXYGEN SATURATION: 98 %

## 2021-12-09 DIAGNOSIS — R10.9 CHRONIC ABDOMINAL PAIN: Primary | ICD-10-CM

## 2021-12-09 DIAGNOSIS — Z79.4 TYPE 2 DIABETES MELLITUS WITH HYPERGLYCEMIA, WITH LONG-TERM CURRENT USE OF INSULIN (HCC): ICD-10-CM

## 2021-12-09 DIAGNOSIS — K31.84 GASTROPARESIS: ICD-10-CM

## 2021-12-09 DIAGNOSIS — R19.7 DIARRHEA, UNSPECIFIED TYPE: Primary | ICD-10-CM

## 2021-12-09 DIAGNOSIS — K86.81 EXOCRINE PANCREATIC INSUFFICIENCY: ICD-10-CM

## 2021-12-09 DIAGNOSIS — E11.65 TYPE 2 DIABETES MELLITUS WITH HYPERGLYCEMIA, WITH LONG-TERM CURRENT USE OF INSULIN (HCC): ICD-10-CM

## 2021-12-09 DIAGNOSIS — G89.29 CHRONIC ABDOMINAL PAIN: Primary | ICD-10-CM

## 2021-12-09 DIAGNOSIS — R11.0 CHRONIC NAUSEA: ICD-10-CM

## 2021-12-09 DIAGNOSIS — R19.7 DIARRHEA, UNSPECIFIED TYPE: ICD-10-CM

## 2021-12-09 DIAGNOSIS — R10.13 EPIGASTRIC PAIN: ICD-10-CM

## 2021-12-09 LAB
A/G RATIO: 1.2 (ref 1.1–2.2)
ALBUMIN SERPL-MCNC: 4.5 G/DL (ref 3.4–5)
ALP BLD-CCNC: 104 U/L (ref 40–129)
ALT SERPL-CCNC: 17 U/L (ref 10–40)
ANION GAP SERPL CALCULATED.3IONS-SCNC: 10 MMOL/L (ref 3–16)
AST SERPL-CCNC: 34 U/L (ref 15–37)
BASOPHILS ABSOLUTE: 0 K/UL (ref 0–0.2)
BASOPHILS RELATIVE PERCENT: 0.6 %
BILIRUB SERPL-MCNC: 0.4 MG/DL (ref 0–1)
BILIRUBIN URINE: ABNORMAL
BLOOD, URINE: NEGATIVE
BUN BLDV-MCNC: 21 MG/DL (ref 7–20)
CALCIUM SERPL-MCNC: 9.6 MG/DL (ref 8.3–10.6)
CHLORIDE BLD-SCNC: 100 MMOL/L (ref 99–110)
CLARITY: ABNORMAL
CO2: 26 MMOL/L (ref 21–32)
COLOR: YELLOW
CREAT SERPL-MCNC: <0.5 MG/DL (ref 0.6–1.1)
EKG ATRIAL RATE: 72 BPM
EKG DIAGNOSIS: NORMAL
EKG P AXIS: 77 DEGREES
EKG P-R INTERVAL: 152 MS
EKG Q-T INTERVAL: 404 MS
EKG QRS DURATION: 88 MS
EKG QTC CALCULATION (BAZETT): 442 MS
EKG R AXIS: 82 DEGREES
EKG T AXIS: 71 DEGREES
EKG VENTRICULAR RATE: 72 BPM
EOSINOPHILS ABSOLUTE: 0.1 K/UL (ref 0–0.6)
EOSINOPHILS RELATIVE PERCENT: 2.4 %
GFR AFRICAN AMERICAN: >60
GFR NON-AFRICAN AMERICAN: >60
GLUCOSE BLD-MCNC: 216 MG/DL (ref 70–99)
GLUCOSE URINE: 100 MG/DL
HCT VFR BLD CALC: 41.8 % (ref 36–48)
HEMOGLOBIN: 14.3 G/DL (ref 12–16)
KETONES, URINE: NEGATIVE MG/DL
LEUKOCYTE ESTERASE, URINE: NEGATIVE
LIPASE: 31 U/L (ref 13–60)
LYMPHOCYTES ABSOLUTE: 2.4 K/UL (ref 1–5.1)
LYMPHOCYTES RELATIVE PERCENT: 46.2 %
MCH RBC QN AUTO: 30.5 PG (ref 26–34)
MCHC RBC AUTO-ENTMCNC: 34.2 G/DL (ref 31–36)
MCV RBC AUTO: 89.2 FL (ref 80–100)
MICROSCOPIC EXAMINATION: ABNORMAL
MONOCYTES ABSOLUTE: 0.4 K/UL (ref 0–1.3)
MONOCYTES RELATIVE PERCENT: 8.8 %
NEUTROPHILS ABSOLUTE: 2.1 K/UL (ref 1.7–7.7)
NEUTROPHILS RELATIVE PERCENT: 42 %
NITRITE, URINE: NEGATIVE
PDW BLD-RTO: 12.7 % (ref 12.4–15.4)
PH UA: 6 (ref 5–8)
PLATELET # BLD: 210 K/UL (ref 135–450)
PMV BLD AUTO: 8.9 FL (ref 5–10.5)
POTASSIUM REFLEX MAGNESIUM: 6.3 MMOL/L (ref 3.5–5.1)
POTASSIUM SERPL-SCNC: 3.7 MMOL/L (ref 3.5–5.1)
PROTEIN UA: NEGATIVE MG/DL
RBC # BLD: 4.69 M/UL (ref 4–5.2)
SODIUM BLD-SCNC: 136 MMOL/L (ref 136–145)
SPECIFIC GRAVITY UA: >=1.03 (ref 1–1.03)
TOTAL PROTEIN: 8.3 G/DL (ref 6.4–8.2)
TROPONIN: <0.01 NG/ML
URINE REFLEX TO CULTURE: ABNORMAL
URINE TYPE: ABNORMAL
UROBILINOGEN, URINE: 0.2 E.U./DL
WBC # BLD: 5.1 K/UL (ref 4–11)

## 2021-12-09 PROCEDURE — 2580000003 HC RX 258: Performed by: PHYSICIAN ASSISTANT

## 2021-12-09 PROCEDURE — 81025 URINE PREGNANCY TEST: CPT | Performed by: NURSE PRACTITIONER

## 2021-12-09 PROCEDURE — 85025 COMPLETE CBC W/AUTO DIFF WBC: CPT

## 2021-12-09 PROCEDURE — 96375 TX/PRO/DX INJ NEW DRUG ADDON: CPT

## 2021-12-09 PROCEDURE — 99213 OFFICE O/P EST LOW 20 MIN: CPT | Performed by: NURSE PRACTITIONER

## 2021-12-09 PROCEDURE — 93010 ELECTROCARDIOGRAM REPORT: CPT | Performed by: INTERNAL MEDICINE

## 2021-12-09 PROCEDURE — 80053 COMPREHEN METABOLIC PANEL: CPT

## 2021-12-09 PROCEDURE — 76705 ECHO EXAM OF ABDOMEN: CPT

## 2021-12-09 PROCEDURE — 6360000002 HC RX W HCPCS: Performed by: PHYSICIAN ASSISTANT

## 2021-12-09 PROCEDURE — 93005 ELECTROCARDIOGRAM TRACING: CPT | Performed by: STUDENT IN AN ORGANIZED HEALTH CARE EDUCATION/TRAINING PROGRAM

## 2021-12-09 PROCEDURE — 99284 EMERGENCY DEPT VISIT MOD MDM: CPT

## 2021-12-09 PROCEDURE — 81003 URINALYSIS AUTO W/O SCOPE: CPT

## 2021-12-09 PROCEDURE — 84484 ASSAY OF TROPONIN QUANT: CPT

## 2021-12-09 PROCEDURE — 96361 HYDRATE IV INFUSION ADD-ON: CPT

## 2021-12-09 PROCEDURE — 96374 THER/PROPH/DIAG INJ IV PUSH: CPT

## 2021-12-09 PROCEDURE — 83690 ASSAY OF LIPASE: CPT

## 2021-12-09 PROCEDURE — 84132 ASSAY OF SERUM POTASSIUM: CPT

## 2021-12-09 RX ORDER — KETOROLAC TROMETHAMINE 30 MG/ML
15 INJECTION, SOLUTION INTRAMUSCULAR; INTRAVENOUS ONCE
Status: COMPLETED | OUTPATIENT
Start: 2021-12-09 | End: 2021-12-09

## 2021-12-09 RX ORDER — FENTANYL CITRATE 50 UG/ML
25 INJECTION, SOLUTION INTRAMUSCULAR; INTRAVENOUS ONCE
Status: COMPLETED | OUTPATIENT
Start: 2021-12-09 | End: 2021-12-09

## 2021-12-09 RX ORDER — ONDANSETRON 4 MG/1
4-8 TABLET, ORALLY DISINTEGRATING ORAL EVERY 8 HOURS PRN
Qty: 12 TABLET | Refills: 0 | Status: SHIPPED | OUTPATIENT
Start: 2021-12-09 | End: 2022-02-17 | Stop reason: SDUPTHER

## 2021-12-09 RX ORDER — SODIUM CHLORIDE, SODIUM LACTATE, POTASSIUM CHLORIDE, AND CALCIUM CHLORIDE .6; .31; .03; .02 G/100ML; G/100ML; G/100ML; G/100ML
1000 INJECTION, SOLUTION INTRAVENOUS ONCE
Status: COMPLETED | OUTPATIENT
Start: 2021-12-09 | End: 2021-12-09

## 2021-12-09 RX ORDER — HYDROCODONE BITARTRATE AND ACETAMINOPHEN 5; 325 MG/1; MG/1
1 TABLET ORAL EVERY 8 HOURS PRN
Qty: 10 TABLET | Refills: 0 | Status: SHIPPED | OUTPATIENT
Start: 2021-12-09 | End: 2021-12-14

## 2021-12-09 RX ORDER — SACCHAROMYCES BOULARDII 250 MG
250 CAPSULE ORAL 2 TIMES DAILY
Qty: 14 CAPSULE | Refills: 0 | Status: SHIPPED | OUTPATIENT
Start: 2021-12-09 | End: 2021-12-16

## 2021-12-09 RX ADMIN — KETOROLAC TROMETHAMINE 15 MG: 30 INJECTION, SOLUTION INTRAMUSCULAR; INTRAVENOUS at 14:59

## 2021-12-09 RX ADMIN — FENTANYL CITRATE 25 MCG: 50 INJECTION INTRAMUSCULAR; INTRAVENOUS at 14:59

## 2021-12-09 RX ADMIN — SODIUM CHLORIDE, POTASSIUM CHLORIDE, SODIUM LACTATE AND CALCIUM CHLORIDE 1000 ML: 600; 310; 30; 20 INJECTION, SOLUTION INTRAVENOUS at 14:59

## 2021-12-09 ASSESSMENT — PAIN DESCRIPTION - LOCATION: LOCATION: ABDOMEN

## 2021-12-09 ASSESSMENT — PAIN SCALES - GENERAL
PAINLEVEL_OUTOF10: 3

## 2021-12-09 ASSESSMENT — PAIN DESCRIPTION - PAIN TYPE: TYPE: ACUTE PAIN

## 2021-12-09 ASSESSMENT — PAIN DESCRIPTION - FREQUENCY: FREQUENCY: CONTINUOUS

## 2021-12-09 ASSESSMENT — PAIN DESCRIPTION - DESCRIPTORS: DESCRIPTORS: DULL;ACHING

## 2021-12-09 ASSESSMENT — PAIN DESCRIPTION - ORIENTATION: ORIENTATION: RIGHT;LOWER

## 2021-12-09 NOTE — ED PROVIDER NOTES
[address] [facility]  ED  EMERGENCY DEPARTMENT ENCOUNTER        Pt Name: Bret Moritz  MRN: [id number]  [place] 1980  Date of evaluation: 2021  Provider: Angeles De Oliveira PA-C  PCP: Christi Lang DO  Note Started: 6:08 PM EST       YENIFER. I have evaluated this patient. My supervising physician was available for consultation. [facility] COMPLAINT       Chief Complaint   Patient presents with    Abdominal Pain     patient seen for right lower quadrant abdominal pain x1.5weeks ago and was told had elevated lipase levels. Decided against admission, back because pain has not gone away and now with 11lb weight lose in the last 2 weeks.  Nausea       HISTORY OF PRESENT ILLNESS   (Location, Timing/Onset, Context/Setting, Quality, Duration, Modifying Factors, Severity, Associated Signs and Symptoms)  Note limiting factors. Chief Complaint: Abdominal pain, nausea, vomiting, diarrhea    Bret Moritz is a 42 y.o. female who presents the above complaint. This patient states 1.5 weeks she has had rather steady abdominal pain mostly on the right flank and right upper quadrant. She has had previous CT scans and recently. No pathology noted. Patient reports loose stool with relative frequency. No blood or mucus reported. She has some nausea with rare vomiting. She has known gastroparesis secondary to insulin-dependent diabetic. She has had previous  x1, cholecystectomy and hysterectomy with BSO. Patient does see Sumanth Gonzalez. Patient states she is lost about 11 pounds in the past 1.5 weeks due to decreased appetite, nausea and diarrhea. Nursing Notes were all reviewed and agreed with or any disagreements were addressed in the HPI. REVIEW OF SYSTEMS    (2-9 systems for level 4, 10 or more for level 5)     Review of Systems    Positives and Pertinent negatives as per HPI.  Except as noted above in the ROS, all other systems were reviewed and negative. PAST MEDICAL HISTORY     Past Medical History:   Diagnosis Date    Abdominal pain, acute, right lower quadrant 5/15/2013    Anxiety     Arthritis     Left Knee    Bilateral ovarian cysts 2013    Blood transfusion reaction     Cellulitis and abscess of trunk 2014    Pt was seen in ED today for bleeding from incision after taking a fall this morning.       COVID-19 2020    Depression     Diabetes mellitus ([facility] [address]     x5yr    Diverticula of colon 2013    DM2 (diabetes mellitus, type 2) ([facility] [address] 2016    Insomnia     Insomnia secondary to situational depression 2014    Left carpal tunnel syndrome 2018    MDRO (multiple drug resistant organisms) resistance     poss MRSA after hysterectomy treated with antibiotics    OA (osteoarthritis) of knee 2014    Obesity (BMI 30.0-34.9) 2017    Ovarian cyst     Plantar fasciitis, left 2014         SURGICAL HISTORY     Past Surgical History:   Procedure Laterality Date    BREAST CYST ASPIRATION       SECTION      CHOLECYSTECTOMY      COLONOSCOPY  2007    polyps    COLONOSCOPY N/A 2021    COLONOSCOPY WITH ANESTHESIA performed by Ayaan Torrez MD at [address]      LAPAROSCOPY      lysis of adhesions    LAPAROTOMY  2014    LAPAROTOMY, BILATERAL SALPINGO - OOPHORECTOMY    SALPINGO-OOPHORECTOMY  2014    UPPER GASTROINTESTINAL ENDOSCOPY N/A 2021    EGD BIOPSY performed by Ayaan Torrez MD at [address] [facility]       Discharge Medication List as of 2021  5:39 PM      CONTINUE these medications which have NOT CHANGED    Details   metoclopramide (REGLAN) 10 MG tablet Take 1 tablet by mouth 3 times daily for 30 doses, Disp-30 tablet, R-0Print      gabapentin (NEURONTIN) 800 MG tablet TAKE 1 TABLET BY MOUTH 3 TIMES A DAY, MAY TAKE 2 TABLETS AT BEDTIME AS DIRECTED, Disp-120 tablet, R-0Normal      hyoscyamine (LEVSIN/SL) 125 MCG sublingual tablet TAKE 1 TO 2 TABLETS BY MOUTH EVERY 4 TO 6 HOURS AS NEEDED FOR ABDOMINAL CRAMPS. .. MAX 12 TABS/DAY. .., Disp-60 tablet, R-1Normal      TOUJEO MAX SOLOSTAR 300 UNIT/ML SOPN Inject 1 pen into the muscle, DAWHistorical Med      promethazine (PHENERGAN) 25 MG tablet Take 1 tablet by mouth 3 times daily as needed for Nausea, Disp-30 tablet, R-5Normal      linaclotide (LINZESS) 145 MCG capsule Take 2 capsules by mouth every morning (before breakfast), Disp-30 capsule, R-3Normal      doxepin (SINEQUAN) 25 MG capsule 2 hs, Disp-60 capsule, R-3Normal      pantoprazole (PROTONIX) 40 MG tablet Take 1 tablet by mouth 2 times daily, Disp-60 tablet, R-0Print      Insulin Pen Needle (B-D UF III MINI PEN NEEDLES) 31G X 5 MM MISC 4 TIMES DAILY Starting Sat 5/8/2021, Disp-100 each, R-3, Normal      metFORMIN (GLUCOPHAGE) 1000 MG tablet Take 1 tablet by mouth 2 times daily (with meals), Disp-180 tablet, R-3Please consider 90 day supplies to promote better adherenceNormal      losartan (COZAAR) 50 MG tablet Take 1 tablet by mouth daily, Disp-30 tablet, R-5Normal      fluticasone (FLONASE) 50 MCG/ACT nasal spray 2 sprays by Nasal route daily, Disp-1 Bottle, R-0Normal      insulin lispro (HUMALOG KWIKPEN) 100 UNIT/ML pen INJECT 20 UNITS SUBCUTANEOUSLY THREE TIMES DAILY BEFORE MEAL(S), Disp-15 pen, R-3Please consider 90 day supplies to promote better adherenceNormal      Lancets MISC Disp-100 each, R-5, NormalDX: E 11.9-Uses insulin.  FSBS 3 times daily-Delica lancets for one touch ultra II      glucose monitoring kit (FREESTYLE) monitoring kit Disp-1 kit, R-0, NormalDx E11.9-One touch ultra II meter-uses tid               ALLERGIES     Lisinopril, Lisinopril-hydrochlorothiazide, and Morphine    FAMILYHISTORY       Family History   Problem Relation Age of Onset    Cancer Mother         ovarian    Diabetes Mother     High Blood Pressure Mother     Hypertension Mother     Diabetes Brother    [employer] Hypertension Brother     Diabetes Maternal Grandmother     Cancer Maternal Grandfather         stomach    Prostate Cancer Paternal Grandfather         metastatic    Breast Cancer Maternal Aunt           SOCIAL HISTORY       Social History     Tobacco Use    Smoking status: Former Smoker     Years: 0.00     Types: Cigarettes     Quit date: 1999     Years since quittin.2    Smokeless tobacco: Never Used    Tobacco comment: smoked for 1 months when teenager   Vaping Use    Vaping Use: Never used   Substance Use Topics    Alcohol use: Not Currently    Drug use: Yes     Types: Marijuana (Weed)     Comment: Hx of cocaine use in 2004- 9 months        SCREENINGS             PHYSICAL EXAM    (up to 7 for level 4, 8 or more for level 5)     ED Triage Vitals [21 1235]   BP Temp Temp Source Pulse Resp SpO2 Height Weight   91/66 98 °F (36.7 °C) Oral 77 16 99 % 5' 1\" (1.549 m) 106 lb (48.1 kg)       Physical Exam  Vitals and nursing note reviewed. Constitutional:       Appearance: She is well-developed and normal weight. She is ill-appearing. HENT:      Head: Normocephalic and atraumatic. Right Ear: External ear normal.      Left Ear: External ear normal.   Eyes:      General: No scleral icterus. Right eye: No discharge. Left eye: No discharge. Conjunctiva/sclera: Conjunctivae normal.   Cardiovascular:      Rate and Rhythm: Normal rate and regular rhythm. Heart sounds: Normal heart sounds. Pulmonary:      Effort: Pulmonary effort is normal.      Breath sounds: Normal breath sounds. Abdominal:      General: Abdomen is flat. Bowel sounds are normal.      Palpations: Abdomen is soft. Tenderness: There is abdominal tenderness. There is no right CVA tenderness or left CVA tenderness. Comments: Patient with tenderness generally in the abdomen more so in the upper abdomen and slightly to the right. Musculoskeletal:         General: Normal range of motion. Cervical back: Normal range of motion and neck supple. Right lower leg: No edema. Left lower leg: No edema. Skin:     General: Skin is warm and dry. Neurological:      General: No focal deficit present. Mental Status: She is alert and oriented to person, place, and time. Mental status is at baseline. Psychiatric:         Mood and Affect: Mood normal.         Behavior: Behavior normal.         Thought Content: Thought content normal.         Judgment: Judgment normal.         DIAGNOSTIC RESULTS   LABS:    Labs Reviewed   COMPREHENSIVE METABOLIC PANEL W/ REFLEX TO MG FOR LOW K - Abnormal; Notable for the following components:       Result Value    Potassium reflex Magnesium 6.3 (*)     Glucose 216 (*)     BUN 21 (*)     CREATININE <0.5 (*)     Total Protein 8.3 (*)     All other components within normal limits    Narrative:     Brian Rankin tel. [phone],  Chemistry results called to and read back by Dmitriy Carballo RN, 12/09/2021 13:45,  by [facility]  Performed at:  [place] [address] [facility]   Phone [phone]   URINE RT REFLEX TO CULTURE - Abnormal; Notable for the following components:    Clarity, UA SL CLOUDY (*)     Glucose, Ur 100 (*)     Bilirubin Urine SMALL (*)     All other components within normal limits    Narrative:     Performed at:  [place] [address] [facility]   Phone [phone]   CBC WITH AUTO DIFFERENTIAL    Narrative:     Performed at:  [place] [address] [facility]   Phone [phone]   LIPASE    Narrative:     Peola Nose [phone],  Chemistry results called to and read back by Dmitriy Carballo RN, 12/09/2021 13:45,  by [facility]  Performed at:  [address], [facility]   Phone [phone]   TROPONIN    Narrative:     CALL  Martino  SAED tel. [phone],  Chemistry results called to and read back by Yemi Lyn RN, 12/09/2021 13:45,  by [facility]  Performed at:  [facility]  [address] [facility],  [place], [address] [place]   Phone [phone]   POTASSIUM    Narrative:     Performed at:  [facility]  [address] [facility] [facility],  [place], [address] [place]   Phone [phone]       When ordered only abnormal lab results are displayed. All other labs were within normal range or not returned as of this dictation. EKG: When ordered, EKG's are interpreted by the Emergency Department Physician in the absence of a cardiologist.  Please see their note for interpretation of EKG. RADIOLOGY:   Non-plain film images such as CT, Ultrasound and MRI are read by the radiologist. Plain radiographic images are visualized and preliminarily interpreted by the ED Provider with the below findings:        Interpretation per the Radiologist below, if available at the time of this note:    [address] [place] [address] [place],[address] organ? LIVER, GALLBLADDER, PANCREAS   Final Result   1. No acute abnormality. US ABDOMEN LIMITED Specify organ? LIVER, GALLBLADDER, PANCREAS    Result Date: 12/9/2021  EXAMINATION: RIGHT UPPER QUADRANT ULTRASOUND 12/9/2021 3:34 pm COMPARISON: 11/28/2021 HISTORY: ORDERING SYSTEM PROVIDED HISTORY: Abdominal pain, previous cholecystectomy, rule out CBD dilation or stone, evaluate pancreas for atrophy TECHNOLOGIST PROVIDED HISTORY: Reason for exam:->Abdominal pain, previous cholecystectomy, rule out CBD dilation or stone, evaluate pancreas for atrophy Specify organ?->LIVER Specify organ?->GALLBLADDER Specify organ?->PANCREAS FINDINGS: LIVER:  The liver is normal in size and echotexture. There is no ductal dilatation or mass. BILIARY SYSTEM: Status post cholecystectomy. The common bile duct measures 6 mm. RIGHT KIDNEY: The right kidney is unremarkable measuring 10.3 cm. There is no renal mass or hydronephrosis. with meals. I did prescribe #45 to cover 15 days. Within this timeframe the patient should be seen by her GI physician for further evaluation consideration. Patient does express understanding the diagnosis and the treatment plan. FINAL IMPRESSION      1. Chronic abdominal pain    2. Chronic nausea    3. Gastroparesis    4. Type 2 diabetes mellitus with hyperglycemia, with long-term current use of insulin (HCC)    5. Diarrhea, unspecified type    6. Exocrine pancreatic insufficiency          DISPOSITION/PLAN   DISPOSITION Decision To Discharge 12/09/2021 05:17:05 PM      PATIENT REFERRED TO:  Thomas Gasca MD  [facility] [address] [facility]    Schedule an appointment as soon as possible for a visit on 12/14/2021      Manohar Womack,   [address] [place] [address],[address] [facility] [address]  [phone]    Schedule an appointment as soon as possible for a visit   As needed    [facility]  ED  [address] [facility] [address]  Go to   If symptoms worsen      DISCHARGE MEDICATIONS:  Discharge Medication List as of 12/9/2021  5:39 PM      START taking these medications    Details   HYDROcodone-acetaminophen (NORCO) 5-325 MG per tablet Take 1 tablet by mouth every 8 hours as needed for Pain for up to 5 days. , Disp-10 tablet, R-0Print      saccharomyces boulardii (FLORASTOR) 250 MG capsule Take 1 capsule by mouth 2 times daily for 7 days, Disp-14 capsule, R-0Print      Pancrelipase, Lip-Prot-Amyl, 2073-1541 units CPEP Take 1 capsule by mouth 3 times daily (with meals) for 15 days, Disp-45 capsule, R-0Please provide similar generic substitute equivalent favored by insurance, if needed. Print             DISCONTINUED MEDICATIONS:  Discharge Medication List as of 12/9/2021  5:39 PM            Periodic Controlled Substance Monitoring: No signs of potential drug abuse or diversion identified.  Daniella Elaine PA-C)    (Please note that portions of this note were completed with a voice recognition program.  Efforts were made to edit the dictations but occasionally words are mis-transcribed. )    Angeles De Oliveira PA-C (electronically signed)           Angeles De Oliveira PA-C  12/09/21 6835

## 2021-12-09 NOTE — ED PROVIDER NOTES
ECG    The Ekg interpreted by me shows sinus rhythm with a rate of 72 bpm.  Normal axis. No acute injury pattern. , QRS 88, QTc 442. No significant change from prior EKG dated 8/26/2021.      Fani Miles DO  12/09/21 1410

## 2021-12-09 NOTE — PROGRESS NOTES
2021  Andrea Alfonso (: 1980)  42 y.o.    ASSESSMENT and PLAN:  Nicholas White was seen today for abdominal pain, nausea and diarrhea. Diagnoses and all orders for this visit:    Diarrhea, unspecified type  -     C DIFF TOXIN/ANTIGEN; Future  -     GI Bacterial Pathogens By PCR; Future  -     BLOOD OCCULT STOOL #1; Future  -     OVA & PARASITE ID/COUNT #1; Future  -     GAMMA GT; Future  -Stool studies. -Recommend urgent f/u with GI-follows with [facility]  -Side effects of medications reviewed, alarm signs and symptoms reviewed, present to ER or call office if experiencing worsening symptoms. Epigastric pain  -     COMPREHENSIVE METABOLIC PANEL; Future  -     TSH with Reflex; Future  -     LIPASE; Future  -     CBC WITH AUTO DIFFERENTIAL; Future  -     POCT urine pregnancy  -     GI Bacterial Pathogens By PCR; Future  -Symptomatic Hypovolemic, weight loss. Recommend ER with present symptoms which can be associated with poor outcome.   -Labs/stool studies. Repeat lipase.    -Already on anti-emetics. -Monitor weight, bp, hr. Keep log of symptoms.   -Recent CT negative for acute pathology  -Push fluids, can trial BRAT diet, and clear liquid diet. No follow-ups on file. HPI  Presenting for persistent abdominal pain. Diagnosed with pancreatitis last week, declined admission due to fear of getting Covid. Slight elevation in Lipase in ER. Having bland diet. CT ab/pelvis normal in ER. Pain has going on for 2 weeks. Today was better though. Very nausea during the day. Down 14lbs since illness began. Has history of gastroparesis on bentyl. Having diarrhea as well, >6 BM daily for the past 2 days. BM's are large volume, watery loose. Had BM Every hour yesterday. New since yesterday . Had multiple episodes of incontinence which is new. Denies fevers, body aches, chills,   Pain is epigastric and radiates to back. Hasn't had alcohol in over a year. Uses medical marijuana.  Appetite has been poor, tolerated half an egg and half a sausage bhupinder this AM. Denies any emesis. Denies black tarry stools, blood in stool. Review of Systems   Constitutional: Positive for appetite change. Negative for activity change, chills, diaphoresis, fatigue and fever. HENT: Negative. Respiratory: Negative for cough, chest tightness, shortness of breath and wheezing. Cardiovascular: Negative for chest pain, palpitations and leg swelling. Gastrointestinal: Positive for abdominal pain (RUQ), diarrhea and nausea. Negative for abdominal distention, blood in stool, constipation and vomiting. Genitourinary: Negative. Negative for difficulty urinating and dysuria. Musculoskeletal: Negative. Negative for gait problem. Neurological: Positive for weakness and light-headedness. Negative for dizziness, syncope, numbness and headaches. Psychiatric/Behavioral: Negative. Allergies, past medical history, family history, and social history reviewed and unchanged from previous encounter. Current Outpatient Medications   Medication Sig Dispense Refill    gabapentin (NEURONTIN) 800 MG tablet TAKE 1 TABLET BY MOUTH 3 TIMES A DAY, MAY TAKE 2 TABLETS AT BEDTIME AS DIRECTED 120 tablet 0    hyoscyamine (LEVSIN/SL) 125 MCG sublingual tablet TAKE 1 TO 2 TABLETS BY MOUTH EVERY 4 TO 6 HOURS AS NEEDED FOR ABDOMINAL CRAMPS. .. MAX 12 TABS/DAY. .. 60 tablet 1    ondansetron (ZOFRAN ODT) 4 MG disintegrating tablet Take 1 tablet by mouth every 8 hours as needed for Nausea or Vomiting 30 tablet 1    promethazine (PHENERGAN) 25 MG tablet Take 1 tablet by mouth 3 times daily as needed for Nausea 30 tablet 5    linaclotide (LINZESS) 145 MCG capsule Take 2 capsules by mouth every morning (before breakfast) 30 capsule 3    doxepin (SINEQUAN) 25 MG capsule 2 hs 60 capsule 3    pantoprazole (PROTONIX) 40 MG tablet Take 1 tablet by mouth 2 times daily 60 tablet 0    losartan (COZAAR) 50 MG tablet Take 1 tablet by mouth daily 30 tablet 5    fluticasone (FLONASE) 50 MCG/ACT nasal spray 2 sprays by Nasal route daily 1 Bottle 0    Lancets MISC DX: E 11.9-Uses insulin. FSBS 3 times daily-Delica lancets for one touch ultra  each 5    glucose monitoring kit (FREESTYLE) monitoring kit Dx E11.9-One touch ultra II meter-uses tid 1 kit 0    metoclopramide (REGLAN) 10 MG tablet Take 1 tablet by mouth 3 times daily for 30 doses 30 tablet 0    TOUJEO MAX SOLOSTAR 300 UNIT/ML SOPN Inject 1 pen into the muscle (Patient not taking: Reported on 12/9/2021)      Insulin Pen Needle (B-D UF III MINI PEN NEEDLES) 31G X 5 MM MISC Inject 1 each into the skin 4 times daily (Patient not taking: Reported on 12/9/2021) 100 each 3    metFORMIN (GLUCOPHAGE) 1000 MG tablet Take 1 tablet by mouth 2 times daily (with meals) (Patient not taking: Reported on 12/9/2021) 180 tablet 3    insulin lispro (HUMALOG KWIKPEN) 100 UNIT/ML pen INJECT 20 UNITS SUBCUTANEOUSLY THREE TIMES DAILY BEFORE MEAL(S) (Patient not taking: Reported on 12/9/2021) 15 pen 3     No current facility-administered medications for this visit. Vitals:    12/09/21 0827 12/09/21 0906 12/09/21 0908   BP: 107/68     Pulse: 110 84    Temp:   97.9 °F (36.6 °C)   TempSrc:   Oral   SpO2: 97% 98%    Weight: 106 lb (48.1 kg)       Estimated body mass index is 20.03 kg/m² as calculated from the following:    Height as of 12/6/21: 5' 1\" (1.549 m). Weight as of this encounter: 106 lb (48.1 kg). Physical Exam  Vitals reviewed. Constitutional:       General: She is not in acute distress. Appearance: Normal appearance. She is normal weight. She is ill-appearing. She is not toxic-appearing or diaphoretic. HENT:      Head: Normocephalic and atraumatic. Nose: Nose normal.   Eyes:      Conjunctiva/sclera: Conjunctivae normal.   Cardiovascular:      Rate and Rhythm: Normal rate and regular rhythm. Pulses: Normal pulses. Heart sounds: Normal heart sounds.    Pulmonary:      Effort: Pulmonary effort is normal. No respiratory distress. Breath sounds: Normal breath sounds. No wheezing or rhonchi. Chest:      Chest wall: No tenderness. Abdominal:      General: Abdomen is flat. There is no distension. Palpations: Abdomen is soft. There is no mass. Tenderness: There is abdominal tenderness. There is no guarding or rebound. Musculoskeletal:         General: Normal range of motion. Cervical back: Normal range of motion and neck supple. Skin:     General: Skin is warm and dry. Capillary Refill: Capillary refill takes less than 2 seconds. Neurological:      General: No focal deficit present. Mental Status: She is alert and oriented to person, place, and time. Mental status is at baseline. Motor: No weakness. Psychiatric:         Mood and Affect: Mood normal.         Behavior: Behavior normal.         Thought Content:  Thought content normal.         Judgment: Judgment normal.

## 2021-12-09 NOTE — LETTER
[facility] [address]  [address] [place] [address]  Phone: [phone]  Fax: [phone]    IRASEMA Welsh CNP        December 9, 2021     Patient: Darlin Dale   YOB: 1980   Date of Visit: 12/9/2021       To Whom it May Concern:    Darlin Dale was seen in my clinic on 12/9/2021. She may return to work on 12/15/21. Please excuse her for this past week, and until 12/15 due to medical condition. If you have any questions or concerns, please don't hesitate to call. Sincerely,         Pablito Rosa.  IRASEMA Stone CNP

## 2021-12-28 ASSESSMENT — ENCOUNTER SYMPTOMS
SHORTNESS OF BREATH: 0
VOMITING: 0
COUGH: 0
CHEST TIGHTNESS: 0
ABDOMINAL DISTENTION: 0
CONSTIPATION: 0
ABDOMINAL PAIN: 1
BLOOD IN STOOL: 0
DIARRHEA: 1
NAUSEA: 1
WHEEZING: 0

## 2022-02-16 ENCOUNTER — TELEPHONE (OUTPATIENT)
Dept: GASTROENTEROLOGY | Age: 42
End: 2022-02-16

## 2022-02-16 ENCOUNTER — NURSE TRIAGE (OUTPATIENT)
Dept: OTHER | Facility: CLINIC | Age: 42
End: 2022-02-16

## 2022-02-16 NOTE — TELEPHONE ENCOUNTER
Subjective: Caller states \"Burning sensation and tingling to feet\"     Current Symptoms: Pain and tenderness to touch, both feet. Painful to walk and bear full weight. States both feet are \"hot\". Red dots to bottom of left foot, localized to big toe. No open wounds reported. Glucose levels \"good\". Also c/o bowel incontinence x 5 episodes. Denies abd pain or vomiting, chronic nausea, taking Zofran. Onset: 1.5 months ago; worsening    Associated Symptoms: diarrhea - weight loss (ongoing)    Pain Severity: 2/10; tingling; intermittent - feet    Temperature: Denies    What has been tried: Gabapentin    LMP: NA Pregnant: NA - Hysterectomy    Recommended disposition: See PCP within 3 days. UCC/WIC if no appointments. Care advice provided, patient verbalizes understanding; denies any other questions or concerns; instructed to call back for any new or worsening symptoms. Writer provided warm transfer to [facility] for appointment scheduling     Attention Provider: Thank you for allowing me to participate in the care of your patient. The patient was connected to triage in response to information provided to the ECC/PSC. Please do not respond through this encounter as the response is not directed to a shared pool.         Reason for Disposition   [1] Numbness or tingling in both feet AND [2] new or increased    Protocols used: DIABETES - FOOT PROBLEMS AND QUESTIONS-ADULT-

## 2022-02-16 NOTE — TELEPHONE ENCOUNTER
Spoke with patient regarding concerns of diarrhea for 1.5 months. Patient states she is no longer taking the Linzess. She was seen by primary care 12/28/2021 and stool studies for C. Diff, bacterial pathogen PCR, and OVA/parasite were ordered. Instructed patient it would be beneficial to complete ordered stool studies to rule out infectious etiology. If negative to try stool bulking agents. Patient instructed to keep her follow up appt with Dr. Miguel Angel Cobb in March.

## 2022-02-16 NOTE — TELEPHONE ENCOUNTER
Received call from [facility], caller not on line. Complaint: Bilateral Leg swelling and pain    Market: [address] [place] [address] Name: Bree Mejia    Caller's telephone number verified as [phone]     Contact made, see triage below.

## 2022-02-17 ENCOUNTER — TELEPHONE (OUTPATIENT)
Dept: FAMILY MEDICINE CLINIC | Age: 42
End: 2022-02-17

## 2022-02-17 ENCOUNTER — OFFICE VISIT (OUTPATIENT)
Dept: FAMILY MEDICINE CLINIC | Age: 42
End: 2022-02-17
Payer: MEDICAID

## 2022-02-17 VITALS
WEIGHT: 108 LBS | TEMPERATURE: 97.7 F | BODY MASS INDEX: 20.39 KG/M2 | DIASTOLIC BLOOD PRESSURE: 72 MMHG | SYSTOLIC BLOOD PRESSURE: 122 MMHG | HEART RATE: 85 BPM | HEIGHT: 61 IN | OXYGEN SATURATION: 95 %

## 2022-02-17 DIAGNOSIS — I10 ESSENTIAL HYPERTENSION: ICD-10-CM

## 2022-02-17 DIAGNOSIS — E11.9 TYPE 2 DIABETES MELLITUS WITHOUT COMPLICATION, WITH LONG-TERM CURRENT USE OF INSULIN (HCC): Primary | ICD-10-CM

## 2022-02-17 DIAGNOSIS — R10.13 EPIGASTRIC PAIN: ICD-10-CM

## 2022-02-17 DIAGNOSIS — Z79.4 TYPE 2 DIABETES MELLITUS WITHOUT COMPLICATION, WITH LONG-TERM CURRENT USE OF INSULIN (HCC): Primary | ICD-10-CM

## 2022-02-17 DIAGNOSIS — R19.7 DIARRHEA, UNSPECIFIED TYPE: ICD-10-CM

## 2022-02-17 DIAGNOSIS — R20.8 BURNING SENSATION OF FEET: ICD-10-CM

## 2022-02-17 DIAGNOSIS — E78.2 MIXED HYPERLIPIDEMIA: ICD-10-CM

## 2022-02-17 DIAGNOSIS — E55.9 VITAMIN D DEFICIENCY: ICD-10-CM

## 2022-02-17 LAB
A/G RATIO: 1.6 (ref 1.1–2.2)
ALBUMIN SERPL-MCNC: 4.2 G/DL (ref 3.4–5)
ALP BLD-CCNC: 148 U/L (ref 40–129)
ALT SERPL-CCNC: 9 U/L (ref 10–40)
ANION GAP SERPL CALCULATED.3IONS-SCNC: 13 MMOL/L (ref 3–16)
AST SERPL-CCNC: 9 U/L (ref 15–37)
BASOPHILS ABSOLUTE: 0 K/UL (ref 0–0.2)
BASOPHILS RELATIVE PERCENT: 0.6 %
BILIRUB SERPL-MCNC: 0.5 MG/DL (ref 0–1)
BUN BLDV-MCNC: 17 MG/DL (ref 7–20)
CALCIUM SERPL-MCNC: 9.1 MG/DL (ref 8.3–10.6)
CHLORIDE BLD-SCNC: 102 MMOL/L (ref 99–110)
CO2: 26 MMOL/L (ref 21–32)
CREAT SERPL-MCNC: <0.5 MG/DL (ref 0.6–1.1)
EOSINOPHILS ABSOLUTE: 0.1 K/UL (ref 0–0.6)
EOSINOPHILS RELATIVE PERCENT: 1.9 %
GAMMA GLUTAMYL TRANSFERASE: 7 U/L (ref 5–36)
GFR AFRICAN AMERICAN: >60
GFR NON-AFRICAN AMERICAN: >60
GLUCOSE BLD-MCNC: 284 MG/DL (ref 70–99)
HBA1C MFR BLD: 8.6 %
HCT VFR BLD CALC: 38.5 % (ref 36–48)
HEMOGLOBIN: 12.9 G/DL (ref 12–16)
LIPASE: 66 U/L (ref 13–60)
LYMPHOCYTES ABSOLUTE: 1.3 K/UL (ref 1–5.1)
LYMPHOCYTES RELATIVE PERCENT: 20.4 %
MAGNESIUM: 1.9 MG/DL (ref 1.8–2.4)
MCH RBC QN AUTO: 30.3 PG (ref 26–34)
MCHC RBC AUTO-ENTMCNC: 33.6 G/DL (ref 31–36)
MCV RBC AUTO: 90.2 FL (ref 80–100)
MONOCYTES ABSOLUTE: 0.4 K/UL (ref 0–1.3)
MONOCYTES RELATIVE PERCENT: 6.9 %
NEUTROPHILS ABSOLUTE: 4.3 K/UL (ref 1.7–7.7)
NEUTROPHILS RELATIVE PERCENT: 70.2 %
PDW BLD-RTO: 13.7 % (ref 12.4–15.4)
PLATELET # BLD: 196 K/UL (ref 135–450)
PMV BLD AUTO: 9.1 FL (ref 5–10.5)
POTASSIUM SERPL-SCNC: 4.4 MMOL/L (ref 3.5–5.1)
RBC # BLD: 4.26 M/UL (ref 4–5.2)
SODIUM BLD-SCNC: 141 MMOL/L (ref 136–145)
TOTAL PROTEIN: 6.9 G/DL (ref 6.4–8.2)
TSH REFLEX: 0.77 UIU/ML (ref 0.27–4.2)
VITAMIN D 25-HYDROXY: 9.5 NG/ML
WBC # BLD: 6.1 K/UL (ref 4–11)

## 2022-02-17 PROCEDURE — 99214 OFFICE O/P EST MOD 30 MIN: CPT | Performed by: FAMILY MEDICINE

## 2022-02-17 PROCEDURE — 83036 HEMOGLOBIN GLYCOSYLATED A1C: CPT | Performed by: FAMILY MEDICINE

## 2022-02-17 PROCEDURE — 3052F HG A1C>EQUAL 8.0%<EQUAL 9.0%: CPT | Performed by: FAMILY MEDICINE

## 2022-02-17 RX ORDER — INSULIN GLARGINE 300 U/ML
1 INJECTION, SOLUTION SUBCUTANEOUS DAILY
Qty: 4 PEN | Refills: 2 | Status: SHIPPED | OUTPATIENT
Start: 2022-02-17

## 2022-02-17 RX ORDER — INSULIN GLARGINE 100 [IU]/ML
INJECTION, SOLUTION SUBCUTANEOUS
Qty: 15 PEN | Refills: 1 | Status: SHIPPED | OUTPATIENT
Start: 2022-02-17 | End: 2022-03-21

## 2022-02-17 RX ORDER — ONDANSETRON 4 MG/1
4-8 TABLET, ORALLY DISINTEGRATING ORAL EVERY 8 HOURS PRN
Qty: 12 TABLET | Refills: 0 | Status: SHIPPED | OUTPATIENT
Start: 2022-02-17 | End: 2022-03-29

## 2022-02-17 ASSESSMENT — ENCOUNTER SYMPTOMS: NAUSEA: 1

## 2022-02-17 NOTE — PROGRESS NOTES
Milind Sawyer is a 42 y.o. female    Chief Complaint   Patient presents with    Foot Pain     Burning/tingling sensation in both feet        HPI:    HPI      ROS:    Review of Systems    /72 (Site: Left Upper Arm, Position: Sitting, Cuff Size: Medium Adult)   Pulse 85   Temp 97.7 °F (36.5 °C) (Oral)   Ht 5' 0.5\" (1.537 m)   Wt 108 lb (49 kg)   LMP 12/18/2008 (Approximate)   SpO2 95%   BMI 20.75 kg/m²     Physical Exam:    Physical Exam    Current Outpatient Medications   Medication Sig Dispense Refill    ondansetron (ZOFRAN ODT) 4 MG disintegrating tablet Take 1-2 tablets by mouth every 8 hours as needed for Nausea or Vomiting 12 tablet 0    hyoscyamine (LEVSIN/SL) 125 MCG sublingual tablet TAKE 1 TO 2 TABLETS BY MOUTH EVERY 4 TO 6 HOURS AS NEEDED FOR ABDOMINAL CRAMPS. .. MAX 12 TABS/DAY. .. 60 tablet 1    TOUJEO MAX SOLOSTAR 300 UNIT/ML SOPN Inject 1 pen into the muscle       promethazine (PHENERGAN) 25 MG tablet Take 1 tablet by mouth 3 times daily as needed for Nausea 30 tablet 5    linaclotide (LINZESS) 145 MCG capsule Take 2 capsules by mouth every morning (before breakfast) 30 capsule 3    doxepin (SINEQUAN) 25 MG capsule 2 hs 60 capsule 3    pantoprazole (PROTONIX) 40 MG tablet Take 1 tablet by mouth 2 times daily 60 tablet 0    Insulin Pen Needle (B-D UF III MINI PEN NEEDLES) 31G X 5 MM MISC Inject 1 each into the skin 4 times daily 100 each 3    metFORMIN (GLUCOPHAGE) 1000 MG tablet Take 1 tablet by mouth 2 times daily (with meals) 180 tablet 3    losartan (COZAAR) 50 MG tablet Take 1 tablet by mouth daily 30 tablet 5    fluticasone (FLONASE) 50 MCG/ACT nasal spray 2 sprays by Nasal route daily 1 Bottle 0    Lancets MISC DX: E 11.9-Uses insulin.  FSBS 3 times daily-Delica lancets for one touch ultra  each 5    glucose monitoring kit (FREESTYLE) monitoring kit Dx E11.9-One touch ultra II meter-uses tid 1 kit 0    gabapentin (NEURONTIN) 800 MG tablet TAKE 1 TABLET BY MOUTH THREE TIMES A DAY, MAY TAKE 2 TABLETS BY MOUTH AT BEDTIME AS DIRECTED 120 tablet 0    Pancrelipase, Lip-Prot-Amyl, 8352-7298 units CPEP Take 1 capsule by mouth 3 times daily (with meals) for 15 days 45 capsule 0    metoclopramide (REGLAN) 10 MG tablet Take 1 tablet by mouth 3 times daily for 30 doses 30 tablet 0    insulin lispro (HUMALOG KWIKPEN) 100 UNIT/ML pen INJECT 20 UNITS SUBCUTANEOUSLY THREE TIMES DAILY BEFORE MEAL(S) (Patient not taking: Reported on 12/9/2021) 15 pen 3     No current facility-administered medications for this visit. Assessment:    No diagnosis found. Plan:    There are no diagnoses linked to this encounter. No follow-ups on file. Patient to return to clinic if symptoms worsen or fail to improve.

## 2022-02-17 NOTE — PROGRESS NOTES
Usman Lorenzo is a 42 y.o. female    Chief Complaint   Patient presents with    Foot Pain     Burning/tingling sensation in both feet     Diabetes    Hypertension    Hyperlipidemia       HPI:    Diabetes  She presents for her follow-up diabetic visit. She has type 2 diabetes mellitus. Her disease course has been improving. Pertinent negatives for diabetes include no polydipsia. Risk factors for coronary artery disease include diabetes mellitus. An ACE inhibitor/angiotensin II receptor blocker is being taken. Hypertension  This is a chronic problem. The current episode started more than 1 year ago. The problem is unchanged. The problem is controlled. Past treatments include angiotensin blockers. The current treatment provides significant improvement. There are no compliance problems. There is no history of kidney disease. Hyperlipidemia  This is a chronic problem. The current episode started more than 1 year ago. The problem is uncontrolled. Recent lipid tests were reviewed and are high. Exacerbating diseases include diabetes. Risk factors for coronary artery disease include diabetes mellitus and hypertension. Foot Pain   Her past medical history is significant for diabetes. The 10-year ASCVD risk score (Tiffany Yoo, et al., 2013) is: 1.2%    Values used to calculate the score:      Age: 42 years      Sex: Female      Is Non- : No      Diabetic: Yes      Tobacco smoker: No      Systolic Blood Pressure: 924 mmHg      Is BP treated: Yes      HDL Cholesterol: 53 mg/dL      Total Cholesterol: 171 mg/dL      ROS:    Review of Systems   Gastrointestinal: Positive for nausea. Endocrine: Negative for polydipsia.        /72 (Site: Left Upper Arm, Position: Sitting, Cuff Size: Medium Adult)   Pulse 85   Temp 97.7 °F (36.5 °C) (Oral)   Ht 5' 0.5\" (1.537 m)   Wt 108 lb (49 kg)   LMP 12/18/2008 (Approximate)   SpO2 95%   BMI 20.75 kg/m²     Physical Exam:    Physical Exam  Constitutional:       General: She is not in acute distress. Appearance: Normal appearance. She is normal weight. She is not ill-appearing or toxic-appearing. HENT:      Head: Normocephalic. Neurological:      Mental Status: She is alert. Psychiatric:         Mood and Affect: Mood normal.         Behavior: Behavior normal.         Thought Content: Thought content normal.         Current Outpatient Medications   Medication Sig Dispense Refill    metFORMIN (GLUCOPHAGE) 1000 MG tablet Take 1 tablet by mouth 2 times daily (with meals) 180 tablet 3    ondansetron (ZOFRAN ODT) 4 MG disintegrating tablet Take 1-2 tablets by mouth every 8 hours as needed for Nausea or Vomiting 12 tablet 0    TOUJEO MAX SOLOSTAR 300 UNIT/ML SOPN Inject 1 pen into the muscle daily 4 pen 2    insulin glargine (LANTUS SOLOSTAR) 100 UNIT/ML injection pen 60 units hs 15 pen 1    hyoscyamine (LEVSIN/SL) 125 MCG sublingual tablet TAKE 1 TO 2 TABLETS BY MOUTH EVERY 4 TO 6 HOURS AS NEEDED FOR ABDOMINAL CRAMPS. .. MAX 12 TABS/DAY. .. 60 tablet 1    promethazine (PHENERGAN) 25 MG tablet Take 1 tablet by mouth 3 times daily as needed for Nausea 30 tablet 5    linaclotide (LINZESS) 145 MCG capsule Take 2 capsules by mouth every morning (before breakfast) 30 capsule 3    doxepin (SINEQUAN) 25 MG capsule 2 hs 60 capsule 3    pantoprazole (PROTONIX) 40 MG tablet Take 1 tablet by mouth 2 times daily 60 tablet 0    Insulin Pen Needle (B-D UF III MINI PEN NEEDLES) 31G X 5 MM MISC Inject 1 each into the skin 4 times daily 100 each 3    losartan (COZAAR) 50 MG tablet Take 1 tablet by mouth daily 30 tablet 5    fluticasone (FLONASE) 50 MCG/ACT nasal spray 2 sprays by Nasal route daily 1 Bottle 0    Lancets MISC DX: E 11.9-Uses insulin.  FSBS 3 times daily-Delica lancets for one touch ultra  each 5    glucose monitoring kit (FREESTYLE) monitoring kit Dx E11.9-One touch ultra II meter-uses tid 1 kit 0    gabapentin (NEURONTIN) 800 MG tablet TAKE 1 TABLET BY MOUTH THREE TIMES A DAY, MAY TAKE 2 TABLETS BY MOUTH AT BEDTIME AS DIRECTED 120 tablet 0    Pancrelipase, Lip-Prot-Amyl, 4026-5725 units CPEP Take 1 capsule by mouth 3 times daily (with meals) for 15 days 45 capsule 0    metoclopramide (REGLAN) 10 MG tablet Take 1 tablet by mouth 3 times daily for 30 doses 30 tablet 0    insulin lispro (HUMALOG KWIKPEN) 100 UNIT/ML pen INJECT 20 UNITS SUBCUTANEOUSLY THREE TIMES DAILY BEFORE MEAL(S) (Patient not taking: Reported on 12/9/2021) 15 pen 3     No current facility-administered medications for this visit. Assessment:    1. Type 2 diabetes mellitus without complication, with long-term current use of insulin ([address]    2. Essential hypertension    3. Mixed hyperlipidemia    4. Burning sensation of feet    5. Vitamin D deficiency        Plan:    1. Type 2 diabetes mellitus without complication, with long-term current use of insulin (HCC)  Stable. Continue current medications. 8.6  - POCT glycosylated hemoglobin (Hb A1C)    2. Essential hypertension  Stable. Continue current medications. 3. Mixed hyperlipidemia  Stable. Continue current medications. - Vitamin B12 & Folate  - Lipid Panel  - Magnesium    4. Burning sensation in feet  Likely neuropathy. Continue gabapentin. We will check an EMG and vitamin D levels. Return in about 3 months (around 5/17/2022) for Diabetes.

## 2022-02-18 ENCOUNTER — TELEPHONE (OUTPATIENT)
Dept: FAMILY MEDICINE CLINIC | Age: 42
End: 2022-02-18

## 2022-02-18 NOTE — TELEPHONE ENCOUNTER
Hi Dr. Susan Troy, see message below. I spoke with patient and she stated she isn't taking the Lantus? You just prescribed that yesterday. So should we just call pharmacy and have them cancel it?

## 2022-02-18 NOTE — TELEPHONE ENCOUNTER
I need current Insurance card. Epic says MEDICAID PENDING. If this requires a response please respond to the pool ( [id number] [facility]). Thank you please advise patient.

## 2022-02-18 NOTE — TELEPHONE ENCOUNTER
She is taking the long acting insulin though? Lantus is the same as Basaglar which is the same as Toujeo. She should be on one of those long-acting insulins at night.

## 2022-02-21 NOTE — TELEPHONE ENCOUNTER
Please fill the pharmacy to just fill whichever long-acting insulin (Lantus, Jerald Nino) is covered by the insurance as they are all the same thing.

## 2022-03-09 DIAGNOSIS — M79.2 NEUROPATHIC PAIN: ICD-10-CM

## 2022-03-09 DIAGNOSIS — R20.8 BURNING SENSATION OF FEET: ICD-10-CM

## 2022-03-09 RX ORDER — GABAPENTIN 800 MG/1
800 TABLET ORAL 2 TIMES DAILY
Qty: 120 TABLET | Refills: 1 | Status: SHIPPED | OUTPATIENT
Start: 2022-03-09 | End: 2022-05-11 | Stop reason: SDUPTHER

## 2022-03-11 DIAGNOSIS — E11.43 GASTROPARESIS DUE TO DM (HCC): Primary | ICD-10-CM

## 2022-03-11 DIAGNOSIS — K31.84 GASTROPARESIS DUE TO DM (HCC): Primary | ICD-10-CM

## 2022-03-11 PROCEDURE — 3052F HG A1C>EQUAL 8.0%<EQUAL 9.0%: CPT | Performed by: INTERNAL MEDICINE

## 2022-03-11 RX ORDER — DRONABINOL 2.5 MG/1
2.5 CAPSULE ORAL
Qty: 60 CAPSULE | Refills: 0 | Status: SHIPPED | OUTPATIENT
Start: 2022-03-11 | End: 2022-03-29 | Stop reason: SDUPTHER

## 2022-03-11 NOTE — TELEPHONE ENCOUNTER
Directions where sent to pharmacy wrong. Not sure how they got changed. Called pharmacy and clarified directions.     TAKE 1 TABLET BY MOUTH THREE TIMES A DAY, MAY TAKE 2 TABLETS BY MOUTH AT BEDTIME AS DIRECTED

## 2022-03-14 ENCOUNTER — NURSE TRIAGE (OUTPATIENT)
Dept: OTHER | Facility: CLINIC | Age: 42
End: 2022-03-14

## 2022-03-14 ENCOUNTER — OFFICE VISIT (OUTPATIENT)
Dept: FAMILY MEDICINE CLINIC | Age: 42
End: 2022-03-14
Payer: MEDICAID

## 2022-03-14 VITALS
DIASTOLIC BLOOD PRESSURE: 64 MMHG | OXYGEN SATURATION: 98 % | SYSTOLIC BLOOD PRESSURE: 98 MMHG | WEIGHT: 108 LBS | BODY MASS INDEX: 20.75 KG/M2 | HEART RATE: 98 BPM

## 2022-03-14 DIAGNOSIS — E11.9 TYPE 2 DIABETES MELLITUS WITHOUT COMPLICATION, WITH LONG-TERM CURRENT USE OF INSULIN (HCC): ICD-10-CM

## 2022-03-14 DIAGNOSIS — K08.89 PAIN, DENTAL: ICD-10-CM

## 2022-03-14 DIAGNOSIS — Z79.4 TYPE 2 DIABETES MELLITUS WITHOUT COMPLICATION, WITH LONG-TERM CURRENT USE OF INSULIN (HCC): ICD-10-CM

## 2022-03-14 DIAGNOSIS — H02.846 SWELLING OF LEFT EYELID: Primary | ICD-10-CM

## 2022-03-14 PROCEDURE — 99214 OFFICE O/P EST MOD 30 MIN: CPT | Performed by: FAMILY MEDICINE

## 2022-03-14 PROCEDURE — 3052F HG A1C>EQUAL 8.0%<EQUAL 9.0%: CPT | Performed by: FAMILY MEDICINE

## 2022-03-14 PROCEDURE — 82044 UR ALBUMIN SEMIQUANTITATIVE: CPT | Performed by: FAMILY MEDICINE

## 2022-03-14 RX ORDER — AMOXICILLIN AND CLAVULANATE POTASSIUM 875; 125 MG/1; MG/1
1 TABLET, FILM COATED ORAL 2 TIMES DAILY
Qty: 20 TABLET | Refills: 0 | Status: SHIPPED | OUTPATIENT
Start: 2022-03-14 | End: 2022-03-24

## 2022-03-14 RX ORDER — IBUPROFEN 800 MG/1
800 TABLET ORAL 3 TIMES DAILY PRN
Qty: 270 TABLET | Refills: 1 | Status: SHIPPED | OUTPATIENT
Start: 2022-03-14

## 2022-03-14 ASSESSMENT — PATIENT HEALTH QUESTIONNAIRE - PHQ9
SUM OF ALL RESPONSES TO PHQ9 QUESTIONS 1 & 2: 2
SUM OF ALL RESPONSES TO PHQ QUESTIONS 1-9: 2
1. LITTLE INTEREST OR PLEASURE IN DOING THINGS: 1
SUM OF ALL RESPONSES TO PHQ QUESTIONS 1-9: 2
2. FEELING DOWN, DEPRESSED OR HOPELESS: 1

## 2022-03-14 ASSESSMENT — ENCOUNTER SYMPTOMS
EYE PAIN: 1
BLURRED VISION: 1
EYE DISCHARGE: 1

## 2022-03-14 NOTE — PROGRESS NOTES
Millie Reed is a 36 y.o. female    Chief Complaint   Patient presents with    Eye Pain     Swelling, pain woke up this morning eye swollen, mo redness     Dental Pain     Left side of mouth, pt has a broken tooth, and abseces, pt believe infection has travelled up face into her eye.  Diabetes       HPI:    Eye Pain   The left eye is affected. This is a new problem. The current episode started in the past 7 days. The problem occurs daily. The problem has been gradually worsening. There was no injury mechanism. Associated symptoms include blurred vision and an eye discharge. Pertinent negatives include no fever. She has tried nothing for the symptoms. Dental Pain   This is a new problem. The current episode started in the past 7 days. The problem occurs daily. The problem has been gradually worsening. Pertinent negatives include no fever. She has tried nothing for the symptoms. Diabetes  She presents for her follow-up diabetic visit. She has type 2 diabetes mellitus. Her disease course has been improving. Associated symptoms include blurred vision. Current diabetic treatment includes intensive insulin program. An ACE inhibitor/angiotensin II receptor blocker is not being taken. ROS:    Review of Systems   Constitutional: Negative for fever. Eyes: Positive for blurred vision, pain and discharge. BP 98/64   Pulse 98   Wt 108 lb (49 kg)   LMP 12/18/2008 (Approximate)   SpO2 98%   BMI 20.75 kg/m²     Physical Exam:    Physical Exam  Constitutional:       General: She is not in acute distress. Appearance: She is normal weight. She is not toxic-appearing. HENT:      Head: Normocephalic. Eyes:      Extraocular Movements: Extraocular movements intact. Conjunctiva/sclera: Conjunctivae normal.      Pupils: Pupils are equal, round, and reactive to light. Comments: Swelling above and below the left eyelid.     Poor dentition in left upper teeth   Neurological:      Mental Status: She is alert. Psychiatric:         Mood and Affect: Mood normal.         Behavior: Behavior normal.         Thought Content: Thought content normal.         Current Outpatient Medications   Medication Sig Dispense Refill    amoxicillin-clavulanate (AUGMENTIN) 875-125 MG per tablet Take 1 tablet by mouth 2 times daily for 10 days 20 tablet 0    ibuprofen (ADVIL;MOTRIN) 800 MG tablet Take 1 tablet by mouth 3 times daily as needed for Pain 270 tablet 1    dronabinol (MARINOL) 2.5 MG capsule Take 1 capsule by mouth 2 times daily (before meals) for 30 days. 60 capsule 0    gabapentin (NEURONTIN) 800 MG tablet Take 1 tablet by mouth 2 times daily for 30 days. 120 tablet 1    metFORMIN (GLUCOPHAGE) 1000 MG tablet Take 1 tablet by mouth 2 times daily (with meals) 180 tablet 3    ondansetron (ZOFRAN ODT) 4 MG disintegrating tablet Take 1-2 tablets by mouth every 8 hours as needed for Nausea or Vomiting 12 tablet 0    TOUJEO MAX SOLOSTAR 300 UNIT/ML SOPN Inject 1 pen into the muscle daily 4 pen 2    insulin glargine (LANTUS SOLOSTAR) 100 UNIT/ML injection pen 60 units hs 15 pen 1    Pancrelipase, Lip-Prot-Amyl, 6451-2282 units CPEP Take 1 capsule by mouth 3 times daily (with meals) for 15 days 45 capsule 0    metoclopramide (REGLAN) 10 MG tablet Take 1 tablet by mouth 3 times daily for 30 doses 30 tablet 0    hyoscyamine (LEVSIN/SL) 125 MCG sublingual tablet TAKE 1 TO 2 TABLETS BY MOUTH EVERY 4 TO 6 HOURS AS NEEDED FOR ABDOMINAL CRAMPS. .. MAX 12 TABS/DAY. .. 60 tablet 1    promethazine (PHENERGAN) 25 MG tablet Take 1 tablet by mouth 3 times daily as needed for Nausea 30 tablet 5    linaclotide (LINZESS) 145 MCG capsule Take 2 capsules by mouth every morning (before breakfast) 30 capsule 3    doxepin (SINEQUAN) 25 MG capsule 2 hs 60 capsule 3    pantoprazole (PROTONIX) 40 MG tablet Take 1 tablet by mouth 2 times daily 60 tablet 0    Insulin Pen Needle (B-D UF III MINI PEN NEEDLES) 31G X 5 MM MISC Inject 1 each into the skin 4 times daily 100 each 3    losartan (COZAAR) 50 MG tablet Take 1 tablet by mouth daily 30 tablet 5    fluticasone (FLONASE) 50 MCG/ACT nasal spray 2 sprays by Nasal route daily 1 Bottle 0    insulin lispro (HUMALOG KWIKPEN) 100 UNIT/ML pen INJECT 20 UNITS SUBCUTANEOUSLY THREE TIMES DAILY BEFORE MEAL(S) (Patient not taking: Reported on 12/9/2021) 15 pen 3    Lancets MISC DX: E 11.9-Uses insulin. FSBS 3 times daily-Delica lancets for one touch ultra  each 5    glucose monitoring kit (FREESTYLE) monitoring kit Dx E11.9-One touch ultra II meter-uses tid 1 kit 0     No current facility-administered medications for this visit. Assessment:    1. Swelling of left eyelid    2. Pain, dental    3. Type 2 diabetes mellitus without complication, with long-term current use of insulin ([facility] [address]        Plan:    1. Swelling of left eyelid  Symptoms just started today. I think it might be a case of preseptal cellulitis. We will go with Augmentin. She does have a history of MRSA. We would consider Bactrim in the future. I will avoid a steroid to not worsen her diabetes below. - amoxicillin-clavulanate (AUGMENTIN) 875-125 MG per tablet; Take 1 tablet by mouth 2 times daily for 10 days  Dispense: 20 tablet; Refill: 0    2. Pain, dental  Augmentin should help her dental pain as well. Encouraged her to see the dentist.  - amoxicillin-clavulanate (AUGMENTIN) 875-125 MG per tablet; Take 1 tablet by mouth 2 times daily for 10 days  Dispense: 20 tablet; Refill: 0  - ibuprofen (ADVIL;MOTRIN) 800 MG tablet; Take 1 tablet by mouth 3 times daily as needed for Pain  Dispense: 270 tablet; Refill: 1    3. Type 2 diabetes mellitus without complication, with long-term current use of insulin ([facility])  Her last A1c was 8.6. I will avoid the steroid taper for now but would consider it if symptoms worsen. - POCT microalbumin      Patient to return to clinic if symptoms worsen or fail to improve.

## 2022-03-14 NOTE — TELEPHONE ENCOUNTER
Received call from [facility] with Red Flag Complaint. Subjective: Caller states \"Woke up today with left eye pain with swelling and blurred vision in left eye. Drainage as well. Broke a molar two months ago on that side of face. \"     Current Symptoms: left eye pain swelling moderate swelling, mild redness notes, drainage yellow in color, very watery. Denies itching. Is painful to touch. Vision is blurred in that eye. Able to see out of right eye okay. Onset: a few hours ago; gradual, worsening    Associated Symptoms: reduced activity    Pain Severity: 6/10; sharp, pressure; constant    Temperature: Denies      What has been tried: warm compress applied, made drainage worse. LMP: NA Pregnant: NA     Recommended disposition: Go to [facility], to be seen in next four hours. If unable to get appointment, advised to go to [facility] to be evaluated. Care advice provided, patient verbalizes understanding; denies any other questions or concerns; instructed to call back for any new or worsening symptoms. Patient/Caller agrees with recommended disposition; writer provided warm transfer to [facility] for appointment scheduling     Attention Provider: Thank you for allowing me to participate in the care of your patient. The patient was connected to triage in response to information provided to the [facility]. Please do not respond through this encounter as the response is not directed to a shared pool.     Reason for Disposition   Blurred vision    Protocols used: EYE - PUS OR DISCHARGE-ADULT-[place]

## 2022-03-21 ENCOUNTER — OFFICE VISIT (OUTPATIENT)
Dept: FAMILY MEDICINE CLINIC | Age: 42
End: 2022-03-21
Payer: MEDICAID

## 2022-03-21 VITALS
HEART RATE: 89 BPM | WEIGHT: 111.2 LBS | OXYGEN SATURATION: 96 % | DIASTOLIC BLOOD PRESSURE: 62 MMHG | SYSTOLIC BLOOD PRESSURE: 92 MMHG | BODY MASS INDEX: 20.99 KG/M2 | HEIGHT: 61 IN

## 2022-03-21 DIAGNOSIS — Z79.4 TYPE 2 DIABETES MELLITUS WITHOUT COMPLICATION, WITH LONG-TERM CURRENT USE OF INSULIN (HCC): Primary | ICD-10-CM

## 2022-03-21 DIAGNOSIS — E11.9 TYPE 2 DIABETES MELLITUS WITHOUT COMPLICATION, WITH LONG-TERM CURRENT USE OF INSULIN (HCC): Primary | ICD-10-CM

## 2022-03-21 DIAGNOSIS — M72.2 PLANTAR FASCIITIS: ICD-10-CM

## 2022-03-21 DIAGNOSIS — I10 ESSENTIAL HYPERTENSION: ICD-10-CM

## 2022-03-21 PROCEDURE — 3052F HG A1C>EQUAL 8.0%<EQUAL 9.0%: CPT | Performed by: FAMILY MEDICINE

## 2022-03-21 PROCEDURE — 99213 OFFICE O/P EST LOW 20 MIN: CPT | Performed by: FAMILY MEDICINE

## 2022-03-21 RX ORDER — BACLOFEN 5 MG/1
TABLET ORAL
Qty: 90 TABLET | Refills: 0 | Status: SHIPPED | OUTPATIENT
Start: 2022-03-21 | End: 2022-05-11 | Stop reason: SDUPTHER

## 2022-03-21 ASSESSMENT — ENCOUNTER SYMPTOMS
EYE PAIN: 0
EYE ITCHING: 0
DIARRHEA: 0
VOMITING: 0
BLOOD IN STOOL: 0
VOICE CHANGE: 0
CHEST TIGHTNESS: 0
SHORTNESS OF BREATH: 0
WHEEZING: 0
NAUSEA: 0
SORE THROAT: 0
SINUS PRESSURE: 0
CONSTIPATION: 0
TROUBLE SWALLOWING: 0
ABDOMINAL DISTENTION: 0
ABDOMINAL PAIN: 0
COUGH: 0
RHINORRHEA: 0
EYE REDNESS: 0
BACK PAIN: 1
EYE DISCHARGE: 0
ANAL BLEEDING: 0

## 2022-03-21 NOTE — PROGRESS NOTES
Subjective:      Patient ID: Preethi Eaton is a 36 y.o. female. HPI  Patient in for check on several medical issues. Diabetes-she is on Metformin and nighttime insulin. Last A1c 8.61-month ago and we will repeat in about 3 months. No change in medication. Hypertension-blood pressure 140/80 or below when she checks at home or elsewhere. She is complaining of some pain in the heel of the right foot. She is also complaining of intermittently during the day her toes will extend and end up pointing straight up and there is some moderate discomfort there. She is now seeing a medical marijuana practitioner and is taking Marinol and his has helped quite a bit for her discomfort in her back. She denies any other issues to discuss. Review of Systems    Review of Systems   Constitutional: Negative for fatigue and unexpected weight change. HENT: Negative for congestion, ear pain, hearing loss, nosebleeds, postnasal drip, rhinorrhea, sinus pressure, sneezing, sore throat, tinnitus, trouble swallowing and voice change. Eyes: Negative for pain, discharge, redness, itching and visual disturbance. Respiratory: Negative for cough, chest tightness, shortness of breath and wheezing. Cardiovascular: Negative for chest pain, palpitations and leg swelling. Gastrointestinal: Negative for abdominal distention, abdominal pain, anal bleeding, blood in stool, constipation, diarrhea, nausea and vomiting. Genitourinary: Negative for dysuria, flank pain, frequency, hematuria, menstrual problem, pelvic pain, urgency and vaginal discharge. Musculoskeletal: Positive for back pain. Negative for arthralgias, gait problem, joint swelling, myalgias and neck pain. See HPI. Skin: Negative for pallor and rash. Neurological: Negative for dizziness, tremors, seizures, weakness, light-headedness, numbness and headaches. See HPI. Hematological: Negative for adenopathy. Does not bruise/bleed easily. (LIORESAL) 5 MG tablet; 1 tid see me in    See me in approximately 4 months. This is been approximately a 25-minute visit with the patient.      Blake Krueger, DO

## 2022-03-28 NOTE — PROGRESS NOTES
Clau yarely    [address] [facility] ,  [address] [place] [address], [place]   Phone: [phone]       CHIEF COMPLAINT  Chief Complaint   Patient presents with    GI Problem     patient has been having issues holding in stools, it just comes out. Has been on and off  for about 4-5 months. Patient has woken up to soiling herself. Prasad Coburn is a 36 y.o. female  with medical history of anxiety disorder, depression, diabetes mellitus type 2, ovarian cyst, cholecystectomy, gastroparesis who returns for follow-up. Patient reports improved nausea and vomiting on Marinol 2.5 mg twice daily. She reports however intermittent stool incontinence and diarrhea at night. Patient notes resolved on Linzess 290 mcg daily. Denies abdominal pain, fever, chills, unintentional weight loss, constipation, hematochezia or melenic stools. Labs from 2/17/2022 reviewed including BMP with elevated glucose, 284 mg/dL. CBC within normal limits. Date of last office visit and details: 8/4/2021  Milind Sawyer is a 42 y.o. female with medical history of anxiety disorder, depression, diabetes mellitus type 2, ovarian cyst, cholecystectomy who returns for follow-up of epigastric pain. Patient underwent gastric emptying scan on 7/1/2021 with findings of delayed gastric emptying consistent with gastroparesis. She reports constipation with one hard bowel movement weekly. Workup with EGD and colonoscopy on 5/6/21 was unremarkable except sigmoid diverticulosis and internal hemorrhoids. Denies  fever, chills, unintentional weight loss,  diarrhea, hematochezia or melenic stools.       Date of last office visit and details: 4/9/21  41 y. o. female  [2] White [1] with medical history of anxiety disorder, depression, diabetes mellitus type 2, ovarian cyst, cholecystectomy seen independently with referral for epigastric pain of 3 months duration.  Pain is sharp/burning, intermittent, radiates to the upper sides. Associated with bloating, nausea, dark tarry stools and constipation with bowel movement 2-3 days.  Denies vomiting, fever, chills, unintentional weight loss, diarrhea, hematochezia.  Reports previously motrin and naproxen for leg pains, discontinued 2 weeks ago.       Patient was seen and [facility] ED on 3/30/2021 and 3/17/2021 with complaints of right upper quadrant abdominal pain flank pain. Labs including BMP, LFTs, lipase, WBC were unremarkable.  CT abdomen and pelvis 3/17/2021 noted no acute abnormality.  Managed with Mylanta, lidocaine with improvement. Toradol in the ED and discharged to follow-up with GI.     Last Encounter Reviewed: None  Pertinent PMH, FH, SH is reviewed below. Last EGD 5/6/21: Normal upper and mid esophagus. No evidence of esophagitis or Guevara's esophagus. Regular Z-line at 36 cm from incisors. Mild mild gastritis. Biopsied (path -mild inactive chronic gastritis, negative for intestinal metaplasia and H. pylori). Normal duodenal bulb and second portion of duodenum.     Last Colonoscopy 5/6/21: Small sized non bleeding diverticula in the sigmoid colon. Normal appearing terminal ileum. Medium sized non bleeding internal hemorrhoids. Otherwise normal appearing colon mucosa. Repeat colonoscopy in 10 years. PAST MEDICAL HISTORY     Past Medical History:   Diagnosis Date    Abdominal pain, acute, right lower quadrant 5/15/2013    Anxiety     Arthritis     Left Knee    Bilateral ovarian cysts 8/13/2013    Blood transfusion reaction     Cellulitis and abscess of trunk 1/29/2014    Pt was seen in ED today for bleeding from incision after taking a fall this morning.       COVID-19 05/2020    Depression     Diabetes mellitus ([address]     x5yr    Diverticula of colon 8/13/2013    DM2 (diabetes mellitus, type 2) ([address] 7/12/2016    Insomnia     Insomnia secondary to situational depression 2/27/2014    Left carpal tunnel syndrome 7/12/2018    MDRO (multiple drug resistant organisms) resistance     poss MRSA after hysterectomy treated with antibiotics    OA (osteoarthritis) of knee 2014    Obesity (BMI 30.0-34.9) 2017    Ovarian cyst     Plantar fasciitis, left 2014     FAMILY HISTORY     Family History   Problem Relation Age of Onset    Cancer Mother         ovarian    Diabetes Mother     High Blood Pressure Mother     Hypertension Mother     Diabetes Brother     Hypertension Brother     Diabetes Maternal Grandmother     Cancer Maternal Grandfather         stomach    Prostate Cancer Paternal Grandfather         metastatic    Breast Cancer Maternal Aunt      SOCIAL HISTORY     Social History     Socioeconomic History    Marital status:      Spouse name: Not on file    Number of children: Not on file    Years of education: Not on file    Highest education level: Not on file   Occupational History    Not on file   Tobacco Use    Smoking status: Former Smoker     Years: 0.00     Types: Cigarettes     Quit date: 1999     Years since quittin.5    Smokeless tobacco: Never Used    Tobacco comment: smoked for 1 months when teenager   Vaping Use    Vaping Use: Never used   Substance and Sexual Activity    Alcohol use: Not Currently    Drug use: Yes     Types: Marijuana (Weed)     Comment: Hx of cocaine use in 2004- 9 months     Sexual activity: Yes     Partners: Male     Comment: painful intercourse    Other Topics Concern    Not on file   Social History Narrative    Not on file     Social Determinants of Health     Financial Resource Strain: High Risk    Difficulty of Paying Living Expenses: Hard   Food Insecurity: Food Insecurity Present    Worried About Running Out of Food in the Last Year: Sometimes true    Zaynab of Food in the Last Year: Never true   Transportation Needs:     Lack of Transportation (Medical): Not on file    Lack of Transportation (Non-Medical):  Not on file   Physical Activity:     Days of Exercise per Week: Not on file    Minutes of Exercise per Session: Not on file   Stress:     Feeling of Stress : Not on file   Social Connections:     Frequency of Communication with Friends and Family: Not on file    Frequency of Social Gatherings with Friends and Family: Not on file    Attends [employer]ology Services: Not on file    Active Member of [address] [place] [address] or Organizations: Not on file    Attends Club or Organization Meetings: Not on file    Marital Status: Not on file   Intimate Partner Violence:     Fear of Current or Ex-Partner: Not on file    Emotionally Abused: Not on file    Physically Abused: Not on file    Sexually Abused: Not on file   Housing Stability:     Unable to Pay for Housing in the Last Year: Not on file    Number of [place] in the Last Year: Not on file    Unstable Housing in the Last Year: Not on file     SURGICAL HISTORY     Past Surgical History:   Procedure Laterality Date    BREAST CYST ASPIRATION  2013   [address] COLONOSCOPY  2007    polyps    COLONOSCOPY N/A 5/6/2021    COLONOSCOPY WITH ANESTHESIA performed by Tony Sanchez MD at [address] [place] [address]  6/31/2013    lysis of adhesions    LAPAROTOMY  1/2014    LAPAROTOMY, BILATERAL SALPINGO - OOPHORECTOMY    SALPINGO-OOPHORECTOMY  1/2014    UPPER GASTROINTESTINAL ENDOSCOPY N/A 5/6/2021    EGD BIOPSY performed by Tony Sanchez MD at [address] [address]   (This list may include medications prescribed during this encounter as epic can not insert only the list prior to this encounter.)  Current Outpatient Rx   Medication Sig Dispense Refill    dronabinol (MARINOL) 2.5 MG capsule Take 1 capsule by mouth 2 times daily (before meals) for 30 days.  60 capsule 2    linaclotide (LINZESS) 145 MCG capsule Take 1 capsule by mouth every morning (before breakfast) 30 capsule 5    Baclofen (LIORESAL) 5 MG tablet 1 tid 90 nosebleeds, sore throat, mouth sores, and voice change. Respiratory: Negative for cough, choking and chest tightness. Cardiovascular: Negative for chest pain   Gastrointestinal: See HPI  Musculoskeletal: Negative for arthralgias. Skin: Negative for pallor. Neurological: Negative for weakness and light-headedness. Hematological: Negative for adenopathy. Does not bruise/bleed easily. Psychiatric/Behavioral: Negative for suicidal ideas. PHYSICAL EXAM   VITAL SIGNS: /80 (Site: Left Wrist, Position: Sitting)   Pulse 77   Temp 97.8 °F (36.6 °C) (Infrared)   Ht 5' 1\" (1.549 m)   Wt 108 lb 12.8 oz (49.4 kg)   LMP 12/18/2008 (Approximate)   BMI 20.56 kg/m²   Wt Readings from Last 3 Encounters:   03/29/22 108 lb 12.8 oz (49.4 kg)   03/21/22 111 lb 3.2 oz (50.4 kg)   03/14/22 108 lb (49 kg)     Constitutional: Well nourished, No acute distress, Non-toxic appearance. HENT: Normocephalic, Atraumatic, Bilateral external ears normal, Oropharynx moist, No oral exudates, Nose normal.   Eyes: Conjunctiva normal, No discharge. Neck: Normal range of motion, No tenderness, Supple, No stridor. Cardiovascular: Normal heart rate, Normal rhythm, No murmurs, No rubs, No gallops. Thorax & Lungs: Normal breath sounds, No respiratory distress, No wheezing, No chest tenderness. Abdomen: normal bowel sounds, soft, non tender, non distended, no hernias   Rectal:  Deferred. Skin: Warm, Dry, No erythema, No rash. Lower Extremities: Intact distal pulses, No edema, No tenderness  Neurologic: Alert & oriented x 3, Normal motor function, Normal sensory function      No results found. FINAL IMPRESSION   Sal Ortega was seen today for gi problem. Diagnoses and all orders for this visit:    Gastroparesis due to DM ([facility] [address] [address]  -     dronabinol (MARINOL) 2.5 MG capsule; Take 1 capsule by mouth 2 times daily (before meals) for 30 days.   -     Continue tight glycemic control  -      Small frequent meals    Chronic idiopathic constipation with now intermittent diarrhea  -     Decrease to linaclotide (LINZESS) 145 MCG capsule; Take 1 capsule by mouth every morning (before breakfast)        No orders of the defined types were placed in this encounter. ORDERED FUTURE/PENDING TESTS     Lab Frequency Next Occurrence   BLOOD OCCULT STOOL #1 Once 12/30/2021   EMG Once 02/17/2022   Hemoglobin A1C Once 03/22/2022       FOLLOWUP   No follow-ups on file.           Claudette Ch MD 3/28/22 9:45 AM EDT    CC:  Hanane Krueger DO

## 2022-03-29 ENCOUNTER — OFFICE VISIT (OUTPATIENT)
Dept: GASTROENTEROLOGY | Age: 42
End: 2022-03-29
Payer: MEDICAID

## 2022-03-29 VITALS
HEIGHT: 61 IN | DIASTOLIC BLOOD PRESSURE: 80 MMHG | TEMPERATURE: 97.8 F | SYSTOLIC BLOOD PRESSURE: 107 MMHG | WEIGHT: 108.8 LBS | HEART RATE: 77 BPM | BODY MASS INDEX: 20.54 KG/M2

## 2022-03-29 DIAGNOSIS — K31.84 GASTROPARESIS DUE TO DM (HCC): Primary | ICD-10-CM

## 2022-03-29 DIAGNOSIS — E11.43 GASTROPARESIS DUE TO DM (HCC): Primary | ICD-10-CM

## 2022-03-29 DIAGNOSIS — K59.04 CHRONIC IDIOPATHIC CONSTIPATION: ICD-10-CM

## 2022-03-29 PROCEDURE — 99214 OFFICE O/P EST MOD 30 MIN: CPT | Performed by: INTERNAL MEDICINE

## 2022-03-29 PROCEDURE — 3052F HG A1C>EQUAL 8.0%<EQUAL 9.0%: CPT | Performed by: INTERNAL MEDICINE

## 2022-03-29 RX ORDER — DRONABINOL 2.5 MG/1
2.5 CAPSULE ORAL
Qty: 60 CAPSULE | Refills: 2 | Status: SHIPPED | OUTPATIENT
Start: 2022-03-29 | End: 2022-04-28

## 2022-04-11 ENCOUNTER — OFFICE VISIT (OUTPATIENT)
Dept: FAMILY MEDICINE CLINIC | Age: 42
End: 2022-04-11
Payer: MEDICARE

## 2022-04-11 VITALS
HEIGHT: 60 IN | TEMPERATURE: 98.3 F | BODY MASS INDEX: 21.6 KG/M2 | OXYGEN SATURATION: 100 % | DIASTOLIC BLOOD PRESSURE: 60 MMHG | WEIGHT: 110 LBS | SYSTOLIC BLOOD PRESSURE: 98 MMHG | HEART RATE: 78 BPM

## 2022-04-11 DIAGNOSIS — R31.0 GROSS HEMATURIA: ICD-10-CM

## 2022-04-11 DIAGNOSIS — N39.0 URINARY TRACT INFECTION WITH HEMATURIA, SITE UNSPECIFIED: ICD-10-CM

## 2022-04-11 DIAGNOSIS — R31.9 URINARY TRACT INFECTION WITH HEMATURIA, SITE UNSPECIFIED: ICD-10-CM

## 2022-04-11 DIAGNOSIS — R30.0 DYSURIA: Primary | ICD-10-CM

## 2022-04-11 LAB
BILIRUBIN, POC: NEGATIVE
BLOOD URINE, POC: ABNORMAL
CLARITY, POC: ABNORMAL
COLOR, POC: ABNORMAL
GLUCOSE URINE, POC: ABNORMAL
KETONES, POC: NEGATIVE
LEUKOCYTE EST, POC: POSITIVE
NITRITE, POC: NEGATIVE
PH, POC: 6
PROTEIN, POC: ABNORMAL
SPECIFIC GRAVITY, POC: >=1.03
UROBILINOGEN, POC: ABNORMAL

## 2022-04-11 PROCEDURE — 99214 OFFICE O/P EST MOD 30 MIN: CPT | Performed by: PHYSICIAN ASSISTANT

## 2022-04-11 RX ORDER — CEPHALEXIN 500 MG/1
500 CAPSULE ORAL 4 TIMES DAILY
Qty: 28 CAPSULE | Refills: 0 | Status: SHIPPED | OUTPATIENT
Start: 2022-04-11 | End: 2022-04-18

## 2022-04-11 ASSESSMENT — ENCOUNTER SYMPTOMS
VOMITING: 0
CONSTIPATION: 0
NAUSEA: 1
DIARRHEA: 0
COUGH: 0
SHORTNESS OF BREATH: 0
RHINORRHEA: 0
SORE THROAT: 0
ABDOMINAL PAIN: 0

## 2022-04-11 NOTE — PROGRESS NOTES
2022  Héctor Bosch (: 1980)  36 y.o.    ASSESSMENT and PLAN:  Aquiles Shukla was seen today for dysuria. Diagnoses and all orders for this visit:    Dysuria  -     POCT Urinalysis no Micro      No follow-ups on file. HPI    Review of Systems    Allergies, past medical history, family history, and social history reviewed and unchanged from previous encounter. Current Outpatient Medications   Medication Sig Dispense Refill    dronabinol (MARINOL) 2.5 MG capsule Take 1 capsule by mouth 2 times daily (before meals) for 30 days. 60 capsule 2    linaclotide (LINZESS) 145 MCG capsule Take 1 capsule by mouth every morning (before breakfast) 30 capsule 5    Baclofen (LIORESAL) 5 MG tablet 1 tid 90 tablet 0    ibuprofen (ADVIL;MOTRIN) 800 MG tablet Take 1 tablet by mouth 3 times daily as needed for Pain 270 tablet 1    metFORMIN (GLUCOPHAGE) 1000 MG tablet Take 1 tablet by mouth 2 times daily (with meals) 180 tablet 3    TOUJEO MAX SOLOSTAR 300 UNIT/ML SOPN Inject 1 pen into the muscle daily 4 pen 2    doxepin (SINEQUAN) 25 MG capsule 2 hs 60 capsule 3    pantoprazole (PROTONIX) 40 MG tablet Take 1 tablet by mouth 2 times daily 60 tablet 0    Insulin Pen Needle (B-D UF III MINI PEN NEEDLES) 31G X 5 MM MISC Inject 1 each into the skin 4 times daily 100 each 3    fluticasone (FLONASE) 50 MCG/ACT nasal spray 2 sprays by Nasal route daily 1 Bottle 0    Lancets MISC DX: E 11.9-Uses insulin. FSBS 3 times daily-Delica lancets for one touch ultra  each 5    glucose monitoring kit (FREESTYLE) monitoring kit Dx E11.9-One touch ultra II meter-uses tid 1 kit 0    gabapentin (NEURONTIN) 800 MG tablet Take 1 tablet by mouth 2 times daily for 30 days. 120 tablet 1    losartan (COZAAR) 50 MG tablet Take 1 tablet by mouth daily (Patient not taking: Reported on 2022) 30 tablet 5     No current facility-administered medications for this visit.        Vitals:    22 1510   BP: 98/60   Site: Right Upper Arm   Position: Sitting   Cuff Size: Medium Adult   Pulse: 78   Temp: 98.3 °F (36.8 °C)   TempSrc: Oral   SpO2: 100%   Weight: 110 lb (49.9 kg)   Height: 4' 11.75\" (1.518 m)     Estimated body mass index is 21.66 kg/m² as calculated from the following:    Height as of this encounter: 4' 11.75\" (1.518 m). Weight as of this encounter: 110 lb (49.9 kg).     Physical Exam

## 2022-04-12 ENCOUNTER — HOSPITAL ENCOUNTER (OUTPATIENT)
Dept: CT IMAGING | Age: 42
Discharge: HOME | End: 2022-04-12
Payer: MEDICARE

## 2022-04-12 ENCOUNTER — TELEPHONE (OUTPATIENT)
Dept: FAMILY MEDICINE CLINIC | Age: 42
End: 2022-04-12

## 2022-04-12 DIAGNOSIS — R31.0 GROSS HEMATURIA: ICD-10-CM

## 2022-04-12 DIAGNOSIS — N30.91 CYSTITIS WITH HEMATURIA: Primary | ICD-10-CM

## 2022-04-12 LAB
BACTERIA: ABNORMAL /HPF
EPITHELIAL CELLS, UA: 6 /HPF (ref 0–5)
HYALINE CASTS: 1 /LPF (ref 0–8)
RBC UA: >100 /HPF (ref 0–4)
URINE TYPE: ABNORMAL
WBC UA: 659 /HPF (ref 0–5)

## 2022-04-12 PROCEDURE — 74176 CT ABD & PELVIS W/O CONTRAST: CPT

## 2022-04-12 NOTE — TELEPHONE ENCOUNTER
Radiology calling with results for CT scan. Mild perivesicular inflammation can be seen in setting of cystitis;   correlate with urinalysis.

## 2022-04-13 DIAGNOSIS — N30.91 CYSTITIS WITH HEMATURIA: Primary | ICD-10-CM

## 2022-04-13 LAB
ORGANISM: ABNORMAL
URINE CULTURE, ROUTINE: ABNORMAL
URINE CULTURE, ROUTINE: ABNORMAL

## 2022-04-13 RX ORDER — PHENAZOPYRIDINE HYDROCHLORIDE 100 MG/1
100 TABLET, FILM COATED ORAL 3 TIMES DAILY PRN
Qty: 12 TABLET | Refills: 0 | Status: SHIPPED | OUTPATIENT
Start: 2022-04-13 | End: 2022-04-22

## 2022-04-13 NOTE — TELEPHONE ENCOUNTER
Please call her--I need some details on why she did not go to work--when any problem for any patient comes up like this I need details.

## 2022-04-13 NOTE — TELEPHONE ENCOUNTER
Spoke with patient and relayed results, she is asking if there's something she can take OTC to help with the pain when she urinates?

## 2022-04-13 NOTE — TELEPHONE ENCOUNTER
Spoke with patient and let her know about Rx. She is requesting a note excusing her for work today, she didn't go in. Dr. Deven Saldivar can you do this for her? Thanks.

## 2022-04-13 NOTE — TELEPHONE ENCOUNTER
I sent pyridium to the pharmacy for her to take. It will help with the burning. She should take this with her antibiotic. Please let her know that it will turn her urine orange.

## 2022-04-13 NOTE — TELEPHONE ENCOUNTER
Please let patient know that CT scan showed no stones but findings were constistent with a bladder infection. The antibiotic will help with this.

## 2022-04-22 ENCOUNTER — OFFICE VISIT (OUTPATIENT)
Dept: FAMILY MEDICINE CLINIC | Age: 42
End: 2022-04-22
Payer: MEDICARE

## 2022-04-22 ENCOUNTER — HOSPITAL ENCOUNTER (OUTPATIENT)
Age: 42
Discharge: HOME | End: 2022-04-22
Payer: MEDICARE

## 2022-04-22 ENCOUNTER — HOSPITAL ENCOUNTER (OUTPATIENT)
Dept: GENERAL RADIOLOGY | Age: 42
Discharge: HOME | End: 2022-04-22
Payer: MEDICARE

## 2022-04-22 VITALS
HEART RATE: 80 BPM | WEIGHT: 110.6 LBS | TEMPERATURE: 98.7 F | DIASTOLIC BLOOD PRESSURE: 40 MMHG | OXYGEN SATURATION: 97 % | BODY MASS INDEX: 21.78 KG/M2 | RESPIRATION RATE: 18 BRPM | SYSTOLIC BLOOD PRESSURE: 60 MMHG

## 2022-04-22 DIAGNOSIS — R50.81 FEVER IN OTHER DISEASES: ICD-10-CM

## 2022-04-22 DIAGNOSIS — R05.9 COUGH: ICD-10-CM

## 2022-04-22 DIAGNOSIS — R09.89 RHONCHI: ICD-10-CM

## 2022-04-22 DIAGNOSIS — R06.02 SOB (SHORTNESS OF BREATH): ICD-10-CM

## 2022-04-22 DIAGNOSIS — J10.1 INFLUENZA A: ICD-10-CM

## 2022-04-22 DIAGNOSIS — R50.81 FEVER IN OTHER DISEASES: Primary | ICD-10-CM

## 2022-04-22 LAB
INFLUENZA A ANTIBODY: POSITIVE
INFLUENZA B ANTIBODY: NEGATIVE

## 2022-04-22 PROCEDURE — 99214 OFFICE O/P EST MOD 30 MIN: CPT | Performed by: NURSE PRACTITIONER

## 2022-04-22 PROCEDURE — G8427 DOCREV CUR MEDS BY ELIG CLIN: HCPCS | Performed by: NURSE PRACTITIONER

## 2022-04-22 PROCEDURE — 87804 INFLUENZA ASSAY W/OPTIC: CPT | Performed by: NURSE PRACTITIONER

## 2022-04-22 PROCEDURE — G8420 CALC BMI NORM PARAMETERS: HCPCS | Performed by: NURSE PRACTITIONER

## 2022-04-22 PROCEDURE — 71046 X-RAY EXAM CHEST 2 VIEWS: CPT

## 2022-04-22 PROCEDURE — 1036F TOBACCO NON-USER: CPT | Performed by: NURSE PRACTITIONER

## 2022-04-22 RX ORDER — AZITHROMYCIN 250 MG/1
TABLET, FILM COATED ORAL
Qty: 6 TABLET | Refills: 0 | Status: SHIPPED | OUTPATIENT
Start: 2022-04-22 | End: 2022-05-02

## 2022-04-22 RX ORDER — OSELTAMIVIR PHOSPHATE 75 MG/1
75 CAPSULE ORAL 2 TIMES DAILY
Qty: 10 CAPSULE | Refills: 0 | Status: SHIPPED | OUTPATIENT
Start: 2022-04-22 | End: 2022-04-27

## 2022-04-22 NOTE — PROGRESS NOTES
stuart Babin (:  1980) is a 36 y.o. female,Established patient, here for evaluation of the following chief complaint(s):  Cough (kids tested positive for Flu), Migraine, Pharyngitis, and Generalized Body Aches         ASSESSMENT/PLAN:  1. Fever in other diseases  -     XR CHEST (2 VW); Future  -     POCT Influenza A/B  -     COVID-19  -     azithromycin (ZITHROMAX) 250 MG tablet; 2 tablets today followed by 1 tablet daily for 4 days, Disp-6 tablet, R-0Normal  2. SOB (shortness of breath)  -     XR CHEST (2 VW); Future  -     POCT Influenza A/B  -     COVID-19  -     azithromycin (ZITHROMAX) 250 MG tablet; 2 tablets today followed by 1 tablet daily for 4 days, Disp-6 tablet, R-0Normal  3. Cough  -     XR CHEST (2 VW); Future  -     POCT Influenza A/B  -     COVID-19  -     azithromycin (ZITHROMAX) 250 MG tablet; 2 tablets today followed by 1 tablet daily for 4 days, Disp-6 tablet, R-0Normal  4. Rhonchi  -     XR CHEST (2 VW); Future  -     POCT Influenza A/B  -     COVID-19  -     azithromycin (ZITHROMAX) 250 MG tablet; 2 tablets today followed by 1 tablet daily for 4 days, Disp-6 tablet, R-0Normal  5. Influenza A  -     oseltamivir (TAMIFLU) 75 MG capsule; Take 1 capsule by mouth 2 times daily for 5 days, Disp-10 capsule, R-0Normal    Mucinex DM to thin secretion    Tylenol 2 times daily    Increase fluids        No follow-ups on file. Subjective   SUBJECTIVE/OBJECTIVE:  HPI      Daughter and young child dx with influenza A yesterday. Yesterday felt tired, lay down. Had fever 102. 3. Today 101 and took ibuprofen 3 hours prior to appt. Had a cough that is productive of green. Review of Systems   All other systems reviewed and are negative. Objective   Physical Exam  Constitutional:       General: She is not in acute distress. Appearance: Normal appearance. She is ill-appearing. Cardiovascular:      Rate and Rhythm: Normal rate and regular rhythm.    Pulmonary: Breath sounds: Wheezing and rhonchi present. Comments: Scattered rhonchi throughout. Easy to cough, harsh bronchial. Resp E&E at rest.   Skin:     General: Skin is warm and dry. Neurological:      Mental Status: She is alert and oriented to person, place, and time.                   An electronic signature was used to authenticate this note.    --IZBAEL BLACK, IRASEMA - CNP

## 2022-04-22 NOTE — LETTER
[address] [facility] [address]  [facility] [address]  Phone: [phone]  Fax: [phone]    IRASEMA Oglesby CNP        April 22, 2022     Patient: Prakash Wallace   YOB: 1980   Date of Visit: 4/22/2022       To Whom It May Concern: It is my medical opinion that Prakash Wallace was seen today for evalutaion and treatment. She is positive for influenza A. She will return to work 4/26/2022. If you have any questions or concerns, please don't hesitate to call.     Sincerely,        IRASEMA BERRY - CNP

## 2022-04-24 LAB — SARS-COV-2: NOT DETECTED

## 2022-04-25 ENCOUNTER — TELEPHONE (OUTPATIENT)
Dept: FAMILY MEDICINE CLINIC | Age: 42
End: 2022-04-25

## 2022-04-25 NOTE — TELEPHONE ENCOUNTER
While one the phone with the patient giving her lab results, patient states that her fever is gone, but still has a cough and lots of congestion. When she coughs a lot, she feels lightheaded and like she is going to pass out. Patient states that she is able to eat and drink plenty of fluids.

## 2022-05-11 DIAGNOSIS — M79.2 NEUROPATHIC PAIN: ICD-10-CM

## 2022-05-11 DIAGNOSIS — R20.8 BURNING SENSATION OF FEET: ICD-10-CM

## 2022-05-11 DIAGNOSIS — K08.89 PAIN, DENTAL: ICD-10-CM

## 2022-05-11 RX ORDER — DOXEPIN HYDROCHLORIDE 25 MG/1
CAPSULE ORAL
Qty: 60 CAPSULE | Refills: 3 | Status: SHIPPED | OUTPATIENT
Start: 2022-05-11 | End: 2022-05-13 | Stop reason: SDUPTHER

## 2022-05-11 RX ORDER — GABAPENTIN 800 MG/1
800 TABLET ORAL 2 TIMES DAILY
Qty: 120 TABLET | Refills: 1 | Status: SHIPPED | OUTPATIENT
Start: 2022-05-11 | End: 2022-05-13 | Stop reason: SDUPTHER

## 2022-05-11 RX ORDER — IBUPROFEN 800 MG/1
800 TABLET ORAL 3 TIMES DAILY PRN
Qty: 270 TABLET | Refills: 1 | OUTPATIENT
Start: 2022-05-11

## 2022-05-11 RX ORDER — INSULIN GLARGINE 300 U/ML
1 INJECTION, SOLUTION SUBCUTANEOUS DAILY
Qty: 4 PEN | Refills: 2 | OUTPATIENT
Start: 2022-05-11

## 2022-05-11 RX ORDER — BACLOFEN 5 MG/1
TABLET ORAL
Qty: 90 TABLET | Refills: 0 | Status: SHIPPED | OUTPATIENT
Start: 2022-05-11 | End: 2022-05-13 | Stop reason: SDUPTHER

## 2022-05-11 NOTE — TELEPHONE ENCOUNTER
Refill Request     CONFIRM preferrred pharmacy with the patient. If Mail Order Rx - Pend for 90 day refill. Last Seen: Last Seen Department: 4/22/2022  Last Seen by PCP: 3/14/2022    Last Written: 3/19/2022 and 2/17/2022    Next Appointment:   No future appointments. Message to [employer] to schedule appointment.          Requested Prescriptions     Pending Prescriptions Disp Refills    metFORMIN (GLUCOPHAGE) 1000 MG tablet 180 tablet 3     Sig: Take 1 tablet by mouth 2 times daily (with meals)    TOUJEO MAX SOLOSTAR 300 UNIT/ML SOPN 4 pen 2     Sig: Inject 1 pen into the muscle daily    ibuprofen (ADVIL;MOTRIN) 800 MG tablet 270 tablet 1     Sig: Take 1 tablet by mouth 3 times daily as needed for Pain

## 2022-05-11 NOTE — TELEPHONE ENCOUNTER
Refill Request - Controlled Substance    CONFIRM preferrred pharmacy with the patient. Last Seen Department: 2022  Last Seen by PCP: 3/21/2022    Last Written: 2021 Doxepin  3/9/2022 Gabapentin  3/21/2022 Baclofen    Last UDS: N/A    Med Agreement Signed On: N/A    Next Appointment: N/A        Requested Prescriptions     Pending Prescriptions Disp Refills    doxepin (SINEQUAN) 25 MG capsule 60 capsule 3     Si hs    gabapentin (NEURONTIN) 800 MG tablet 120 tablet 1     Sig: Take 1 tablet by mouth 2 times daily for 30 days.     Baclofen (LIORESAL) 5 MG tablet 90 tablet 0     Si tid

## 2022-05-13 DIAGNOSIS — M79.2 NEUROPATHIC PAIN: ICD-10-CM

## 2022-05-13 DIAGNOSIS — R20.8 BURNING SENSATION OF FEET: ICD-10-CM

## 2022-05-13 RX ORDER — GABAPENTIN 800 MG/1
800 TABLET ORAL 2 TIMES DAILY
Qty: 120 TABLET | Refills: 1 | Status: SHIPPED | OUTPATIENT
Start: 2022-05-13 | End: 2022-07-14 | Stop reason: SDUPTHER

## 2022-05-13 RX ORDER — BACLOFEN 5 MG/1
TABLET ORAL
Qty: 90 TABLET | Refills: 2 | Status: SHIPPED | OUTPATIENT
Start: 2022-05-13

## 2022-05-13 RX ORDER — DOXEPIN HYDROCHLORIDE 25 MG/1
CAPSULE ORAL
Qty: 60 CAPSULE | Refills: 3 | Status: SHIPPED | OUTPATIENT
Start: 2022-05-13 | End: 2022-09-26

## 2022-05-13 NOTE — TELEPHONE ENCOUNTER
pts script look as if they was sent to a printer and not to the pharmacy [facility]

## 2022-07-13 ENCOUNTER — TELEMEDICINE (OUTPATIENT)
Dept: PRIMARY CARE CLINIC | Age: 42
End: 2022-07-13
Payer: MEDICARE

## 2022-07-13 DIAGNOSIS — H10.9 BACTERIAL CONJUNCTIVITIS OF RIGHT EYE: Primary | ICD-10-CM

## 2022-07-13 PROCEDURE — 99213 OFFICE O/P EST LOW 20 MIN: CPT | Performed by: NURSE PRACTITIONER

## 2022-07-13 PROCEDURE — G8427 DOCREV CUR MEDS BY ELIG CLIN: HCPCS | Performed by: NURSE PRACTITIONER

## 2022-07-13 RX ORDER — POLYMYXIN B SULFATE AND TRIMETHOPRIM 1; 10000 MG/ML; [USP'U]/ML
1 SOLUTION OPHTHALMIC EVERY 4 HOURS
Qty: 20 ML | Refills: 0 | Status: SHIPPED | OUTPATIENT
Start: 2022-07-13 | End: 2022-07-20

## 2022-07-13 ASSESSMENT — ENCOUNTER SYMPTOMS
EYE DISCHARGE: 1
GASTROINTESTINAL NEGATIVE: 1
EYE ITCHING: 1
RESPIRATORY NEGATIVE: 1
PHOTOPHOBIA: 0
EYE REDNESS: 1
EYE PAIN: 0

## 2022-07-13 NOTE — PATIENT INSTRUCTIONS
Patient Education        Pinkeye: Care Instructions  Overview     Pinkeye is redness and swelling of the eye surface and the conjunctiva (the lining of the eyelid and the covering of the white part of the eye). Pinkeye is also called conjunctivitis. Pinkeye is often caused by infection with bacteriaor a virus. Dry air, allergies, smoke, and chemicals are other common causes. Pinkeye often gets better on its own in 7 to 10 days. Antibiotics only help if the pinkeye is caused by bacteria. Pinkeye caused by infection spreads easily. If an allergy or chemical is causing pinkeye, it will not go away unless youcan avoid whatever is causing it. Follow-up care is a key part of your treatment and safety. Be sure to make and go to all appointments, and call your doctor if you are having problems. It's also a good idea to know your test results and keep alist of the medicines you take. How can you care for yourself at home?  Wash your hands often. Always wash them before and after you treat pinkeye or touch your eyes or face.  Use moist cotton or a clean, wet cloth to remove crust. Wipe from the inside corner of the eye to the outside. Use a clean part of the cloth for each wipe.  Put cold or warm wet cloths on your eye a few times a day if the eye hurts.  Do not wear contact lenses or eye makeup until the pinkeye is gone. Throw away any eye makeup you were using when you got pinkeye. Clean your contacts and storage case. If you wear disposable contacts, use a new pair when your eye has cleared and it is safe to wear contacts again.  If the doctor gave you antibiotic ointment or eyedrops, use them as directed. Use the medicine for as long as instructed, even if your eye starts looking better soon. Keep the bottle tip clean, and do not let it touch the eye area.  To put in eyedrops or ointment:  ? Tilt your head back, and pull your lower eyelid down with one finger. ?  Drop or squirt the medicine inside the lower lid. ? Close your eye for 30 to 60 seconds to let the drops or ointment move around. ? Do not touch the ointment or dropper tip to your eyelashes or any other surface.  Do not share towels, pillows, or washcloths while you have pinkeye. When should you call for help? Call your doctor now or seek immediate medical care if:     You have pain in your eye, not just irritation on the surface.      You have a change in vision or loss of vision.      You have an increase in discharge from the eye.      Your eye has not started to improve or begins to get worse within 48 hours after you start using antibiotics.      Pinkeye lasts longer than 7 days. Watch closely for changes in your health, and be sure to contact your doctor ifyou have any problems. Where can you learn more? Go to https://[employer]. org and sign in to your [employer] account. Enter Y392 in the Jumpstarter box to learn more about \"Pinkeye: Care Instructions. \"     If you do not have an account, please click on the \"Sign Up Now\" link. Current as of: March 9, 2022               Content Version: 13.3  © 5934-5001 [employer]. Care instructions adapted under license by [facility]. If you have questions about a medical condition or this instruction, always ask your healthcare professional. [address] any warranty or liability for your use of this information.

## 2022-07-13 NOTE — PROGRESS NOTES
2022    TELEHEALTH EVALUATION -- Audio/Visual (During CSLTF-01 public health emergency)  Chief Complaint   Patient presents with    Conjunctivitis     right eye     HPI:    Jan John (:  1980) has requested an audio/video evaluation for the following concern(s):    Started yesterday awoke right eye with yellowish crust discharge, went to work last night and they sent her home telling her to [facility] doctor she is contagious\" . She went home woke up this morning and conjunctivae in right eye red and with yellowish crusty discharge again. Denies vision issues. No fever/chills. No signs of URI. Will need work note contagious return to work 2022. Review of Systems   Constitutional: Negative for chills, fatigue and fever. HENT: Negative. Eyes: Positive for discharge (right eye yellow discharge), redness and itching. Negative for photophobia, pain and visual disturbance. Respiratory: Negative. Cardiovascular: Negative. Gastrointestinal: Negative. Genitourinary: Negative. Neurological: Negative. Psychiatric/Behavioral: Negative. Prior to Visit Medications    Medication Sig Taking? Authorizing Provider   trimethoprim-polymyxin b (POLYTRIM) 92637-3.1 UNIT/ML-% ophthalmic solution Place 1 drop into the right eye every 4 hours for 7 days Yes IRASEMA Sheppard - CNP   Baclofen (LIORESAL) 5 MG tablet 1 tid  Blake Krueger DO   doxepin (SINEQUAN) 25 MG capsule 2 hs  Blake Krueger DO   gabapentin (NEURONTIN) 800 MG tablet Take 1 tablet by mouth 2 times daily for 30 days.   Blake Krueger DO   ibuprofen (ADVIL;MOTRIN) 800 MG tablet Take 1 tablet by mouth 3 times daily as needed for Pain  Kayla Morse DO   metFORMIN (GLUCOPHAGE) 1000 MG tablet Take 1 tablet by mouth 2 times daily (with meals)  Kayla Morse DO   TOUJEO MAX SOLOSTAR 300 UNIT/ML SOPN Inject 1 pen into the muscle daily  Sojennifer Morse DO   pantoprazole (PROTONIX) 40 MG tablet Take 1 tablet by mouth 2 times daily  Blake Krueger DO   Insulin Pen Needle (B-D UF III MINI PEN NEEDLES) 31G X 5 MM MISC Inject 1 each into the skin 4 times daily  Blake Krueger DO   fluticasone (FLONASE) 50 MCG/ACT nasal spray 2 sprays by Nasal route daily  Patient not taking: Reported on 2022  EVETTE Conde   Lancets MISC DX: E 11.9-Uses insulin. FSBS 3 times daily-Delica lancets for one touch ultra II  Blake Krueger DO   glucose monitoring kit (FREESTYLE) monitoring kit Dx E11.9-One touch ultra II meter-uses tid  Berta Notice, DO       Social History     Tobacco Use    Smoking status: Former Smoker     Years: 0.00     Types: Cigarettes     Quit date: 1999     Years since quittin.8    Smokeless tobacco: Never Used    Tobacco comment: smoked for 1 months when teenager   Vaping Use    Vaping Use: Never used   Substance Use Topics    Alcohol use: Not Currently    Drug use: Yes     Types: Marijuana [place])     Comment: Hx of cocaine use in 2004- 9 months         Allergies   Allergen Reactions    Lisinopril Swelling    Lisinopril-Hydrochlorothiazide Swelling    Morphine Nausea And Vomiting     States indigestion, but can take with Pepcid prior. ,   Past Medical History:   Diagnosis Date    Abdominal pain, acute, right lower quadrant 5/15/2013    Anxiety     Arthritis     Left Knee    Bilateral ovarian cysts 2013    Blood transfusion reaction     Cellulitis and abscess of trunk 2014    Pt was seen in ED today for bleeding from incision after taking a fall this morning.       COVID-19 2020    Depression     Diabetes mellitus ([address]     x5yr    Diverticula of colon 2013    DM2 (diabetes mellitus, type 2) ([address] 2016    Insomnia     Insomnia secondary to situational depression 2014    Left carpal tunnel syndrome 2018    MDRO (multiple drug resistant organisms) resistance     poss MRSA after hysterectomy treated with antibiotics    OA (osteoarthritis) of knee 2014  Obesity (BMI 30.0-34.9) 2017    Ovarian cyst     Plantar fasciitis, left 2014   ,   Past Surgical History:   Procedure Laterality Date    BREAST CYST ASPIRATION       SECTION      CHOLECYSTECTOMY      COLONOSCOPY  2007    polyps    COLONOSCOPY N/A 2021    COLONOSCOPY WITH ANESTHESIA performed by Luke Mars MD at [address] ([address]      LAPAROSCOPY      lysis of adhesions    LAPAROTOMY  2014    LAPAROTOMY, BILATERAL SALPINGO - OOPHORECTOMY    SALPINGO-OOPHORECTOMY  2014    UPPER GASTROINTESTINAL ENDOSCOPY N/A 2021    EGD BIOPSY performed by Luke Mars MD at [address]   ,   Social History     Tobacco Use    Smoking status: Former Smoker     Years: 0.00     Types: Cigarettes     Quit date: 1999     Years since quittin.8    Smokeless tobacco: Never Used    Tobacco comment: smoked for 1 months when teenager   Vaping Use    Vaping Use: Never used   Substance Use Topics    Alcohol use: Not Currently    Drug use: Yes     Types: Marijuana (Weed)     Comment: Hx of cocaine use in 2004- 9 months    ,   Family History   Problem Relation Age of Onset    Cancer Mother         ovarian    Diabetes Mother     High Blood Pressure Mother     Hypertension Mother     Diabetes Brother     Hypertension Brother     Diabetes Maternal Grandmother     Cancer Maternal Grandfather         stomach    Prostate Cancer Paternal Grandfather         metastatic    Breast Cancer Maternal Aunt    ,   Immunization History   Administered Date(s) Administered    Influenza Virus Vaccine 2014, 2014, 2016    Influenza, Intradermal, Preservative free 2012    Influenza, Quadv, IM, PF (6 mo and older Fluzone, Flulaval, Fluarix, and 3 yrs and older Afluria) 2017, 10/07/2020    Pneumococcal Polysaccharide ([id number]) 2014, 2014    Tdap (Boostrix, Adacel) 2014, 02/21/2015, 06/07/2016       PHYSICAL EXAMINATION:  [ INSTRUCTIONS:  \"[x]\" Indicates a positive item  \"[]\" Indicates a negative item  -- DELETE ALL ITEMS NOT EXAMINED]  Vital Signs: (As obtained by patient/caregiver or practitioner observation)    Blood pressure-  Heart rate-    Respiratory rate-    Temperature-  Pulse oximetry-     Constitutional: [x] Appears well-developed and well-nourished [x] No apparent distress      [] Abnormal-   Mental status  [x] Alert and awake  [x] Oriented to person/place/time [x]Able to follow commands      Eyes:  EOM    [x]  Normal  [] Abnormal-  Sclera  [x]  Normal  [] Abnormal -         Discharge []  None visible  [x] Abnormal - conjunctiva red right eye, yellow discharge noted. HENT:   [x] Normocephalic, atraumatic. [] Abnormal   [] Mouth/Throat: Mucous membranes are moist.     External Ears [x] Normal  [] Abnormal-     Neck: [x] No visualized mass     Pulmonary/Chest: [x] Respiratory effort normal.  [x] No visualized signs of difficulty breathing or respiratory distress        [] Abnormal-      Musculoskeletal:   [] Normal gait with no signs of ataxia         [x] Normal range of motion of neck        [] Abnormal-       Neurological:        [x] No Facial Asymmetry (Cranial nerve 7 motor function) (limited exam to video visit)          [x] No gaze palsy        [] Abnormal-         Skin:        [x] No significant exanthematous lesions or discoloration noted on facial skin         [] Abnormal-            Psychiatric:       [x] Normal Affect [] No Hallucinations        [] Abnormal-     Other pertinent observable physical exam findings-     ASSESSMENT/PLAN:  1. Bacterial conjunctivitis of right eye-New  Notify office if you have no improvement or worsening of condition. Take medication as prescribed. Practice good handwashing.   Do not share towels, pillows, wash clothes  No makeup for several days  Do not wear contacts  Please refer to educational handout provided with AVS    - trimethoprim-polymyxin b (POLYTRIM) 33486-3.1 UNIT/ML-% ophthalmic solution; Place 1 drop into the right eye every 4 hours for 7 days  Dispense: 20 mL; Refill: 0      Return if symptoms worsen or fail to improve. Clark Munguia, was evaluated through a synchronous (real-time) audio-video encounter. The patient (or guardian if applicable) is aware that this is a billable service, which includes applicable co-pays. This Virtual Visit was conducted with patient's (and/or legal guardian's) consent. The visit was conducted pursuant to the emergency declaration under the [address] [place] [address], [address] [place] [address] authority and the [employer] General Act. Patient identification was verified, and a caregiver was present when appropriate. The patient was located at Home: [address] [place] [address]. Provider was located at Other: Home: [place]. Total time spent on this encounter: 20 minutes    --IRASEMA Morgan CNP on 7/13/2022 at 1:23 PM    An electronic signature was used to authenticate this note.

## 2022-07-13 NOTE — LETTER
I had the pleasure of seeing Tenisha Sat today for a primary care virtualist video visit secondary to Conjunctivitis right eye. I have provided the following recommendations: Please see note. I have included my note for your review and have asked the patient to follow up with you if no improvement or worsening of symptoms. If you have questions, please reach out via [employer] secure messaging by searching for the [facility] Primary Care Virtualists. Your communication will be answered promptly by the Virtualist on service for the day. Additionally, we would love your overall feedback on this visit. Please hit shift and click the following link to let us know if the Virtualist service met your expectations. [employer] [id number]      Electronically signed by IRASEMA Falcon CNP on 7/13/22 at 9:52 PM EDT.

## 2022-07-13 NOTE — LETTER
[address] [address]  Phone: [phone]  Fax: [address], APRN - CNP        July 13, 2022     Patient: Isidra Adams   YOB: 1980   Date of Visit: 7/13/2022       To Whom It May Concern: It is my medical opinion that Isidra Adams is contagious and may return to work on 7/18/2022. If you have any questions or concerns, please don't hesitate to call.     Sincerely,          IRASEMA Guillermo CNP

## 2022-07-14 DIAGNOSIS — M79.2 NEUROPATHIC PAIN: ICD-10-CM

## 2022-07-14 DIAGNOSIS — R20.8 BURNING SENSATION OF FEET: ICD-10-CM

## 2022-07-14 RX ORDER — GABAPENTIN 800 MG/1
800 TABLET ORAL 2 TIMES DAILY
Qty: 120 TABLET | Refills: 1 | Status: SHIPPED | OUTPATIENT
Start: 2022-07-14 | End: 2022-07-29 | Stop reason: SDUPTHER

## 2022-07-14 NOTE — TELEPHONE ENCOUNTER
Refill Request     CONFIRM preferrred pharmacy with the patient. If Mail Order Rx - Pend for 90 day refill. Last Seen: Last Seen Department: 4/22/2022  Last Seen by PCP: 3/21/2022    Last Written: 5/13/2022 120 with 1     Next Appointment:   Future Appointments   Date Time Provider Tomy Thayer   9/26/2022  3:00 PM DO TAYLOR Estevez  [place] [facility]       Future appointment scheduled      Requested Prescriptions     Pending Prescriptions Disp Refills    gabapentin (NEURONTIN) 800 MG tablet 120 tablet 1     Sig: Take 1 tablet by mouth 2 times daily for 30 days.

## 2022-07-15 ENCOUNTER — TELEPHONE (OUTPATIENT)
Dept: FAMILY MEDICINE CLINIC | Age: 42
End: 2022-07-15

## 2022-07-15 DIAGNOSIS — M79.2 NEUROPATHIC PAIN: ICD-10-CM

## 2022-07-15 DIAGNOSIS — R20.8 BURNING SENSATION OF FEET: ICD-10-CM

## 2022-07-15 NOTE — TELEPHONE ENCOUNTER
Spoke with william up about this medication for the patient and she states she doesn't see where the dose has been increased and will wait til next week for / to take a look into this for the patient.

## 2022-07-15 NOTE — TELEPHONE ENCOUNTER
Patient states she takes gabapentin 3 times a day and a 4th one at night if needed. A prescription was sent the other day and it was the wrong prescription. Can we get an updated script sent in? The pharmacy wont let her fill it as it still has the old directions and they are stating it is still too soon. She has been out for 3/4 days and her feet are killing her. Please advise. SEND TO [facility] [address]

## 2022-07-29 ENCOUNTER — NURSE TRIAGE (OUTPATIENT)
Dept: OTHER | Facility: CLINIC | Age: 42
End: 2022-07-29

## 2022-07-29 DIAGNOSIS — R20.8 BURNING SENSATION OF FEET: ICD-10-CM

## 2022-07-29 DIAGNOSIS — M79.2 NEUROPATHIC PAIN: ICD-10-CM

## 2022-07-29 RX ORDER — GABAPENTIN 800 MG/1
800 TABLET ORAL EVERY 8 HOURS
Qty: 120 TABLET | Refills: 1 | Status: SHIPPED | OUTPATIENT
Start: 2022-07-29 | End: 2022-11-04 | Stop reason: SDUPTHER

## 2022-07-29 NOTE — TELEPHONE ENCOUNTER
Received call from Eva Solitario at [facility] with [employer] Complaint. Subjective: Caller states \"lower back pain\"     Current Symptoms:   Pain in tailbone with sitting/standing  Started having issues with controlling her bowels last week, had an accident at work  When she clenches to try to prevent the passage of stool this causes a lot of pain  Legs feel weak-able to walk  Denies injury, came on gradually    Spoke to Nadeem alexandre at [facility] regarding patient's gabapentin. Patient states she used to take one pill BID, but that this was increased to one pill TID and she could take two at bedtime if needed. Patient states the old script was sent in. Nadeem alexandre will send a message to the patient's provider. Onset: 2 weeks ago; worsening    Associated Symptoms: NA    Pain Severity: 5/10; aching; constant    Temperature: denies     What has been tried: tylenol    LMP: NA Pregnant: NA    Recommended disposition: Go to ED Now    Care advice provided, patient verbalizes understanding; denies any other questions or concerns; instructed to call back for any new or worsening symptoms. Patient/caller agrees to proceed to nearest Emergency Department. Attention Provider: Thank you for allowing me to participate in the care of your patient. The patient was connected to triage in response to information provided to the ECC/PSC. Please do not respond through this encounter as the response is not directed to a shared pool.     Reason for Disposition   Loss of bladder or bowel control (urine or bowel incontinence; wetting self, leaking stool) of new-onset    Protocols used: Back Pain-ADULT-[place]

## 2022-07-29 NOTE — TELEPHONE ENCOUNTER
Patient called and nurse triage and they informed her to go to the ER. . pt told nurse triage she is going to ER. nurse triage called over to us about a medication that was sent wrong which was gabapentin again. I stated that I had already sent a message over on this and that I would send another. Can we verify if there has been a med change or not so I can call the pt? I sent a message a week ago and has not been answer about it please advise.

## 2022-08-03 ENCOUNTER — APPOINTMENT (OUTPATIENT)
Dept: MRI IMAGING | Age: 42
End: 2022-08-03
Payer: MEDICARE

## 2022-08-03 ENCOUNTER — HOSPITAL ENCOUNTER (EMERGENCY)
Age: 42
Discharge: HOME | End: 2022-08-03
Attending: STUDENT IN AN ORGANIZED HEALTH CARE EDUCATION/TRAINING PROGRAM
Payer: MEDICARE

## 2022-08-03 VITALS
OXYGEN SATURATION: 99 % | HEART RATE: 94 BPM | DIASTOLIC BLOOD PRESSURE: 71 MMHG | RESPIRATION RATE: 16 BRPM | WEIGHT: 120 LBS | TEMPERATURE: 98.1 F | SYSTOLIC BLOOD PRESSURE: 109 MMHG | HEIGHT: 61 IN | BODY MASS INDEX: 22.66 KG/M2

## 2022-08-03 DIAGNOSIS — M54.16 BILATERAL LUMBAR RADICULOPATHY: Primary | ICD-10-CM

## 2022-08-03 DIAGNOSIS — S39.012A STRAIN OF LUMBAR REGION, INITIAL ENCOUNTER: ICD-10-CM

## 2022-08-03 LAB
A/G RATIO: 1.5 (ref 1.1–2.2)
ALBUMIN SERPL-MCNC: 4.4 G/DL (ref 3.4–5)
ALP BLD-CCNC: 211 U/L (ref 40–129)
ALT SERPL-CCNC: 21 U/L (ref 10–40)
ANION GAP SERPL CALCULATED.3IONS-SCNC: 10 MMOL/L (ref 3–16)
AST SERPL-CCNC: 15 U/L (ref 15–37)
BASOPHILS ABSOLUTE: 0 K/UL (ref 0–0.2)
BASOPHILS RELATIVE PERCENT: 0.6 %
BILIRUB SERPL-MCNC: 0.4 MG/DL (ref 0–1)
BILIRUBIN URINE: NEGATIVE
BLOOD, URINE: NEGATIVE
BUN BLDV-MCNC: 18 MG/DL (ref 7–20)
C-REACTIVE PROTEIN: <3 MG/L (ref 0–5.1)
CALCIUM SERPL-MCNC: 9.2 MG/DL (ref 8.3–10.6)
CHLORIDE BLD-SCNC: 99 MMOL/L (ref 99–110)
CLARITY: CLEAR
CO2: 26 MMOL/L (ref 21–32)
COLOR: YELLOW
CREAT SERPL-MCNC: <0.5 MG/DL (ref 0.6–1.1)
EOSINOPHILS ABSOLUTE: 0.1 K/UL (ref 0–0.6)
EOSINOPHILS RELATIVE PERCENT: 2.3 %
GFR AFRICAN AMERICAN: >60
GFR NON-AFRICAN AMERICAN: >60
GLUCOSE BLD-MCNC: 372 MG/DL (ref 70–99)
GLUCOSE URINE: >=1000 MG/DL
HCG(URINE) PREGNANCY TEST: NEGATIVE
HCT VFR BLD CALC: 41.3 % (ref 36–48)
HEMOGLOBIN: 14 G/DL (ref 12–16)
KETONES, URINE: NEGATIVE MG/DL
LEUKOCYTE ESTERASE, URINE: NEGATIVE
LYMPHOCYTES ABSOLUTE: 2.8 K/UL (ref 1–5.1)
LYMPHOCYTES RELATIVE PERCENT: 48.1 %
MCH RBC QN AUTO: 30.1 PG (ref 26–34)
MCHC RBC AUTO-ENTMCNC: 33.8 G/DL (ref 31–36)
MCV RBC AUTO: 89.2 FL (ref 80–100)
MICROSCOPIC EXAMINATION: ABNORMAL
MONOCYTES ABSOLUTE: 0.6 K/UL (ref 0–1.3)
MONOCYTES RELATIVE PERCENT: 9.5 %
NEUTROPHILS ABSOLUTE: 2.3 K/UL (ref 1.7–7.7)
NEUTROPHILS RELATIVE PERCENT: 39.5 %
NITRITE, URINE: NEGATIVE
PDW BLD-RTO: 12.9 % (ref 12.4–15.4)
PH UA: 6 (ref 5–8)
PLATELET # BLD: 206 K/UL (ref 135–450)
PMV BLD AUTO: 9.4 FL (ref 5–10.5)
POTASSIUM SERPL-SCNC: 4.1 MMOL/L (ref 3.5–5.1)
PROTEIN UA: NEGATIVE MG/DL
RBC # BLD: 4.63 M/UL (ref 4–5.2)
SEDIMENTATION RATE, ERYTHROCYTE: 11 MM/HR (ref 0–20)
SODIUM BLD-SCNC: 135 MMOL/L (ref 136–145)
SPECIFIC GRAVITY UA: 1.01 (ref 1–1.03)
TOTAL PROTEIN: 7.3 G/DL (ref 6.4–8.2)
URINE REFLEX TO CULTURE: ABNORMAL
URINE TYPE: ABNORMAL
UROBILINOGEN, URINE: 0.2 E.U./DL
WBC # BLD: 5.9 K/UL (ref 4–11)

## 2022-08-03 PROCEDURE — 6360000002 HC RX W HCPCS: Performed by: STUDENT IN AN ORGANIZED HEALTH CARE EDUCATION/TRAINING PROGRAM

## 2022-08-03 PROCEDURE — 84703 CHORIONIC GONADOTROPIN ASSAY: CPT

## 2022-08-03 PROCEDURE — 96374 THER/PROPH/DIAG INJ IV PUSH: CPT

## 2022-08-03 PROCEDURE — 80053 COMPREHEN METABOLIC PANEL: CPT

## 2022-08-03 PROCEDURE — 86140 C-REACTIVE PROTEIN: CPT

## 2022-08-03 PROCEDURE — 2580000003 HC RX 258: Performed by: STUDENT IN AN ORGANIZED HEALTH CARE EDUCATION/TRAINING PROGRAM

## 2022-08-03 PROCEDURE — 6370000000 HC RX 637 (ALT 250 FOR IP): Performed by: STUDENT IN AN ORGANIZED HEALTH CARE EDUCATION/TRAINING PROGRAM

## 2022-08-03 PROCEDURE — 85025 COMPLETE CBC W/AUTO DIFF WBC: CPT

## 2022-08-03 PROCEDURE — 72148 MRI LUMBAR SPINE W/O DYE: CPT

## 2022-08-03 PROCEDURE — 85652 RBC SED RATE AUTOMATED: CPT

## 2022-08-03 PROCEDURE — 99284 EMERGENCY DEPT VISIT MOD MDM: CPT

## 2022-08-03 PROCEDURE — 81003 URINALYSIS AUTO W/O SCOPE: CPT

## 2022-08-03 RX ORDER — SODIUM CHLORIDE, SODIUM LACTATE, POTASSIUM CHLORIDE, AND CALCIUM CHLORIDE .6; .31; .03; .02 G/100ML; G/100ML; G/100ML; G/100ML
1000 INJECTION, SOLUTION INTRAVENOUS ONCE
Status: COMPLETED | OUTPATIENT
Start: 2022-08-03 | End: 2022-08-03

## 2022-08-03 RX ORDER — CYCLOBENZAPRINE HCL 10 MG
10 TABLET ORAL ONCE
Status: DISCONTINUED | OUTPATIENT
Start: 2022-08-03 | End: 2022-08-03

## 2022-08-03 RX ORDER — ACETAMINOPHEN 500 MG
1000 TABLET ORAL ONCE
Status: COMPLETED | OUTPATIENT
Start: 2022-08-03 | End: 2022-08-03

## 2022-08-03 RX ORDER — ORPHENADRINE CITRATE 30 MG/ML
60 INJECTION INTRAMUSCULAR; INTRAVENOUS ONCE
Status: COMPLETED | OUTPATIENT
Start: 2022-08-03 | End: 2022-08-03

## 2022-08-03 RX ADMIN — ACETAMINOPHEN 1000 MG: 500 TABLET ORAL at 04:12

## 2022-08-03 RX ADMIN — SODIUM CHLORIDE, POTASSIUM CHLORIDE, SODIUM LACTATE AND CALCIUM CHLORIDE 1000 ML: 600; 310; 30; 20 INJECTION, SOLUTION INTRAVENOUS at 04:11

## 2022-08-03 RX ADMIN — ORPHENADRINE CITRATE 60 MG: 30 INJECTION INTRAMUSCULAR; INTRAVENOUS at 04:12

## 2022-08-03 ASSESSMENT — PAIN DESCRIPTION - DESCRIPTORS
DESCRIPTORS: ACHING;TINGLING
DESCRIPTORS: TINGLING
DESCRIPTORS: ACHING;TINGLING

## 2022-08-03 ASSESSMENT — PAIN DESCRIPTION - LOCATION
LOCATION: BACK

## 2022-08-03 ASSESSMENT — PAIN DESCRIPTION - ORIENTATION
ORIENTATION: MID;LOWER
ORIENTATION: MID;LOWER
ORIENTATION: LOWER;LEFT;RIGHT
ORIENTATION: MID;LOWER

## 2022-08-03 ASSESSMENT — PAIN SCALES - GENERAL
PAINLEVEL_OUTOF10: 6
PAINLEVEL_OUTOF10: 3
PAINLEVEL_OUTOF10: 4
PAINLEVEL_OUTOF10: 4

## 2022-08-03 ASSESSMENT — PAIN - FUNCTIONAL ASSESSMENT
PAIN_FUNCTIONAL_ASSESSMENT: 0-10
PAIN_FUNCTIONAL_ASSESSMENT: 0-10

## 2022-08-03 NOTE — LETTER
Ramandeep Silva was seen and treated in our emergency department on 8/3/2022. She may return to work on 08/04/2022. If you have any questions or concerns, please don't hesitate to call.       Mariya Glasgow MD

## 2022-08-03 NOTE — ED PROVIDER NOTES
[facility]  ED  EMERGENCY DEPARTMENT ENCOUNTER      Pt Name: Pam Amaro  MRN: [id number]  Christina 1980  Date of evaluation: 8/3/2022  Provider: Ju Ambrosio MD    CHIEF COMPLAINT       Chief Complaint   Patient presents with    Back Pain     Lower bilat started last week, hurts worse after sitting for an extended amount of time     Back pain    HISTORY OF PRESENT ILLNESS   (Location/Symptom, Timing/Onset,Context/Setting, Quality, Duration, Modifying Factors, Severity)  Note limiting factors. Pam Amaro is a 36 y.o. female who presents to the ED with a chief complaint of back pain for the past week. Onset gradual, progressively worse, severe, sharp, worse with movement. Denies injury, states that he has noticed unintentional weight loss over the past several months, states that she is lost 20 pounds over the past year. She also states that she had an accident while she was try to get to the bathroom 1 day ago where she had fecal incontinence, also complains of urinary incontinence, not always been able to make it to the bathroom. Has numbness and tingling that she states radiates to her feet bilaterally. Denies fevers, focal weakness.,  Hurts more with sitting. Denies IV drug use, prior surgeries. Symptoms not otherwise alleviated or exacerbated by other factors      NursingNotes were reviewed. REVIEW OF SYSTEMS    (2-9 systems for level 4, 10 or more for level 5)     Review of Systems   Constitutional:  Negative for chills and fever. HENT:  Negative for congestion and sore throat. Eyes:  Negative for pain and visual disturbance. Respiratory:  Negative for cough and shortness of breath. Cardiovascular:  Negative for chest pain and palpitations. Gastrointestinal:  Negative for abdominal pain, diarrhea, nausea and vomiting. Genitourinary:  Positive for difficulty urinating and urgency. Negative for dysuria and frequency.         Fecal and urinary incontinence   Musculoskeletal:  Positive for back pain. Negative for neck pain. Skin:  Negative for rash and wound. Neurological:  Negative for dizziness, weakness and light-headedness. PAST MEDICAL HISTORY     Past Medical History:   Diagnosis Date    Abdominal pain, acute, right lower quadrant 5/15/2013    Anxiety     Arthritis     Left Knee    Bilateral ovarian cysts 2013    Blood transfusion reaction     Cellulitis and abscess of trunk 2014    Pt was seen in ED today for bleeding from incision after taking a fall this morning.       COVID-19 2020    Depression     Diabetes mellitus ([address]     x5yr    Diverticula of colon 2013    DM2 (diabetes mellitus, type 2) ([facility] 2016    Insomnia     Insomnia secondary to situational depression 2014    Left carpal tunnel syndrome 2018    MDRO (multiple drug resistant organisms) resistance     poss MRSA after hysterectomy treated with antibiotics    OA (osteoarthritis) of knee 2014    Obesity (BMI 30.0-34.9) 2017    Ovarian cyst     Plantar fasciitis, left 2014         SURGICALHISTORY       Past Surgical History:   Procedure Laterality Date    BREAST CYST ASPIRATION       SECTION      CHOLECYSTECTOMY      COLONOSCOPY  2007    polyps    COLONOSCOPY N/A 2021    COLONOSCOPY WITH ANESTHESIA performed by Patrick Mcneil MD at [facility] ([address] [place] [address])      LAPAROSCOPY      lysis of adhesions    LAPAROTOMY  2014    LAPAROTOMY, BILATERAL SALPINGO - OOPHORECTOMY    SALPINGO-OOPHORECTOMY  2014    UPPER GASTROINTESTINAL ENDOSCOPY N/A 2021    EGD BIOPSY performed by Patrick Mcneil MD at [address]       Previous Medications    BACLOFEN (LIORESAL) 5 MG TABLET    1 tid    DOXEPIN (SINEQUAN) 25 MG CAPSULE    2 hs    FLUTICASONE (FLONASE) 50 MCG/ACT NASAL SPRAY    2 sprays by Nasal route daily    GABAPENTIN (NEURONTIN) 800 MG TABLET    Take 1 tablet by mouth every 8 hours for 30 days. GLUCOSE MONITORING KIT (FREESTYLE) MONITORING KIT    Dx E11.9-One touch ultra II meter-uses tid    IBUPROFEN (ADVIL;MOTRIN) 800 MG TABLET    Take 1 tablet by mouth 3 times daily as needed for Pain    INSULIN PEN NEEDLE (B-D UF III MINI PEN NEEDLES) 31G X 5 MM MISC    Inject 1 each into the skin 4 times daily    LANCETS MISC    DX: E 11.9-Uses insulin.  FSBS 3 times daily-Delica lancets for one touch ultra II    METFORMIN (GLUCOPHAGE) 1000 MG TABLET    Take 1 tablet by mouth 2 times daily (with meals)    PANTOPRAZOLE (PROTONIX) 40 MG TABLET    Take 1 tablet by mouth 2 times daily    TOUJEO MAX SOLOSTAR 300 UNIT/ML SOPN    Inject 1 pen into the muscle daily       ALLERGIES     Lisinopril, Lisinopril-hydrochlorothiazide, and Morphine    FAMILY HISTORY       Family History   Problem Relation Age of Onset    Cancer Mother         ovarian    Diabetes Mother     High Blood Pressure Mother     Hypertension Mother     Diabetes Brother     Hypertension Brother     Diabetes Maternal Grandmother     Cancer Maternal Grandfather         stomach    Prostate Cancer Paternal Grandfather         metastatic    Breast Cancer Maternal Aunt           SOCIAL HISTORY       Social History     Socioeconomic History    Marital status:      Spouse name: None    Number of children: None    Years of education: None    Highest education level: None   Tobacco Use    Smoking status: Former     Years: 0.00     Types: Cigarettes     Quit date: 1999     Years since quittin.8    Smokeless tobacco: Never    Tobacco comments:     smoked for 1 months when teenager   Vaping Use    Vaping Use: Never used   Substance and Sexual Activity    Alcohol use: Not Currently    Drug use: Yes     Types: Marijuana (Weed)     Comment: Hx of cocaine use in 2004- 9 months     Sexual activity: Yes     Partners: Male     Comment: painful intercourse      Social Determinants of Health Financial Resource Strain: High Risk    Difficulty of Paying Living Expenses: Hard   Food Insecurity: Food Insecurity Present    Worried About Running Out of Food in the Last Year: Sometimes true    Ran Out of Food in the Last Year: Never true       SCREENINGS    [place] Coma Scale  Eye Opening: Spontaneous  Best Verbal Response: Oriented  Best Motor Response: Obeys commands  [place] Coma Scale Score: 15        PHYSICAL EXAM    (up to 7 for level 4, 8 or more for level 5)     ED Triage Vitals [08/03/22 0036]   BP Temp Temp Source Heart Rate Resp SpO2 Height Weight   (!) 106/57 98.1 °F (36.7 °C) Oral 86 14 97 % 5' 1\" (1.549 m) 120 lb (54.4 kg)       General: Alert and oriented appropriately for age, No acute distress. Eye: Normal conjunctiva. Sclera anicteric. HENT: Oral mucosa is moist.  Respiratory: Respirations even and non-labored. Cardiovascular: Normal rate, Regular rhythm. Gastrointestinal: Soft, Non-tender, Non-distended. : Slightly decreased rectal tone, subjective numbness in the saddle area. Musculoskeletal: Tenderness to palpation in the lumbar midline. No step-offs. Integumentary: Warm, Dry. Neurologic: Alert and appropriate for age. Strength 5 out of 5 bilateral lower extremities, Babinski's downgoing bilaterally. Sensation intact in the bilateral lower extremities except for on the bilateral inner thighs, patient states that she has a sensation of subjective numbness. Is able to feel light touch in this location. Psychiatric: Cooperative. DIAGNOSTIC RESULTS         RADIOLOGY:   Non-plain filmimages such as CT, Ultrasound and MRI are read by the radiologist. Plain radiographic images are visualized and preliminarily interpreted by the emergency physician with the below findings:      Interpretation per the Radiologist below, if available at the time ofthis note:    MRI [address] PAEG   Final Result   1. No acute abnormality seen of the unenhanced lumbar spine.    2. No significant spinal canal stenosis. 3. Degenerative change contributes to neural foraminal narrowing at L4-L5 and   L5-S1 as above. LABS:  Labs Reviewed   URINALYSIS WITH REFLEX TO CULTURE - Abnormal; Notable for the following components:       Result Value    Glucose, Ur >=1000 (*)     All other components within normal limits   COMPREHENSIVE METABOLIC PANEL - Abnormal; Notable for the following components:    Sodium 135 (*)     Glucose 372 (*)     Creatinine <0.5 (*)     Alkaline Phosphatase 211 (*)     All other components within normal limits   PREGNANCY, URINE   CBC WITH AUTO DIFFERENTIAL   SEDIMENTATION RATE   C-REACTIVE PROTEIN       EMERGENCY DEPARTMENT COURSE and DIFFERENTIAL DIAGNOSIS/MDM:   Vitals:    Vitals:    22 0036 22 0157 22 0512 22 0612   BP: (!) 106/57 120/75 121/63 109/71   Pulse: 86 77 74 94   Resp: 14 15 16 16   Temp: 98.1 °F (36.7 °C)      TempSrc: Oral      SpO2: 97% 99% 100% 99%   Weight: 120 lb (54.4 kg)      Height: 5' 1\" (1.549 m)            Medical decision makin-year-old female with midline lumbar back pain, concerning features of incontinence and unintentional weight loss over the past year to months. With subjective sensation of numbness on the inner thighs and with decreased rectal tone. Otherwise strength and sensation intact. Requires MRI to assess for epidural mass lesion, cauda equina syndrome given Norflex, Tylenol, LR bolus to improvement. Lumbar MRI negative for compressive lesion, no cauda equina. Patient is able to ambulate much better, feels much better after this, instructed on importance of outpatient follow-up for continued work-up of why she is having incontinence and weight loss, she voices understanding. Discharged home, ambulated steadily from the emergency department upon discharge.     Medications   acetaminophen (TYLENOL) tablet 1,000 mg (1,000 mg Oral Given 8/3/22 328)   orphenadrine (NORFLEX) injection 60 mg (60 mg IntraVENous Given 8/3/22 2112)   lactated ringers bolus (0 mLs IntraVENous Stopped 8/3/22 9518)            FINAL IMPRESSION      1. Bilateral lumbar radiculopathy    2.  Strain of lumbar region, initial encounter          DISPOSITION/PLAN   DISPOSITION    Discharged home    PATIENT REFERRED TO:  [phone]:  Discharge Medication List as of 8/3/2022  7:10 AM             (Please note that portions of this note were completed with a voice recognition program.Efforts were made to edit the dictations but occasionally words are mis-transcribed.)    Manolo Sheth MD (electronically signed)  Attending Emergency Physician         Manolo Sheth MD  08/05/22 6667

## 2022-08-05 ASSESSMENT — ENCOUNTER SYMPTOMS
NAUSEA: 0
COUGH: 0
VOMITING: 0
ABDOMINAL PAIN: 0
BACK PAIN: 1
EYE PAIN: 0
SHORTNESS OF BREATH: 0
SORE THROAT: 0
DIARRHEA: 0

## 2022-09-07 RX ORDER — DOXEPIN HYDROCHLORIDE 25 MG/1
CAPSULE ORAL
Qty: 60 CAPSULE | Refills: 3 | OUTPATIENT
Start: 2022-09-07

## 2022-09-26 ENCOUNTER — OFFICE VISIT (OUTPATIENT)
Dept: FAMILY MEDICINE CLINIC | Age: 42
End: 2022-09-26
Payer: MEDICARE

## 2022-09-26 VITALS
SYSTOLIC BLOOD PRESSURE: 126 MMHG | HEART RATE: 85 BPM | BODY MASS INDEX: 25.51 KG/M2 | OXYGEN SATURATION: 97 % | DIASTOLIC BLOOD PRESSURE: 72 MMHG | WEIGHT: 135 LBS

## 2022-09-26 DIAGNOSIS — Z79.4 TYPE 2 DIABETES MELLITUS WITHOUT COMPLICATION, WITH LONG-TERM CURRENT USE OF INSULIN (HCC): Primary | ICD-10-CM

## 2022-09-26 DIAGNOSIS — R15.9 FECAL SOILING DUE TO FECAL INCONTINENCE: ICD-10-CM

## 2022-09-26 DIAGNOSIS — E11.9 TYPE 2 DIABETES MELLITUS WITHOUT COMPLICATION, WITH LONG-TERM CURRENT USE OF INSULIN (HCC): Primary | ICD-10-CM

## 2022-09-26 DIAGNOSIS — M79.2 NEUROPATHIC PAIN: ICD-10-CM

## 2022-09-26 LAB — HBA1C MFR BLD: 11.4 %

## 2022-09-26 PROCEDURE — 83036 HEMOGLOBIN GLYCOSYLATED A1C: CPT | Performed by: FAMILY MEDICINE

## 2022-09-26 PROCEDURE — G8427 DOCREV CUR MEDS BY ELIG CLIN: HCPCS | Performed by: FAMILY MEDICINE

## 2022-09-26 PROCEDURE — 3046F HEMOGLOBIN A1C LEVEL >9.0%: CPT | Performed by: FAMILY MEDICINE

## 2022-09-26 PROCEDURE — 99212 OFFICE O/P EST SF 10 MIN: CPT | Performed by: FAMILY MEDICINE

## 2022-09-26 PROCEDURE — G8419 CALC BMI OUT NRM PARAM NOF/U: HCPCS | Performed by: FAMILY MEDICINE

## 2022-09-26 PROCEDURE — 1036F TOBACCO NON-USER: CPT | Performed by: FAMILY MEDICINE

## 2022-09-26 PROCEDURE — 2022F DILAT RTA XM EVC RTNOPTHY: CPT | Performed by: FAMILY MEDICINE

## 2022-09-26 RX ORDER — MELOXICAM 15 MG/1
15 TABLET ORAL DAILY
Qty: 30 TABLET | Refills: 0 | Status: SHIPPED | OUTPATIENT
Start: 2022-09-26

## 2022-09-26 ASSESSMENT — ENCOUNTER SYMPTOMS
TROUBLE SWALLOWING: 0
SORE THROAT: 0
DIARRHEA: 0
EYE DISCHARGE: 0
SINUS PRESSURE: 0
NAUSEA: 0
EYE PAIN: 0
BACK PAIN: 0
CONSTIPATION: 0
CHEST TIGHTNESS: 0
EYE ITCHING: 0
ABDOMINAL DISTENTION: 0
COUGH: 0
ABDOMINAL PAIN: 1
SHORTNESS OF BREATH: 0
BLOOD IN STOOL: 0
RHINORRHEA: 0
ANAL BLEEDING: 0
VOMITING: 0
EYE REDNESS: 0
WHEEZING: 0
VOICE CHANGE: 0

## 2022-09-26 ASSESSMENT — PATIENT HEALTH QUESTIONNAIRE - PHQ9
1. LITTLE INTEREST OR PLEASURE IN DOING THINGS: 0
SUM OF ALL RESPONSES TO PHQ QUESTIONS 1-9: 0
2. FEELING DOWN, DEPRESSED OR HOPELESS: 0
SUM OF ALL RESPONSES TO PHQ9 QUESTIONS 1 & 2: 0
SUM OF ALL RESPONSES TO PHQ QUESTIONS 1-9: 0

## 2022-09-26 NOTE — PROGRESS NOTES
Subjective:      Patient ID: Darlin Dale is a 36 y.o. female. HPI  Patient in for checkup on several medical issues. Diabetes-she states she is put on 15 pounds in the last few months and now has the [employer] testing for sugar apparatus. Last A1c was 8.6--8 months ago. Neuropathic pain-she is on gabapentin 803 times a day and also ibuprofen 800 for numerous joint complaints-she would like to try something different if at all possible. She also admits to fecal incontinence which has been for the last approximately 6 months. She states that it occurs for about 2 or 3 days every 2 weeks which would be about 6 days/month. 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 HENT:  Negative for congestion, ear pain, hearing loss, nosebleeds, postnasal drip, rhinorrhea, sinus pressure, sneezing, sore throat, tinnitus, trouble swallowing and voice change. Eyes:  Negative for pain, discharge, redness, itching and visual disturbance. Respiratory:  Negative for cough, chest tightness, shortness of breath and wheezing. Cardiovascular:  Negative for chest pain, palpitations and leg swelling. Gastrointestinal:  Positive for abdominal pain. Negative for abdominal distention, anal bleeding, blood in stool, constipation, diarrhea, nausea and vomiting. See HPI   Genitourinary:  Positive for frequency. Negative for dysuria, flank pain, hematuria, menstrual problem, pelvic pain, urgency and vaginal discharge. Musculoskeletal:  Positive for arthralgias. Negative for back pain, gait problem, joint swelling, myalgias and neck pain. Skin:  Negative for pallor and rash. Neurological:  Negative for dizziness, tremors, seizures, weakness, light-headedness, numbness and headaches. See HPI. Hematological:  Negative for adenopathy. Does not bruise/bleed easily.    Psychiatric/Behavioral: Negative for agitation, confusion, decreased concentration and sleep disturbance. The patient is not nervous/anxious and is not hyperactive. Objective:   Physical Exam      Physical Exam  Constitutional:       General: She is not in acute distress. Appearance: Normal appearance. She is well-developed and normal weight. She is not ill-appearing. HENT:      Head: Normocephalic. Mouth/Throat:      Mouth: Mucous membranes are moist.      Pharynx: Oropharynx is clear. Eyes:      General: No scleral icterus. Conjunctiva/sclera: Conjunctivae normal.   Neck:      Thyroid: No thyromegaly. Vascular: No carotid bruit. Cardiovascular:      Rate and Rhythm: Normal rate and regular rhythm. Heart sounds: Normal heart sounds. Pulmonary:      Effort: Pulmonary effort is normal.      Breath sounds: Normal breath sounds. Abdominal:      General: Bowel sounds are normal. There is no distension. Palpations: Abdomen is soft. There is no mass. Tenderness: There is no abdominal tenderness. Musculoskeletal:      Cervical back: Neck supple. Right lower leg: No edema. Left lower leg: No edema. Lymphadenopathy:      Cervical: No cervical adenopathy. Skin:     General: Skin is warm and dry. Coloration: Skin is not pale. Neurological:      Mental Status: She is alert and oriented to person, place, and time. Motor: No abnormal muscle tone. Gait: Gait normal.   Psychiatric:         Mood and Affect: Mood normal.         Behavior: Behavior normal.         Thought Content: Thought content normal.         Judgment: Judgment normal.       Assessment:       Diagnosis Orders   1. Type 2 diabetes mellitus without complication, with long-term current use of insulin (HCC)  POCT glycosylated hemoglobin (Hb A1C)    Diabetic Foot Exam      2. Neuropathic pain        3. Fecal soiling due to fecal incontinence              Plan:      Tamera Ma was seen today for diabetes.     Diagnoses and all orders for this visit:    Type 2 diabetes mellitus without complication, with long-term current use of insulin ([facility])  -     POCT glycosylated hemoglobin (Hb A1C)  -     Diabetic Foot Exam  A1c 11.4 and last A1c was 8.6--continue metformin and sliding scale with insulin-we will make any adjustments since you have just been using your saúl apparatus for less than a month and you do see your averages coming down so we will give that another month or 2. Neuropathic pain  Continue medications. Stop taking ibuprofen and prescription has been sent for meloxicam 15 daily. Fecal soiling due to fecal incontinence  Need to  generic Metamucil capsules and start taking 1 twice a day and send me a note in about 2 weeks with an update. Other orders  -     meloxicam (MOBIC) 15 MG tablet; Take 1 tablet by mouth daily    This is been approximately a 20-minute visit with the patient.     See me 3 months     Blake Krueger, DO

## 2022-09-26 NOTE — LETTER
[address]  [facility] [address]  Phone: [phone]  Fax: [address],         September 26, 2022     Patient: Andrea Alfonso   YOB: 1980   Date of Visit: 9/26/2022       To Whom It May Concern: It is my medical opinion that Andrea Alfonso may return to work on 9/27/2022. Off 9/26/2022-under my care. If you have any questions or concerns, please don't hesitate to call.     Sincerely,        Terrye Innocent, DO

## 2022-10-31 RX ORDER — BACLOFEN 5 MG/1
TABLET ORAL
Qty: 90 TABLET | Refills: 2 | OUTPATIENT
Start: 2022-10-31

## 2022-11-04 ENCOUNTER — TELEPHONE (OUTPATIENT)
Dept: WOMENS IMAGING | Age: 42
End: 2022-11-04

## 2022-11-04 ENCOUNTER — OFFICE VISIT (OUTPATIENT)
Dept: FAMILY MEDICINE CLINIC | Age: 42
End: 2022-11-04
Payer: COMMERCIAL

## 2022-11-04 VITALS
WEIGHT: 138 LBS | BODY MASS INDEX: 26.07 KG/M2 | HEART RATE: 73 BPM | OXYGEN SATURATION: 97 % | DIASTOLIC BLOOD PRESSURE: 70 MMHG | SYSTOLIC BLOOD PRESSURE: 122 MMHG

## 2022-11-04 DIAGNOSIS — N64.59 INVERTED NIPPLE: ICD-10-CM

## 2022-11-04 DIAGNOSIS — N64.4 PAIN OF LEFT BREAST: Primary | ICD-10-CM

## 2022-11-04 DIAGNOSIS — I10 ESSENTIAL HYPERTENSION: ICD-10-CM

## 2022-11-04 DIAGNOSIS — M79.2 NEUROPATHIC PAIN: ICD-10-CM

## 2022-11-04 DIAGNOSIS — E11.42 TYPE 2 DIABETES MELLITUS WITH DIABETIC POLYNEUROPATHY, WITH LONG-TERM CURRENT USE OF INSULIN (HCC): Primary | ICD-10-CM

## 2022-11-04 DIAGNOSIS — E78.2 MIXED HYPERLIPIDEMIA: ICD-10-CM

## 2022-11-04 DIAGNOSIS — R20.8 BURNING SENSATION OF FEET: ICD-10-CM

## 2022-11-04 DIAGNOSIS — Z79.4 TYPE 2 DIABETES MELLITUS WITH DIABETIC POLYNEUROPATHY, WITH LONG-TERM CURRENT USE OF INSULIN (HCC): Primary | ICD-10-CM

## 2022-11-04 PROCEDURE — 3046F HEMOGLOBIN A1C LEVEL >9.0%: CPT | Performed by: FAMILY MEDICINE

## 2022-11-04 PROCEDURE — 2022F DILAT RTA XM EVC RTNOPTHY: CPT | Performed by: FAMILY MEDICINE

## 2022-11-04 PROCEDURE — 99214 OFFICE O/P EST MOD 30 MIN: CPT | Performed by: FAMILY MEDICINE

## 2022-11-04 PROCEDURE — G8419 CALC BMI OUT NRM PARAM NOF/U: HCPCS | Performed by: FAMILY MEDICINE

## 2022-11-04 PROCEDURE — 1036F TOBACCO NON-USER: CPT | Performed by: FAMILY MEDICINE

## 2022-11-04 PROCEDURE — 3078F DIAST BP <80 MM HG: CPT | Performed by: FAMILY MEDICINE

## 2022-11-04 PROCEDURE — G8484 FLU IMMUNIZE NO ADMIN: HCPCS | Performed by: FAMILY MEDICINE

## 2022-11-04 PROCEDURE — G8428 CUR MEDS NOT DOCUMENT: HCPCS | Performed by: FAMILY MEDICINE

## 2022-11-04 PROCEDURE — 3074F SYST BP LT 130 MM HG: CPT | Performed by: FAMILY MEDICINE

## 2022-11-04 RX ORDER — GABAPENTIN 800 MG/1
800 TABLET ORAL 3 TIMES DAILY
Qty: 120 TABLET | Refills: 5 | Status: SHIPPED | OUTPATIENT
Start: 2022-11-04 | End: 2023-05-03

## 2022-11-04 RX ORDER — HYDROCODONE BITARTRATE AND ACETAMINOPHEN 5; 325 MG/1; MG/1
1 TABLET ORAL EVERY 4 HOURS PRN
Qty: 18 TABLET | Refills: 0 | Status: SHIPPED | OUTPATIENT
Start: 2022-11-04 | End: 2022-11-07
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
## 2022-11-04 NOTE — TELEPHONE ENCOUNTER
Patient stopped in to get mammogram done. While getting her scheduled she mentioned that she was have left breast pain with inverted nipple. Patient will need to be Diagnostic Bilateral mammogram and Left Breast Ultrasound. Patient is scheduled for 11/23/22. Orders pended in system awaiting signature.     Thanks

## 2022-11-14 RX ORDER — MELOXICAM 15 MG/1
TABLET ORAL
Qty: 30 TABLET | Refills: 1 | Status: SHIPPED | OUTPATIENT
Start: 2022-11-14

## 2022-11-14 NOTE — TELEPHONE ENCOUNTER
Refill Request     CONFIRM preferrred pharmacy with the patient. If Mail Order Rx - Pend for 90 day refill. Last Seen: Last Seen Department: 11/4/2022    Last Seen by PCP: 9/26/2022    Last Written: 9/26/22 30 tablet 0 refills    If no future appointment scheduled, route STAFF MESSAGE with patient name to the [facility] for scheduling. Next Appointment: 12/28/22  Future Appointments   Date Time Provider Tomy Thayer   11/23/2022  8:00 AM [facility] EG WC MAMMO [facility] EG WC [place] [facility]   11/23/2022  8:30 AM [facility] EG WC US [facility] EG WC [facility]   12/28/2022  2:00 PM DO TAYLOR Estevez - XIMENA       Message sent to [address] [place] [address] to schedule appt with patient?   NO      Requested Prescriptions     Pending Prescriptions Disp Refills    meloxicam (MOBIC) 15 MG tablet [Pharmacy Med Name: MELOXICAM 15MG TABS] 30 tablet 0     Sig: TAKE 1 TABLET BY MOUTH ONE TIME A DAY

## 2022-11-23 ENCOUNTER — HOSPITAL ENCOUNTER (OUTPATIENT)
Dept: WOMENS IMAGING | Age: 42
Discharge: HOME | End: 2022-11-23
Payer: COMMERCIAL

## 2022-11-23 DIAGNOSIS — N64.59 INVERTED NIPPLE: ICD-10-CM

## 2022-11-23 DIAGNOSIS — N64.4 PAIN OF LEFT BREAST: ICD-10-CM

## 2022-11-23 PROCEDURE — 77066 DX MAMMO INCL CAD BI: CPT

## 2022-11-23 PROCEDURE — 76642 ULTRASOUND BREAST LIMITED: CPT

## 2022-11-28 ENCOUNTER — HOSPITAL ENCOUNTER (OUTPATIENT)
Dept: GENERAL RADIOLOGY | Age: 42
Discharge: HOME | End: 2022-11-28
Payer: COMMERCIAL

## 2022-11-28 ENCOUNTER — HOSPITAL ENCOUNTER (OUTPATIENT)
Age: 42
Discharge: HOME | End: 2022-11-28
Payer: COMMERCIAL

## 2022-11-28 DIAGNOSIS — K59.00 CONSTIPATION, UNSPECIFIED CONSTIPATION TYPE: ICD-10-CM

## 2022-11-28 PROCEDURE — 74018 RADEX ABDOMEN 1 VIEW: CPT

## 2022-11-30 RX ORDER — BACLOFEN 5 MG/1
TABLET ORAL
Qty: 90 TABLET | Refills: 2 | OUTPATIENT
Start: 2022-11-30

## 2023-01-25 ENCOUNTER — OFFICE VISIT (OUTPATIENT)
Dept: FAMILY MEDICINE CLINIC | Age: 43
End: 2023-01-25
Payer: COMMERCIAL

## 2023-01-25 VITALS
WEIGHT: 137 LBS | OXYGEN SATURATION: 98 % | DIASTOLIC BLOOD PRESSURE: 70 MMHG | HEART RATE: 79 BPM | SYSTOLIC BLOOD PRESSURE: 102 MMHG | BODY MASS INDEX: 25.89 KG/M2

## 2023-01-25 DIAGNOSIS — R15.9 FECAL SOILING DUE TO FECAL INCONTINENCE: ICD-10-CM

## 2023-01-25 DIAGNOSIS — K57.92 DIVERTICULITIS: ICD-10-CM

## 2023-01-25 DIAGNOSIS — K31.84 GASTROPARESIS: Primary | ICD-10-CM

## 2023-01-25 PROCEDURE — G8427 DOCREV CUR MEDS BY ELIG CLIN: HCPCS | Performed by: NURSE PRACTITIONER

## 2023-01-25 PROCEDURE — G8419 CALC BMI OUT NRM PARAM NOF/U: HCPCS | Performed by: NURSE PRACTITIONER

## 2023-01-25 PROCEDURE — 1036F TOBACCO NON-USER: CPT | Performed by: NURSE PRACTITIONER

## 2023-01-25 PROCEDURE — G8482 FLU IMMUNIZE ORDER/ADMIN: HCPCS | Performed by: NURSE PRACTITIONER

## 2023-01-25 PROCEDURE — 99213 OFFICE O/P EST LOW 20 MIN: CPT | Performed by: NURSE PRACTITIONER

## 2023-01-25 RX ORDER — CIPROFLOXACIN 500 MG/1
500 TABLET, FILM COATED ORAL 2 TIMES DAILY
Qty: 14 TABLET | Refills: 0 | Status: SHIPPED | OUTPATIENT
Start: 2023-01-25 | End: 2023-02-01

## 2023-01-25 ASSESSMENT — ENCOUNTER SYMPTOMS
BLOOD IN STOOL: 0
ANAL BLEEDING: 0
ABDOMINAL DISTENTION: 0
ABDOMINAL PAIN: 1
DIARRHEA: 1
CONSTIPATION: 0
VOMITING: 1
NAUSEA: 1
RECTAL PAIN: 0
COUGH: 0

## 2023-01-25 ASSESSMENT — PATIENT HEALTH QUESTIONNAIRE - PHQ9: DEPRESSION UNABLE TO ASSESS: PT REFUSES

## 2023-01-25 NOTE — LETTER
[address]  [facility] [address]  Phone: [phone]  Fax: [phone]    IRASEMA Mayfield CNP        January 25, 2023     Patient: Darlin Dale   YOB: 1980   Date of Visit: 1/25/2023       To Whom It May Concern: It is my medical opinion that Darlin Dale may return to work on 1/27/23. If you have any questions or concerns, please don't hesitate to call.     Sincerely,        IRASEMA Mayfield CNP

## 2023-01-25 NOTE — PROGRESS NOTES
Erika Villatoro (:  1980) is a 36 y.o. female,Established patient, here for evaluation of the following chief complaint(s):  Abdominal Pain         ASSESSMENT/PLAN:  1. Gastroparesis  2. Diverticulitis  -     ciprofloxacin (CIPRO) 500 MG tablet; Take 1 tablet by mouth 2 times daily for 7 days, Disp-14 tablet, R-0Normal  3. Fecal soiling due to fecal incontinence    -[place], soft diet, advance as tolerates  -Stay hydrated  -Keep appointment with GI tomorrow  -ER if significant worsening of pain    Return if symptoms worsen or fail to improve. Subjective   SUBJECTIVE/OBJECTIVE:  History of fecal incontinence, was diagnosed with gastroparesis, has seen Dr. Huber Orellana in the past now sees a new GI specialist, has appointment tomorrow with him. Reports that have her symptoms have worsened over the last couple of days with more fecal incontinence, LLQ pain that goes up to her LUQ, nausea,   Denies blood in stool or urine  Reports chills, unsure about fever   Stopped taking Linzess six months ago   Last bowel movement was today, describes it as \"brown strings, floating\"         Review of Systems   Constitutional:  Positive for appetite change and chills. Negative for fatigue and fever. Respiratory:  Negative for cough. Gastrointestinal:  Positive for abdominal pain, diarrhea, nausea and vomiting. Negative for abdominal distention, anal bleeding, blood in stool, constipation and rectal pain. Genitourinary:  Negative for dysuria, frequency and urgency. Objective   Physical Exam  Vitals reviewed. Eyes:      Pupils: Pupils are equal, round, and reactive to light. Cardiovascular:      Rate and Rhythm: Normal rate and regular rhythm. Pulmonary:      Effort: Pulmonary effort is normal.      Breath sounds: Normal breath sounds. No wheezing, rhonchi or rales. Chest:      Chest wall: No tenderness. Abdominal:      General: Bowel sounds are normal.      Palpations: Abdomen is soft.  There is no hepatomegaly or splenomegaly. Tenderness: There is abdominal tenderness in the left upper quadrant and left lower quadrant. There is no right CVA tenderness, left CVA tenderness, guarding or rebound. Negative signs include Terrell's sign and Rovsing's sign. Skin:     General: Skin is warm and dry. Capillary Refill: Capillary refill takes less than 2 seconds. Neurological:      General: No focal deficit present. Mental Status: She is alert and oriented to person, place, and time. This note was generated completely or in part utilizing Dragon dictation speech recognition software. Occasionally, words are mistranscribed and despite editing, the text may contain inaccuracies due to incorrect word recognition. If further clarification is needed please contact the office at [phone]. An electronic signature was used to authenticate this note.     --IRASEMA Egan - CNP

## 2023-01-25 NOTE — LETTER
[address]  [facility] [address]  Phone: [phone]  Fax: [phone]    IRASEMA Lopez CNP        January 25, 2023     Patient: Sweta Jones   YOB: 1980   Date of Visit: 1/25/2023       To Whom It May Concern: It is my medical opinion that Sweta Jones may return to work on 1/27/23. She should be excused from work for 1/24/23-1/26/23    If you have any questions or concerns, please don't hesitate to call.     Sincerely,        IRASEMA Lopez CNP

## 2023-03-02 ENCOUNTER — HOSPITAL ENCOUNTER (OUTPATIENT)
Dept: PHYSICAL THERAPY | Age: 43
Setting detail: THERAPIES SERIES
Discharge: HOME | End: 2023-03-02

## 2023-03-02 NOTE — FLOWSHEET NOTE
[facility] [facility], [facility], [address]  [place], [address]  Phone: [phone]  Fax [phone]      Physical Therapy  Cancellation/No-show Note  Patient Name:  Parish Haile  :  1980   Date:  3/2/2023  Cancels to date: 0  No-shows to date: 1    For today's appointment patient:  [] Cancelled  [] Rescheduled appointment  [x] No-show     Reason given by patient:  [] Patient ill  [] Conflicting appointment  [] No transportation    [] Conflict with work  [] No reason given  [] Other:     Comments:      Electronically signed by:  Sherrell Odonnell PT

## 2023-03-03 ENCOUNTER — APPOINTMENT (OUTPATIENT)
Dept: GENERAL RADIOLOGY | Age: 43
End: 2023-03-03
Payer: MEDICAID

## 2023-03-03 ENCOUNTER — HOSPITAL ENCOUNTER (EMERGENCY)
Age: 43
Discharge: HOME | End: 2023-03-03
Payer: MEDICAID

## 2023-03-03 VITALS
DIASTOLIC BLOOD PRESSURE: 73 MMHG | TEMPERATURE: 98 F | HEART RATE: 83 BPM | RESPIRATION RATE: 18 BRPM | OXYGEN SATURATION: 99 % | SYSTOLIC BLOOD PRESSURE: 119 MMHG

## 2023-03-03 DIAGNOSIS — M79.672 LEFT FOOT PAIN: Primary | ICD-10-CM

## 2023-03-03 PROCEDURE — 73630 X-RAY EXAM OF FOOT: CPT

## 2023-03-03 PROCEDURE — 99283 EMERGENCY DEPT VISIT LOW MDM: CPT

## 2023-03-03 RX ORDER — INDOMETHACIN 50 MG/1
50 CAPSULE ORAL
Qty: 21 CAPSULE | Refills: 0 | Status: SHIPPED | OUTPATIENT
Start: 2023-03-03 | End: 2023-03-10

## 2023-03-03 ASSESSMENT — PAIN DESCRIPTION - ORIENTATION: ORIENTATION: LEFT

## 2023-03-03 ASSESSMENT — PAIN DESCRIPTION - LOCATION: LOCATION: LEG

## 2023-03-03 ASSESSMENT — PAIN SCALES - GENERAL: PAINLEVEL_OUTOF10: 3

## 2023-03-03 ASSESSMENT — PAIN - FUNCTIONAL ASSESSMENT: PAIN_FUNCTIONAL_ASSESSMENT: 0-10

## 2023-03-03 NOTE — DISCHARGE INSTRUCTIONS
X-ray was negative. It is possible stress fracture is present but not seen on x-ray. Recommend follow-up today 2. They will consider further imaging as indicated. I did prescribe Indocin 50 mg take this 3 times daily with food x1 week. Do not take meloxicam while taking Indocin. Resume meloxicam when Indocin finishes. Concurrently may take Tylenol 1000 mg every 6 or 8 hours for additional pain control. I did provide you Ace wrap and a postop shoe. This will help splint the foot.

## 2023-03-03 NOTE — ED PROVIDER NOTES
[address] [facility]  ED  EMERGENCY DEPARTMENT ENCOUNTER        Pt Name: Ad Perez  MRN: [id number]  [place] 1980  Date of evaluation: 3/3/2023  Provider: Silvia Negron PA-C  PCP: Mauricio Rosales, DO  Note Started: 12:44 PM EST 3/3/23      YENIFER. I have evaluated this patient. My supervising physician was available for consultation. CHIEF COMPLAINT       Chief Complaint   Patient presents with    Leg Pain     2 days pta began with lower leg pain down to top of her foot. The pain shoots up to calf. Worse with standing. HISTORY OF PRESENT ILLNESS: 1 or more Elements     History From: Patient    Limitations to history : None    Ad Perez is a 39 y.o. female who presents to the emergency department complaint pain and swelling dorsal midfoot x48 hours. Pain worse with ambulation. No redness. No trauma. She has history of neuropathy secondary to diabetes. She does take gabapentin 800 mg 3 times daily and occasionally an excellent at bedtime. Past couple days she is taken an extra 1 at bedtime without adequate benefit. She is currently on meloxicam 15 mg daily times a few months by PCP for left knee pain. Nursing Notes were all reviewed and agreed with or any disagreements were addressed in the HPI. REVIEW OF SYSTEMS :      Review of Systems    Positives and Pertinent negatives as per HPI.      SURGICAL HISTORY     Past Surgical History:   Procedure Laterality Date    BREAST CYST ASPIRATION       SECTION      CHOLECYSTECTOMY      COLONOSCOPY  2007    polyps    COLONOSCOPY N/A 2021    COLONOSCOPY WITH ANESTHESIA performed by Christine Turner MD at [facility] ([facility])      LAPAROSCOPY      lysis of adhesions    LAPAROTOMY  2014    LAPAROTOMY, BILATERAL SALPINGO - OOPHORECTOMY    SALPINGO-OOPHORECTOMY  2014    UPPER GASTROINTESTINAL ENDOSCOPY N/A 2021    EGD BIOPSY performed by Zacarias David MD at [address] [facility] [address]       Previous Medications    BACLOFEN (LIORESAL) 5 MG TABLET    1 tid    FLUTICASONE (FLONASE) 50 MCG/ACT NASAL SPRAY    2 sprays by Nasal route daily    GABAPENTIN (NEURONTIN) 800 MG TABLET    Take 1 tablet by mouth 3 times daily for 180 days. Take 2 tablets at bedtime. GLUCOSE MONITORING KIT (FREESTYLE) MONITORING KIT    Dx E11.9-One touch ultra II meter-uses tid    IBUPROFEN (ADVIL;MOTRIN) 800 MG TABLET    Take 1 tablet by mouth 3 times daily as needed for Pain    INSULIN PEN NEEDLE (B-D UF III MINI PEN NEEDLES) 31G X 5 MM MISC    Inject 1 each into the skin 4 times daily    LANCETS MISC    DX: E 11.9-Uses insulin.  FSBS 3 times daily-Delica lancets for one touch ultra II    MELOXICAM (MOBIC) 15 MG TABLET    TAKE 1 TABLET BY MOUTH ONE TIME A DAY    METFORMIN (GLUCOPHAGE) 1000 MG TABLET    Take 1 tablet by mouth 2 times daily (with meals)    TOUJEO MAX SOLOSTAR 300 UNIT/ML SOPN    Inject 1 pen into the muscle daily       ALLERGIES     Lisinopril, Lisinopril-hydrochlorothiazide, and Morphine    FAMILYHISTORY       Family History   Problem Relation Age of Onset    Cancer Mother         ovarian    Diabetes Mother     High Blood Pressure Mother     Hypertension Mother     Diabetes Brother     Hypertension Brother     Diabetes Maternal Grandmother     Cancer Maternal Grandfather         stomach    Prostate Cancer Paternal Grandfather         metastatic    Breast Cancer Maternal Aunt         SOCIAL HISTORY       Social History     Tobacco Use    Smoking status: Former     Years: 0.00     Types: Cigarettes     Quit date: 1999     Years since quittin.4    Smokeless tobacco: Never    Tobacco comments:     smoked for 1 months when teenager   Vaping Use    Vaping Use: Never used   Substance Use Topics    Alcohol use: Not Currently    Drug use: Yes     Types: Marijuana (Weed)     Comment: Hx of cocaine use in 2004- 9 months        SCREENINGS Maricarmen Coma Scale  Eye Opening: Spontaneous  Best Verbal Response: Oriented  Best Motor Response: Obeys commands  Maricarmen Coma Scale Score: 15                CIWA Assessment  BP: 119/73  Heart Rate: 83           PHYSICAL EXAM  1 or more Elements     ED Triage Vitals [03/03/23 1106]   BP Temp Temp Source Heart Rate Resp SpO2 Height Weight   119/73 98 °F (36.7 °C) Oral 83 18 99 % -- --       Physical Exam  Vitals and nursing note reviewed. Constitutional:       Appearance: Normal appearance. She is well-developed and normal weight. HENT:      Head: Normocephalic and atraumatic. Right Ear: External ear normal.      Left Ear: External ear normal.   Eyes:      General: No scleral icterus. Right eye: No discharge. Left eye: No discharge. Conjunctiva/sclera: Conjunctivae normal.   Cardiovascular:      Rate and Rhythm: Normal rate. Pulmonary:      Effort: Pulmonary effort is normal.   Abdominal:      General: Abdomen is flat. Bowel sounds are normal.      Palpations: Abdomen is soft. Tenderness: There is no abdominal tenderness. Musculoskeletal:         General: Swelling and tenderness present. Normal range of motion. Cervical back: Normal range of motion and neck supple. Right lower leg: No edema. Left lower leg: No edema. Comments: Patient minimal swelling. No erythema. Strong DP pulse. Sensory intact to toes. Tenderness over the mid metatarsals left foot. Seems to be more second third and fourth. Skin:     General: Skin is warm and dry. Neurological:      General: No focal deficit present. Mental Status: She is alert and oriented to person, place, and time. Mental status is at baseline. Psychiatric:         Mood and Affect: Mood normal.         Behavior: Behavior normal.         Thought Content:  Thought content normal.         Judgment: Judgment normal.       DIAGNOSTIC RESULTS   LABS:    Labs Reviewed - No data to display    When ordered only abnormal lab results are displayed. All other labs were within normal range or not returned as of this dictation. EKG: When ordered, EKG's are interpreted by the Emergency Department Physician in the absence of a cardiologist.  Please see their note for interpretation of EKG. RADIOLOGY:   Non-plain film images such as CT, Ultrasound and MRI are read by the radiologist. Plain radiographic images are visualized and preliminarily interpreted by the ED Provider with the below findings:    X-rays do by myself and interpreted by radiology shows no acute osseous abnormality. Interpretation per the Radiologist below, if available at the time of this note:    XR FOOT LEFT (MIN 3 VIEWS)   Final Result   1. No acute abnormality. XR FOOT LEFT (MIN 3 VIEWS)    Result Date: 3/3/2023  EXAMINATION: THREE XRAY VIEWS OF THE LEFT FOOT 3/3/2023 11:58 am COMPARISON: 12/18/2015 HISTORY: ORDERING SYSTEM PROVIDED HISTORY: Swelling and pain dorsal midfoot TECHNOLOGIST PROVIDED HISTORY: Reason for exam:->Swelling and pain dorsal midfoot Reason for Exam: swelling FINDINGS: There is no acute fracture or dislocation. The bones are normally mineralized. There are no bony destructive lesions. The joint spaces are maintained. Small calcaneal enthesophytes are present. 1. No acute abnormality. No results found. PROCEDURES     Ace wrap applied by staff and postop shoe applied by staff. Inspection by myself reveals appropriate placement without neurovascular compromise.      Procedures    CRITICAL CARE TIME (.cctime)       PAST MEDICAL HISTORY      has a past medical history of Abdominal pain, acute, right lower quadrant (5/15/2013), Anxiety, Arthritis, Bilateral ovarian cysts (8/13/2013), Blood transfusion reaction, Cellulitis and abscess of trunk (1/29/2014), COVID-19 (05/2020), Depression, Diabetes mellitus ([facility] [address] Diverticula of colon (8/13/2013), DM2 (diabetes mellitus, type 2) ([facility] [address] (7/12/2016), Insomnia, Insomnia secondary to situational depression (2/27/2014), Left carpal tunnel syndrome (7/12/2018), MDRO (multiple drug resistant organisms) resistance, OA (osteoarthritis) of knee (5/13/2014), Obesity (BMI 30.0-34.9) (9/21/2017), Ovarian cyst, and Plantar fasciitis, left (5/13/2014). EMERGENCY DEPARTMENT COURSE and DIFFERENTIAL DIAGNOSIS/MDM:   Vitals:    Vitals:    03/03/23 1106   BP: 119/73   Pulse: 83   Resp: 18   Temp: 98 °F (36.7 °C)   TempSrc: Oral   SpO2: 99%       Patient was given the following medications:  Medications - No data to display          Is this patient to be included in the SEP-1 Core Measure due to severe sepsis or septic shock? No   Exclusion criteria - the patient is NOT to be included for SEP-1 Core Measure due to: Infection is not suspected    Chronic Conditions affecting care: Previous laceration plantar aspect as child, plantar fasciitis left foot, arthritis left knee. has a past medical history of Abdominal pain, acute, right lower quadrant (5/15/2013), Anxiety, Arthritis, Bilateral ovarian cysts (8/13/2013), Blood transfusion reaction, Cellulitis and abscess of trunk (1/29/2014), COVID-19 (05/2020), Depression, Diabetes mellitus ([facility] [address] Diverticula of colon (8/13/2013), DM2 (diabetes mellitus, type 2) ([facility] [address] (7/12/2016), Insomnia, Insomnia secondary to situational depression (2/27/2014), Left carpal tunnel syndrome (7/12/2018), MDRO (multiple drug resistant organisms) resistance, OA (osteoarthritis) of knee (5/13/2014), Obesity (BMI 30.0-34.9) (9/21/2017), Ovarian cyst, and Plantar fasciitis, left (5/13/2014). CONSULTS: (Who and What was discussed)  None    Social Determinants Significantly Affecting Health : None    Records Reviewed (External and Source) none    CC/HPI Summary, DDx, ED Course, and Reassessment:     Patient presenting with pain and swelling dorsum of the left foot with tenderness more so on the dorsum versus plantar x48 hours.   She is currently meloxicam 15 mg.  X-ray negative. Ace wrap and postop shoe placed. Hold meloxicam and start indomethacin 50 mg 3 times daily with food x1 week. Then she is to return to using meloxicam.  She does have a podiatrist.  Recommend she follow with her podiatrist.  The patient has expressed understanding diagnosis and treatment plan. Disposition Considerations (tests considered but not done, Admit vs D/C, Shared Decision Making, Pt Expectation of Test or Tx.):     Patient being discharged to home with diagnosis pain left foot. X-ray negative. Ace wrap and postop shoe applied. Potential stress fracture not seen on x-ray I did recommend that she follow with her podiatrist for further evaluation and treatment. Low suspicion for gout or infection based on clinical examination. I am the Primary Clinician of Record. FINAL IMPRESSION      1. Left foot pain          DISPOSITION/PLAN     DISPOSITION Decision To Discharge 03/03/2023 12:41:40 PM      PATIENT REFERRED TO:  Your podiatrist    Schedule an appointment as soon as possible for a visit on 3/7/2023      Laverne Hensley DO  [address] [address],[address] [facility] [address]  [phone]    Schedule an appointment as soon as possible for a visit   As needed    [facility]  ED  [address] [facility] [address]  Go to   If symptoms worsen      DISCHARGE MEDICATIONS:  New Prescriptions    INDOMETHACIN (INDOCIN) 50 MG CAPSULE    Take 1 capsule by mouth 3 times daily (with meals) for 7 days       DISCONTINUED MEDICATIONS:  Discontinued Medications    No medications on file              (Please note that portions of this note were completed with a voice recognition program.  Efforts were made to edit the dictations but occasionally words are mis-transcribed. )    Vini Cabezas PA-C (electronically signed)        Vini Cabezas PA-C  03/03/23 [id number]

## 2023-03-16 ENCOUNTER — OFFICE VISIT (OUTPATIENT)
Dept: FAMILY MEDICINE CLINIC | Age: 43
End: 2023-03-16
Payer: MEDICAID

## 2023-03-16 VITALS
OXYGEN SATURATION: 97 % | SYSTOLIC BLOOD PRESSURE: 112 MMHG | HEART RATE: 85 BPM | DIASTOLIC BLOOD PRESSURE: 60 MMHG | TEMPERATURE: 98 F

## 2023-03-16 DIAGNOSIS — M79.672 LEFT FOOT PAIN: Primary | ICD-10-CM

## 2023-03-16 DIAGNOSIS — J02.9 SORE THROAT: ICD-10-CM

## 2023-03-16 PROCEDURE — 87804 INFLUENZA ASSAY W/OPTIC: CPT | Performed by: NURSE PRACTITIONER

## 2023-03-16 PROCEDURE — 99214 OFFICE O/P EST MOD 30 MIN: CPT | Performed by: NURSE PRACTITIONER

## 2023-03-16 PROCEDURE — 87880 STREP A ASSAY W/OPTIC: CPT | Performed by: NURSE PRACTITIONER

## 2023-03-16 RX ORDER — TRAMADOL HYDROCHLORIDE 50 MG/1
50 TABLET ORAL EVERY 4 HOURS PRN
Qty: 18 TABLET | Refills: 0 | Status: SHIPPED | OUTPATIENT
Start: 2023-03-16 | End: 2023-03-19

## 2023-03-16 SDOH — ECONOMIC STABILITY: HOUSING INSECURITY
IN THE LAST 12 MONTHS, WAS THERE A TIME WHEN YOU DID NOT HAVE A STEADY PLACE TO SLEEP OR SLEPT IN A SHELTER (INCLUDING NOW)?: NO

## 2023-03-16 SDOH — ECONOMIC STABILITY: FOOD INSECURITY: WITHIN THE PAST 12 MONTHS, THE FOOD YOU BOUGHT JUST DIDN'T LAST AND YOU DIDN'T HAVE MONEY TO GET MORE.: NEVER TRUE

## 2023-03-16 SDOH — ECONOMIC STABILITY: FOOD INSECURITY: WITHIN THE PAST 12 MONTHS, YOU WORRIED THAT YOUR FOOD WOULD RUN OUT BEFORE YOU GOT MONEY TO BUY MORE.: NEVER TRUE

## 2023-03-16 SDOH — ECONOMIC STABILITY: INCOME INSECURITY: HOW HARD IS IT FOR YOU TO PAY FOR THE VERY BASICS LIKE FOOD, HOUSING, MEDICAL CARE, AND HEATING?: NOT HARD AT ALL

## 2023-03-16 NOTE — PROGRESS NOTES
3/16/2023     Tawnya Morris (:  1980) is a 39 y.o. female, here for evaluation of the following medical concerns:    HPI  Pt c/o left foot pain and swelling for the past 2 weeks. No known injury. Went to the ER 3/3/23. Xray was normal.  Pt was unable to get in with her podiatrist.  Took Indocin as directed. No improvement. Pt is also taking prescribed meloxicam and gabapentin as directed. C/o left foot swelling and increased pain with ambulation. She works FT as a  and is afraid that the pain will continue to increase b/c they plan to be very busy for [facility]. Prash[employer] Day. Pt also c/o sore throat. States that both of her children have the flu. Denies fever or chills. Review of Systems   All other systems reviewed and are negative. Prior to Visit Medications    Medication Sig Taking? Authorizing Provider   traMADol (ULTRAM) 50 MG tablet Take 1 tablet by mouth every 4 hours as needed for Pain for up to 3 days. Intended supply: 3 days. Take lowest dose possible to manage pain Max Daily Amount: 300 mg Yes IRASEMA Cazares - CNP   meloxicam (MOBIC) 15 MG tablet TAKE 1 TABLET BY MOUTH ONE TIME A DAY Yes Blake Krueger DO   gabapentin (NEURONTIN) 800 MG tablet Take 1 tablet by mouth 3 times daily for 180 days. Take 2 tablets at bedtime. Yes Kayla Morse DO   TOUJEO MAX SOLOSTAR 300 UNIT/ML SOPN Inject 1 pen into the muscle daily Yes Kayla Morse DO   Insulin Pen Needle (B-D UF III MINI PEN NEEDLES) 31G X 5 MM MISC Inject 1 each into the skin 4 times daily Yes Blake Krueger DO   Lancets MISC DX: E 11.9-Uses insulin.  FSBS 3 times daily-Delica lancets for one touch ultra II Yes Blake Krueger DO   glucose monitoring kit (FREESTYLE) monitoring kit Dx E11.9-One touch ultra II meter-uses tid Yes Blake Krueger DO   metFORMIN (GLUCOPHAGE) 1000 MG tablet Take 1 tablet by mouth 2 times daily (with meals)  Patient not taking: Reported on 3/16/2023  Gillian Anderson DO fluticasone (FLONASE) 50 MCG/ACT nasal spray 2 sprays by Nasal route daily  Patient not taking: Reported on 3/16/2023  EVETTE Serrano        Social History     Tobacco Use    Smoking status: Former     Years: 0.00     Types: Cigarettes     Quit date: 1999     Years since quittin.4    Smokeless tobacco: Never    Tobacco comments:     smoked for 1 months when teenager   Substance Use Topics    Alcohol use: Not Currently        Vitals:    23 1540   BP: 112/60   Site: Right Upper Arm   Position: Sitting   Cuff Size: Medium Adult   Pulse: 85   Temp: 98 °F (36.7 °C)   SpO2: 97%     Estimated body mass index is 25.89 kg/m² as calculated from the following:    Height as of 8/3/22: 5' 1\" (1.549 m). Weight as of 23: 137 lb (62.1 kg). Physical Exam  Vitals reviewed. Constitutional:       Appearance: Normal appearance. HENT:      Mouth/Throat:      Lips: Pink. Mouth: Mucous membranes are moist.      Pharynx: Oropharynx is clear. Uvula midline. No pharyngeal swelling, oropharyngeal exudate, posterior oropharyngeal erythema or uvula swelling. Tonsils: No tonsillar exudate or tonsillar abscesses. Cardiovascular:      Rate and Rhythm: Normal rate and regular rhythm. Pulses: Normal pulses. Heart sounds: Normal heart sounds. Pulmonary:      Effort: Pulmonary effort is normal.      Breath sounds: Normal breath sounds. Musculoskeletal:      Right foot: Normal.      Left foot: Decreased range of motion. Normal capillary refill. Swelling, tenderness and bony tenderness present. Normal pulse. Legs:    Skin:     General: Skin is warm and dry. Findings: No abrasion, bruising, ecchymosis, erythema, signs of injury, laceration, lesion or wound. Neurological:      Mental Status: She is alert. Psychiatric:         Mood and Affect: Mood normal.         Behavior: Behavior normal.         Thought Content:  Thought content normal.         Judgment: Judgment normal. PDMP Monitoring:    Last PDMP Flaca Castaneda as Reviewed:  Review User Review Instant Review Result   Gomez Lazo 3/16/2023  3:52 PM Reviewed PDMP [1]     Last Controlled Substance Monitoring Documentation      [id number]  ED from 12/9/2021 in [facility]  ED   Periodic Controlled Substance Monitoring No signs of potential drug abuse or diversion identified. filed at 12/09/2021 1727          Urine Drug Screenings (1 yr)    No resulted procedures found. Medication Contract and Consent for Opioid Use Documents Filed        No documents found                      ASSESSMENT/PLAN:  1. Left foot pain  Pt is tearful d/t increased pain and is fearful that she will not be able to work d/t increased pain. New referral given to her for podiatry since her podiatrist could not get her in. Ace wrap applied to left foot. PDMP rev'd. Prescribed Ultram to take as needed for pain. Advised pt that this may cause drowsiness and to not drink alcohol or operate heavy machinery while taking.     - External Referral To Podiatry  - Apply ace wrap  - traMADol (ULTRAM) 50 MG tablet; Take 1 tablet by mouth every 4 hours as needed for Pain for up to 3 days. Intended supply: 3 days. Take lowest dose possible to manage pain Max Daily Amount: 300 mg  Dispense: 18 tablet; Refill: 0    2. Sore throat  Normal PE. POC strep and flu tests are negative. Will send throat culture. Recommended warm salt water gargles and throat lozenges to help with pain. Will f/u with pt regarding culture results. - POCT rapid strep A  - POCT Influenza A/B  - Culture, Throat    Return if symptoms worsen or fail to improve. I have spent 33 minutes with this pt encounter today. An electronic signature was used to authenticate this note.     --IRASEMA Burnett - CNP on 3/16/2023 at 4:16 PM

## 2023-03-18 LAB — BACTERIA THROAT AEROBE CULT: NORMAL

## 2023-04-03 RX ORDER — IBUPROFEN 800 MG/1
TABLET ORAL
Qty: 90 TABLET | Refills: 1 | Status: SHIPPED | OUTPATIENT
Start: 2023-04-03

## 2023-04-03 NOTE — TELEPHONE ENCOUNTER
Refill Request     CONFIRM preferred pharmacy with the patient. If Mail Order Rx - Pend for 90 day refill. Last Seen: Last Seen Department: 3/16/2023  Last Seen by PCP: 11/4/2022    Last Written: 3/16/2023    If no future appointment scheduled:  Review the last OV with PCP and review information for follow-up visit,  Route STAFF MESSAGE with patient name to the [facility] for scheduling with the following information:            -  Timing of next visit           -  Visit type ie Physical, OV, etc           -  Diagnoses/Reason ie. COPD, HTN - Do not use MEDICATION, Follow-up or CHECK UP - Give reason for visit      Next Appointment:   Future Appointments   Date Time Provider Tomy Thayer   7/12/2023  3:30 PM Blake Krueger, DO [facility]       Message sent to [address] [place] [address] to schedule appt with patient?   NO      Requested Prescriptions     Pending Prescriptions Disp Refills    ibuprofen (ADVIL;MOTRIN) 800 MG tablet [Pharmacy Med Name: IBUPROFEN 800 MG TABLET] 90 tablet 1     Sig: TAKE 1 TABLET BY MOUTH 3 TIMES A DAY AS NEEDED FOR PAIN

## 2023-04-26 ENCOUNTER — HOSPITAL ENCOUNTER (EMERGENCY)
Age: 43
Discharge: HOME | End: 2023-04-26
Payer: COMMERCIAL

## 2023-04-26 ENCOUNTER — APPOINTMENT (OUTPATIENT)
Dept: GENERAL RADIOLOGY | Age: 43
End: 2023-04-26
Payer: COMMERCIAL

## 2023-04-26 VITALS
DIASTOLIC BLOOD PRESSURE: 66 MMHG | RESPIRATION RATE: 16 BRPM | OXYGEN SATURATION: 100 % | HEART RATE: 65 BPM | SYSTOLIC BLOOD PRESSURE: 102 MMHG | TEMPERATURE: 97 F

## 2023-04-26 DIAGNOSIS — R07.81 RIB PAIN: Primary | ICD-10-CM
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
## 2023-04-26 PROCEDURE — 99283 EMERGENCY DEPT VISIT LOW MDM: CPT

## 2023-04-26 PROCEDURE — 71046 X-RAY EXAM CHEST 2 VIEWS: CPT

## 2023-04-26 PROCEDURE — 6370000000 HC RX 637 (ALT 250 FOR IP): Performed by: PHYSICIAN ASSISTANT

## 2023-04-26 RX ORDER — ACETAMINOPHEN 325 MG/1
650 TABLET ORAL ONCE
Status: COMPLETED | OUTPATIENT
Start: 2023-04-26 | End: 2023-04-26

## 2023-04-26 RX ORDER — HYDROCODONE BITARTRATE AND ACETAMINOPHEN 5; 325 MG/1; MG/1
1 TABLET ORAL ONCE
Status: COMPLETED | OUTPATIENT
Start: 2023-04-26 | End: 2023-04-26

## 2023-04-26 RX ORDER — IBUPROFEN 800 MG/1
800 TABLET ORAL ONCE
Status: COMPLETED | OUTPATIENT
Start: 2023-04-26 | End: 2023-04-26

## 2023-04-26 RX ORDER — LIDOCAINE 50 MG/G
1 PATCH TOPICAL DAILY
Qty: 10 PATCH | Refills: 0 | Status: SHIPPED | OUTPATIENT
Start: 2023-04-26 | End: 2023-05-06

## 2023-04-26 RX ADMIN — HYDROCODONE BITARTRATE AND ACETAMINOPHEN 1 TABLET: 5; 325 TABLET ORAL at 08:37

## 2023-04-26 RX ADMIN — MOXIFLOXACIN HYDROCHLORIDE 800 MG: 400 TABLET, FILM COATED ORAL at 08:39

## 2023-04-26 RX ADMIN — ACETAMINOPHEN 650 MG: 325 TABLET ORAL at 08:38

## 2023-04-26 ASSESSMENT — PAIN DESCRIPTION - ORIENTATION: ORIENTATION: LEFT

## 2023-04-26 ASSESSMENT — PAIN DESCRIPTION - PAIN TYPE: TYPE: ACUTE PAIN

## 2023-04-26 ASSESSMENT — PAIN SCALES - GENERAL: PAINLEVEL_OUTOF10: 4

## 2023-04-26 ASSESSMENT — PAIN - FUNCTIONAL ASSESSMENT: PAIN_FUNCTIONAL_ASSESSMENT: 0-10

## 2023-04-26 ASSESSMENT — PAIN DESCRIPTION - LOCATION: LOCATION: RIB CAGE;SHOULDER

## 2023-04-26 NOTE — DISCHARGE INSTRUCTIONS
-Follow up with your primary doctor next week for a recheck.   -Follow up with your foot doctor.   -Come back if you feel worse. -Take 400mg Ibuprofen (Motrin) and 500mg Acetaminophen (Tylenol) every 4 hours for pain.

## 2023-04-26 NOTE — ED PROVIDER NOTES
[address] [facility]  ED  EMERGENCY DEPARTMENT ENCOUNTER        Pt Name: Lili Davey  MRN: [id number]  [place] 1980  Date of evaluation: 4/26/2023  Provider: EVETTE Bolden  PCP: Jessica Jane DO  Note Started: 8:20 AM EDT       YENIFER. I have evaluated this patient. My supervising physician was available for consultation. CHIEF COMPLAINT       Chief Complaint   Patient presents with    Fall     Pt was getting up to go to the bathroom,  she is on crutches currently. Pt reports she fell down 15 steps, carpeted. Landed on left side ontop of her crutch. Pain with deep breath. Unsure if she was dizzy. Pt reports no loc. Pt is alert and oriented. HISTORY OF PRESENT ILLNESS      Chief Complaint: Fall downstairs    Lili Davey is a 39 y.o. female who presents to the emergency room after falling down approximately 15 carpeted steps. She reports pain in her left chest, worse when she takes a deep breath. She denies head or neck pain. No pain in her arms or legs. She is on crutches currently due to a stress fracture in her foot. She broke her crutches and her fall. SCREENINGS           Is this patient to be included in the SEP-1 Core Measure due to severe sepsis or septic shock? No   Exclusion criteria - the patient is NOT to be included for SEP-1 Core Measure due to: Infection is not suspected      PHYSICAL EXAM     Vitals: /66   Pulse 65   Temp 97 °F (36.1 °C)   Resp 16   LMP 12/18/2008 (Approximate)   SpO2 100%    General: awake, alert, no apparent distress  Pupils: equal, reactive  Head: Non-traumatic  Neck: No midline tenderness  Back: No midline tenderness. No paraspinal tenderness. Heart: Rate as noted, regular rhythm, no murmur or rubs.   Chest/Lungs: Tender to palpation left chest.  Abrasions to the left chest.  Abdomen: soft, nondistended, no tenderness to palpation   Extremities:  cap refill <2 UE/LE, no tenderness of calves, no

## 2023-04-30 ENCOUNTER — APPOINTMENT (OUTPATIENT)
Dept: GENERAL RADIOLOGY | Age: 43
End: 2023-04-30
Payer: COMMERCIAL

## 2023-04-30 ENCOUNTER — HOSPITAL ENCOUNTER (EMERGENCY)
Age: 43
Discharge: HOME | End: 2023-04-30
Payer: COMMERCIAL

## 2023-04-30 VITALS
SYSTOLIC BLOOD PRESSURE: 122 MMHG | WEIGHT: 140 LBS | OXYGEN SATURATION: 98 % | RESPIRATION RATE: 18 BRPM | HEART RATE: 70 BPM | HEIGHT: 61 IN | TEMPERATURE: 98.7 F | DIASTOLIC BLOOD PRESSURE: 84 MMHG | BODY MASS INDEX: 26.43 KG/M2

## 2023-04-30 DIAGNOSIS — S20.212D CONTUSION OF LEFT CHEST WALL, SUBSEQUENT ENCOUNTER: Primary | ICD-10-CM

## 2023-04-30 PROCEDURE — 6360000002 HC RX W HCPCS: Performed by: PHYSICIAN ASSISTANT

## 2023-04-30 PROCEDURE — 96372 THER/PROPH/DIAG INJ SC/IM: CPT

## 2023-04-30 PROCEDURE — 99284 EMERGENCY DEPT VISIT MOD MDM: CPT

## 2023-04-30 PROCEDURE — 71101 X-RAY EXAM UNILAT RIBS/CHEST: CPT

## 2023-04-30 RX ORDER — KETOROLAC TROMETHAMINE 30 MG/ML
30 INJECTION, SOLUTION INTRAMUSCULAR; INTRAVENOUS ONCE
Status: COMPLETED | OUTPATIENT
Start: 2023-04-30 | End: 2023-04-30

## 2023-04-30 RX ADMIN — KETOROLAC TROMETHAMINE 30 MG: 30 INJECTION, SOLUTION INTRAMUSCULAR at 13:36

## 2023-04-30 ASSESSMENT — PAIN - FUNCTIONAL ASSESSMENT: PAIN_FUNCTIONAL_ASSESSMENT: 0-10

## 2023-04-30 ASSESSMENT — PAIN SCALES - GENERAL
PAINLEVEL_OUTOF10: 5
PAINLEVEL_OUTOF10: 6

## 2023-04-30 ASSESSMENT — PAIN DESCRIPTION - ORIENTATION: ORIENTATION: LEFT

## 2023-04-30 ASSESSMENT — PAIN DESCRIPTION - LOCATION: LOCATION: RIB CAGE

## 2023-05-02 ENCOUNTER — TELEPHONE (OUTPATIENT)
Dept: FAMILY MEDICINE CLINIC | Age: 43
End: 2023-05-02

## 2023-05-02 NOTE — TELEPHONE ENCOUNTER
Patient: Torrie Thompson Patient : 1980      Patient call back number- [phone]     Spoke with: PT    Symptom(s) patient is experiencing- PT called stating she was in the ER over the weekend for a fall and chest and rib pain. She advised that the pain is getting worse and they gave her an Toradol injection 30 mg, and she wanted another injection to help with the PAIN, and  that the OTC medications are not working. I advised she would need to be evaluated in the office prior to any medication administrations, and also we are out of that medication at this time. PT stated she will wait a few days, I advised I can still make a Appt today for her, PT refused and hung up the phone. Symptom onset has been constant for a time period of 3 day(s). Severity is described as severe. Course of her symptoms over time is worsening. Does anything make the symptom(s) better or worse? - Yes Toradol injection    Have you experienced this before?-no    Any pertinent medical history? no      Have you taken anything (prescriptions or OTC) to help with symptom(s)?- no   - If YES, did it help?- No      Is patient having pain? Yes   Location of the pain- Rib  Describe the pain (sharp, stabbing, aching, burning, dull, etc)-- tender  Constant or intermittent- severe  Rate pain on a scale of 1 to 10: 4  Does is it radiate to other areas or just in one spot?- nonradiating      Call Outcome/Determination of next steps for patient- Self-Care Instructions Provided as advised by provider    Advised to call back directly if there are further questions, or if these symptoms fail to improve as anticipated or worsen.       Plan of Care reviewed and discussed with PCP: Yes       Electronically signed by Jesus Larios LPN on 9400 at 9:12 AM

## 2023-05-16 DIAGNOSIS — M79.672 LEFT FOOT PAIN: ICD-10-CM

## 2023-05-16 RX ORDER — TRAMADOL HYDROCHLORIDE 50 MG/1
50 TABLET ORAL 2 TIMES DAILY PRN
Qty: 30 TABLET | Refills: 0 | Status: SHIPPED | OUTPATIENT
Start: 2023-05-16 | End: 2023-06-14

## 2023-06-15 ENCOUNTER — TELEPHONE (OUTPATIENT)
Dept: ADMINISTRATIVE | Age: 43
End: 2023-06-15

## 2023-06-15 NOTE — TELEPHONE ENCOUNTER
Submitted PA for traMADol HCl 50MG tablets  Via CMM Key: [id number] STATUS: The member benefit does not include pharmacy drug coverage. Eligible drugs may be covered under the medical benefit. Please notify patient. Thank you.

## 2023-07-20 ENCOUNTER — APPOINTMENT (OUTPATIENT)
Dept: CT IMAGING | Age: 43
End: 2023-07-20
Payer: MEDICAID

## 2023-07-20 ENCOUNTER — HOSPITAL ENCOUNTER (EMERGENCY)
Age: 43
Discharge: HOME | End: 2023-07-20
Attending: STUDENT IN AN ORGANIZED HEALTH CARE EDUCATION/TRAINING PROGRAM
Payer: MEDICAID

## 2023-07-20 VITALS
WEIGHT: 142 LBS | HEART RATE: 75 BPM | BODY MASS INDEX: 26.81 KG/M2 | RESPIRATION RATE: 15 BRPM | OXYGEN SATURATION: 98 % | DIASTOLIC BLOOD PRESSURE: 54 MMHG | SYSTOLIC BLOOD PRESSURE: 98 MMHG | HEIGHT: 61 IN | TEMPERATURE: 98.4 F

## 2023-07-20 DIAGNOSIS — B02.9 HERPES ZOSTER WITHOUT COMPLICATION: Primary | ICD-10-CM

## 2023-07-20 LAB
ALBUMIN SERPL-MCNC: 4 G/DL (ref 3.4–5)
ALBUMIN/GLOB SERPL: 1.3 {RATIO} (ref 1.1–2.2)
ALP SERPL-CCNC: 182 U/L (ref 40–129)
ALT SERPL-CCNC: 16 U/L (ref 10–40)
ANION GAP SERPL CALCULATED.3IONS-SCNC: 11 MMOL/L (ref 3–16)
AST SERPL-CCNC: 12 U/L (ref 15–37)
BASOPHILS # BLD: 0 K/UL (ref 0–0.2)
BASOPHILS NFR BLD: 0.5 %
BILIRUB SERPL-MCNC: 0.3 MG/DL (ref 0–1)
BILIRUB UR QL STRIP.AUTO: NEGATIVE
BUN SERPL-MCNC: 18 MG/DL (ref 7–20)
CALCIUM SERPL-MCNC: 9.4 MG/DL (ref 8.3–10.6)
CHLORIDE SERPL-SCNC: 101 MMOL/L (ref 99–110)
CLARITY UR: CLEAR
CO2 SERPL-SCNC: 26 MMOL/L (ref 21–32)
COLOR UR: YELLOW
CREAT SERPL-MCNC: <0.5 MG/DL (ref 0.6–1.1)
DEPRECATED RDW RBC AUTO: 13.3 % (ref 12.4–15.4)
EOSINOPHIL # BLD: 0.2 K/UL (ref 0–0.6)
EOSINOPHIL NFR BLD: 3.7 %
GFR SERPLBLD CREATININE-BSD FMLA CKD-EPI: >60 ML/MIN/{1.73_M2}
GLUCOSE SERPL-MCNC: 345 MG/DL (ref 70–99)
GLUCOSE UR STRIP.AUTO-MCNC: 500 MG/DL
HCT VFR BLD AUTO: 36.7 % (ref 36–48)
HGB BLD-MCNC: 12.7 G/DL (ref 12–16)
HGB UR QL STRIP.AUTO: NEGATIVE
KETONES UR STRIP.AUTO-MCNC: NEGATIVE MG/DL
LEUKOCYTE ESTERASE UR QL STRIP.AUTO: NEGATIVE
LYMPHOCYTES # BLD: 2.5 K/UL (ref 1–5.1)
LYMPHOCYTES NFR BLD: 44.5 %
MCH RBC QN AUTO: 30.4 PG (ref 26–34)
MCHC RBC AUTO-ENTMCNC: 34.7 G/DL (ref 31–36)
MCV RBC AUTO: 87.7 FL (ref 80–100)
MONOCYTES # BLD: 0.5 K/UL (ref 0–1.3)
MONOCYTES NFR BLD: 9.3 %
NEUTROPHILS # BLD: 2.4 K/UL (ref 1.7–7.7)
NEUTROPHILS NFR BLD: 42 %
NITRITE UR QL STRIP.AUTO: NEGATIVE
PH UR STRIP.AUTO: 7 [PH] (ref 5–8)
PLATELET # BLD AUTO: 199 K/UL (ref 135–450)
PMV BLD AUTO: 8.4 FL (ref 5–10.5)
POTASSIUM SERPL-SCNC: 3.7 MMOL/L (ref 3.5–5.1)
PROT SERPL-MCNC: 7 G/DL (ref 6.4–8.2)
PROT UR STRIP.AUTO-MCNC: NEGATIVE MG/DL
RBC # BLD AUTO: 4.19 M/UL (ref 4–5.2)
SODIUM SERPL-SCNC: 138 MMOL/L (ref 136–145)
SP GR UR STRIP.AUTO: 1.02 (ref 1–1.03)
UA COMPLETE W REFLEX CULTURE PNL UR: ABNORMAL
UA DIPSTICK W REFLEX MICRO PNL UR: ABNORMAL
URN SPEC COLLECT METH UR: ABNORMAL
UROBILINOGEN UR STRIP-ACNC: 0.2 E.U./DL
WBC # BLD AUTO: 5.7 K/UL (ref 4–11)

## 2023-07-20 PROCEDURE — 74176 CT ABD & PELVIS W/O CONTRAST: CPT

## 2023-07-20 PROCEDURE — 99284 EMERGENCY DEPT VISIT MOD MDM: CPT

## 2023-07-20 PROCEDURE — 6360000002 HC RX W HCPCS: Performed by: STUDENT IN AN ORGANIZED HEALTH CARE EDUCATION/TRAINING PROGRAM

## 2023-07-20 PROCEDURE — 96361 HYDRATE IV INFUSION ADD-ON: CPT

## 2023-07-20 PROCEDURE — 80053 COMPREHEN METABOLIC PANEL: CPT

## 2023-07-20 PROCEDURE — 81003 URINALYSIS AUTO W/O SCOPE: CPT

## 2023-07-20 PROCEDURE — 6370000000 HC RX 637 (ALT 250 FOR IP): Performed by: STUDENT IN AN ORGANIZED HEALTH CARE EDUCATION/TRAINING PROGRAM

## 2023-07-20 PROCEDURE — 85025 COMPLETE CBC W/AUTO DIFF WBC: CPT

## 2023-07-20 PROCEDURE — 2580000003 HC RX 258: Performed by: STUDENT IN AN ORGANIZED HEALTH CARE EDUCATION/TRAINING PROGRAM

## 2023-07-20 PROCEDURE — 96374 THER/PROPH/DIAG INJ IV PUSH: CPT

## 2023-07-20 PROCEDURE — 96375 TX/PRO/DX INJ NEW DRUG ADDON: CPT

## 2023-07-20 RX ORDER — 0.9 % SODIUM CHLORIDE 0.9 %
1000 INTRAVENOUS SOLUTION INTRAVENOUS ONCE
Status: COMPLETED | OUTPATIENT
Start: 2023-07-20 | End: 2023-07-20

## 2023-07-20 RX ORDER — VALACYCLOVIR HYDROCHLORIDE 1 G/1
1000 TABLET, FILM COATED ORAL 3 TIMES DAILY
Qty: 21 TABLET | Refills: 0 | Status: SHIPPED | OUTPATIENT
Start: 2023-07-20 | End: 2023-07-27

## 2023-07-20 RX ORDER — HYDROCODONE BITARTRATE AND ACETAMINOPHEN 5; 325 MG/1; MG/1
1 TABLET ORAL EVERY 6 HOURS PRN
Qty: 12 TABLET | Refills: 0 | Status: SHIPPED | OUTPATIENT
Start: 2023-07-20 | End: 2023-07-21

## 2023-07-20 RX ORDER — HYDROCODONE BITARTRATE AND ACETAMINOPHEN 5; 325 MG/1; MG/1
1 TABLET ORAL
Status: COMPLETED | OUTPATIENT
Start: 2023-07-20 | End: 2023-07-20

## 2023-07-20 RX ORDER — KETOROLAC TROMETHAMINE 30 MG/ML
15 INJECTION, SOLUTION INTRAMUSCULAR; INTRAVENOUS ONCE
Status: COMPLETED | OUTPATIENT
Start: 2023-07-20 | End: 2023-07-20

## 2023-07-20 RX ORDER — ONDANSETRON 2 MG/ML
4 INJECTION INTRAMUSCULAR; INTRAVENOUS ONCE
Status: COMPLETED | OUTPATIENT
Start: 2023-07-20 | End: 2023-07-20

## 2023-07-20 RX ADMIN — SODIUM CHLORIDE 1000 ML: 9 INJECTION, SOLUTION INTRAVENOUS at 02:45

## 2023-07-20 RX ADMIN — HYDROCODONE BITARTRATE AND ACETAMINOPHEN 1 TABLET: 5; 325 TABLET ORAL at 04:40

## 2023-07-20 RX ADMIN — ONDANSETRON 4 MG: 2 INJECTION INTRAMUSCULAR; INTRAVENOUS at 02:45

## 2023-07-20 RX ADMIN — KETOROLAC TROMETHAMINE 15 MG: 30 INJECTION, SOLUTION INTRAMUSCULAR; INTRAVENOUS at 02:45

## 2023-07-20 ASSESSMENT — LIFESTYLE VARIABLES
HOW MANY STANDARD DRINKS CONTAINING ALCOHOL DO YOU HAVE ON A TYPICAL DAY: PATIENT DOES NOT DRINK
HOW OFTEN DO YOU HAVE A DRINK CONTAINING ALCOHOL: NEVER

## 2023-07-20 ASSESSMENT — PAIN SCALES - GENERAL
PAINLEVEL_OUTOF10: 7
PAINLEVEL_OUTOF10: 4

## 2023-07-20 NOTE — ED NOTES
RN discussed discharge instructions with pt including medications and follow up. Pt was instructed not to drink alcohol or drive after taking pain medications. Pt verbalized understanding. IV removed with no complications. Pt left stable and ambulatory with all belongings and family. Pt did not drive home after receiving pain meds.       Lisa Echeverria RN  07/20/23 5660

## 2023-07-20 NOTE — DISCHARGE INSTRUCTIONS
Return to nearest ED if you develop severe worsening pain, fevers, other concerning symptoms. Take the valacyclovir as prescribed. You can take Tylenol and ibuprofen as needed. If this does not control your pain you can take the hydrocodone. Do not drive or drink alcohol while taking hydrocodone.

## 2023-07-21 ENCOUNTER — TELEMEDICINE (OUTPATIENT)
Dept: PRIMARY CARE CLINIC | Age: 43
End: 2023-07-21
Payer: MEDICAID

## 2023-07-21 DIAGNOSIS — B02.9 ACUTE PAIN ASSOCIATED WITH HERPES ZOSTER: Primary | ICD-10-CM

## 2023-07-21 PROCEDURE — 99213 OFFICE O/P EST LOW 20 MIN: CPT | Performed by: NURSE PRACTITIONER

## 2023-07-21 RX ORDER — LIDOCAINE 50 MG/G
1 PATCH TOPICAL DAILY
Qty: 10 PATCH | Refills: 0 | Status: SHIPPED | OUTPATIENT
Start: 2023-07-21 | End: 2023-07-31

## 2023-07-21 RX ORDER — METHYLPREDNISOLONE 4 MG/1
TABLET ORAL
Qty: 1 KIT | Refills: 0 | Status: SHIPPED | OUTPATIENT
Start: 2023-07-21 | End: 2023-07-27

## 2023-07-21 ASSESSMENT — ENCOUNTER SYMPTOMS
RESPIRATORY NEGATIVE: 1
GASTROINTESTINAL NEGATIVE: 1

## 2023-07-21 NOTE — PROGRESS NOTES
2023    TELEHEALTH EVALUATION -- Audio/Visual    Chief Complaint   Patient presents with    Herpes Zoster     Right side at waist line      Pain     Not relieved by Pain medication given in ED (hydrocodone prescribed by ED)       HPI:    Dali Sanford (:  1980) has requested an audio/video evaluation for the following concern(s): This is an established patient of Dr. Jose Weiss. This is the first time I am seeing her today. She was seen yesterday for a couple day of right side pain and found to have shingles. She is diabetic and sugars range 200's she states she has had a couple days in the 80s. BS at ED was 345. She was given  Hydrocodone and states that this is not working for her pain. She is already taking her Gabapentin. She states she is having unbelievable itching and pain. I will start Medrol dose pack and she will call with increased BS and any signs or hyperglycemia. She was not able to go to work today and needs work note. She is so ill she sent both her young children to her families house to take care of them for her. Review of Systems   Constitutional:  Positive for fatigue. Negative for chills and fever. HENT: Negative. Respiratory: Negative. Cardiovascular: Negative. Gastrointestinal: Negative. Genitourinary: Negative. Musculoskeletal: Negative. Skin:  Positive for rash (right side at waist line hepes zoster vesicles noted). Neurological:  Negative for dizziness, weakness and numbness. Prior to Visit Medications    Medication Sig Taking? Authorizing Provider   methylPREDNISolone (MEDROL DOSEPACK) 4 MG tablet Take by mouth. Yes IRASEMA Mchugh - CNP   lidocaine (LIDODERM) 5 % Place 1 patch onto the skin daily for 10 days 12 hours on, 12 hours off.  Yes Alice Vega APRN - CNP   valACYclovir (VALTREX) 1 g tablet Take 1 tablet by mouth 3 times daily for 7 days Yes Marisela Centeno,    gabapentin (NEURONTIN) 800 MG tablet TAKE 1 TABLET BY MOUTH 3

## 2023-07-22 NOTE — PATIENT INSTRUCTIONS
Notify office if you have no improvement or worsening of condition. Continue with Valtrex. Will stop hydrocodone if not working. Will order medrol dose pack and lidocaine patches and she will also been instructed on calamine use. Monitor BS and report if higher than usual and hyperglycemia s/s. She will follow up with PCP on Monday if no improvement. ED if pain worsening and no relief. Try not to scratch or pick at the blisters. Keep the blisters moist until they heal over. One way to do this is to cover them with a thin layer of petroleum jelly, such as Vaseline, and a nonstick bandage. Avoid close contact with people until the blisters have healed. It is very important for you to avoid contact with anyone who has never had chickenpox or the chickenpox vaccine. Amilcar Gaston babies and anyone who is pregnant or has a hard time fighting infection (such as someone with HIV, diabetes, or cancer) are especially at risk. Please refer to educational handouts.

## 2023-07-23 ENCOUNTER — TELEPHONE (OUTPATIENT)
Dept: FAMILY MEDICINE CLINIC | Age: 43
End: 2023-07-23

## 2023-09-22 RX ORDER — IBUPROFEN 800 MG/1
TABLET ORAL
Qty: 90 TABLET | Refills: 1 | Status: SHIPPED | OUTPATIENT
Start: 2023-09-22

## 2023-09-22 NOTE — TELEPHONE ENCOUNTER
Refill Request     CONFIRM preferred pharmacy with the patient. If Mail Order Rx - Pend for 90 day refill. Last Seen: Last Seen Department: 3/16/2023  Last Seen by PCP: 9/26/2022    Last Written: 06/17/23 90 with 1 refill    If no future appointment scheduled:  Review the last OV with PCP and review information for follow-up visit,  Route STAFF MESSAGE with patient name to the [facility] for scheduling with the following information:            -  Timing of next visit           -  Visit type ie Physical, OV, etc           -  Diagnoses/Reason ie. COPD, HTN - Do not use MEDICATION, Follow-up or CHECK UP - Give reason for visit      Next Appointment:   No future appointments. Message sent to [employer] [facility] to schedule appt with patient?   NO      Requested Prescriptions     Pending Prescriptions Disp Refills    ibuprofen (ADVIL;MOTRIN) 800 MG tablet [Pharmacy Med Name: IBUPROFEN 800 MG TABLET] 90 tablet 1     Sig: TAKE 1 TABLET BY MOUTH THREE TIMES A DAY AS NEEDED FOR PAIN

## 2023-11-08 DIAGNOSIS — R20.8 BURNING SENSATION OF FEET: ICD-10-CM

## 2023-11-08 DIAGNOSIS — M79.2 NEUROPATHIC PAIN: ICD-10-CM

## 2023-11-08 RX ORDER — GABAPENTIN 800 MG/1
TABLET ORAL
Qty: 120 TABLET | Refills: 1 | Status: SHIPPED | OUTPATIENT
Start: 2023-11-08 | End: 2023-12-08

## 2023-11-08 NOTE — TELEPHONE ENCOUNTER
Refill Request     CONFIRM preferred pharmacy with the patient.    If Mail Order Rx - Pend for 90 day refill.      Last Seen: Last Seen Department: 3/16/2023  Last Seen by PCP: 9/26/2022    Last Written: 6/14/2023    If no future appointment scheduled:  Review the last OV with PCP and review information for follow-up visit,  Route STAFF MESSAGE with patient name to the  Pool for scheduling with the following information:            -  Timing of next visit           -  Visit type ie Physical, OV, etc           -  Diagnoses/Reason ie. COPD, HTN - Do not use MEDICATION, Follow-up or CHECK UP - Give reason for visit      Next Appointment:   No future appointments.    Message sent to  to schedule appt with patient?  NO      Requested Prescriptions     Pending Prescriptions Disp Refills    gabapentin (NEURONTIN) 800 MG tablet [Pharmacy Med Name: GABAPENTIN 800 MG TABLET] 120 tablet 3     Sig: TAKE 1 TABLET BY MOUTH 3 TIMES A DAY MAY TAKE 2 TABLETS AT BEDTIME

## 2023-11-21 ENCOUNTER — TELEPHONE (OUTPATIENT)
Dept: FAMILY MEDICINE CLINIC | Age: 43
End: 2023-11-21

## 2023-11-21 NOTE — TELEPHONE ENCOUNTER
please call the patient and schedule a diabetes follow up appointment, needs to be in person.  Thank you.

## 2023-12-11 ENCOUNTER — HOSPITAL ENCOUNTER (EMERGENCY)
Age: 43
Discharge: HOME | End: 2023-12-11

## 2023-12-11 VITALS
OXYGEN SATURATION: 95 % | HEIGHT: 61 IN | BODY MASS INDEX: 30.21 KG/M2 | TEMPERATURE: 99 F | DIASTOLIC BLOOD PRESSURE: 77 MMHG | RESPIRATION RATE: 18 BRPM | WEIGHT: 160 LBS | HEART RATE: 99 BPM | SYSTOLIC BLOOD PRESSURE: 153 MMHG

## 2023-12-11 DIAGNOSIS — M79.672 LEFT FOOT PAIN: ICD-10-CM

## 2023-12-11 DIAGNOSIS — K08.89 PAIN, DENTAL: Primary | ICD-10-CM

## 2023-12-11 PROCEDURE — 99283 EMERGENCY DEPT VISIT LOW MDM: CPT

## 2023-12-11 PROCEDURE — 6370000000 HC RX 637 (ALT 250 FOR IP): Performed by: NURSE PRACTITIONER

## 2023-12-11 RX ORDER — PENICILLIN V POTASSIUM 250 MG/1
500 TABLET ORAL ONCE
Status: COMPLETED | OUTPATIENT
Start: 2023-12-11 | End: 2023-12-11

## 2023-12-11 RX ORDER — TRAMADOL HYDROCHLORIDE 50 MG/1
50 TABLET ORAL 2 TIMES DAILY PRN
Qty: 14 TABLET | Refills: 1 | Status: SHIPPED | OUTPATIENT
Start: 2023-12-11 | End: 2023-12-25

## 2023-12-11 RX ORDER — NAPROXEN 250 MG/1
500 TABLET ORAL ONCE
Status: COMPLETED | OUTPATIENT
Start: 2023-12-11 | End: 2023-12-11

## 2023-12-11 RX ORDER — DICLOFENAC SODIUM 75 MG/1
75 TABLET, DELAYED RELEASE ORAL 2 TIMES DAILY
Qty: 14 TABLET | Refills: 0 | Status: SHIPPED | OUTPATIENT
Start: 2023-12-11 | End: 2023-12-18

## 2023-12-11 RX ORDER — PENICILLIN V POTASSIUM 500 MG/1
500 TABLET ORAL 4 TIMES DAILY
Qty: 28 TABLET | Refills: 0 | Status: SHIPPED | OUTPATIENT
Start: 2023-12-11 | End: 2023-12-18

## 2023-12-11 RX ADMIN — PENICILLIN V POTASSIUM 500 MG: 250 TABLET, FILM COATED ORAL at 22:38

## 2023-12-11 RX ADMIN — NAPROXEN 500 MG: 250 TABLET ORAL at 22:38

## 2023-12-11 ASSESSMENT — PAIN SCALES - GENERAL: PAINLEVEL_OUTOF10: 10

## 2023-12-11 ASSESSMENT — PAIN - FUNCTIONAL ASSESSMENT: PAIN_FUNCTIONAL_ASSESSMENT: 0-10

## 2023-12-11 ASSESSMENT — PAIN DESCRIPTION - LOCATION: LOCATION: TEETH

## 2023-12-11 ASSESSMENT — PAIN DESCRIPTION - DESCRIPTORS: DESCRIPTORS: ACHING

## 2023-12-11 ASSESSMENT — PAIN DESCRIPTION - ORIENTATION: ORIENTATION: RIGHT

## 2023-12-11 NOTE — TELEPHONE ENCOUNTER
Hi FD, please help schedule patient for check up visit for Tramadol. In Dr. Khoa Hurtado or Dr. Nitesh Villarreal next available. Thanks!

## 2023-12-11 NOTE — TELEPHONE ENCOUNTER
Refill Request - Controlled Substance    CONFIRM preferred pharmacy with the patient. If Mail Order Rx - Pend for 90 day refill. Last Seen Department: 3/16/2023    Last Seen by PCP: 9/26/2022    Last Written: 6/14/23 30 tablet 0 refills    Last UDS: n/a    Med Agreement Signed On: n/a    If no future appointment scheduled:  Review the last OV with PCP and review information for follow-up visit,  Route STAFF MESSAGE with patient name to the [facility] for scheduling with the following information:            -  Timing of next visit           -  Visit type ie Physical, OV, etc           -  Diagnoses/Reason ie. COPD, HTN - Do not use MEDICATION, Follow-up or CHECK UP - Give reason for visit        Next Appointment: n/a  No future appointments. Message sent to [facility] to schedule appt with patient? NO      Requested Prescriptions     Pending Prescriptions Disp Refills    traMADol (ULTRAM) 50 MG tablet [Pharmacy Med Name: TRAMADOL HCL 50 MG TABLET] 14 tablet 2     Sig: Take 1 tablet by mouth 2 times daily as needed for Pain for up to 15 days.  Max Daily Amount: 100 mg

## 2023-12-11 NOTE — TELEPHONE ENCOUNTER
Phone line no longer in service, sending a message in [address] [place] [address] and mailing a letter.

## 2023-12-13 NOTE — ED PROVIDER NOTES
[address] [place] [address]  ED  EMERGENCY DEPARTMENT ENCOUNTER        Pt Name: Dali Sanford  MRN: [id number]  [address] [facility] 1980  Date of evaluation: 2023  Provider: IRASEMA Rivero - CNP  PCP: Aurora [place] DO        YENIFER. I have evaluated this patient. CHIEF COMPLAINT       Chief Complaint   Patient presents with    Dental Pain     Since yesterday. 10/10 pain. Tylenol and ibuprofen at home       HISTORY OF PRESENT ILLNESS: 1 or more Elements     History From: the patient  Limitations to history : None    Dali Sanford is a 39 y.o. female who presents to the emerged part today with complaints of dental pain. Patient states that she had dental pain that started yesterday, states that she is taken Tylenol and ibuprofen at home. Has not seen a dentist for this. Denies any difficulty breathing or shortness of breath. Nursing Notes were all reviewed and agreed with or any disagreements were addressed in the HPI. REVIEW OF SYSTEMS :      Review of Systems    Positives and Pertinent negatives as per HPI. SURGICAL HISTORY     Past Surgical History:   Procedure Laterality Date    BREAST CYST ASPIRATION       SECTION      CHOLECYSTECTOMY      COLONOSCOPY  2007    polyps    COLONOSCOPY N/A 2021    COLONOSCOPY WITH ANESTHESIA performed by Mushtaq Squires MD at [address] [place] ([address])      LAPAROSCOPY      lysis of adhesions    LAPAROTOMY  2014    LAPAROTOMY, BILATERAL SALPINGO - OOPHORECTOMY    SALPINGO-OOPHORECTOMY  2014    UPPER GASTROINTESTINAL ENDOSCOPY N/A 2021    EGD BIOPSY performed by Mushtaq Squires MD at [address]       Discharge Medication List as of 2023 10:36 PM        CONTINUE these medications which have NOT CHANGED    Details   traMADol (ULTRAM) 50 MG tablet Take 1 tablet by mouth 2 times daily as needed for Pain for up to 14 days.  Max Daily

## 2024-01-03 RX ORDER — IBUPROFEN 800 MG/1
TABLET ORAL
Qty: 90 TABLET | Refills: 0 | Status: SHIPPED | OUTPATIENT
Start: 2024-01-03

## 2024-01-03 NOTE — TELEPHONE ENCOUNTER
Refill Request     CONFIRM preferred pharmacy with the patient.    If Mail Order Rx - Pend for 90 day refill.      Last Seen: Last Seen Department: 3/16/2023  Last Seen by PCP: 9/26/2022    Last Written: 09/22/23 90 with 1 refill    If no future appointment scheduled:  Review the last OV with PCP and review information for follow-up visit,  Route STAFF MESSAGE with patient name to the  Pool for scheduling with the following information:            -  Timing of next visit           -  Visit type ie Physical, OV, etc           -  Diagnoses/Reason ie. COPD, HTN - Do not use MEDICATION, Follow-up or CHECK UP - Give reason for visit      Next Appointment:   No future appointments.    Message sent to  to schedule appt with patient?  NO      Requested Prescriptions     Pending Prescriptions Disp Refills    ibuprofen (ADVIL;MOTRIN) 800 MG tablet [Pharmacy Med Name: IBUPROFEN 800 MG TABLET] 90 tablet 1     Sig: TAKE 1 TABLET BY MOUTH THREE TIMES A DAY AS NEEDED FOR PAIN

## 2024-01-31 RX ORDER — IBUPROFEN 800 MG/1
TABLET ORAL
Qty: 90 TABLET | Refills: 0 | Status: SHIPPED | OUTPATIENT
Start: 2024-01-31

## 2024-01-31 NOTE — TELEPHONE ENCOUNTER
Refill Request     CONFIRM preferred pharmacy with the patient.    If Mail Order Rx - Pend for 90 day refill.      Last Seen: Last Seen Department: 3/16/2023  Last Seen by PCP: 1/25/2023    Last Written: 1/03/2024, #90, 0 refills    If no future appointment scheduled:  Review the last OV with PCP and review information for follow-up visit,  Route STAFF MESSAGE with patient name to the  Pool for scheduling with the following information:            -  Timing of next visit           -  Visit type ie Physical, OV, etc           -  Diagnoses/Reason ie. COPD, HTN - Do not use MEDICATION, Follow-up or CHECK UP - Give reason for visit      Next Appointment:   No future appointments.    Message sent to  to schedule appt with patient?  N/A      Requested Prescriptions     Pending Prescriptions Disp Refills    ibuprofen (ADVIL;MOTRIN) 800 MG tablet [Pharmacy Med Name: IBUPROFEN 800 MG TABLET] 90 tablet 0     Sig: TAKE 1 TABLET BY MOUTH THREE TIMES A DAY AS NEEDED FOR PAIN

## 2024-02-12 DIAGNOSIS — M79.672 LEFT FOOT PAIN: ICD-10-CM

## 2024-02-13 RX ORDER — TRAMADOL HYDROCHLORIDE 50 MG/1
50 TABLET ORAL 2 TIMES DAILY PRN
Qty: 14 TABLET | Refills: 1 | Status: SHIPPED | OUTPATIENT
Start: 2024-02-13 | End: 2024-02-27

## 2024-02-13 NOTE — TELEPHONE ENCOUNTER
Refill Request - Controlled Substance    CONFIRM preferred pharmacy with the patient.    If Mail Order Rx - Pend for 90 day refill.        Last Seen Department: 3/16/2023  Last Seen by PCP: 9/26/2022    Last Written: 12/11/2023    Last UDS: NA    Med Agreement Signed On: NA    If no future appointment scheduled:  Review the last OV with PCP and review information for follow-up visit,  Route STAFF MESSAGE with patient name to the  Pool for scheduling with the following information:            -  Timing of next visit           -  Visit type ie Physical, OV, etc           -  Diagnoses/Reason ie. COPD, HTN - Do not use MEDICATION, Follow-up or CHECK UP - Give reason for visit        Next Appointment:   No future appointments.    Message sent to  to schedule appt with patient?  YES- Needs Diabetes follow up      Requested Prescriptions     Pending Prescriptions Disp Refills    traMADol (ULTRAM) 50 MG tablet [Pharmacy Med Name: TRAMADOL HCL 50 MG TABLET] 14 tablet 1     Sig: Take 1 tablet by mouth 2 times daily as needed for Pain for up to 14 days. Max Daily Amount: 100 mg

## 2024-03-01 DIAGNOSIS — R20.8 BURNING SENSATION OF FEET: ICD-10-CM

## 2024-03-01 DIAGNOSIS — M79.2 NEUROPATHIC PAIN: ICD-10-CM

## 2024-03-01 RX ORDER — GABAPENTIN 800 MG/1
TABLET ORAL
Qty: 120 TABLET | Refills: 1 | Status: SHIPPED | OUTPATIENT
Start: 2024-03-01 | End: 2024-03-31

## 2024-03-01 NOTE — TELEPHONE ENCOUNTER
Refill Request - Controlled Substance    CONFIRM preferred pharmacy with the patient.    If Mail Order Rx - Pend for 90 day refill.        Last Seen Department: 3/16/2023  Last Seen by PCP: 9/26/2022    Last Written: 11/08/2023, #120, 1 refills    Last UDS: none on file    Med Agreement Signed On: none on file    If no future appointment scheduled:  Review the last OV with PCP and review information for follow-up visit,  Route STAFF MESSAGE with patient name to the  Pool for scheduling with the following information:            -  Timing of next visit           -  Visit type ie Physical, OV, etc           -  Diagnoses/Reason ie. COPD, HTN - Do not use MEDICATION, Follow-up or CHECK UP - Give reason for visit        Next Appointment:   No future appointments.    Message sent to  to schedule appt with patient?  N/A      Requested Prescriptions     Pending Prescriptions Disp Refills    gabapentin (NEURONTIN) 800 MG tablet [Pharmacy Med Name: GABAPENTIN 800 MG TABLET] 120 tablet 1     Sig: TAKE 1 TABLET BY MOUTH 3 TIMES A DAY MAY TAKE 2 TABLETS AT BEDTIME

## 2024-03-07 RX ORDER — IBUPROFEN 800 MG/1
TABLET ORAL
Qty: 90 TABLET | Refills: 0 | Status: SHIPPED | OUTPATIENT
Start: 2024-03-07

## 2024-03-26 DIAGNOSIS — M79.672 LEFT FOOT PAIN: ICD-10-CM

## 2024-03-26 RX ORDER — TRAMADOL HYDROCHLORIDE 50 MG/1
50 TABLET ORAL 2 TIMES DAILY PRN
Qty: 14 TABLET | Refills: 1 | Status: SHIPPED | OUTPATIENT
Start: 2024-03-26 | End: 2024-04-09

## 2024-03-26 NOTE — TELEPHONE ENCOUNTER
Refill Request     CONFIRM preferred pharmacy with the patient.    If Mail Order Rx - Pend for 90 day refill.      Last Seen: Last Seen Department: 3/16/2023  Last Seen by PCP: 1/25/2023    Last Written: 02/13/2024 14 tab 1 refills     If no future appointment scheduled:  Review the last OV with PCP and review information for follow-up visit,  Route STAFF MESSAGE with patient name to the  Pool for scheduling with the following information:            -  Timing of next visit           -  Visit type ie Physical, OV, etc           -  Diagnoses/Reason ie. COPD, HTN - Do not use MEDICATION, Follow-up or CHECK UP - Give reason for visit      Next Appointment:   Future Appointments   Date Time Provider Department Center   6/17/2024  3:00 PM Blake Krueger, DO [facility]       Message sent to  to schedule appt with patient?  NO      Requested Prescriptions     Pending Prescriptions Disp Refills    traMADol (ULTRAM) 50 MG tablet [Pharmacy Med Name: TRAMADOL HCL 50 MG TABLET] 14 tablet 1     Sig: Take 1 tablet by mouth 2 times daily as needed for Pain for up to 14 days. Max Daily Amount: 100 mg

## 2024-03-27 LAB — MAMMOGRAPHY, EXTERNAL: NORMAL

## 2024-04-24 DIAGNOSIS — R20.8 BURNING SENSATION OF FEET: ICD-10-CM

## 2024-04-24 DIAGNOSIS — M79.2 NEUROPATHIC PAIN: ICD-10-CM

## 2024-04-24 DIAGNOSIS — M79.672 LEFT FOOT PAIN: ICD-10-CM

## 2024-04-25 DIAGNOSIS — M79.2 NEUROPATHIC PAIN: ICD-10-CM

## 2024-04-25 DIAGNOSIS — R20.8 BURNING SENSATION OF FEET: ICD-10-CM

## 2024-04-25 NOTE — TELEPHONE ENCOUNTER
Refill Request     CONFIRM preferred pharmacy with the patient.    If Mail Order Rx - Pend for 90 day refill.      Last Seen: Last Seen Department: 3/16/2023  Last Seen by PCP: 9/26/2022    Last Written: 3/1/2024    If no future appointment scheduled:  Review the last OV with PCP and review information for follow-up visit,  Route STAFF MESSAGE with patient name to the  Pool for scheduling with the following information:            -  Timing of next visit           -  Visit type ie Physical, OV, etc           -  Diagnoses/Reason ie. COPD, HTN - Do not use MEDICATION, Follow-up or CHECK UP - Give reason for visit      Next Appointment:   Future Appointments   Date Time Provider Department Center   6/17/2024  3:00 PM Blake Krueger, DO [facility]       Message sent to  to schedule appt with patient?  NO      Requested Prescriptions     Pending Prescriptions Disp Refills    gabapentin (NEURONTIN) 800 MG tablet 120 tablet 1     Sig: TAKE 1 TABLET BY MOUTH 3 TIMES A DAY MAY TAKE 2 TABLETS AT BEDTIME

## 2024-04-25 NOTE — TELEPHONE ENCOUNTER
Refill Request - Controlled Substance    CONFIRM preferred pharmacy with the patient.    If Mail Order Rx - Pend for 90 day refill.        Last Seen Department: 3/16/2023  Last Seen by PCP: 9/26/2022    Last Written: gabapentin 3/1/24 120 with 1 refill     Ibuprofen 3/7/24 90 with  no refill     Tramadol 3/26/24 14 with 1 refill     Last UDS: none     Med Agreement Signed On: none     If no future appointment scheduled:  Review the last OV with PCP and review information for follow-up visit,  Route STAFF MESSAGE with patient name to the  Pool for scheduling with the following information:            -  Timing of next visit           -  Visit type ie Physical, OV, etc           -  Diagnoses/Reason ie. COPD, HTN - Do not use MEDICATION, Follow-up or CHECK UP - Give reason for visit        Next Appointment:   Future Appointments   Date Time Provider Department Center   6/17/2024  3:00 PM Blake Krueger DO [facility]       Message sent to  to schedule appt with patient?  No       Requested Prescriptions     Pending Prescriptions Disp Refills    gabapentin (NEURONTIN) 800 MG tablet [Pharmacy Med Name: GABAPENTIN 800 MG TABLET] 120 tablet 1     Sig: TAKE 1 TABLET BY MOUTH 3 TIMES A DAY MAY TAKE 2 TABLETS AT BEDTIME    ibuprofen (ADVIL;MOTRIN) 800 MG tablet [Pharmacy Med Name: IBUPROFEN 800 MG TABLET] 90 tablet 0     Sig: TAKE 1 TABLET BY MOUTH THREE TIMES A DAY AS NEEDED FOR PAIN    traMADol (ULTRAM) 50 MG tablet [Pharmacy Med Name: TRAMADOL HCL 50 MG TABLET] 14 tablet 1     Sig: Take 1 tablet by mouth 2 times daily as needed for Pain for up to 14 days. Max Daily Amount: 100 mg

## 2024-04-26 DIAGNOSIS — M17.0 PRIMARY OSTEOARTHRITIS OF BOTH KNEES: Primary | ICD-10-CM

## 2024-04-26 RX ORDER — GABAPENTIN 800 MG/1
TABLET ORAL
Qty: 120 TABLET | Refills: 1 | Status: SHIPPED | OUTPATIENT
Start: 2024-04-26 | End: 2024-06-24

## 2024-04-26 RX ORDER — TRAMADOL HYDROCHLORIDE 50 MG/1
50 TABLET ORAL 2 TIMES DAILY PRN
Qty: 14 TABLET | Refills: 1 | OUTPATIENT
Start: 2024-04-26 | End: 2024-05-10

## 2024-04-26 RX ORDER — TRAMADOL HYDROCHLORIDE 50 MG/1
50 TABLET ORAL 2 TIMES DAILY PRN
Qty: 60 TABLET | Refills: 0 | Status: SHIPPED | OUTPATIENT
Start: 2024-04-26 | End: 2024-05-26

## 2024-04-26 RX ORDER — GABAPENTIN 800 MG/1
TABLET ORAL
Qty: 120 TABLET | Refills: 1 | OUTPATIENT
Start: 2024-04-26 | End: 2024-05-26

## 2024-04-26 RX ORDER — IBUPROFEN 800 MG/1
TABLET ORAL
Qty: 90 TABLET | Refills: 0 | OUTPATIENT
Start: 2024-04-26

## 2024-04-26 NOTE — TELEPHONE ENCOUNTER
Tired calling patient so she can be scheduled for the nurse visit for the UDS and Med contract. No answer and not able to leave message.    Yes

## 2024-04-26 NOTE — TELEPHONE ENCOUNTER
Tried calling patient. Phone is not is service. Will have to try later.        Sent pt [employer] message

## 2024-04-26 NOTE — TELEPHONE ENCOUNTER
Spoke with patient she states she is taking 1 pill BID and if needed at night she is taking 1 extra with a max of 4 per day if needed. Please send to pharmacy if deemed appropriate. Thank you

## 2024-04-26 NOTE — TELEPHONE ENCOUNTER
Mariposa Connell    4/26/24  2:42 PM  Note     Spoke with patient she states she is taking 1 pill BID and if needed at night she is taking 1 extra with a max of 4 per day if needed. Please send to pharmacy if deemed appropriate. Thank you

## 2024-04-29 DIAGNOSIS — M79.2 NEUROPATHIC PAIN: Primary | ICD-10-CM

## 2024-06-17 ENCOUNTER — OFFICE VISIT (OUTPATIENT)
Dept: FAMILY MEDICINE CLINIC | Age: 44
End: 2024-06-17
Payer: MEDICAID

## 2024-06-17 ENCOUNTER — HOSPITAL ENCOUNTER (OUTPATIENT)
Dept: GENERAL RADIOLOGY | Age: 44
Discharge: HOME | End: 2024-06-17
Payer: MEDICAID

## 2024-06-17 ENCOUNTER — TELEPHONE (OUTPATIENT)
Dept: FAMILY MEDICINE CLINIC | Age: 44
End: 2024-06-17

## 2024-06-17 VITALS
OXYGEN SATURATION: 97 % | WEIGHT: 159.6 LBS | HEART RATE: 90 BPM | SYSTOLIC BLOOD PRESSURE: 102 MMHG | BODY MASS INDEX: 30.16 KG/M2 | DIASTOLIC BLOOD PRESSURE: 68 MMHG

## 2024-06-17 DIAGNOSIS — M17.0 PRIMARY OSTEOARTHRITIS OF BOTH KNEES: ICD-10-CM

## 2024-06-17 DIAGNOSIS — M54.2 CERVICAL PAIN (NECK): ICD-10-CM

## 2024-06-17 DIAGNOSIS — I10 ESSENTIAL HYPERTENSION: ICD-10-CM

## 2024-06-17 DIAGNOSIS — Z79.4 TYPE 2 DIABETES MELLITUS WITH DIABETIC POLYNEUROPATHY, WITH LONG-TERM CURRENT USE OF INSULIN (HCC): Primary | ICD-10-CM

## 2024-06-17 DIAGNOSIS — R20.8 BURNING SENSATION OF FEET: ICD-10-CM

## 2024-06-17 DIAGNOSIS — M79.2 NEUROPATHIC PAIN: ICD-10-CM

## 2024-06-17 DIAGNOSIS — E11.42 TYPE 2 DIABETES MELLITUS WITH DIABETIC POLYNEUROPATHY, WITH LONG-TERM CURRENT USE OF INSULIN (HCC): Primary | ICD-10-CM

## 2024-06-17 LAB
CREATININE URINE POCT: NORMAL
HBA1C MFR BLD: 8.5 %
MICROALBUMIN/CREAT 24H UR: NORMAL MG/DL
MICROALBUMIN/CREAT UR-RTO: NORMAL MG/G

## 2024-06-17 PROCEDURE — 82044 UR ALBUMIN SEMIQUANTITATIVE: CPT | Performed by: FAMILY MEDICINE

## 2024-06-17 PROCEDURE — 3052F HG A1C>EQUAL 8.0%<EQUAL 9.0%: CPT | Performed by: FAMILY MEDICINE

## 2024-06-17 PROCEDURE — 83036 HEMOGLOBIN GLYCOSYLATED A1C: CPT | Performed by: FAMILY MEDICINE

## 2024-06-17 PROCEDURE — 3074F SYST BP LT 130 MM HG: CPT | Performed by: FAMILY MEDICINE

## 2024-06-17 PROCEDURE — 99214 OFFICE O/P EST MOD 30 MIN: CPT | Performed by: FAMILY MEDICINE

## 2024-06-17 PROCEDURE — 72052 X-RAY EXAM NECK SPINE 6/>VWS: CPT

## 2024-06-17 PROCEDURE — 3078F DIAST BP <80 MM HG: CPT | Performed by: FAMILY MEDICINE

## 2024-06-17 RX ORDER — ZOLPIDEM TARTRATE 5 MG/1
5 TABLET ORAL NIGHTLY
COMMUNITY
Start: 2022-10-05

## 2024-06-17 RX ORDER — TRAMADOL HYDROCHLORIDE 50 MG/1
50 TABLET ORAL 2 TIMES DAILY PRN
Qty: 28 TABLET | Refills: 0 | Status: SHIPPED | OUTPATIENT
Start: 2024-06-17 | End: 2024-07-01

## 2024-06-17 RX ORDER — GABAPENTIN 800 MG/1
TABLET ORAL
Qty: 90 TABLET | Refills: 1 | Status: SHIPPED | OUTPATIENT
Start: 2024-06-17 | End: 2024-07-08

## 2024-06-17 ASSESSMENT — ENCOUNTER SYMPTOMS
BLOOD IN STOOL: 0
EYE REDNESS: 0
DIARRHEA: 0
ABDOMINAL PAIN: 0
EYE PAIN: 0
SINUS PRESSURE: 0
ANAL BLEEDING: 0
SHORTNESS OF BREATH: 0
EYE DISCHARGE: 0
CONSTIPATION: 0
NAUSEA: 0
BACK PAIN: 0
COUGH: 0
CHEST TIGHTNESS: 0
RHINORRHEA: 0
VOMITING: 0
SORE THROAT: 0
TROUBLE SWALLOWING: 0
ABDOMINAL DISTENTION: 0
VOICE CHANGE: 0
WHEEZING: 0
EYE ITCHING: 0

## 2024-06-17 NOTE — PROGRESS NOTES
Subjective:      Patient ID: Ramandeep Majano is a 44 y.o. female.    HPI  Patient in for checkup on several medical issues.  Diabetes-A1c today 8.5-she is on Trulicity 1.5 and her last A1c was 10.  Hypertension-blood pressure 140/80 or below when she checks at home or elsewhere.  Neuropathy of the feet-chronic gabapentin and does a fairly good job that she is a  and she is on her feet a lot in 1 day she may have some increase in discomfort and has taken tramadol in the past which has helped on that particular day.  She is complaining of some bilateral shoulder and neck pain which is mostly on the left for the past 2 months.        Review of Systems    Review of Systems   Constitutional:  Negative for fatigue and unexpected weight change.   HENT:  Negative for congestion, ear pain, hearing loss, nosebleeds, postnasal drip, rhinorrhea, sinus pressure, sneezing, sore throat, tinnitus, trouble swallowing and voice change.    Eyes:  Negative for pain, discharge, redness, itching and visual disturbance.   Respiratory:  Negative for cough, chest tightness, shortness of breath and wheezing.    Cardiovascular:  Negative for chest pain, palpitations and leg swelling.   Gastrointestinal:  Negative for abdominal distention, abdominal pain, anal bleeding, blood in stool, constipation, diarrhea, nausea and vomiting.   Genitourinary:  Negative for dysuria, flank pain, frequency, hematuria, menstrual problem, pelvic pain, urgency and vaginal discharge.   Musculoskeletal:  Positive for arthralgias and neck pain. Negative for back pain, gait problem, joint swelling and myalgias.        See hpi   Skin:  Negative for pallor and rash.   Neurological:  Positive for numbness. Negative for dizziness, tremors, seizures, weakness, light-headedness and headaches.   Hematological:  Negative for adenopathy. Does not bruise/bleed easily.   Psychiatric/Behavioral:  Negative for agitation, confusion, decreased concentration

## 2024-06-17 NOTE — TELEPHONE ENCOUNTER
Refill Request - Controlled Substance    CONFIRM preferred pharmacy with the patient.    If Mail Order Rx - Pend for 90 day refill.        Last Seen Department: 3/16/2023  Last Seen by PCP: 9/26/2022    Last Written: 4/26/24 120 with 1 refill     Last UDS: none     Med Agreement Signed On: none     If no future appointment scheduled:  Review the last OV with PCP and review information for follow-up visit,  Route STAFF MESSAGE with patient name to the  Pool for scheduling with the following information:            -  Timing of next visit           -  Visit type ie Physical, OV, etc           -  Diagnoses/Reason ie. COPD, HTN - Do not use MEDICATION, Follow-up or CHECK UP - Give reason for visit        Next Appointment:   Future Appointments   Date Time Provider Department Center   6/17/2024  3:00 PM Blake Krueger DO [facility]       Message sent to  to schedule appt with patient?  NO      Requested Prescriptions     Pending Prescriptions Disp Refills    gabapentin (NEURONTIN) 800 MG tablet [Pharmacy Med Name: GABAPENTIN 800 MG TABLET] 120 tablet 1     Sig: TAKE 1 TABLET BY MOUTH 2 TIMES A DAY MAY TAKE 1 TABLET AT BEDTIME IF NEEDED--LIMIT 3 PER DAY.

## 2024-06-17 NOTE — TELEPHONE ENCOUNTER
Patient is interested in the diabetes classes.  She states she will come back in but I am sending a message as heads up

## 2024-06-20 LAB
6MAM UR QL: NOT DETECTED
7AMINOCLONAZEPAM UR QL: NOT DETECTED
A-OH ALPRAZ UR QL: NOT DETECTED
ALPHA-OH-MIDAZOLAM, URINE: NOT DETECTED
ALPRAZ UR QL: NOT DETECTED
AMPHET UR QL SCN: NOT DETECTED
ANNOTATION COMMENT IMP: NORMAL
ANNOTATION COMMENT IMP: NORMAL
BARBITURATES UR QL: NEGATIVE
BUPRENORPHINE UR QL: NOT DETECTED
BZE UR QL: NEGATIVE
CARBOXYTHC UR QL: NORMAL
CARISOPRODOL UR QL: NEGATIVE
CLONAZEPAM UR QL: NOT DETECTED
CODEINE UR QL: NOT DETECTED
CREAT UR-MCNC: 167.6 MG/DL (ref 20–400)
DIAZEPAM UR QL: NOT DETECTED
ETHYL GLUCURONIDE UR QL: NEGATIVE
FENTANYL UR QL: NOT DETECTED
GABAPENTIN: PRESENT
HYDROCODONE UR QL: NOT DETECTED
HYDROMORPHONE UR QL: NOT DETECTED
LORAZEPAM UR QL: NOT DETECTED
MDA UR QL: NOT DETECTED
MDEA UR QL: NOT DETECTED
MDMA UR QL: NOT DETECTED
MEPERIDINE UR QL: NOT DETECTED
METHADONE UR QL: NEGATIVE
METHAMPHET UR QL: NOT DETECTED
MIDAZOLAM UR QL SCN: NOT DETECTED
MORPHINE UR QL: NOT DETECTED
NALOXONE: NOT DETECTED
NORBUPRENORPHINE UR QL CFM: NOT DETECTED
NORDIAZEPAM UR QL: NOT DETECTED
NORFENTANYL UR QL: NOT DETECTED
NORHYDROCODONE UR QL CFM: NOT DETECTED
NOROXYCODONE UR QL CFM: NOT DETECTED
NOROXYMORPHONE, URINE: NOT DETECTED
OXAZEPAM UR QL: NOT DETECTED
OXYCODONE UR QL: NOT DETECTED
OXYMORPHONE UR QL: PRESENT
PATHOLOGY STUDY: NORMAL
PCP UR QL: NEGATIVE
PHENTERMINE UR QL: NOT DETECTED
PPAA UR QL: NOT DETECTED
PREGABALIN: NOT DETECTED
SERVICE CMNT-IMP: NORMAL
TAPENTADOL UR QL SCN: NOT DETECTED
TAPENTADOL-O-SULFATE, URINE: NOT DETECTED
TEMAZEPAM UR QL: NOT DETECTED
TRAMADOL UR QL: NORMAL
ZOLPIDEM UR QL: NOT DETECTED

## 2024-07-15 DIAGNOSIS — G89.29 CHRONIC LEFT SHOULDER PAIN: Primary | ICD-10-CM

## 2024-07-15 DIAGNOSIS — M25.512 CHRONIC LEFT SHOULDER PAIN: Primary | ICD-10-CM

## 2024-07-15 DIAGNOSIS — G47.01 INSOMNIA DUE TO MEDICAL CONDITION: Primary | ICD-10-CM

## 2024-07-15 RX ORDER — ZOLPIDEM TARTRATE 5 MG/1
5 TABLET ORAL NIGHTLY
Qty: 30 TABLET | Refills: 0 | Status: SHIPPED | OUTPATIENT
Start: 2024-07-15 | End: 2024-08-14

## 2024-08-15 ENCOUNTER — OFFICE VISIT (OUTPATIENT)
Dept: ORTHOPEDIC SURGERY | Age: 44
End: 2024-08-15
Payer: MEDICAID

## 2024-08-15 VITALS — HEIGHT: 61 IN | BODY MASS INDEX: 30.02 KG/M2 | WEIGHT: 159 LBS

## 2024-08-15 DIAGNOSIS — G47.01 INSOMNIA DUE TO MEDICAL CONDITION: ICD-10-CM

## 2024-08-15 DIAGNOSIS — M25.551 RIGHT HIP PAIN: ICD-10-CM

## 2024-08-15 DIAGNOSIS — M75.02 ADHESIVE CAPSULITIS OF LEFT SHOULDER: Primary | ICD-10-CM

## 2024-08-15 DIAGNOSIS — M25.512 LEFT SHOULDER PAIN, UNSPECIFIED CHRONICITY: ICD-10-CM

## 2024-08-15 DIAGNOSIS — M75.42 ROTATOR CUFF IMPINGEMENT SYNDROME OF LEFT SHOULDER: ICD-10-CM

## 2024-08-15 PROCEDURE — 99204 OFFICE O/P NEW MOD 45 MIN: CPT | Performed by: ORTHOPAEDIC SURGERY

## 2024-08-15 RX ORDER — ZOLPIDEM TARTRATE 5 MG/1
5 TABLET ORAL NIGHTLY
Qty: 30 TABLET | Refills: 0 | Status: SHIPPED | OUTPATIENT
Start: 2024-08-15 | End: 2024-09-14

## 2024-08-15 NOTE — TELEPHONE ENCOUNTER
Refill Request - Controlled Substance    CONFIRM preferred pharmacy with the patient.    If Mail Order Rx - Pend for 90 day refill.        Last Seen Department: 6/17/2024    Last Seen by PCP: 6/17/2024    Last Written: 7/15/24 30 tablet 0 refills    Last UDS: 6/17/24    Med Agreement Signed On: 6/28/24     If no future appointment scheduled:  Review the last OV with PCP and review information for follow-up visit,  Route STAFF MESSAGE with patient name to the  Pool for scheduling with the following information:            -  Timing of next visit           -  Visit type ie Physical, OV, etc           -  Diagnoses/Reason ie. COPD, HTN - Do not use MEDICATION, Follow-up or CHECK UP - Give reason for visit        Next Appointment: 10/21/24  Future Appointments   Date Time Provider Department Center   10/21/2024  2:00 PM Blake Krueger DO [facility]       Message sent to  to schedule appt with patient?  NO      Requested Prescriptions     Pending Prescriptions Disp Refills    zolpidem (AMBIEN) 5 MG tablet [Pharmacy Med Name: ZOLPIDEM TARTRATE 5 MG TABLET] 30 tablet 0     Sig: Take 1 tablet by mouth nightly for 30 days. Max Daily Amount: 5 mg

## 2024-08-16 PROBLEM — M25.512 LEFT SHOULDER PAIN: Status: ACTIVE | Noted: 2024-08-16

## 2024-08-16 PROBLEM — M25.551 RIGHT HIP PAIN: Status: ACTIVE | Noted: 2024-08-16

## 2024-08-16 PROBLEM — M75.01 ADHESIVE CAPSULITIS OF RIGHT SHOULDER: Status: ACTIVE | Noted: 2024-08-16

## 2024-08-16 PROBLEM — M75.42 ROTATOR CUFF IMPINGEMENT SYNDROME OF LEFT SHOULDER: Status: ACTIVE | Noted: 2024-08-16

## 2024-08-16 RX ORDER — TIZANIDINE 4 MG/1
4 TABLET ORAL 3 TIMES DAILY PRN
Qty: 30 TABLET | Refills: 0 | Status: SHIPPED | OUTPATIENT
Start: 2024-08-16

## 2024-08-16 RX ORDER — MELOXICAM 15 MG/1
15 TABLET ORAL DAILY PRN
Qty: 30 TABLET | Refills: 0 | Status: SHIPPED | OUTPATIENT
Start: 2024-08-16

## 2024-08-16 NOTE — PROGRESS NOTES
SHOULDER VISIT      HISTORY OF PRESENT ILLNESS    Ramandeep Majano is a 44 y.o. female who presents for evaluation of left shoulder and right hip pain she has had for an extended period of time.  She says it about 4 months ago she started having pain in the left shoulder with no mechanism of injury.  6 months ago she has some numbness in the arm which is resolved but she says now she cannot lean on the arm has decreased range of motion decreased strength and popping in the shoulder.  She rated pain between 3 and 7/10 in the left shoulder but it varies in activity.  It wakes her up at night and she notices she has swollen hands at times.  She has difficulty sleeping.  She works as a  and has limited her hours from 6 down to 3 nights a week which is causing some financial difficulty.  She also complains of some right hip pain in the groin area and says she cannot extend her leg and sit  style.  She demonstrated this by sitting on the exam table and she cannot lay her leg out past about 45 degrees of abduction because of pain in the groin.  She denies radicular symptoms.  She denies bowel or bladder difficulties.  She also denies any history of injury to the inside.  Additionally she is a type II diabetic and takes Trulicity, rescue insulin at times.  She also takes her has taken in the past Neurontin and Voltaren which she does not take any longer.    ROS    Well-documented patient history form dated 8/15/2024  All other ROS negative except for above.    Past Surgical history    Past Surgical History:   Procedure Laterality Date    BREAST CYST ASPIRATION       SECTION      CHOLECYSTECTOMY      COLONOSCOPY  2007    polyps    COLONOSCOPY N/A 2021    COLONOSCOPY WITH ANESTHESIA performed by Kahlil Mejia MD at [facility] ENDOSCOPY    HYSTERECTOMY (CERVIX STATUS UNKNOWN)  2008    LAPAROSCOPY      lysis of adhesions    LAPAROTOMY  2014    LAPAROTOMY, BILATERAL SALPINGO -

## 2024-08-19 ENCOUNTER — OFFICE VISIT (OUTPATIENT)
Dept: FAMILY MEDICINE CLINIC | Age: 44
End: 2024-08-19
Payer: MEDICAID

## 2024-08-19 VITALS
HEART RATE: 85 BPM | WEIGHT: 161.4 LBS | TEMPERATURE: 97.9 F | SYSTOLIC BLOOD PRESSURE: 100 MMHG | BODY MASS INDEX: 30.47 KG/M2 | HEIGHT: 61 IN | DIASTOLIC BLOOD PRESSURE: 70 MMHG | OXYGEN SATURATION: 99 %

## 2024-08-19 DIAGNOSIS — M25.542 JOINT PAIN IN BOTH HANDS: ICD-10-CM

## 2024-08-19 DIAGNOSIS — M25.541 JOINT PAIN IN BOTH HANDS: ICD-10-CM

## 2024-08-19 DIAGNOSIS — M75.82 ROTATOR CUFF TENDINITIS, LEFT: ICD-10-CM

## 2024-08-19 DIAGNOSIS — M25.542 JOINT PAIN IN BOTH HANDS: Primary | ICD-10-CM

## 2024-08-19 DIAGNOSIS — M25.541 JOINT PAIN IN BOTH HANDS: Primary | ICD-10-CM

## 2024-08-19 DIAGNOSIS — M76.891 HIP CAPSULITIS, RIGHT: ICD-10-CM

## 2024-08-19 PROCEDURE — 99213 OFFICE O/P EST LOW 20 MIN: CPT | Performed by: FAMILY MEDICINE

## 2024-08-19 RX ORDER — PREDNISONE 10 MG/1
10 TABLET ORAL 2 TIMES DAILY
Qty: 10 TABLET | Refills: 0 | Status: SHIPPED | OUTPATIENT
Start: 2024-08-19 | End: 2024-08-24

## 2024-08-19 NOTE — PROGRESS NOTES
Subjective:      Patient ID: Ramandeep Majano is a 44 y.o. female.    HPI  Patient saw Dr. Lm Haskins in the last several weeks for pain and reduced range of motion of the left shoulder-no trauma-some discomfort is intermittent swelling of some joints in both hands and some pain with certain movements in the right inguinal area.  X-rays were essentially negative and the shoulder and the hip and she was put on meloxicam and was told to have me do some blood test for rheumatoid arthritis and to see him in 2 weeks and if no improvement he would do an MRI of the shoulder.        Review of Systems    Review of Systems   Musculoskeletal:  Positive for joint swelling.        See HPI       Objective:   Physical Exam      Physical Exam  Constitutional:       General: She is not in acute distress.     Appearance: Normal appearance. She is not ill-appearing.      Comments: Overweight   Musculoskeletal:      Comments: Mild to moderate decreased range of motion of the left shoulder-tender over left AC joint-no gross swelling of hand joints.   Neurological:      Mental Status: She is alert and oriented to person, place, and time.   Psychiatric:         Mood and Affect: Mood normal.         Behavior: Behavior normal.         Thought Content: Thought content normal.         Judgment: Judgment normal.         Assessment:       Diagnosis Orders   1. Joint pain in both hands  Rheumatoid Factor    Sedimentation Rate    C-Reactive Protein      2. Rotator cuff tendinitis, left        3. Hip capsulitis, right              Plan:      Ramandeep was seen today for neck pain and hand pain.    Diagnoses and all orders for this visit:    Joint pain in both hands  -     Rheumatoid Factor  -     Sedimentation Rate; Future  -     C-Reactive Protein; Future  Prescription sent for prednisone 10 mg twice a day for 5 days-need to monitor sugar closely and notify me of any persistent elevation of blood sugars.  Continue physical therapy that was

## 2024-08-20 LAB
CRP SERPL-MCNC: <3 MG/L (ref 0–5.1)
ERYTHROCYTE [SEDIMENTATION RATE] IN BLOOD BY WESTERGREN METHOD: 19 MM/HR (ref 0–20)
RHEUMATOID FACT SER IA-ACNC: <10 IU/ML

## 2024-09-14 DIAGNOSIS — M75.02 ADHESIVE CAPSULITIS OF LEFT SHOULDER: ICD-10-CM

## 2024-09-14 DIAGNOSIS — M25.551 RIGHT HIP PAIN: ICD-10-CM

## 2024-09-17 ENCOUNTER — TELEMEDICINE (OUTPATIENT)
Age: 44
End: 2024-09-17
Payer: MEDICAID

## 2024-09-17 DIAGNOSIS — R06.2 WHEEZING: ICD-10-CM

## 2024-09-17 DIAGNOSIS — R06.02 SOB (SHORTNESS OF BREATH): ICD-10-CM

## 2024-09-17 DIAGNOSIS — J06.9 URI WITH COUGH AND CONGESTION: Primary | ICD-10-CM

## 2024-09-17 DIAGNOSIS — R50.9 FEVER, UNSPECIFIED FEVER CAUSE: ICD-10-CM

## 2024-09-17 PROCEDURE — 99213 OFFICE O/P EST LOW 20 MIN: CPT | Performed by: NURSE PRACTITIONER

## 2024-09-17 RX ORDER — MELOXICAM 15 MG/1
15 TABLET ORAL DAILY PRN
Qty: 30 TABLET | Refills: 0 | Status: SHIPPED | OUTPATIENT
Start: 2024-09-17

## 2024-09-17 RX ORDER — BENZONATATE 100 MG/1
100 CAPSULE ORAL 3 TIMES DAILY PRN
Qty: 30 CAPSULE | Refills: 0 | Status: SHIPPED | OUTPATIENT
Start: 2024-09-17 | End: 2024-09-27

## 2024-09-17 RX ORDER — DEXTROMETHORPHAN HYDROBROMIDE AND PROMETHAZINE HYDROCHLORIDE 15; 6.25 MG/5ML; MG/5ML
5 SYRUP ORAL NIGHTLY PRN
Qty: 35 ML | Refills: 0 | Status: SHIPPED | OUTPATIENT
Start: 2024-09-17 | End: 2024-09-24

## 2024-09-17 RX ORDER — AZITHROMYCIN 250 MG/1
TABLET, FILM COATED ORAL
Qty: 6 TABLET | Refills: 0 | Status: SHIPPED | OUTPATIENT
Start: 2024-09-17 | End: 2024-09-27

## 2024-09-17 RX ORDER — ALBUTEROL SULFATE 90 UG/1
2 INHALANT RESPIRATORY (INHALATION) 4 TIMES DAILY PRN
Qty: 54 G | Refills: 0 | Status: SHIPPED | OUTPATIENT
Start: 2024-09-17

## 2024-09-17 ASSESSMENT — ENCOUNTER SYMPTOMS
SHORTNESS OF BREATH: 1
WHEEZING: 1
COUGH: 1

## 2024-09-18 DIAGNOSIS — G47.01 INSOMNIA DUE TO MEDICAL CONDITION: ICD-10-CM

## 2024-09-18 RX ORDER — ZOLPIDEM TARTRATE 5 MG/1
5 TABLET ORAL NIGHTLY
Qty: 30 TABLET | Refills: 1 | Status: SHIPPED | OUTPATIENT
Start: 2024-09-18 | End: 2024-10-18

## 2024-10-06 DIAGNOSIS — M17.0 PRIMARY OSTEOARTHRITIS OF BOTH KNEES: ICD-10-CM

## 2024-10-06 NOTE — TELEPHONE ENCOUNTER
Refill Request - Controlled Substance    CONFIRM preferred pharmacy with the patient.    If Mail Order Rx - Pend for 90 day refill.        Last Seen Department: 2024  Last Seen by PCP: 2024    Last Written: 24 60 tab 0 refills    Last UDS: 24    Med Agreement Signed On: 24    If no future appointment scheduled:  Review the last OV with PCP and review information for follow-up visit,  Route STAFF MESSAGE with patient name to the  Pool for scheduling with the following information:            -  Timing of next visit           -  Visit type ie Physical, OV, etc           -  Diagnoses/Reason ie. COPD, HTN - Do not use MEDICATION, Follow-up or CHECK UP - Give reason for visit        Next Appointment:   Future Appointments   Date Time Provider Department Center   10/21/2024  2:00 PM Blake Krueger DO [facility]       Message sent to  to schedule appt with patient?  NO      Requested Prescriptions     Pending Prescriptions Disp Refills    traMADol (ULTRAM) 50 MG tablet [Pharmacy Med Name: TRAMADOL HCL 50 MG TABLET] 60 tablet 0     Si TAB BY MOUTH 2 TIMES DAILY AS NEEDED FOR PAIN FOR UP TO 30 DAYS. NOT WITHIN 2 HRS OF GABAPENTIN

## 2024-10-07 RX ORDER — TRAMADOL HYDROCHLORIDE 50 MG/1
50 TABLET ORAL 2 TIMES DAILY PRN
Qty: 60 TABLET | Refills: 0 | Status: SHIPPED | OUTPATIENT
Start: 2024-10-07 | End: 2024-11-06

## 2024-11-12 DIAGNOSIS — R20.8 BURNING SENSATION OF FEET: ICD-10-CM

## 2024-11-12 DIAGNOSIS — M79.2 NEUROPATHIC PAIN: ICD-10-CM

## 2024-11-12 RX ORDER — GABAPENTIN 800 MG/1
TABLET ORAL
Qty: 90 TABLET | Refills: 0 | Status: SHIPPED | OUTPATIENT
Start: 2024-11-12 | End: 2024-12-03

## 2024-11-12 NOTE — TELEPHONE ENCOUNTER
.Refill Request     CONFIRM preferred pharmacy with the patient.    If Mail Order Rx - Pend for 90 day refill.      Last Seen: Last Seen Department: 8/19/2024  Last Seen by PCP: 8/19/2024    Last Written: 6-17-24 90 with 1     If no future appointment scheduled:  Review the last OV with PCP and review information for follow-up visit,  Route STAFF MESSAGE with patient name to the  Pool for scheduling with the following information:            -  Timing of next visit           -  Visit type ie Physical, OV, etc           -  Diagnoses/Reason ie. COPD, HTN - Do not use MEDICATION, Follow-up or CHECK UP - Give reason for visit      Next Appointment:   Future Appointments   Date Time Provider Department Center   12/4/2024  4:00 PM Blake Krueger, DO [facility]       Message sent to  to schedule appt with patient?  NO      Requested Prescriptions     Pending Prescriptions Disp Refills    gabapentin (NEURONTIN) 800 MG tablet 90 tablet 1     Sig: TAKE 1 TABLET BY MOUTH 2 TIMES A DAY MAY TAKE 1 TABLET AT BEDTIME if needed--limit 3 per day.

## 2024-11-18 DIAGNOSIS — M25.551 RIGHT HIP PAIN: ICD-10-CM

## 2024-11-18 DIAGNOSIS — G47.01 INSOMNIA DUE TO MEDICAL CONDITION: ICD-10-CM

## 2024-11-18 DIAGNOSIS — M75.02 ADHESIVE CAPSULITIS OF LEFT SHOULDER: ICD-10-CM

## 2024-11-18 DIAGNOSIS — M17.0 PRIMARY OSTEOARTHRITIS OF BOTH KNEES: ICD-10-CM

## 2024-11-18 RX ORDER — ZOLPIDEM TARTRATE 5 MG/1
TABLET ORAL
Qty: 25 TABLET | Refills: 0 | Status: SHIPPED | OUTPATIENT
Start: 2024-11-18 | End: 2024-12-18

## 2024-11-18 RX ORDER — TRAMADOL HYDROCHLORIDE 50 MG/1
50 TABLET ORAL 2 TIMES DAILY PRN
Qty: 60 TABLET | Refills: 0 | Status: SHIPPED | OUTPATIENT
Start: 2024-11-18 | End: 2024-12-18

## 2024-11-18 NOTE — TELEPHONE ENCOUNTER
Refill Request - Controlled Substance    CONFIRM preferred pharmacy with the patient.    If Mail Order Rx - Pend for 90 day refill.        Last Seen Department: 8/19/2024    Last Seen by PCP: 8/19/2024    Last Written: 9/18/24 30 tablet 1 refill    Last UDS: 6/17/24    Med Agreement Signed On: 6/28/24    If no future appointment scheduled:  Review the last OV with PCP and review information for follow-up visit,  Route STAFF MESSAGE with patient name to the  Pool for scheduling with the following information:            -  Timing of next visit           -  Visit type ie Physical, OV, etc           -  Diagnoses/Reason ie. COPD, HTN - Do not use MEDICATION, Follow-up or CHECK UP - Give reason for visit        Next Appointment: 12/4/24  Future Appointments   Date Time Provider Department Center   12/4/2024  4:00 PM Blake Krueger DO [facility]       Message sent to  to schedule appt with patient?  NO      Requested Prescriptions     Pending Prescriptions Disp Refills    zolpidem (AMBIEN) 5 MG tablet [Pharmacy Med Name: ZOLPIDEM TARTRATE 5 MG TABLET] 30 tablet 1     Sig: Take 1 tablet by mouth nightly for 30 days. Max Daily Amount: 5 mg

## 2024-11-18 NOTE — TELEPHONE ENCOUNTER
Refill Request - Controlled Substance    CONFIRM preferred pharmacy with the patient.    If Mail Order Rx - Pend for 90 day refill.        Last Seen Department: 8/19/2024    Last Seen by PCP: 8/19/2024    Last Written: 10/7/24 60 tablet 0 refills    Last UDS: 6/17/24    Med Agreement Signed On: 6/28/24    If no future appointment scheduled:  Review the last OV with PCP and review information for follow-up visit,  Route STAFF MESSAGE with patient name to the  Pool for scheduling with the following information:            -  Timing of next visit           -  Visit type ie Physical, OV, etc           -  Diagnoses/Reason ie. COPD, HTN - Do not use MEDICATION, Follow-up or CHECK UP - Give reason for visit        Next Appointment: 12/4/24  Future Appointments   Date Time Provider Department Center   12/4/2024  4:00 PM Blake Krueger DO [facility]       Message sent to  to schedule appt with patient?  NO      Requested Prescriptions     Pending Prescriptions Disp Refills    traMADol (ULTRAM) 50 MG tablet [Pharmacy Med Name: TRAMADOL HCL 50 MG TABLET] 60 tablet 0     Sig: Take 1 tablet by mouth 2 times daily as needed for Pain for up to 30 days. Max Daily Amount: 100 mg

## 2024-11-19 RX ORDER — MELOXICAM 15 MG/1
15 TABLET ORAL DAILY PRN
Qty: 30 TABLET | Refills: 0 | Status: SHIPPED | OUTPATIENT
Start: 2024-11-19

## 2024-11-21 ENCOUNTER — HOSPITAL ENCOUNTER (EMERGENCY)
Age: 44
Discharge: HOME | End: 2024-11-21
Attending: EMERGENCY MEDICINE
Payer: MEDICAID

## 2024-11-21 ENCOUNTER — APPOINTMENT (OUTPATIENT)
Dept: CT IMAGING | Age: 44
End: 2024-11-21
Payer: MEDICAID

## 2024-11-21 VITALS
WEIGHT: 161.6 LBS | OXYGEN SATURATION: 98 % | RESPIRATION RATE: 15 BRPM | SYSTOLIC BLOOD PRESSURE: 99 MMHG | DIASTOLIC BLOOD PRESSURE: 67 MMHG | TEMPERATURE: 98 F | HEART RATE: 78 BPM | BODY MASS INDEX: 30.53 KG/M2

## 2024-11-21 DIAGNOSIS — R51.9 NONINTRACTABLE HEADACHE, UNSPECIFIED CHRONICITY PATTERN, UNSPECIFIED HEADACHE TYPE: Primary | ICD-10-CM

## 2024-11-21 LAB
ANION GAP SERPL CALCULATED.3IONS-SCNC: 12 MMOL/L (ref 3–16)
BASOPHILS # BLD: 0 K/UL (ref 0–0.2)
BASOPHILS NFR BLD: 0.5 %
BUN SERPL-MCNC: 19 MG/DL (ref 7–20)
CALCIUM SERPL-MCNC: 8.8 MG/DL (ref 8.3–10.6)
CHLORIDE SERPL-SCNC: 105 MMOL/L (ref 99–110)
CO2 SERPL-SCNC: 24 MMOL/L (ref 21–32)
CREAT SERPL-MCNC: 0.7 MG/DL (ref 0.6–1.1)
DEPRECATED RDW RBC AUTO: 13.2 % (ref 12.4–15.4)
EOSINOPHIL # BLD: 0.2 K/UL (ref 0–0.6)
EOSINOPHIL NFR BLD: 2.7 %
GFR SERPLBLD CREATININE-BSD FMLA CKD-EPI: >90 ML/MIN/{1.73_M2}
GLUCOSE SERPL-MCNC: 209 MG/DL (ref 70–99)
HCT VFR BLD AUTO: 37.7 % (ref 36–48)
HGB BLD-MCNC: 12.6 G/DL (ref 12–16)
LYMPHOCYTES # BLD: 2.4 K/UL (ref 1–5.1)
LYMPHOCYTES NFR BLD: 38.8 %
MCH RBC QN AUTO: 29.9 PG (ref 26–34)
MCHC RBC AUTO-ENTMCNC: 33.6 G/DL (ref 31–36)
MCV RBC AUTO: 89.1 FL (ref 80–100)
MONOCYTES # BLD: 0.5 K/UL (ref 0–1.3)
MONOCYTES NFR BLD: 8.5 %
NEUTROPHILS # BLD: 3.1 K/UL (ref 1.7–7.7)
NEUTROPHILS NFR BLD: 49.5 %
PLATELET # BLD AUTO: 231 K/UL (ref 135–450)
PMV BLD AUTO: 8.1 FL (ref 5–10.5)
POTASSIUM SERPL-SCNC: 3.9 MMOL/L (ref 3.5–5.1)
RBC # BLD AUTO: 4.23 M/UL (ref 4–5.2)
SODIUM SERPL-SCNC: 141 MMOL/L (ref 136–145)
WBC # BLD AUTO: 6.2 K/UL (ref 4–11)

## 2024-11-21 PROCEDURE — 99284 EMERGENCY DEPT VISIT MOD MDM: CPT

## 2024-11-21 PROCEDURE — 6370000000 HC RX 637 (ALT 250 FOR IP): Performed by: EMERGENCY MEDICINE

## 2024-11-21 PROCEDURE — 85025 COMPLETE CBC W/AUTO DIFF WBC: CPT

## 2024-11-21 PROCEDURE — 6360000002 HC RX W HCPCS: Performed by: EMERGENCY MEDICINE

## 2024-11-21 PROCEDURE — 80048 BASIC METABOLIC PNL TOTAL CA: CPT

## 2024-11-21 PROCEDURE — 96374 THER/PROPH/DIAG INJ IV PUSH: CPT

## 2024-11-21 PROCEDURE — 96375 TX/PRO/DX INJ NEW DRUG ADDON: CPT

## 2024-11-21 PROCEDURE — 70450 CT HEAD/BRAIN W/O DYE: CPT

## 2024-11-21 PROCEDURE — 2580000003 HC RX 258: Performed by: EMERGENCY MEDICINE

## 2024-11-21 RX ORDER — 0.9 % SODIUM CHLORIDE 0.9 %
1000 INTRAVENOUS SOLUTION INTRAVENOUS ONCE
Status: COMPLETED | OUTPATIENT
Start: 2024-11-21 | End: 2024-11-21

## 2024-11-21 RX ORDER — PROCHLORPERAZINE EDISYLATE 5 MG/ML
10 INJECTION INTRAMUSCULAR; INTRAVENOUS ONCE
Status: COMPLETED | OUTPATIENT
Start: 2024-11-21 | End: 2024-11-21

## 2024-11-21 RX ORDER — ACETAMINOPHEN 500 MG
1000 TABLET ORAL ONCE
Status: COMPLETED | OUTPATIENT
Start: 2024-11-21 | End: 2024-11-21

## 2024-11-21 RX ORDER — DIPHENHYDRAMINE HYDROCHLORIDE 50 MG/ML
12.5 INJECTION INTRAMUSCULAR; INTRAVENOUS ONCE
Status: COMPLETED | OUTPATIENT
Start: 2024-11-21 | End: 2024-11-21

## 2024-11-21 RX ORDER — METOCLOPRAMIDE HYDROCHLORIDE 5 MG/ML
10 INJECTION INTRAMUSCULAR; INTRAVENOUS ONCE
Status: COMPLETED | OUTPATIENT
Start: 2024-11-21 | End: 2024-11-21

## 2024-11-21 RX ORDER — PROCHLORPERAZINE MALEATE 10 MG
10 TABLET ORAL EVERY 6 HOURS PRN
Qty: 12 TABLET | Refills: 0 | Status: SHIPPED | OUTPATIENT
Start: 2024-11-21 | End: 2024-11-24

## 2024-11-21 RX ORDER — BUTALBITAL, ACETAMINOPHEN AND CAFFEINE 50; 325; 40 MG/1; MG/1; MG/1
1 TABLET ORAL EVERY 6 HOURS PRN
Qty: 12 TABLET | Refills: 0 | Status: SHIPPED | OUTPATIENT
Start: 2024-11-21 | End: 2024-11-24

## 2024-11-21 RX ADMIN — SODIUM CHLORIDE 1000 ML: 9 INJECTION, SOLUTION INTRAVENOUS at 01:27

## 2024-11-21 RX ADMIN — DIPHENHYDRAMINE HYDROCHLORIDE 12.5 MG: 50 INJECTION INTRAMUSCULAR; INTRAVENOUS at 03:10

## 2024-11-21 RX ADMIN — ACETAMINOPHEN 1000 MG: 500 TABLET ORAL at 01:29

## 2024-11-21 RX ADMIN — PROCHLORPERAZINE EDISYLATE 10 MG: 5 INJECTION INTRAMUSCULAR; INTRAVENOUS at 03:10

## 2024-11-21 RX ADMIN — METOCLOPRAMIDE 10 MG: 5 INJECTION, SOLUTION INTRAMUSCULAR; INTRAVENOUS at 01:28

## 2024-11-21 ASSESSMENT — PAIN SCALES - GENERAL
PAINLEVEL_OUTOF10: 4
PAINLEVEL_OUTOF10: 4

## 2024-11-21 ASSESSMENT — PAIN - FUNCTIONAL ASSESSMENT: PAIN_FUNCTIONAL_ASSESSMENT: 0-10

## 2024-11-21 NOTE — ED PROVIDER NOTES
shock?   No   Exclusion criteria - the patient is NOT to be included for SEP-1 Core Measure due to:  2+ SIRS criteria are not met    Clinical Impression:  1. Nonintractable headache, unspecified chronicity pattern, unspecified headache type          Disposition:  Discharge to home in good condition.    Blood pressure 133/83, pulse 89, temperature 97.9 °F (36.6 °C), resp. rate 14, weight 73.3 kg (161 lb 9.6 oz), last menstrual period 12/18/2008, SpO2 97%, not currently breastfeeding.    Patient was given scripts for the following medications. I counseled patient how to take these medications.   Discharge Medication List as of 11/21/2024  5:34 AM        START taking these medications    Details   prochlorperazine (COMPAZINE) 10 MG tablet Take 1 tablet by mouth every 6 hours as needed (headache), Disp-12 tablet, R-0Normal      butalbital-acetaminophen-caffeine (FIORICET, ESGIC) -40 MG per tablet Take 1 tablet by mouth every 6 hours as needed for Headaches Max Daily Amount: 4 tablets, Disp-12 tablet, R-0Normal             Disposition referral (if applicable):  Blake Krueger, DO  [address]  [address]  [facility] [address] [phone]    Schedule an appointment as soon as possible for a visit in 3 days      [facility]  ED  [address]  [facility] [address] [phone]    As needed, If symptoms worsen      IVon am the primary attending of record and contributed the majority of evaluation and treatment of emergent care for this encounter.     Total critical care time is 0 minutes, which excludes separately billable procedures and updating family. Time spent is specifically for management of the presenting complaint and symptoms initially, direct bedside care, reevaluation, review of records, and consultation.  There was a high probability of clinically significant life-threatening deterioration in the patient's condition, which required my urgent intervention.     This chart

## 2024-11-22 ENCOUNTER — TELEPHONE (OUTPATIENT)
Dept: FAMILY MEDICINE CLINIC | Age: 44
End: 2024-11-22

## 2024-11-22 NOTE — TELEPHONE ENCOUNTER
ED Follow Up Call/ Schedule appt   ED: [facility]  Reason: Headache   Date: 11/21/4    Appt scheduled:     Future Appointments   Date Time Provider Department Center   12/4/2024  4:00 PM Blake Krueger DO [facility]

## 2024-12-04 ENCOUNTER — OFFICE VISIT (OUTPATIENT)
Dept: FAMILY MEDICINE CLINIC | Age: 44
End: 2024-12-04
Payer: MEDICAID

## 2024-12-04 VITALS
OXYGEN SATURATION: 95 % | TEMPERATURE: 97.9 F | SYSTOLIC BLOOD PRESSURE: 118 MMHG | HEIGHT: 61 IN | HEART RATE: 51 BPM | BODY MASS INDEX: 30.47 KG/M2 | WEIGHT: 161.4 LBS | DIASTOLIC BLOOD PRESSURE: 72 MMHG

## 2024-12-04 DIAGNOSIS — R51.9 NONINTRACTABLE HEADACHE, UNSPECIFIED CHRONICITY PATTERN, UNSPECIFIED HEADACHE TYPE: Primary | ICD-10-CM

## 2024-12-04 DIAGNOSIS — E11.42 TYPE 2 DIABETES MELLITUS WITH DIABETIC POLYNEUROPATHY, WITH LONG-TERM CURRENT USE OF INSULIN (HCC): ICD-10-CM

## 2024-12-04 DIAGNOSIS — Z79.4 TYPE 2 DIABETES MELLITUS WITH DIABETIC POLYNEUROPATHY, WITH LONG-TERM CURRENT USE OF INSULIN (HCC): ICD-10-CM

## 2024-12-04 LAB — HBA1C MFR BLD: 7.8 %

## 2024-12-04 PROCEDURE — 99214 OFFICE O/P EST MOD 30 MIN: CPT | Performed by: FAMILY MEDICINE

## 2024-12-04 PROCEDURE — 83036 HEMOGLOBIN GLYCOSYLATED A1C: CPT | Performed by: FAMILY MEDICINE

## 2024-12-04 PROCEDURE — 3051F HG A1C>EQUAL 7.0%<8.0%: CPT | Performed by: FAMILY MEDICINE

## 2024-12-04 RX ORDER — HYDROCORTISONE 25 MG/G
CREAM TOPICAL
Qty: 15 G | Refills: 0 | Status: SHIPPED | OUTPATIENT
Start: 2024-12-04

## 2024-12-04 RX ORDER — BUTALBITAL, ACETAMINOPHEN AND CAFFEINE 50; 325; 40 MG/1; MG/1; MG/1
1 TABLET ORAL EVERY 6 HOURS PRN
Qty: 24 TABLET | Refills: 0 | Status: SHIPPED | OUTPATIENT
Start: 2024-12-04 | End: 2024-12-10

## 2024-12-04 RX ORDER — HYDROCHLOROTHIAZIDE 12.5 MG/1
CAPSULE ORAL
Qty: 2 EACH | Refills: 5 | Status: SHIPPED | OUTPATIENT
Start: 2024-12-04

## 2024-12-04 RX ORDER — PROCHLORPERAZINE MALEATE 10 MG
10 TABLET ORAL EVERY 6 HOURS PRN
Qty: 12 TABLET | Refills: 0 | Status: SHIPPED | OUTPATIENT
Start: 2024-12-04 | End: 2024-12-07

## 2024-12-04 SDOH — ECONOMIC STABILITY: FOOD INSECURITY: WITHIN THE PAST 12 MONTHS, THE FOOD YOU BOUGHT JUST DIDN'T LAST AND YOU DIDN'T HAVE MONEY TO GET MORE.: NEVER TRUE

## 2024-12-04 SDOH — ECONOMIC STABILITY: INCOME INSECURITY: HOW HARD IS IT FOR YOU TO PAY FOR THE VERY BASICS LIKE FOOD, HOUSING, MEDICAL CARE, AND HEATING?: NOT HARD AT ALL

## 2024-12-04 SDOH — ECONOMIC STABILITY: FOOD INSECURITY: WITHIN THE PAST 12 MONTHS, YOU WORRIED THAT YOUR FOOD WOULD RUN OUT BEFORE YOU GOT MONEY TO BUY MORE.: NEVER TRUE

## 2024-12-04 ASSESSMENT — PATIENT HEALTH QUESTIONNAIRE - PHQ9: DEPRESSION UNABLE TO ASSESS: PT REFUSES

## 2024-12-04 ASSESSMENT — ENCOUNTER SYMPTOMS
NAUSEA: 1
VOMITING: 1

## 2024-12-04 NOTE — PROGRESS NOTES
\"Have you been to the ER, urgent care clinic since your last visit?  Hospitalized since your last visit?\"    YES - When: approximately 2  weeks ago.  Where and Why: [facility] for [facility].    “Have you seen or consulted any other health care providers outside our system since your last visit?”    NO      “Have you had a diabetic eye exam?”    YES - Where: [facility] last month Nurse/CMA to request most recent records if not in the chart     Date of last diabetic eye exam: 7/26/2018

## 2024-12-04 NOTE — PROGRESS NOTES
Subjective:      Patient ID: Ramandeep Majano is a 44 y.o. female.    HPI  Patient in for emergency room visit.  She had developed a headache mostly in the upper neck occipital area.  She was given Fioricet in the ER which helped and they gave her a prescription for that and Compazine for nausea and vomiting.  She had 1 other headache episode about a week after the ER visit and the medication helped again.        Review of Systems    Review of Systems   Cardiovascular:  Negative for chest pain.   Gastrointestinal:  Positive for nausea and vomiting.   Musculoskeletal:  Positive for neck pain and neck stiffness.       Objective:   Physical Exam      Physical Exam  Constitutional:       General: She is not in acute distress.     Appearance: Normal appearance. She is well-developed and normal weight. She is not ill-appearing.   Neck:      Thyroid: No thyromegaly.   Cardiovascular:      Rate and Rhythm: Normal rate and regular rhythm.      Heart sounds: Normal heart sounds.   Pulmonary:      Effort: Pulmonary effort is normal.      Breath sounds: Normal breath sounds.   Musculoskeletal:      Cervical back: Neck supple.      Comments: Occipital and cervical musculature were nontender at this time and she had good range of motion of the cervical spine.   Skin:     General: Skin is warm and dry.   Neurological:      Mental Status: She is alert and oriented to person, place, and time.      Motor: No abnormal muscle tone.      Gait: Gait normal.   Psychiatric:         Mood and Affect: Mood normal.         Behavior: Behavior normal.         Thought Content: Thought content normal.         Judgment: Judgment normal.         Assessment:       Diagnosis Orders   1. Nonintractable headache, unspecified chronicity pattern, unspecified headache type  butalbital-acetaminophen-caffeine (FIORICET, ESGIC) -40 MG per tablet      2. Type 2 diabetes mellitus with diabetic polyneuropathy, with long-term current use of insulin

## 2024-12-08 ENCOUNTER — OFFICE VISIT (OUTPATIENT)
Age: 44
End: 2024-12-08

## 2024-12-08 VITALS
WEIGHT: 161 LBS | BODY MASS INDEX: 30.4 KG/M2 | DIASTOLIC BLOOD PRESSURE: 62 MMHG | OXYGEN SATURATION: 99 % | HEART RATE: 64 BPM | HEIGHT: 61 IN | SYSTOLIC BLOOD PRESSURE: 120 MMHG | TEMPERATURE: 97.4 F

## 2024-12-08 DIAGNOSIS — R35.0 URINARY FREQUENCY: ICD-10-CM

## 2024-12-08 DIAGNOSIS — R31.9 HEMATURIA, UNSPECIFIED TYPE: ICD-10-CM

## 2024-12-08 DIAGNOSIS — N30.91 CYSTITIS WITH HEMATURIA: Primary | ICD-10-CM

## 2024-12-08 DIAGNOSIS — R81 GLYCOSURIA: ICD-10-CM

## 2024-12-08 LAB
BILIRUBIN, POC: ABNORMAL
BLOOD URINE, POC: ABNORMAL
CLARITY, POC: ABNORMAL
COLOR, POC: YELLOW
GLUCOSE URINE, POC: 500 MG/DL
KETONES, POC: ABNORMAL MG/DL
LEUKOCYTE EST, POC: ABNORMAL
NITRITE, POC: ABNORMAL
PH, POC: 6
PROTEIN, POC: 100 MG/DL
SPECIFIC GRAVITY, POC: >1.03
UROBILINOGEN, POC: 0.2 MG/DL

## 2024-12-08 RX ORDER — PHENAZOPYRIDINE HYDROCHLORIDE 200 MG/1
200 TABLET, FILM COATED ORAL 3 TIMES DAILY PRN
Qty: 6 TABLET | Refills: 0 | Status: SHIPPED | OUTPATIENT
Start: 2024-12-08

## 2024-12-08 RX ORDER — NITROFURANTOIN 25; 75 MG/1; MG/1
100 CAPSULE ORAL 2 TIMES DAILY
Qty: 28 CAPSULE | Refills: 0 | Status: SHIPPED | OUTPATIENT
Start: 2024-12-08 | End: 2024-12-22

## 2024-12-09 LAB
BV BACTERIA DNA VAG QL NAA+PROBE: NOT DETECTED
C GLABRATA DNA VAG QL NAA+PROBE: NORMAL
C GLABRATA DNA VAG QL NAA+PROBE: NOT DETECTED
C KRUSEI DNA VAG QL NAA+PROBE: NOT DETECTED
CANDIDA DNA VAG QL NAA+PROBE: NOT DETECTED
T VAGINALIS DNA VAG QL NAA+PROBE: NOT DETECTED

## 2024-12-11 LAB
BACTERIA UR CULT: ABNORMAL
ORGANISM: ABNORMAL

## 2024-12-11 RX ORDER — ACYCLOVIR 400 MG/1
2 TABLET ORAL AS NEEDED
Qty: 2 EACH | Refills: 5 | Status: SHIPPED | OUTPATIENT
Start: 2024-12-11

## 2024-12-17 ENCOUNTER — OFFICE VISIT (OUTPATIENT)
Dept: FAMILY MEDICINE CLINIC | Age: 44
End: 2024-12-17

## 2024-12-17 ENCOUNTER — HOSPITAL ENCOUNTER (OUTPATIENT)
Dept: GENERAL RADIOLOGY | Age: 44
Discharge: HOME | End: 2024-12-17
Payer: MEDICAID

## 2024-12-17 VITALS
SYSTOLIC BLOOD PRESSURE: 118 MMHG | OXYGEN SATURATION: 98 % | BODY MASS INDEX: 30.59 KG/M2 | HEART RATE: 90 BPM | TEMPERATURE: 97.7 F | DIASTOLIC BLOOD PRESSURE: 82 MMHG | WEIGHT: 161.8 LBS

## 2024-12-17 DIAGNOSIS — G43.709 CHRONIC MIGRAINE WITHOUT AURA WITHOUT STATUS MIGRAINOSUS, NOT INTRACTABLE: ICD-10-CM

## 2024-12-17 DIAGNOSIS — M79.10 MYALGIA: ICD-10-CM

## 2024-12-17 DIAGNOSIS — R10.84 GENERALIZED ABDOMINAL PAIN: Primary | ICD-10-CM

## 2024-12-17 DIAGNOSIS — R11.2 NAUSEA AND VOMITING, UNSPECIFIED VOMITING TYPE: ICD-10-CM

## 2024-12-17 DIAGNOSIS — K59.00 CONSTIPATION, UNSPECIFIED CONSTIPATION TYPE: ICD-10-CM

## 2024-12-17 LAB
ALBUMIN SERPL-MCNC: 4.1 G/DL (ref 3.4–5)
ALBUMIN/GLOB SERPL: 1.2 {RATIO} (ref 1.1–2.2)
ALP SERPL-CCNC: 136 U/L (ref 40–129)
ALT SERPL-CCNC: 20 U/L (ref 10–40)
ANION GAP SERPL CALCULATED.3IONS-SCNC: 12 MMOL/L (ref 3–16)
AST SERPL-CCNC: 20 U/L (ref 15–37)
BASOPHILS # BLD: 0 K/UL (ref 0–0.2)
BASOPHILS NFR BLD: 0.5 %
BILIRUB SERPL-MCNC: <0.2 MG/DL (ref 0–1)
BUN SERPL-MCNC: 18 MG/DL (ref 7–20)
CALCIUM SERPL-MCNC: 9.7 MG/DL (ref 8.3–10.6)
CHLORIDE SERPL-SCNC: 106 MMOL/L (ref 99–110)
CK SERPL-CCNC: 56 U/L (ref 26–192)
CO2 SERPL-SCNC: 26 MMOL/L (ref 21–32)
CREAT SERPL-MCNC: 0.7 MG/DL (ref 0.6–1.1)
DEPRECATED RDW RBC AUTO: 12.8 % (ref 12.4–15.4)
EOSINOPHIL # BLD: 0.1 K/UL (ref 0–0.6)
EOSINOPHIL NFR BLD: 2.1 %
GFR SERPLBLD CREATININE-BSD FMLA CKD-EPI: >90 ML/MIN/{1.73_M2}
GLUCOSE SERPL-MCNC: 122 MG/DL (ref 70–99)
HCT VFR BLD AUTO: 39.3 % (ref 36–48)
HGB BLD-MCNC: 13.6 G/DL (ref 12–16)
INFLUENZA A ANTIBODY: NORMAL
INFLUENZA B ANTIBODY: NORMAL
LIPASE SERPL-CCNC: 38 U/L (ref 13–60)
LYMPHOCYTES # BLD: 1.8 K/UL (ref 1–5.1)
LYMPHOCYTES NFR BLD: 31.5 %
MCH RBC QN AUTO: 30.9 PG (ref 26–34)
MCHC RBC AUTO-ENTMCNC: 34.7 G/DL (ref 31–36)
MCV RBC AUTO: 89 FL (ref 80–100)
MONOCYTES # BLD: 0.4 K/UL (ref 0–1.3)
MONOCYTES NFR BLD: 7.1 %
NEUTROPHILS # BLD: 3.4 K/UL (ref 1.7–7.7)
NEUTROPHILS NFR BLD: 58.8 %
PLATELET # BLD AUTO: 233 K/UL (ref 135–450)
PMV BLD AUTO: 9.9 FL (ref 5–10.5)
POTASSIUM SERPL-SCNC: 4.1 MMOL/L (ref 3.5–5.1)
PROT SERPL-MCNC: 7.4 G/DL (ref 6.4–8.2)
RBC # BLD AUTO: 4.41 M/UL (ref 4–5.2)
SODIUM SERPL-SCNC: 144 MMOL/L (ref 136–145)
TSH SERPL DL<=0.005 MIU/L-ACNC: 0.58 UIU/ML (ref 0.27–4.2)
WBC # BLD AUTO: 5.7 K/UL (ref 4–11)

## 2024-12-17 PROCEDURE — 74018 RADEX ABDOMEN 1 VIEW: CPT

## 2024-12-17 RX ORDER — LACTULOSE 10 G/15ML
10 SOLUTION ORAL DAILY PRN
Qty: 237 ML | Refills: 1 | Status: SHIPPED | OUTPATIENT
Start: 2024-12-17

## 2024-12-17 RX ORDER — TOPIRAMATE 25 MG/1
TABLET, FILM COATED ORAL
Qty: 60 TABLET | Refills: 1 | Status: SHIPPED | OUTPATIENT
Start: 2024-12-17

## 2024-12-17 ASSESSMENT — PATIENT HEALTH QUESTIONNAIRE - PHQ9
1. LITTLE INTEREST OR PLEASURE IN DOING THINGS: SEVERAL DAYS
DEPRESSION UNABLE TO ASSESS: FUNCTIONAL CAPACITY MOTIVATION LIMITS ACCURACY
SUM OF ALL RESPONSES TO PHQ QUESTIONS 1-9: 2
SUM OF ALL RESPONSES TO PHQ QUESTIONS 1-9: 2
SUM OF ALL RESPONSES TO PHQ9 QUESTIONS 1 & 2: 2
SUM OF ALL RESPONSES TO PHQ QUESTIONS 1-9: 2
2. FEELING DOWN, DEPRESSED OR HOPELESS: SEVERAL DAYS
SUM OF ALL RESPONSES TO PHQ QUESTIONS 1-9: 2

## 2024-12-17 ASSESSMENT — ENCOUNTER SYMPTOMS
SHORTNESS OF BREATH: 0
VOMITING: 0
NAUSEA: 0
COUGH: 0
ABDOMINAL PAIN: 0

## 2024-12-17 NOTE — PROGRESS NOTES
[facility] Medicine  2024     Ramandeep Majano (:  1980) is a 44 y.o. female, here for evaluation of the following medical concerns:    Chief Complaint   Patient presents with    Nausea & Vomiting     X 4 days, no appetite.     Fatigue     \"Have a few cousins that were Dx with lupus\", requesting to be checked for that.    Extremity Weakness     Arm weakness, had this for a while but getting worse.     OTHER     Haven't had a bowel movement in almost 2 weeks, stool softener not helping.      ASSESSMENT/ PLAN  Assessment & Plan  Generalized abdominal pain    Generalized abdominal pain in epigastric, right and left lower quadrants with bloating + N/V and constipation. Active bowel sounds on exam. Will obtain stat KUB, had VALERIY. If no obstruction, will treat constipation with lactulose prn, suppositories prn. Previously on linzess with regular Bms - will restart after acute period improved.      Orders:    lactulose (CHRONULAC) 10 GM/15ML solution; Take 15 mLs by mouth daily as needed (Constipation)    linaCLOtide (LINZESS) 72 MCG CAPS capsule; Take 1 capsule by mouth every morning (before breakfast)    TSH with Reflex to FT4; Future    Comprehensive Metabolic Panel; Future    Lipase; Future    CBC with Auto Differential; Future    Myalgia    Generalized myalgias in upper and lower extremities without joint swelling or tenderness; though does have limited ROM of left shoulder - planning to follow-up with ortho. Family hx of lupus; will obtain work up below.     Orders:    POCT Influenza A/B    COVID-19    ROSEY Reflex to Antibody [place]; Future    TSH with Reflex to FT4; Future    CK; Future    Chronic migraine without aura without status migrainosus, not intractable    Chronic migraines treated with prn fiorcet; would be interested in prophylactic medication. Discussed options, will trial topamax - discussed side effects.     Orders:    topiramate (TOPAMAX) 25 MG tablet; Take 25mg

## 2024-12-18 LAB
ANA SER QL IA: NEGATIVE
SARS-COV-2 RNA RESP QL NAA+PROBE: NOT DETECTED

## 2024-12-19 ENCOUNTER — TELEPHONE (OUTPATIENT)
Dept: FAMILY MEDICINE CLINIC | Age: 44
End: 2024-12-19

## 2024-12-19 NOTE — TELEPHONE ENCOUNTER
Patient called the office stating that she needs a note stating that she can return to work today as she was out due to n/v and fatigue. Please advise, thanks.

## 2024-12-23 ENCOUNTER — OFFICE VISIT (OUTPATIENT)
Dept: FAMILY MEDICINE CLINIC | Age: 44
End: 2024-12-23
Payer: MEDICAID

## 2024-12-23 VITALS
BODY MASS INDEX: 30.78 KG/M2 | TEMPERATURE: 97 F | WEIGHT: 163 LBS | DIASTOLIC BLOOD PRESSURE: 70 MMHG | HEIGHT: 61 IN | HEART RATE: 87 BPM | SYSTOLIC BLOOD PRESSURE: 100 MMHG | OXYGEN SATURATION: 95 %

## 2024-12-23 DIAGNOSIS — R20.8 BURNING SENSATION OF FEET: ICD-10-CM

## 2024-12-23 DIAGNOSIS — M79.2 NEUROPATHIC PAIN: ICD-10-CM

## 2024-12-23 DIAGNOSIS — B96.89 ACUTE BACTERIAL SINUSITIS: Primary | ICD-10-CM

## 2024-12-23 DIAGNOSIS — J01.90 ACUTE BACTERIAL SINUSITIS: Primary | ICD-10-CM

## 2024-12-23 PROCEDURE — 99213 OFFICE O/P EST LOW 20 MIN: CPT | Performed by: FAMILY MEDICINE

## 2024-12-23 RX ORDER — GABAPENTIN 800 MG/1
TABLET ORAL
Qty: 90 TABLET | Refills: 0 | Status: SHIPPED | OUTPATIENT
Start: 2024-12-23 | End: 2025-01-23

## 2024-12-23 RX ORDER — AZELASTINE 1 MG/ML
1 SPRAY, METERED NASAL 2 TIMES DAILY
Qty: 30 ML | Refills: 1 | Status: SHIPPED | OUTPATIENT
Start: 2024-12-23

## 2024-12-23 RX ORDER — AZITHROMYCIN 250 MG/1
TABLET, FILM COATED ORAL
Qty: 6 TABLET | Refills: 0 | Status: SHIPPED | OUTPATIENT
Start: 2024-12-23 | End: 2025-01-02

## 2024-12-23 SDOH — ECONOMIC STABILITY: FOOD INSECURITY: WITHIN THE PAST 12 MONTHS, THE FOOD YOU BOUGHT JUST DIDN'T LAST AND YOU DIDN'T HAVE MONEY TO GET MORE.: NEVER TRUE

## 2024-12-23 SDOH — ECONOMIC STABILITY: FOOD INSECURITY: WITHIN THE PAST 12 MONTHS, YOU WORRIED THAT YOUR FOOD WOULD RUN OUT BEFORE YOU GOT MONEY TO BUY MORE.: NEVER TRUE

## 2024-12-23 SDOH — ECONOMIC STABILITY: INCOME INSECURITY: HOW HARD IS IT FOR YOU TO PAY FOR THE VERY BASICS LIKE FOOD, HOUSING, MEDICAL CARE, AND HEATING?: NOT HARD AT ALL

## 2024-12-23 ASSESSMENT — ENCOUNTER SYMPTOMS
NAUSEA: 0
SINUS PAIN: 1
SHORTNESS OF BREATH: 0
DIARRHEA: 0
COUGH: 0
VOMITING: 0
VOICE CHANGE: 1

## 2024-12-23 NOTE — TELEPHONE ENCOUNTER
Refill Request - Controlled Substance    CONFIRM preferred pharmacy with the patient.    If Mail Order Rx - Pend for 90 day refill.        Last Seen Department: 12/23/2024  Last Seen by PCP: 12/23/2024    Last Written: 11/12/24 90 with 0 refills     Last UDS: 06/17/24    Med Agreement Signed On: 06/28/24    If no future appointment scheduled:  Review the last OV with PCP and review information for follow-up visit,  Route STAFF MESSAGE with patient name to the  Pool for scheduling with the following information:            -  Timing of next visit           -  Visit type ie Physical, OV, etc           -  Diagnoses/Reason ie. COPD, HTN - Do not use MEDICATION, Follow-up or CHECK UP - Give reason for visit        Next Appointment:   Future Appointments   Date Time Provider Department Center   12/30/2024  4:00 PM Ann Connell, MD [facility] ECC DEP   3/5/2025  4:30 PM Ann Connell MD [facility] DEP       Message sent to  to schedule appt with patient?  NO      Requested Prescriptions     Pending Prescriptions Disp Refills    gabapentin (NEURONTIN) 800 MG tablet [Pharmacy Med Name: GABAPENTIN 800 MG TABLET] 90 tablet 0     Sig: TAKE 1 TABLET BY MOUTH 2 TIMES A DAY MAY TAKE 1 TABLET AT BEDTIME IF NEEDED--LIMIT 3 PER DAY.

## 2024-12-23 NOTE — PROGRESS NOTES
2022  Hamilton Moat (: 1980)  36 y.o.    ASSESSMENT and PLAN:  Nahed Jerry was seen today for dysuria. Diagnoses and all orders for this visit:    Dysuria  -     POCT Urinalysis no Micro    Gross hematuria  -     CT ABDOMEN PELVIS WO CONTRAST Additional Contrast? None; Future  - r/o stone     Urinary tract infection with hematuria, site unspecified  -     cephALEXin (KEFLEX) 500 MG capsule; Take 1 capsule by mouth 4 times daily for 7 days  - UA + nitrite, large blood   - will treat with keflex based on last + culture of group b strep. Culture obtained today, will follow results and adjust abx as needed. Return if symptoms worsen or fail to improve. HPI  Patient presents for evaluation of uti  Started 2 days ago with dysuria and uirnary frequency. Then developed bilateral flank pain. Today has clots in her urine  Is s/p total hysterectomy. Denies fevers, chills. Endorses acute on chronic nausea, no vomiting. Review of Systems   Constitutional: Positive for fatigue. Negative for activity change, chills and fever. HENT: Negative for congestion, ear pain, rhinorrhea and sore throat. Eyes: Negative for visual disturbance. Respiratory: Negative for cough and shortness of breath. Cardiovascular: Negative for chest pain and palpitations. Gastrointestinal: Positive for nausea. Negative for abdominal pain, constipation, diarrhea and vomiting. Genitourinary: Positive for difficulty urinating, dysuria, flank pain and frequency. Musculoskeletal: Negative for arthralgias and myalgias. Skin: Negative for rash. Neurological: Negative for dizziness, weakness and numbness. Psychiatric/Behavioral: Negative for sleep disturbance. Allergies, past medical history, family history, and social history reviewed and unchanged from previous encounter.      Current Outpatient Medications   Medication Sig Dispense Refill    cephALEXin (KEFLEX) 500 MG capsule Take 1 capsule by mouth 4 times daily for 7 days 28 capsule 0    dronabinol (MARINOL) 2.5 MG capsule Take 1 capsule by mouth 2 times daily (before meals) for 30 days. 60 capsule 2    linaclotide (LINZESS) 145 MCG capsule Take 1 capsule by mouth every morning (before breakfast) 30 capsule 5    Baclofen (LIORESAL) 5 MG tablet 1 tid 90 tablet 0    ibuprofen (ADVIL;MOTRIN) 800 MG tablet Take 1 tablet by mouth 3 times daily as needed for Pain 270 tablet 1    metFORMIN (GLUCOPHAGE) 1000 MG tablet Take 1 tablet by mouth 2 times daily (with meals) 180 tablet 3    TOUJEO MAX SOLOSTAR 300 UNIT/ML SOPN Inject 1 pen into the muscle daily 4 pen 2    doxepin (SINEQUAN) 25 MG capsule 2 hs 60 capsule 3    pantoprazole (PROTONIX) 40 MG tablet Take 1 tablet by mouth 2 times daily 60 tablet 0    Insulin Pen Needle (B-D UF III MINI PEN NEEDLES) 31G X 5 MM MISC Inject 1 each into the skin 4 times daily 100 each 3    fluticasone (FLONASE) 50 MCG/ACT nasal spray 2 sprays by Nasal route daily 1 Bottle 0    Lancets MISC DX: E 11.9-Uses insulin. FSBS 3 times daily-Delica lancets for one touch ultra  each 5    glucose monitoring kit (FREESTYLE) monitoring kit Dx E11.9-One touch ultra II meter-uses tid 1 kit 0    gabapentin (NEURONTIN) 800 MG tablet Take 1 tablet by mouth 2 times daily for 30 days. 120 tablet 1    losartan (COZAAR) 50 MG tablet Take 1 tablet by mouth daily (Patient not taking: Reported on 4/11/2022) 30 tablet 5     No current facility-administered medications for this visit. Vitals:    04/11/22 1510   BP: 98/60   Site: Right Upper Arm   Position: Sitting   Cuff Size: Medium Adult   Pulse: 78   Temp: 98.3 °F (36.8 °C)   TempSrc: Oral   SpO2: 100%   Weight: 110 lb (49.9 kg)   Height: 4' 11.75\" (1.518 m)     Estimated body mass index is 21.66 kg/m² as calculated from the following:    Height as of this encounter: 4' 11.75\" (1.518 m). Weight as of this encounter: 110 lb (49.9 kg).     Physical Exam  Constitutional: General: She is not in acute distress. Appearance: She is well-developed. HENT:      Head: Normocephalic and atraumatic. Eyes:      Conjunctiva/sclera: Conjunctivae normal.      Pupils: Pupils are equal, round, and reactive to light. Cardiovascular:      Rate and Rhythm: Normal rate and regular rhythm. Heart sounds: Normal heart sounds. No murmur heard. Pulmonary:      Effort: Pulmonary effort is normal.      Breath sounds: Normal breath sounds. No wheezing. Abdominal:      General: Bowel sounds are normal.      Palpations: Abdomen is soft. Tenderness: There is abdominal tenderness in the right upper quadrant, epigastric area and left upper quadrant. There is right CVA tenderness and left CVA tenderness. Musculoskeletal:      Cervical back: Neck supple. Lymphadenopathy:      Cervical: No cervical adenopathy. Skin:     General: Skin is warm and dry. Findings: No rash. Neurological:      Mental Status: She is alert and oriented to person, place, and time. Deep Tendon Reflexes: Reflexes are normal and symmetric. Other

## 2024-12-23 NOTE — PROGRESS NOTES
Subjective:      Patient ID: Ramandeep Majano is a 44 y.o. female.    HPI  Patient in for checkup on 1 week history of green nasal discharge, sinus congestion and maxillary pain.  She denies cough fever or any GI symptoms.  She states she had 1 dose of Afrin which did help but did not want her nose to get hooked on it and she is taking Mucinex along with some Tylenol Cold medicine.  Temperature has been slightly over 99.        Review of Systems    Review of Systems   Constitutional:  Positive for fatigue. Negative for fever.   HENT:  Positive for congestion, postnasal drip, sinus pain and voice change.    Respiratory:  Negative for cough and shortness of breath.    Cardiovascular:  Negative for chest pain.   Gastrointestinal:  Negative for diarrhea, nausea and vomiting.       Objective:   Physical Exam      Physical Exam  Constitutional:       General: She is not in acute distress.     Appearance: Normal appearance. She is ill-appearing.   HENT:      Nose:      Comments: Very tender over the maxillary sinuses     Mouth/Throat:      Mouth: Mucous membranes are moist.      Pharynx: Oropharynx is clear.   Cardiovascular:      Rate and Rhythm: Regular rhythm.      Heart sounds: Normal heart sounds.   Pulmonary:      Breath sounds: Normal breath sounds.   Lymphadenopathy:      Cervical: No cervical adenopathy.   Neurological:      Mental Status: She is alert and oriented to person, place, and time.   Psychiatric:         Mood and Affect: Mood normal.         Behavior: Behavior normal.         Thought Content: Thought content normal.         Judgment: Judgment normal.         Assessment:       Diagnosis Orders   1. Acute bacterial sinusitis              Plan:      Ramandeep was seen today for congestion.    Diagnoses and all orders for this visit:    Acute bacterial sinusitis  Prescription sent for Z-Benji and Astelin nasal spray both as directed.  May continue Mucinex and cold medication-lots of fluids-Tylenol for any

## 2024-12-27 DIAGNOSIS — M17.0 PRIMARY OSTEOARTHRITIS OF BOTH KNEES: ICD-10-CM

## 2024-12-27 DIAGNOSIS — G47.01 INSOMNIA DUE TO MEDICAL CONDITION: ICD-10-CM

## 2024-12-27 RX ORDER — ZOLPIDEM TARTRATE 5 MG/1
TABLET ORAL
Qty: 25 TABLET | Refills: 0 | Status: SHIPPED | OUTPATIENT
Start: 2024-12-27 | End: 2025-01-27

## 2024-12-27 RX ORDER — TRAMADOL HYDROCHLORIDE 50 MG/1
50 TABLET ORAL 2 TIMES DAILY PRN
Qty: 60 TABLET | Refills: 0 | Status: SHIPPED | OUTPATIENT
Start: 2024-12-27 | End: 2025-01-26

## 2024-12-27 RX ORDER — DULAGLUTIDE 1.5 MG/.5ML
INJECTION, SOLUTION SUBCUTANEOUS
Qty: 2 ADJUSTABLE DOSE PRE-FILLED PEN SYRINGE | Refills: 2 | Status: SHIPPED | OUTPATIENT
Start: 2024-12-27

## 2024-12-27 NOTE — TELEPHONE ENCOUNTER
Refill Request - Controlled Substance    CONFIRM preferred pharmacy with the patient.    If Mail Order Rx - Pend for 90 day refill.        Last Seen Department: 12/23/2024  Last Seen by PCP: 12/23/2024    Last Written: Zolpidem 25 with no refill     Trulicity 06/17/2024 1.5MG/0.5ML SC injection with 2 refills    Last UDS: 06/17/2024    Med Agreement Signed On: 06/28/2024    If no future appointment scheduled:  Review the last OV with PCP and review information for follow-up visit,  Route STAFF MESSAGE with patient name to the  Pool for scheduling with the following information:            -  Timing of next visit           -  Visit type ie Physical, OV, etc           -  Diagnoses/Reason ie. COPD, HTN - Do not use MEDICATION, Follow-up or CHECK UP - Give reason for visit        Next Appointment:   Future Appointments   Date Time Provider Department Center   12/30/2024  4:00 PM Ann Connell MD [facility] ECC DEP   3/5/2025  4:30 PM Ann Connell MD [facility] DEP       Message sent to  to schedule appt with patient?  NO      Requested Prescriptions     Pending Prescriptions Disp Refills    TRULICITY 1.5 MG/0.5ML SC injection [Pharmacy Med Name: TRULICITY 1.5 MG/0.5 ML PEN]  2     Sig: INJECT 0.5 ML SUBCUTANEOUSLY ONE TIME PER WEEK    zolpidem (AMBIEN) 5 MG tablet [Pharmacy Med Name: ZOLPIDEM TARTRATE 5 MG TABLET] 25 tablet 0     Sig: TAKE 1 TABLET BY MOUTH AT BEDTIME AS NEEDED FOR SLEEP. MUST LAST 1 MONTH

## 2024-12-27 NOTE — TELEPHONE ENCOUNTER
Refill Request - Controlled Substance    CONFIRM preferred pharmacy with the patient.    If Mail Order Rx - Pend for 90 day refill.        Last Seen Department: 12/23/2024  Last Seen by PCP: 12/23/2024    Last Written: 11/18/24 60 with no refills     Last UDS: 6/17/24    Med Agreement Signed On: 6/28/24    If no future appointment scheduled:  Review the last OV with PCP and review information for follow-up visit,  Route STAFF MESSAGE with patient name to the  Pool for scheduling with the following information:            -  Timing of next visit           -  Visit type ie Physical, OV, etc           -  Diagnoses/Reason ie. COPD, HTN - Do not use MEDICATION, Follow-up or CHECK UP - Give reason for visit        Next Appointment:   Future Appointments   Date Time Provider Department Center   12/30/2024  4:00 PM Ann Connell, MD [facility] ECC DEP   3/5/2025  4[facility]30 [facility]       Message sent to  to schedule appt with patient?  NO      Requested Prescriptions     Pending Prescriptions Disp Refills    traMADol (ULTRAM) 50 MG tablet [Pharmacy Med Name: TRAMADOL HCL 50 MG TABLET] 60 tablet 0     Sig: TAKE 1 TABLET BY MOUTH 2 TIMES DAILY AS NEEDED FOR PAIN FOR UP TO 30 DAYS. MAX DAILY AMOUNT: 2 TABS

## 2024-12-30 ENCOUNTER — TELEPHONE (OUTPATIENT)
Dept: ADMINISTRATIVE | Age: 44
End: 2024-12-30

## 2024-12-30 NOTE — TELEPHONE ENCOUNTER
Submitted PA for traMADol HCl 50MG tablets   Via CMM Key: [id number] STATUS: The medication is APPROVED THROUGH 06/28/2025.    If this requires a response please respond to the pool ( P MHCX [facility] MEDICATION PRE-AUTH).      Thank you please advise patient.

## 2025-01-07 ENCOUNTER — OFFICE VISIT (OUTPATIENT)
Dept: ORTHOPEDIC SURGERY | Age: 45
End: 2025-01-07
Payer: COMMERCIAL

## 2025-01-07 ENCOUNTER — TELEPHONE (OUTPATIENT)
Dept: ORTHOPEDIC SURGERY | Age: 45
End: 2025-01-07

## 2025-01-07 VITALS — BODY MASS INDEX: 32 KG/M2 | WEIGHT: 163 LBS | HEIGHT: 60 IN

## 2025-01-07 DIAGNOSIS — M75.01 ADHESIVE CAPSULITIS OF BOTH SHOULDERS: Primary | ICD-10-CM

## 2025-01-07 DIAGNOSIS — M75.02 ADHESIVE CAPSULITIS OF BOTH SHOULDERS: Primary | ICD-10-CM

## 2025-01-07 DIAGNOSIS — M75.42 ROTATOR CUFF IMPINGEMENT SYNDROME OF LEFT SHOULDER: ICD-10-CM

## 2025-01-07 DIAGNOSIS — M75.41 ROTATOR CUFF IMPINGEMENT SYNDROME OF RIGHT SHOULDER: ICD-10-CM

## 2025-01-07 DIAGNOSIS — M18.11 PRIMARY OSTEOARTHRITIS OF FIRST CARPOMETACARPAL JOINT OF RIGHT HAND: ICD-10-CM

## 2025-01-07 PROCEDURE — L3809 WHFO W/O JOINTS PRE OTS: HCPCS | Performed by: ORTHOPAEDIC SURGERY

## 2025-01-07 PROCEDURE — 99213 OFFICE O/P EST LOW 20 MIN: CPT | Performed by: ORTHOPAEDIC SURGERY

## 2025-01-07 RX ORDER — MELOXICAM 15 MG/1
15 TABLET ORAL DAILY PRN
Qty: 30 TABLET | Refills: 0 | Status: SHIPPED | OUTPATIENT
Start: 2025-01-07

## 2025-01-07 NOTE — PROGRESS NOTES
She returns today for evaluation.  I saw her sometime ago and she has been doing therapy and taking medication but even taking Ambien at night and tramadol during the day in addition to other things she takes she still is having increasing pain in both shoulders down to her right hand.  The pain and the pain is more of a consistent CMC type pain for check of her brace but the issues with her shoulders as she has pain on both shoulders has loss of motion right worse than left having diminished motion to both abduction forward flexion and rotation which is worsening from prior visits.  At this time because of the nature of her pain I do recommend that she obtain an MRI of her left shoulder which is her more symptomatic side and may benefit from an MRI of the right shoulder not-too-distant future.  I try some meloxicam but would avoid steroids because her A1c is 7.7.  I will not be in the office for the next 3 weeks I would recommend that she be referred to Dr. Milan Amaya after the MRI for disposition.        Procedures    Ani Thomas Titan Wrist and Thumb Brace     Patient was prescribed a Ani Thomas Titan Wrist and Thumb Brace.  The right wrist and thumb will require stabilization / immobilization from this semi-rigid / rigid orthosis to improve their function.  The orthosis will assist in protecting the affected area, provide functional support and facilitate healing.    The patient was educated and fit by a healthcare professional with expert knowledge and specialization in brace application while under the direct supervision of the treating physician.  Verbal and written instructions for the use of and application of this item were provided.   They were instructed to contact the office immediately should the brace result in increased pain, decreased sensation, increased swelling or worsening of the condition.

## 2025-01-10 ENCOUNTER — HOSPITAL ENCOUNTER (EMERGENCY)
Age: 45
Discharge: HOME | End: 2025-01-10
Payer: COMMERCIAL

## 2025-01-10 VITALS
HEART RATE: 74 BPM | HEIGHT: 61 IN | DIASTOLIC BLOOD PRESSURE: 78 MMHG | OXYGEN SATURATION: 100 % | WEIGHT: 162.5 LBS | BODY MASS INDEX: 30.68 KG/M2 | SYSTOLIC BLOOD PRESSURE: 127 MMHG | RESPIRATION RATE: 16 BRPM | TEMPERATURE: 97.8 F

## 2025-01-10 DIAGNOSIS — M65.4 DE QUERVAIN'S TENOSYNOVITIS, RIGHT: Primary | ICD-10-CM

## 2025-01-10 PROCEDURE — 6370000000 HC RX 637 (ALT 250 FOR IP): Performed by: NURSE PRACTITIONER

## 2025-01-10 PROCEDURE — 99283 EMERGENCY DEPT VISIT LOW MDM: CPT

## 2025-01-10 RX ORDER — PREDNISONE 20 MG/1
40 TABLET ORAL ONCE
Status: COMPLETED | OUTPATIENT
Start: 2025-01-10 | End: 2025-01-10

## 2025-01-10 RX ORDER — PREDNISONE 10 MG/1
TABLET ORAL
Qty: 20 TABLET | Refills: 0 | Status: SHIPPED | OUTPATIENT
Start: 2025-01-10 | End: 2025-01-20

## 2025-01-10 RX ADMIN — PREDNISONE 40 MG: 20 TABLET ORAL at 01:05

## 2025-01-10 ASSESSMENT — PAIN - FUNCTIONAL ASSESSMENT: PAIN_FUNCTIONAL_ASSESSMENT: 0-10

## 2025-01-10 ASSESSMENT — PAIN SCALES - GENERAL: PAINLEVEL_OUTOF10: 6

## 2025-01-10 ASSESSMENT — PAIN DESCRIPTION - LOCATION: LOCATION: HAND

## 2025-01-10 ASSESSMENT — LIFESTYLE VARIABLES
HOW OFTEN DO YOU HAVE A DRINK CONTAINING ALCOHOL: NEVER
HOW MANY STANDARD DRINKS CONTAINING ALCOHOL DO YOU HAVE ON A TYPICAL DAY: PATIENT DOES NOT DRINK

## 2025-01-10 ASSESSMENT — PAIN DESCRIPTION - ORIENTATION: ORIENTATION: RIGHT

## 2025-01-13 ENCOUNTER — HOSPITAL ENCOUNTER (OUTPATIENT)
Dept: GENERAL RADIOLOGY | Age: 45
Discharge: HOME | End: 2025-01-13
Payer: COMMERCIAL

## 2025-01-13 ENCOUNTER — OFFICE VISIT (OUTPATIENT)
Dept: FAMILY MEDICINE CLINIC | Age: 45
End: 2025-01-13

## 2025-01-13 VITALS
DIASTOLIC BLOOD PRESSURE: 82 MMHG | WEIGHT: 165.8 LBS | BODY MASS INDEX: 31.33 KG/M2 | SYSTOLIC BLOOD PRESSURE: 112 MMHG | HEART RATE: 66 BPM | OXYGEN SATURATION: 97 %

## 2025-01-13 DIAGNOSIS — M79.644 PAIN OF RIGHT THUMB: ICD-10-CM

## 2025-01-13 DIAGNOSIS — R10.84 GENERALIZED ABDOMINAL PAIN: ICD-10-CM

## 2025-01-13 DIAGNOSIS — E11.42 TYPE 2 DIABETES MELLITUS WITH DIABETIC POLYNEUROPATHY, WITH LONG-TERM CURRENT USE OF INSULIN (HCC): Primary | ICD-10-CM

## 2025-01-13 DIAGNOSIS — Z79.4 TYPE 2 DIABETES MELLITUS WITH DIABETIC POLYNEUROPATHY, WITH LONG-TERM CURRENT USE OF INSULIN (HCC): Primary | ICD-10-CM

## 2025-01-13 LAB
BILIRUBIN, POC: NORMAL
BLOOD URINE, POC: NORMAL
CLARITY, POC: CLEAR
COLOR, POC: YELLOW
GLUCOSE URINE, POC: NORMAL MG/DL
KETONES, POC: NORMAL MG/DL
LEUKOCYTE EST, POC: NORMAL
NITRITE, POC: NORMAL
PH, POC: 6
PROTEIN, POC: NORMAL MG/DL
SPECIFIC GRAVITY, POC: 1.02
UROBILINOGEN, POC: 0.2 MG/DL

## 2025-01-13 PROCEDURE — 73130 X-RAY EXAM OF HAND: CPT

## 2025-01-13 SDOH — ECONOMIC STABILITY: FOOD INSECURITY: WITHIN THE PAST 12 MONTHS, YOU WORRIED THAT YOUR FOOD WOULD RUN OUT BEFORE YOU GOT MONEY TO BUY MORE.: SOMETIMES TRUE

## 2025-01-13 SDOH — ECONOMIC STABILITY: TRANSPORTATION INSECURITY
IN THE PAST 12 MONTHS, HAS THE LACK OF TRANSPORTATION KEPT YOU FROM MEDICAL APPOINTMENTS OR FROM GETTING MEDICATIONS?: NO

## 2025-01-13 SDOH — ECONOMIC STABILITY: TRANSPORTATION INSECURITY
IN THE PAST 12 MONTHS, HAS LACK OF TRANSPORTATION KEPT YOU FROM MEETINGS, WORK, OR FROM GETTING THINGS NEEDED FOR DAILY LIVING?: NO

## 2025-01-13 SDOH — ECONOMIC STABILITY: FOOD INSECURITY: WITHIN THE PAST 12 MONTHS, THE FOOD YOU BOUGHT JUST DIDN'T LAST AND YOU DIDN'T HAVE MONEY TO GET MORE.: NEVER TRUE

## 2025-01-13 SDOH — ECONOMIC STABILITY: FOOD INSECURITY: WITHIN THE PAST 12 MONTHS, THE FOOD YOU BOUGHT JUST DIDN'T LAST AND YOU DIDN'T HAVE MONEY TO GET MORE.: SOMETIMES TRUE

## 2025-01-13 SDOH — ECONOMIC STABILITY: INCOME INSECURITY: IN THE LAST 12 MONTHS, WAS THERE A TIME WHEN YOU WERE NOT ABLE TO PAY THE MORTGAGE OR RENT ON TIME?: NO

## 2025-01-13 SDOH — ECONOMIC STABILITY: FOOD INSECURITY: WITHIN THE PAST 12 MONTHS, YOU WORRIED THAT YOUR FOOD WOULD RUN OUT BEFORE YOU GOT MONEY TO BUY MORE.: NEVER TRUE

## 2025-01-13 ASSESSMENT — PATIENT HEALTH QUESTIONNAIRE - PHQ9
SUM OF ALL RESPONSES TO PHQ QUESTIONS 1-9: 0
SUM OF ALL RESPONSES TO PHQ QUESTIONS 1-9: 0
1. LITTLE INTEREST OR PLEASURE IN DOING THINGS: NOT AT ALL
1. LITTLE INTEREST OR PLEASURE IN DOING THINGS: NOT AT ALL
SUM OF ALL RESPONSES TO PHQ9 QUESTIONS 1 & 2: 0
2. FEELING DOWN, DEPRESSED OR HOPELESS: NOT AT ALL
2. FEELING DOWN, DEPRESSED OR HOPELESS: NOT AT ALL
SUM OF ALL RESPONSES TO PHQ QUESTIONS 1-9: 0
SUM OF ALL RESPONSES TO PHQ QUESTIONS 1-9: 0
SUM OF ALL RESPONSES TO PHQ9 QUESTIONS 1 & 2: 0

## 2025-01-13 NOTE — ED PROVIDER NOTES
When ordered, EKG's are interpreted by the Emergency Department Physician in the absence of a cardiologist.  Please see their note for interpretation of EKG.    RADIOLOGY:   Non-plain film images such as CT, Ultrasound and MRI are read by the radiologist. Plain radiographic images are visualized and preliminarily interpreted by the ED Provider with the below findings:        Interpretation per the Radiologist below, if available at the time of this note:    No orders to display     No results found.    No results found.    PROCEDURES   Unless otherwise noted below, none     Procedures    CRITICAL CARE TIME (.cctime)       PAST MEDICAL HISTORY      has a past medical history of Abdominal pain, acute, right lower quadrant (05/15/2013), Anxiety, Arthritis, Bilateral ovarian cysts (08/13/2013), Blood transfusion reaction, Cellulitis and abscess of trunk (01/29/2014), COVID-19 (05/2020), Depression, Diabetes mellitus ([facility]), Diverticula of colon (08/13/2013), DM2 (diabetes mellitus, type 2) ([facility]) (07/12/2016), Insomnia, Insomnia secondary to situational depression (02/27/2014), Left carpal tunnel syndrome (07/12/2018), MDRO (multiple drug resistant organisms) resistance, OA (osteoarthritis) of knee (05/13/2014), Obesity (BMI 30.0-34.9) (09/21/2017), Ovarian cyst, Plantar fasciitis, left (05/13/2014), and Type 2 diabetes mellitus with ophthalmic complication, with long-term current use of insulin ([facility]) (01/06/2015).     EMERGENCY DEPARTMENT COURSE and DIFFERENTIAL DIAGNOSIS/MDM:   Vitals:    Vitals:    01/10/25 0046 01/10/25 0047   BP: 127/78    Pulse: 74    Resp: 16    Temp:  97.8 °F (36.6 °C)   TempSrc:  Oral   SpO2: 100%    Weight: 73.7 kg (162 lb 8 oz)    Height: 1.549 m (5' 1\")        Patient was given the following medications:  Medications   predniSONE (DELTASONE) tablet 40 mg (40 mg Oral Given 1/10/25 0105)             Is this patient to be included in the SEP-1 core measure? No Exclusion criteria - the patient is

## 2025-01-13 NOTE — PROGRESS NOTES
pain, acute, right lower quadrant 05/15/2013    Anxiety     Arthritis     Left Knee    Bilateral ovarian cysts 08/13/2013    Blood transfusion reaction     Cellulitis and abscess of trunk 01/29/2014    Pt was seen in ED today for bleeding from incision after taking a fall this morning.      COVID-19 05/2020    Depression     Diabetes mellitus ([facility])     x5yr    Diverticula of colon 08/13/2013    DM2 (diabetes mellitus, type 2) ([facility]) 07/12/2016    Insomnia     Insomnia secondary to situational depression 02/27/2014    Left carpal tunnel syndrome 07/12/2018    MDRO (multiple drug resistant organisms) resistance     poss MRSA after hysterectomy treated with antibiotics    OA (osteoarthritis) of knee 05/13/2014    Obesity (BMI 30.0-34.9) 09/21/2017    Ovarian cyst     Plantar fasciitis, left 05/13/2014    Type 2 diabetes mellitus with ophthalmic complication, with long-term current use of insulin ([facility]) 01/06/2015     Patient Active Problem List   Diagnosis    OA (osteoarthritis) of knee    Type 2 diabetes mellitus with ophthalmic complication, with long-term current use of insulin ([facility])    Elevated alkaline phosphatase level    Obesity (BMI 30.0-34.9)    Cervical pain (neck)    Burning sensation of feet    Insomnia due to medical condition    Vitreous floaters of left eye    Left carpal tunnel syndrome    Closed avulsion fracture of middle phalanx of finger    Acute transudative otitis media    Dental caries    Fracture of tooth    Otalgia of right ear    History of shingles    Neuropathic pain    Epigastric pain    Gastritis without bleeding    Diverticulosis of colon    Grade II internal hemorrhoids    Fecal soiling due to fecal incontinence    Left shoulder pain    Right hip pain    Adhesive capsulitis of right shoulder    Rotator cuff impingement syndrome of right shoulder    Primary osteoarthritis of first carpometacarpal joint of right hand    Adhesive capsulitis of left shoulder       Physical Exam  Vitals:

## 2025-01-14 DIAGNOSIS — G43.709 CHRONIC MIGRAINE WITHOUT AURA WITHOUT STATUS MIGRAINOSUS, NOT INTRACTABLE: ICD-10-CM

## 2025-01-14 RX ORDER — TOPIRAMATE 25 MG/1
TABLET, FILM COATED ORAL
Qty: 180 TABLET | Refills: 1 | Status: SHIPPED | OUTPATIENT
Start: 2025-01-14

## 2025-01-14 ASSESSMENT — ENCOUNTER SYMPTOMS
CONSTIPATION: 1
SHORTNESS OF BREATH: 0
DIARRHEA: 1
COUGH: 0
VOMITING: 0
NAUSEA: 0
ABDOMINAL PAIN: 0

## 2025-01-23 DIAGNOSIS — M79.2 NEUROPATHIC PAIN: ICD-10-CM

## 2025-01-23 DIAGNOSIS — R20.8 BURNING SENSATION OF FEET: ICD-10-CM

## 2025-01-24 RX ORDER — GABAPENTIN 800 MG/1
TABLET ORAL
Qty: 90 TABLET | Refills: 0 | Status: SHIPPED | OUTPATIENT
Start: 2025-01-24 | End: 2025-02-24

## 2025-01-24 NOTE — TELEPHONE ENCOUNTER
Medication Management [facility]  Anticoagulation Clinic  [phone] (phone)  [phone] (fax)      Mr. Hall Home. is a 67 y.o.  male with history of persistent atrial fib./recurrent PE, per Dr. Ramo Calderon referral, who presents today for Warfarin  monitoring and adjustment (5 week visit). Patient verifies current dosing regimen and tablet strength. No missed or extra doses. Patient denies bleeding/bruising/swelling/chest pain. SOB improving. No blood in urine or stool. No dietary changes. No changes in medication/OTC agents/herbals. No change in alcohol use or tobacco use. No change in activity level. Patient denies headaches/dizziness/lightheadedness/falls. Hit head on cupboard 3 weeks ago. Reminded to go to ER with any head trauma. No vomiting/diarrhea or acute illness. No procedures scheduled in the future at this time. Has lost 5# since last visit - trying to. Assessment:   Lab Results   Component Value Date    INR 2.50 (H) 06/19/2019    INR 2.30 (H) 05/15/2019    INR 2.30 (H) 04/17/2019    PROTIME 35.8 (H) 12/18/2017    PROTIME 21.7 (A) 08/14/2017    PROTIME 52.5 (H) 08/12/2017     INR therapeutic - goal 2-3. Recent Labs     06/19/19  1504   INR 2.50*       Plan:  POCT INR ordered/performed/result reviewed. Continue PO Coumadin 5 mg MF, 7.5 mg TWThSS. Recheck INR in 5 weeks. Patient reminded to call the Anticoagulation Clinic with any signs or symptoms of bleeding or with any medication changes. Patient given instructions utilizing the teach back method. Discharged ambulatory in no apparent distress, with cane and wife. Prescription renewed electronically by clinic pharmacist.    After visit summary printed and reviewed with patient.       Medications reviewed and updated on home medication list.     Influenza vaccine:     [] given    [] declined   [] received previously   [] plans to receive at a later time   [] refused    [] documented in
Refill Request - Controlled Substance    CONFIRM preferred pharmacy with the patient.    If Mail Order Rx - Pend for 90 day refill.        Last Seen Department: 1/13/2025  Last Seen by PCP: 12/23/2024    Last Written: 12/23/24 90 with no refills     Last UDS: 6/17/24     Med Agreement Signed On: 6/28/24    If no future appointment scheduled:  Review the last OV with PCP and review information for follow-up visit,  Route STAFF MESSAGE with patient name to the  Pool for scheduling with the following information:            -  Timing of next visit           -  Visit type ie Physical, OV, etc           -  Diagnoses/Reason ie. COPD, HTN - Do not use MEDICATION, Follow-up or CHECK UP - Give reason for visit        Next Appointment:   Future Appointments   Date Time Provider Department Center   2/5/2025  3:00 PM Emily Lu MD [facility]   3/5/2025  4:30 PM Ann Connell MD [facility] DEP       Message sent to  to schedule appt with patient?  NO      Requested Prescriptions     Pending Prescriptions Disp Refills    gabapentin (NEURONTIN) 800 MG tablet [Pharmacy Med Name: GABAPENTIN 800 MG TABLET] 90 tablet 0     Sig: TAKE 1 TABLET BY MOUTH 2 TIMES A DAY MAY TAKE 1 TABLET AT BEDTIME IF NEEDED--LIMIT 3 PER DAY.         
EPIC
no

## 2025-02-02 SDOH — HEALTH STABILITY: PHYSICAL HEALTH: ON AVERAGE, HOW MANY DAYS PER WEEK DO YOU ENGAGE IN MODERATE TO STRENUOUS EXERCISE (LIKE A BRISK WALK)?: 2 DAYS

## 2025-02-03 DIAGNOSIS — M75.42 ROTATOR CUFF IMPINGEMENT SYNDROME OF LEFT SHOULDER: ICD-10-CM

## 2025-02-03 DIAGNOSIS — M75.02 ADHESIVE CAPSULITIS OF BOTH SHOULDERS: ICD-10-CM

## 2025-02-03 DIAGNOSIS — M17.0 PRIMARY OSTEOARTHRITIS OF BOTH KNEES: ICD-10-CM

## 2025-02-03 DIAGNOSIS — M75.01 ADHESIVE CAPSULITIS OF BOTH SHOULDERS: ICD-10-CM

## 2025-02-03 DIAGNOSIS — G47.01 INSOMNIA DUE TO MEDICAL CONDITION: ICD-10-CM

## 2025-02-03 RX ORDER — MELOXICAM 15 MG/1
15 TABLET ORAL DAILY PRN
Qty: 30 TABLET | Refills: 0 | Status: SHIPPED | OUTPATIENT
Start: 2025-02-03

## 2025-02-03 NOTE — TELEPHONE ENCOUNTER
Refill Request - Controlled Substance    CONFIRM preferred pharmacy with the patient.    If Mail Order Rx - Pend for 90 day refill.        Last Seen Department: 1/13/2025  Last Seen by PCP: 12/23/2024    Last Written: 12/27/2024 60 tab 0 refills     Last UDS: 06/17/2024    Med Agreement Signed On: 06/28/2024    If no future appointment scheduled:  Review the last OV with PCP and review information for follow-up visit,  Route STAFF MESSAGE with patient name to the  Pool for scheduling with the following information:            -  Timing of next visit           -  Visit type ie Physical, OV, etc           -  Diagnoses/Reason ie. COPD, HTN - Do not use MEDICATION, Follow-up or CHECK UP - Give reason for visit        Next Appointment:   Future Appointments   Date Time Provider Department [place]   2/5/2025  3:00 PM Emily Lu MD [facility]   3/5/2025  4:30 PM Ann Connell MD [facility] DEP       Message sent to  to schedule appt with patient?  NO      Requested Prescriptions     Pending Prescriptions Disp Refills    traMADol (ULTRAM) 50 MG tablet [Pharmacy Med Name: TRAMADOL HCL 50 MG TABLET] 60 tablet 0     Sig: TAKE 1 TABLET BY MOUTH 2 TIMES DAILY AS NEEDED FOR PAIN FOR UP TO 30 DAYS. MAX DAILY AMOUNT: 2 TABS

## 2025-02-03 NOTE — TELEPHONE ENCOUNTER
Refill Request - Controlled Substance    CONFIRM preferred pharmacy with the patient.    If Mail Order Rx - Pend for 90 day refill.        Last Seen Department: 1/13/2025  Last Seen by PCP: 12/23/2024    Last Written: 12/27/2024 25 tab 0 refills     Last UDS: 06/17/2024    Med Agreement Signed On: 06/28/2024    If no future appointment scheduled:  Review the last OV with PCP and review information for follow-up visit,  Route STAFF MESSAGE with patient name to the  Pool for scheduling with the following information:            -  Timing of next visit           -  Visit type ie Physical, OV, etc           -  Diagnoses/Reason ie. COPD, HTN - Do not use MEDICATION, Follow-up or CHECK UP - Give reason for visit        Next Appointment:   Future Appointments   Date Time Provider Department Center   2/5/2025  3:00 PM Emily Lu MD [facility]   3/5/2025  4:30 PM Ann Connell MD [facility] DEP       Message sent to  to schedule appt with patient?  NO      Requested Prescriptions     Pending Prescriptions Disp Refills    zolpidem (AMBIEN) 5 MG tablet [Pharmacy Med Name: ZOLPIDEM TARTRATE 5 MG TABLET] 25 tablet 0     Sig: TAKE 1 TABLET BY MOUTH AT BEDTIME AS NEEDED FOR SLEEP. MUST LAST 1 MONTH

## 2025-02-05 ENCOUNTER — PATIENT MESSAGE (OUTPATIENT)
Dept: FAMILY MEDICINE CLINIC | Age: 45
End: 2025-02-05

## 2025-02-05 ENCOUNTER — OFFICE VISIT (OUTPATIENT)
Dept: ORTHOPEDIC SURGERY | Age: 45
End: 2025-02-05

## 2025-02-05 VITALS — HEIGHT: 61 IN | BODY MASS INDEX: 31.15 KG/M2 | WEIGHT: 165 LBS

## 2025-02-05 DIAGNOSIS — M65.311 TRIGGER FINGER OF RIGHT THUMB: Primary | ICD-10-CM

## 2025-02-05 RX ORDER — ZOLPIDEM TARTRATE 5 MG/1
TABLET ORAL
Qty: 25 TABLET | Refills: 0 | Status: SHIPPED | OUTPATIENT
Start: 2025-02-05 | End: 2025-03-05

## 2025-02-05 RX ORDER — BETAMETHASONE SODIUM PHOSPHATE AND BETAMETHASONE ACETATE 3; 3 MG/ML; MG/ML
3 INJECTION, SUSPENSION INTRA-ARTICULAR; INTRALESIONAL; INTRAMUSCULAR; SOFT TISSUE ONCE
Status: COMPLETED | OUTPATIENT
Start: 2025-02-05 | End: 2025-02-05

## 2025-02-05 RX ORDER — LIDOCAINE HYDROCHLORIDE 10 MG/ML
0.5 INJECTION, SOLUTION INFILTRATION; PERINEURAL ONCE
Status: COMPLETED | OUTPATIENT
Start: 2025-02-05 | End: 2025-02-05

## 2025-02-05 RX ORDER — TRAMADOL HYDROCHLORIDE 50 MG/1
50 TABLET ORAL EVERY 6 HOURS PRN
Qty: 60 TABLET | Refills: 0 | Status: SHIPPED | OUTPATIENT
Start: 2025-02-05 | End: 2025-03-07

## 2025-02-05 RX ADMIN — BETAMETHASONE SODIUM PHOSPHATE AND BETAMETHASONE ACETATE 3 MG: 3; 3 INJECTION, SUSPENSION INTRA-ARTICULAR; INTRALESIONAL; INTRAMUSCULAR; SOFT TISSUE at 15:17

## 2025-02-05 RX ADMIN — LIDOCAINE HYDROCHLORIDE 0.5 ML: 10 INJECTION, SOLUTION INFILTRATION; PERINEURAL at 15:17

## 2025-02-05 NOTE — PROGRESS NOTES
Hand, Upper Extremity and Reconstructive Surgery                Emily Lu MD                                             History of Present Illness  The patient is a 44-year-old left-hand dominant female presenting with a 6-week history of left thumb pain.  She reports pain in the thumb and a catching of the thumb with range of motion.  She has undergone x-rays and has been provided with a thumb spica splint.  She reports stiffness of the thumb following wearing the splint and she states the splint is not helping her symptoms.  Patient states that the pain became so severe that she went to the emergency department.  Denies trauma.  She has previously seen Dr. Haskins for shoulder issues. The patient has diabetes mellitus with controlled blood glucose levels.    SOCIAL HISTORY  She works as a .    MEDICATIONS  Current: Meloxicam, tramadol, prednisone.                                                                                       Current Outpatient Medications   Medication Instructions    albuterol sulfate HFA (VENTOLIN HFA) 108 (90 Base) MCG/ACT inhaler 2 puffs, Inhalation, 4 TIMES DAILY PRN    azelastine (ASTELIN) 0.1 % nasal spray 1 spray, Nasal, 2 TIMES DAILY, Use in each nostril as directed    butalbital-acetaminophen-caffeine (FIORICET, ESGIC) -40 MG per tablet 1 tablet, Oral, EVERY 6 HOURS PRN, Limit to 3 times per week    Continuous Glucose Sensor (DEXCOM G7 SENSOR) MISC 2 Implants, Does not apply, AS NEEDED    Continuous Glucose Sensor (FREESTYLE GARRY 3 PLUS SENSOR) MISC Change q 2 weks    gabapentin (NEURONTIN) 800 MG tablet TAKE 1 TABLET BY MOUTH 2 TIMES A DAY MAY TAKE 1 TABLET AT BEDTIME IF NEEDED--LIMIT 3 PER DAY.    glucose monitoring kit (FREESTYLE) monitoring kit Dx E11.9-One touch ultra II meter-uses tid    hydrocortisone 2.5 % cream Apply topically 2 times daily.    Insulin Pen Needle (B-D UF III MINI PEN NEEDLES) 31G X 5 MM MISC 1

## 2025-02-18 ENCOUNTER — OFFICE VISIT (OUTPATIENT)
Dept: ORTHOPEDIC SURGERY | Age: 45
End: 2025-02-18
Payer: COMMERCIAL

## 2025-02-18 VITALS — WEIGHT: 165 LBS | HEIGHT: 61 IN | BODY MASS INDEX: 31.15 KG/M2

## 2025-02-18 DIAGNOSIS — M75.02 ADHESIVE CAPSULITIS OF BOTH SHOULDERS: Primary | ICD-10-CM

## 2025-02-18 DIAGNOSIS — M75.01 ADHESIVE CAPSULITIS OF BOTH SHOULDERS: Primary | ICD-10-CM

## 2025-02-18 DIAGNOSIS — M75.41 ROTATOR CUFF IMPINGEMENT SYNDROME OF RIGHT SHOULDER: ICD-10-CM

## 2025-02-18 PROCEDURE — 20610 DRAIN/INJ JOINT/BURSA W/O US: CPT | Performed by: STUDENT IN AN ORGANIZED HEALTH CARE EDUCATION/TRAINING PROGRAM

## 2025-02-18 PROCEDURE — 99213 OFFICE O/P EST LOW 20 MIN: CPT | Performed by: STUDENT IN AN ORGANIZED HEALTH CARE EDUCATION/TRAINING PROGRAM

## 2025-02-18 RX ORDER — METHYLPREDNISOLONE ACETATE 40 MG/ML
80 INJECTION, SUSPENSION INTRA-ARTICULAR; INTRALESIONAL; INTRAMUSCULAR; SOFT TISSUE ONCE
Status: COMPLETED | OUTPATIENT
Start: 2025-02-18 | End: 2025-02-18

## 2025-02-18 RX ORDER — LIDOCAINE HYDROCHLORIDE 10 MG/ML
4 INJECTION, SOLUTION INFILTRATION; PERINEURAL ONCE
Status: COMPLETED | OUTPATIENT
Start: 2025-02-18 | End: 2025-02-18

## 2025-02-18 RX ADMIN — LIDOCAINE HYDROCHLORIDE 4 ML: 10 INJECTION, SOLUTION INFILTRATION; PERINEURAL at 16:41

## 2025-02-18 RX ADMIN — METHYLPREDNISOLONE ACETATE 80 MG: 40 INJECTION, SUSPENSION INTRA-ARTICULAR; INTRALESIONAL; INTRAMUSCULAR; SOFT TISSUE at 16:42

## 2025-02-18 NOTE — PROGRESS NOTES
Date:  2025    Name:  Ramandeep Majano  Address:  [address] [place] [id number]    :  1980      Age:   44 y.o.    SSN:  [id number]      Medical Record Number:  [id number]    Reason for Visit:    Chief Complaint    Shoulder Pain (NP RT. SHOULDER)      DOS:2025     HPI: Ramandeep Majano is a 44 y.o. female here today for new patient evaluation regarding her bilateral shoulders left worse than right.  She is left-hand dominant.    History of Present Illness  The patient is a 44-year-old female who presents today for a new patient evaluation.    She was referred by Dr. Haskins due to persistent shoulder pain, which has been ongoing for approximately one year. She reports that the pain is so severe that it disrupts her sleep, allowing her only two hours of rest even with medication. She is left-hand dominant and experiences pain in both arms, particularly when attempting to perform tasks such as stretching back to fasten her bra or pulling objects over her head. She reports no specific injury that could have triggered the onset of her symptoms. She has no history of previous shoulder surgeries but recalls experiencing aches during her middle and high school years when she was active in volleyball, cheerleading, and basketball. She also reports difficulty in maintaining intimacy due to the postoperative pain. Despite engaging in home therapy exercises with bands for a duration of 2 months and taking oral prednisone, her pain persists. She has not received any injections for her shoulder pain due to her diabetic condition, although her blood sugar levels are well-managed. A few weeks prior, she received a steroid injection in her thumb, which did not significantly elevate her blood sugar levels.    MEDICATIONS  Current: prednisone           ROS: All systems reviewed on patient intake form.  Pertinent items are noted in HPI.        Past Medical History:   Diagnosis Date    Abdominal

## 2025-02-25 ENCOUNTER — HOSPITAL ENCOUNTER (OUTPATIENT)
Dept: PHYSICAL THERAPY | Age: 45
Setting detail: THERAPIES SERIES
Discharge: HOME | End: 2025-02-25

## 2025-02-25 DIAGNOSIS — R20.8 BURNING SENSATION OF FEET: ICD-10-CM

## 2025-02-25 DIAGNOSIS — M79.2 NEUROPATHIC PAIN: ICD-10-CM

## 2025-02-25 RX ORDER — GABAPENTIN 800 MG/1
TABLET ORAL
Qty: 90 TABLET | Refills: 1 | Status: SHIPPED | OUTPATIENT
Start: 2025-02-25 | End: 2025-04-25

## 2025-02-25 NOTE — PLAN OF CARE
[facility] - Outpatient Rehabilitation and Therapy: [address], [place] [address] office: [phone] fax: [phone]     Physical Therapy Initial Evaluation Certification      Dear Milan Amaya DO ,    We had the pleasure of evaluating the following patient for physical therapy services at [facility].  A summary of our findings can be found in the initial assessment below.  This includes our plan of care.  If you have any questions or concerns regarding these findings, please do not hesitate to contact me at the office phone number listed above.  Thank you for the referral.     Physician Signature:_______________________________Date:__________________  By signing above (or electronic signature), therapist’s plan is approved by physician       Physical Therapy: TREATMENT/PROGRESS NOTE   Patient: Ramandeep Majano (45 y.o. female)   Examination Date: 2025   :  1980 MRN: [id number]   Visit #: 1   Insurance Allowable Auth Needed   ? []Yes    []No    Insurance: Pay[place]: [place] BC[place] / Plan[place] P[place] / Product Type: *No Product type* /   Insurance ID: [id number] - ([facility])  Secondary Insurance (if applicable):    Treatment Diagnosis: L shoulder pain   No diagnosis found.   Medical Diagnosis:  Shoulder pain [M25.519]   Referring Physician: Milan Amaya DO  PCP: Ann Connell MD     Plan of care signed (Y/N):     Date of Patient follow up with Physician:      Plan of Care Report: EVAL today and YES, Date Range for this report: 25 to 25  POC update due: (10 visits /OR AUTH LIMITS, whichever is less)  3/27/2025                                             Medical History:  Comorbidities:  {THERAPY COMORBIDITIES:02816}  Relevant Medical History: ***                                         Precautions/ Contra-indications:           Latex allergy:  {YES/NO/NA:40193::\"NO\"}  Pacemaker:

## 2025-02-25 NOTE — TELEPHONE ENCOUNTER
Refill Request     CONFIRM preferred pharmacy with the patient.    If Mail Order Rx - Pend for 90 day refill.      Last Seen: Last Seen Department: 1/13/2025  Last Seen by PCP: 1/13/2025    Last Written: 1/24/2025 90 tab 0 refills    If no future appointment scheduled:  Review the last OV with PCP and review information for follow-up visit,  Route STAFF MESSAGE with patient name to the  Pool for scheduling with the following information:            -  Timing of next visit           -  Visit type ie Physical, OV, etc           -  Diagnoses/Reason ie. COPD, HTN - Do not use MEDICATION, Follow-up or CHECK UP - Give reason for visit      Next Appointment:   Future Appointments   Date Time Provider Department Center   3/4/2025  2:45 PM Milan Am[facility] DO [facility]   3/5/2025  4:30 PM Ann Connell MD [facility]   4/1/2025  3:15 PM Milan Am[facility] DO [facility]       Message sent to  to schedule appt with patient?  NO      Requested Prescriptions     Pending Prescriptions Disp Refills    gabapentin (NEURONTIN) 800 MG tablet [Pharmacy Med Name: GABAPENTIN 800 MG TABLET] 90 tablet 0     Sig: TAKE 1 TABLET BY MOUTH 2 TIMES A DAY MAY TAKE 1 TABLET AT BEDTIME IF NEEDED--LIMIT 3 PER DAY.

## 2025-03-05 ENCOUNTER — OFFICE VISIT (OUTPATIENT)
Dept: FAMILY MEDICINE CLINIC | Age: 45
End: 2025-03-05

## 2025-03-05 VITALS
HEART RATE: 96 BPM | SYSTOLIC BLOOD PRESSURE: 100 MMHG | WEIGHT: 152.2 LBS | DIASTOLIC BLOOD PRESSURE: 62 MMHG | OXYGEN SATURATION: 95 % | BODY MASS INDEX: 28.76 KG/M2

## 2025-03-05 DIAGNOSIS — G47.01 INSOMNIA DUE TO MEDICAL CONDITION: ICD-10-CM

## 2025-03-05 DIAGNOSIS — Z79.4 TYPE 2 DIABETES MELLITUS WITH DIABETIC POLYNEUROPATHY, WITH LONG-TERM CURRENT USE OF INSULIN (HCC): Primary | ICD-10-CM

## 2025-03-05 DIAGNOSIS — Z12.31 SCREENING MAMMOGRAM FOR BREAST CANCER: ICD-10-CM

## 2025-03-05 DIAGNOSIS — E11.42 TYPE 2 DIABETES MELLITUS WITH DIABETIC POLYNEUROPATHY, WITH LONG-TERM CURRENT USE OF INSULIN (HCC): Primary | ICD-10-CM

## 2025-03-05 LAB — HBA1C MFR BLD: 7.3 %

## 2025-03-05 RX ORDER — GLUCAGON 3 MG/1
POWDER NASAL
Qty: 1 EACH | Refills: 0 | Status: SHIPPED | OUTPATIENT
Start: 2025-03-05

## 2025-03-05 RX ORDER — ZOLPIDEM TARTRATE 10 MG/1
TABLET ORAL
Qty: 30 TABLET | Refills: 0 | Status: SHIPPED | OUTPATIENT
Start: 2025-03-05 | End: 2025-04-02

## 2025-03-05 ASSESSMENT — ENCOUNTER SYMPTOMS
COUGH: 0
NAUSEA: 0
ABDOMINAL PAIN: 0
SHORTNESS OF BREATH: 0
VOMITING: 0

## 2025-03-05 NOTE — PROGRESS NOTES
(152 lb 3.2 oz).    Physical Exam  Vitals reviewed.   Constitutional:       General: She is not in acute distress.     Appearance: Normal appearance.   HENT:      Head: Normocephalic and atraumatic.   Cardiovascular:      Rate and Rhythm: Normal rate and regular rhythm.   Pulmonary:      Effort: Pulmonary effort is normal. No respiratory distress.      Breath sounds: Normal breath sounds.   Abdominal:      Palpations: Abdomen is soft.      Tenderness: There is no abdominal tenderness.   Musculoskeletal:         General: No swelling.      Comments: Decreased ROM of left shoulder.    Skin:     General: Skin is warm and dry.   Neurological:      Mental Status: She is alert.   Psychiatric:         Mood and Affect: Mood normal.       Ann Connell MD    This dictation was generated by voice recognition computer software.  Although all attempts are made to edit the dictation for accuracy, there may be errors in the transcription that are not intended.

## 2025-03-05 NOTE — ASSESSMENT & PLAN NOTE
Would like to trial increased ambien dose at 10mg. UDS and drug contract UTD.     Orders:    zolpidem (AMBIEN) 10 MG tablet; TAKE 1 TABLET BY MOUTH AT BEDTIME AS NEEDED FOR SLEEP. MUST LAST 1 MONTH

## 2025-03-09 DIAGNOSIS — M75.02 ADHESIVE CAPSULITIS OF BOTH SHOULDERS: ICD-10-CM

## 2025-03-09 DIAGNOSIS — M75.01 ADHESIVE CAPSULITIS OF BOTH SHOULDERS: ICD-10-CM

## 2025-03-09 DIAGNOSIS — M75.42 ROTATOR CUFF IMPINGEMENT SYNDROME OF LEFT SHOULDER: ICD-10-CM

## 2025-03-11 ENCOUNTER — TELEPHONE (OUTPATIENT)
Dept: ORTHOPEDIC SURGERY | Age: 45
End: 2025-03-11

## 2025-03-11 ENCOUNTER — OFFICE VISIT (OUTPATIENT)
Dept: ORTHOPEDIC SURGERY | Age: 45
End: 2025-03-11

## 2025-03-11 VITALS — WEIGHT: 152 LBS | HEIGHT: 61 IN | BODY MASS INDEX: 28.7 KG/M2

## 2025-03-11 DIAGNOSIS — M75.01 ADHESIVE CAPSULITIS OF BOTH SHOULDERS: Primary | ICD-10-CM

## 2025-03-11 DIAGNOSIS — M75.02 ADHESIVE CAPSULITIS OF BOTH SHOULDERS: Primary | ICD-10-CM

## 2025-03-11 RX ORDER — METHYLPREDNISOLONE ACETATE 40 MG/ML
80 INJECTION, SUSPENSION INTRA-ARTICULAR; INTRALESIONAL; INTRAMUSCULAR; SOFT TISSUE ONCE
Status: COMPLETED | OUTPATIENT
Start: 2025-03-11 | End: 2025-03-11

## 2025-03-11 RX ORDER — LIDOCAINE HYDROCHLORIDE 10 MG/ML
4 INJECTION, SOLUTION INFILTRATION; PERINEURAL ONCE
Status: COMPLETED | OUTPATIENT
Start: 2025-03-11 | End: 2025-03-11

## 2025-03-11 RX ORDER — MELOXICAM 15 MG/1
15 TABLET ORAL DAILY PRN
Qty: 30 TABLET | Refills: 0 | Status: SHIPPED | OUTPATIENT
Start: 2025-03-11

## 2025-03-11 RX ADMIN — METHYLPREDNISOLONE ACETATE 80 MG: 40 INJECTION, SUSPENSION INTRA-ARTICULAR; INTRALESIONAL; INTRAMUSCULAR; SOFT TISSUE at 09:36

## 2025-03-11 RX ADMIN — LIDOCAINE HYDROCHLORIDE 4 ML: 10 INJECTION, SOLUTION INFILTRATION; PERINEURAL at 09:34

## 2025-03-11 NOTE — PROGRESS NOTES
Chief Complaint  Shoulder Pain (CK RT. SHOULDER)      History of Present Illness  The patient is a 45-year-old female here today for repeat evaluation regarding her bilateral shoulders. 1.    MEDICATIONS  Current: Toujeo    Prior HPI 2/18/25:  The patient is a 44-year-old female who presents today for a new patient evaluation. She was referred by Dr. Haskins due to persistent shoulder pain, which has been ongoing for approximately one year. She reports that the pain is so severe that it disrupts her sleep, allowing her only two hours of rest even with medication. She is left-hand dominant and experiences pain in both arms, particularly when attempting to perform tasks such as stretching back to fasten her bra or pulling objects over her head. She reports no specific injury that could have triggered the onset of her symptoms. She has no history of previous shoulder surgeries but recalls experiencing aches during her middle and high school years when she was active in volleyball, cheerleading, and basketball. She also reports difficulty in maintaining intimacy due to the postoperative pain. Despite engaging in home therapy exercises with bands for a duration of 2 months and taking oral prednisone, her pain persists. She has not received any injections for her shoulder pain due to her diabetic condition, although her blood sugar levels are well-managed. A few weeks prior, she received a steroid injection in her thumb, which did not significantly elevate her blood sugar levels.         Medical History:  Patient's medications, allergies, past medical, surgical, social and family histories were reviewed and updated as appropriate.    Pertinent items are noted in HPI  Review of systems reviewed from Patient History Form dated on 3/11/25 and available in the patient's chart under the Media tab.       Vital Signs:  There were no vitals filed for this visit.      Constitutional: In no apparent distress. Normal affect. Alert

## 2025-03-11 NOTE — TELEPHONE ENCOUNTER
General Question     Subject: patient is calling to get and Work Letter send to her email stated she was seen in the office today     [email or web address]      Patient Ramandeep Majano   Contact Number: [phone]

## 2025-03-12 DIAGNOSIS — M17.0 PRIMARY OSTEOARTHRITIS OF BOTH KNEES: ICD-10-CM

## 2025-03-12 RX ORDER — TRAMADOL HYDROCHLORIDE 50 MG/1
50 TABLET ORAL EVERY 6 HOURS PRN
Qty: 60 TABLET | Refills: 0 | Status: SHIPPED | OUTPATIENT
Start: 2025-03-12 | End: 2025-04-11

## 2025-03-12 NOTE — TELEPHONE ENCOUNTER
Called patient and left a message to let her know I am unable to email her personal email but I can put it in her mychart or she can come pick it up.

## 2025-03-12 NOTE — TELEPHONE ENCOUNTER
Refill Request     CONFIRM preferred pharmacy with the patient.    If Mail Order Rx - Pend for 90 day refill.      Last Seen: Last Seen Department: 3/5/2025  Last Seen by PCP: 3/5/2025    Last Written: 02/05/2025 60 tab 0 refills     If no future appointment scheduled:  Review the last OV with PCP and review information for follow-up visit,  Route STAFF MESSAGE with patient name to the  Pool for scheduling with the following information:            -  Timing of next visit           -  Visit type ie Physical, OV, etc           -  Diagnoses/Reason ie. COPD, HTN - Do not use MEDICATION, Follow-up or CHECK UP - Give reason for visit      Next Appointment:   Future Appointments   Date Time Provider Department Center   4/1/2025  3:15 PM Milan Amaya DO [facility]   6/6/2025  3:30 PM Ann Connell MD [facility]       Message sent to  to schedule appt with patient?  NO      Requested Prescriptions     Pending Prescriptions Disp Refills    traMADol (ULTRAM) 50 MG tablet 60 tablet 0     Sig: Take 1 tablet by mouth every 6 hours as needed for Pain for up to 30 days. Max Daily Amount: 200 mg

## 2025-03-18 ENCOUNTER — OFFICE VISIT (OUTPATIENT)
Dept: FAMILY MEDICINE CLINIC | Age: 45
End: 2025-03-18
Payer: COMMERCIAL

## 2025-03-18 VITALS
OXYGEN SATURATION: 98 % | SYSTOLIC BLOOD PRESSURE: 100 MMHG | WEIGHT: 146.2 LBS | HEIGHT: 61 IN | HEART RATE: 102 BPM | DIASTOLIC BLOOD PRESSURE: 60 MMHG | BODY MASS INDEX: 27.6 KG/M2 | TEMPERATURE: 97.2 F

## 2025-03-18 DIAGNOSIS — R19.7 DIARRHEA OF PRESUMED INFECTIOUS ORIGIN: ICD-10-CM

## 2025-03-18 DIAGNOSIS — R11.2 NAUSEA AND VOMITING, UNSPECIFIED VOMITING TYPE: Primary | ICD-10-CM

## 2025-03-18 LAB
INFLUENZA A ANTIBODY: NEGATIVE
INFLUENZA B ANTIBODY: NEGATIVE

## 2025-03-18 PROCEDURE — 87804 INFLUENZA ASSAY W/OPTIC: CPT | Performed by: FAMILY MEDICINE

## 2025-03-18 PROCEDURE — 99213 OFFICE O/P EST LOW 20 MIN: CPT | Performed by: FAMILY MEDICINE

## 2025-03-18 RX ORDER — ONDANSETRON 4 MG/1
4 TABLET, ORALLY DISINTEGRATING ORAL EVERY 8 HOURS PRN
Qty: 30 TABLET | Refills: 3 | Status: SHIPPED | OUTPATIENT
Start: 2025-03-18

## 2025-03-18 ASSESSMENT — ENCOUNTER SYMPTOMS
NAUSEA: 1
DIARRHEA: 1
COUGH: 0
VOMITING: 1

## 2025-03-18 NOTE — PROGRESS NOTES
lungs 4 times daily as needed for Wheezing or Shortness of Breath 54 g 0    TOUJEO MAX SOLOSTAR 300 UNIT/ML SOPN Inject 1 pen into the muscle daily 4 pen 2    Insulin Pen Needle (B-D UF III MINI PEN NEEDLES) 31G X 5 MM MISC Inject 1 each into the skin 4 times daily 100 each 3    Lancets MISC DX: E 11.9-Uses insulin. FSBS 3 times daily-Delica lancets for one touch ultra  each 5    glucose monitoring kit (FREESTYLE) monitoring kit Dx E11.9-One touch ultra II meter-uses tid 1 kit 0    lactulose (CHRONULAC) 10 GM/15ML solution Take 15 mLs by mouth daily as needed (Constipation) (Patient not taking: Reported on 3/18/2025) 237 mL 1    linaCLOtide (LINZESS) 72 MCG CAPS capsule Take 1 capsule by mouth every morning (before breakfast) (Patient not taking: Reported on 3/18/2025) 90 capsule 1    Continuous Glucose Sensor (FREESTYLE GARRY 3 PLUS SENSOR) MISC Change q 2 weks (Patient not taking: Reported on 3/18/2025) 2 each 5    meloxicam (MOBIC) 15 MG tablet TAKE 1 TABLET BY MOUTH EVERY DAY AS NEEDED FOR PAIN (Patient not taking: Reported on 3/18/2025) 30 tablet 0     No current facility-administered medications for this visit.       Assessment:    1. Nausea and vomiting, unspecified vomiting type    2. Diarrhea of presumed infectious origin        Plan:    1. Nausea and vomiting, unspecified vomiting type  Likely viral gastroenteritis, probably norovirus given exposure.  Discussed the self-limiting nature of viral infections.  Increase hydration.  Increase potassium intake.  Try the Zofran for nausea.  Flu is expected was negative  - ondansetron (ZOFRAN-ODT) 4 MG disintegrating tablet; Take 1 tablet by mouth every 8 hours as needed for Nausea or Vomiting  Dispense: 30 tablet; Refill: 3  - POCT Influenza A/B    2. Diarrhea of presumed infectious origin  Likely related to #1 above.  Work excuse created.    Patient to return to clinic if symptoms worsen or fail to improve.

## 2025-04-01 DIAGNOSIS — E11.42 TYPE 2 DIABETES MELLITUS WITH DIABETIC POLYNEUROPATHY, WITH LONG-TERM CURRENT USE OF INSULIN (HCC): ICD-10-CM

## 2025-04-01 DIAGNOSIS — Z79.4 TYPE 2 DIABETES MELLITUS WITH DIABETIC POLYNEUROPATHY, WITH LONG-TERM CURRENT USE OF INSULIN (HCC): ICD-10-CM

## 2025-04-01 DIAGNOSIS — G47.01 INSOMNIA DUE TO MEDICAL CONDITION: ICD-10-CM

## 2025-04-02 RX ORDER — SEMAGLUTIDE 1.34 MG/ML
1 INJECTION, SOLUTION SUBCUTANEOUS
Qty: 3 ML | Refills: 1 | Status: SHIPPED | OUTPATIENT
Start: 2025-04-02

## 2025-04-02 RX ORDER — ZOLPIDEM TARTRATE 10 MG/1
TABLET ORAL
Qty: 30 TABLET | Refills: 0 | Status: SHIPPED | OUTPATIENT
Start: 2025-04-02 | End: 2025-05-02

## 2025-04-02 NOTE — TELEPHONE ENCOUNTER
Refill Request - Controlled Substance    CONFIRM preferred pharmacy with the patient.    If Mail Order Rx - Pend for 90 day refill.        Last Seen Department: 3/18/2025  Last Seen by PCP: 3/5/2025    Last Written: semaglutide 3/5/25 3 ml with no refills     Ambien 3/5/25 30 with no refills     Last UDS: 6/17/24    Med Agreement Signed On: 6/28/24    If no future appointment scheduled:  Review the last OV with PCP and review information for follow-up visit,  Route STAFF MESSAGE with patient name to the  Pool for scheduling with the following information:            -  Timing of next visit           -  Visit type ie Physical, OV, etc           -  Diagnoses/Reason ie. COPD, HTN - Do not use MEDICATION, Follow-up or CHECK UP - Give reason for visit        Next Appointment:   Future Appointments   Date Time Provider Department Center   4/15/2025  2:45 PM Milan Amaya DO [facility]   6/6/2025  3:30 PM Ann Connell MD [facility]       Message sent to  to schedule appt with patient?  NO      Requested Prescriptions     Pending Prescriptions Disp Refills    OZEMPIC, 1 MG/DOSE, 4 MG/3ML SOPN sc injection [Pharmacy Med Name: OZEMPIC 4 MG/3 ML (1 MG/DOSE)]       Sig: INJECT 1 MG INTO THE SKIN EVERY 7 DAYS    zolpidem (AMBIEN) 10 MG tablet [Pharmacy Med Name: ZOLPIDEM TARTRATE 10 MG TABLET] 30 tablet 0     Sig: TAKE 1 TABLET BY MOUTH AT BEDTIME AS NEEDED FOR SLEEP. MUST LAST 1 MONTH

## 2025-04-05 DIAGNOSIS — M17.0 PRIMARY OSTEOARTHRITIS OF BOTH KNEES: ICD-10-CM

## 2025-04-06 NOTE — TELEPHONE ENCOUNTER
Refill Request - Controlled Substance    CONFIRM preferred pharmacy with the patient.    If Mail Order Rx - Pend for 90 day refill.        Last Seen Department: 3/18/2025  Last Seen by PCP: 3/5/2025    Last Written: 3/12/2025 60 tab 0 refills    Last UDS: 6/17/2024    Med Agreement Signed On: 6/28/2024    If no future appointment scheduled:  Review the last OV with PCP and review information for follow-up visit,  Route STAFF MESSAGE with patient name to the  Pool for scheduling with the following information:            -  Timing of next visit           -  Visit type ie Physical, OV, etc           -  Diagnoses/Reason ie. COPD, HTN - Do not use MEDICATION, Follow-up or CHECK UP - Give reason for visit        Next Appointment:   Future Appointments   Date Time Provider Department Center   4/15/2025  2:45 PM Milan Amaya DO [facility]   6/6/2025  3:30 PM Ann Connell MD [facility]       Message sent to  to schedule appt with patient?  NO      Requested Prescriptions     Pending Prescriptions Disp Refills    traMADol (ULTRAM) 50 MG tablet [Pharmacy Med Name: TRAMADOL HCL 50 MG TABLET] 60 tablet 0     Sig: Take 1 tablet by mouth every 6 hours as needed for Pain for up to 30 days. Max Daily Amount: 200 mg

## 2025-04-07 RX ORDER — TRAMADOL HYDROCHLORIDE 50 MG/1
50 TABLET ORAL EVERY 8 HOURS PRN
Qty: 60 TABLET | Refills: 0 | Status: SHIPPED | OUTPATIENT
Start: 2025-04-10 | End: 2025-05-10

## 2025-05-02 DIAGNOSIS — M75.42 ROTATOR CUFF IMPINGEMENT SYNDROME OF LEFT SHOULDER: ICD-10-CM

## 2025-05-02 DIAGNOSIS — M75.02 ADHESIVE CAPSULITIS OF BOTH SHOULDERS: ICD-10-CM

## 2025-05-02 DIAGNOSIS — M75.01 ADHESIVE CAPSULITIS OF BOTH SHOULDERS: ICD-10-CM

## 2025-05-04 DIAGNOSIS — M17.0 PRIMARY OSTEOARTHRITIS OF BOTH KNEES: ICD-10-CM

## 2025-05-04 DIAGNOSIS — M79.2 NEUROPATHIC PAIN: ICD-10-CM

## 2025-05-04 DIAGNOSIS — R20.8 BURNING SENSATION OF FEET: ICD-10-CM

## 2025-05-04 DIAGNOSIS — G47.01 INSOMNIA DUE TO MEDICAL CONDITION: ICD-10-CM

## 2025-05-05 RX ORDER — ZOLPIDEM TARTRATE 10 MG/1
TABLET ORAL
Qty: 30 TABLET | Refills: 0 | Status: SHIPPED | OUTPATIENT
Start: 2025-05-05 | End: 2025-06-05

## 2025-05-05 RX ORDER — TRAMADOL HYDROCHLORIDE 50 MG/1
50 TABLET ORAL 2 TIMES DAILY PRN
Qty: 60 TABLET | Refills: 0 | Status: SHIPPED | OUTPATIENT
Start: 2025-05-05 | End: 2025-06-04

## 2025-05-05 RX ORDER — GABAPENTIN 800 MG/1
TABLET ORAL
Qty: 90 TABLET | Refills: 1 | Status: SHIPPED | OUTPATIENT
Start: 2025-05-05 | End: 2025-07-05

## 2025-05-05 NOTE — TELEPHONE ENCOUNTER
Refill Request - Controlled Substance    CONFIRM preferred pharmacy with the patient.    If Mail Order Rx - Pend for 90 day refill.        Last Seen Department: 3/18/2025  Last Seen by PCP: 3/5/2025    Last Written: gabapentin 2/25/25 90 with 1 refill     Tramadol 4/10/25 60 with no refills     Ambien 4/2/25 30 with no refills     Last UDS: 6/17/24    Med Agreement Signed On: 6/28/24    If no future appointment scheduled:  Review the last OV with PCP and review information for follow-up visit,  Route STAFF MESSAGE with patient name to the  Pool for scheduling with the following information:            -  Timing of next visit           -  Visit type ie Physical, OV, etc           -  Diagnoses/Reason ie. COPD, HTN - Do not use MEDICATION, Follow-up or CHECK UP - Give reason for visit        Next Appointment:   Future Appointments   Date Time Provider Department Center   6/6/2025  3:30 PM Ann Connell MD [facility] DEP       Message sent to  to schedule appt with patient?  NO      Requested Prescriptions     Pending Prescriptions Disp Refills    traMADol (ULTRAM) 50 MG tablet [Pharmacy Med Name: TRAMADOL HCL 50 MG TABLET] 60 tablet 0     Sig: Take 1 tablet by mouth every 8 hours as needed for Pain for up to 30 days. Max Daily Amount: 150 mg    gabapentin (NEURONTIN) 800 MG tablet [Pharmacy Med Name: GABAPENTIN 800 MG TABLET] 90 tablet 1     Sig: TAKE 1 TABLET BY MOUTH 2 TIMES A DAY MAY TAKE 1 TABLET AT BEDTIME IF NEEDED--LIMIT 3 PER DAY.    zolpidem (AMBIEN) 10 MG tablet [Pharmacy Med Name: ZOLPIDEM TARTRATE 10 MG TABLET] 30 tablet 0     Sig: TAKE 1 TABLET BY MOUTH AT BEDTIME AS NEEDED FOR SLEEP. MUST LAST 1 MONTH

## 2025-05-06 RX ORDER — MELOXICAM 15 MG/1
15 TABLET ORAL DAILY PRN
Qty: 30 TABLET | Refills: 0 | Status: SHIPPED | OUTPATIENT
Start: 2025-05-06

## 2025-05-28 ENCOUNTER — OFFICE VISIT (OUTPATIENT)
Dept: FAMILY MEDICINE CLINIC | Age: 45
End: 2025-05-28
Payer: COMMERCIAL

## 2025-05-28 VITALS
HEART RATE: 101 BPM | DIASTOLIC BLOOD PRESSURE: 64 MMHG | TEMPERATURE: 97.7 F | WEIGHT: 141.8 LBS | SYSTOLIC BLOOD PRESSURE: 122 MMHG | BODY MASS INDEX: 26.77 KG/M2 | HEIGHT: 61 IN | OXYGEN SATURATION: 97 %

## 2025-05-28 DIAGNOSIS — L30.4 INTERTRIGO: ICD-10-CM

## 2025-05-28 DIAGNOSIS — E11.42 TYPE 2 DIABETES MELLITUS WITH DIABETIC POLYNEUROPATHY, WITH LONG-TERM CURRENT USE OF INSULIN (HCC): ICD-10-CM

## 2025-05-28 DIAGNOSIS — M75.02 ADHESIVE CAPSULITIS OF BOTH SHOULDERS: ICD-10-CM

## 2025-05-28 DIAGNOSIS — M79.2 NEUROPATHIC PAIN: Primary | ICD-10-CM

## 2025-05-28 DIAGNOSIS — M75.01 ADHESIVE CAPSULITIS OF BOTH SHOULDERS: ICD-10-CM

## 2025-05-28 DIAGNOSIS — M75.02 ADHESIVE CAPSULITIS OF LEFT SHOULDER: ICD-10-CM

## 2025-05-28 DIAGNOSIS — R10.84 GENERALIZED ABDOMINAL PAIN: ICD-10-CM

## 2025-05-28 DIAGNOSIS — E11.9 TYPE 2 DIABETES MELLITUS WITHOUT COMPLICATION, WITH LONG-TERM CURRENT USE OF INSULIN (HCC): ICD-10-CM

## 2025-05-28 DIAGNOSIS — R20.8 BURNING SENSATION OF FEET: ICD-10-CM

## 2025-05-28 DIAGNOSIS — Z79.4 TYPE 2 DIABETES MELLITUS WITH DIABETIC POLYNEUROPATHY, WITH LONG-TERM CURRENT USE OF INSULIN (HCC): ICD-10-CM

## 2025-05-28 DIAGNOSIS — M75.42 ROTATOR CUFF IMPINGEMENT SYNDROME OF LEFT SHOULDER: ICD-10-CM

## 2025-05-28 DIAGNOSIS — R11.2 NAUSEA AND VOMITING, UNSPECIFIED VOMITING TYPE: ICD-10-CM

## 2025-05-28 DIAGNOSIS — G43.709 CHRONIC MIGRAINE WITHOUT AURA WITHOUT STATUS MIGRAINOSUS, NOT INTRACTABLE: ICD-10-CM

## 2025-05-28 DIAGNOSIS — M25.551 RIGHT HIP PAIN: ICD-10-CM

## 2025-05-28 DIAGNOSIS — R51.9 NONINTRACTABLE HEADACHE, UNSPECIFIED CHRONICITY PATTERN, UNSPECIFIED HEADACHE TYPE: ICD-10-CM

## 2025-05-28 DIAGNOSIS — G47.01 INSOMNIA DUE TO MEDICAL CONDITION: ICD-10-CM

## 2025-05-28 DIAGNOSIS — Z79.4 TYPE 2 DIABETES MELLITUS WITHOUT COMPLICATION, WITH LONG-TERM CURRENT USE OF INSULIN (HCC): ICD-10-CM

## 2025-05-28 DIAGNOSIS — M17.0 PRIMARY OSTEOARTHRITIS OF BOTH KNEES: ICD-10-CM

## 2025-05-28 LAB — HBA1C MFR BLD: 6.3 %

## 2025-05-28 PROCEDURE — 3044F HG A1C LEVEL LT 7.0%: CPT

## 2025-05-28 PROCEDURE — 99215 OFFICE O/P EST HI 40 MIN: CPT

## 2025-05-28 PROCEDURE — 83036 HEMOGLOBIN GLYCOSYLATED A1C: CPT

## 2025-05-28 RX ORDER — SEMAGLUTIDE 1.34 MG/ML
1 INJECTION, SOLUTION SUBCUTANEOUS
Qty: 3 ML | Refills: 1 | Status: SHIPPED | OUTPATIENT
Start: 2025-05-28 | End: 2025-05-29 | Stop reason: SDUPTHER

## 2025-05-28 SDOH — ECONOMIC STABILITY: FOOD INSECURITY: WITHIN THE PAST 12 MONTHS, THE FOOD YOU BOUGHT JUST DIDN'T LAST AND YOU DIDN'T HAVE MONEY TO GET MORE.: NEVER TRUE

## 2025-05-28 SDOH — ECONOMIC STABILITY: FOOD INSECURITY: WITHIN THE PAST 12 MONTHS, YOU WORRIED THAT YOUR FOOD WOULD RUN OUT BEFORE YOU GOT MONEY TO BUY MORE.: NEVER TRUE

## 2025-05-28 ASSESSMENT — ENCOUNTER SYMPTOMS
DIARRHEA: 0
CONSTIPATION: 0
CHEST TIGHTNESS: 0
ABDOMINAL PAIN: 0
TROUBLE SWALLOWING: 0
COUGH: 0
NAUSEA: 0
SHORTNESS OF BREATH: 0
SINUS PRESSURE: 0

## 2025-05-28 ASSESSMENT — PATIENT HEALTH QUESTIONNAIRE - PHQ9
SUM OF ALL RESPONSES TO PHQ QUESTIONS 1-9: 0
1. LITTLE INTEREST OR PLEASURE IN DOING THINGS: NOT AT ALL
2. FEELING DOWN, DEPRESSED OR HOPELESS: NOT AT ALL

## 2025-05-28 NOTE — PROGRESS NOTES
Ramandeep DAN Melecio 45 y.o. 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      Diabetes- Toujeo 300- 40 U at night; 40 U in am; uses Dexcom- b.s. doing well; morning sugars running 120-130s. No reported lows; no glucagon use.   Ozempic- increased from 0.5 to 1.0 mg at last visit. Has lost weight- another 10 lbs; less cravings. Tolerating fine without AE.   Last A1C was 7.3 on 3-5-25.   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      Adhesive capsulitis of both shoulders- MRI showed SLAP tear; had frozen shoulder; received steroid injection by ortho (Dr. Amaya) 3/2025  into right shoulder; advised PT but according to note pt could not afford so was given home exercises- injection helped significantly but pain is already starting to return; she is doing the home exercises with the band. Doesn't have appt scheduled but does plan to schedule f/u with ortho.   Right thumb pain- trigger thumb; improved with injection by Dr. Lu; slowly the pain is returning as well but tolerable for now.     Primary osteoarthritis of both knees  -prescribed tramadol by pcp  - takes one in the a.m; takes one in p.m only if in a lot of pain      Neuropathic pain/burning sensation in feet:  -prescribed gabapentin by pcp; helps significantly with the burning; no complaints or

## 2025-05-28 NOTE — TELEPHONE ENCOUNTER
Refill Request     CONFIRM preferred pharmacy with the patient.    If Mail Order Rx - Pend for 90 day refill.      Last Seen: Last Seen Department: 3/18/2025  Last Seen by PCP: 3/5/2025    Last Written: 4/2/25 3 ml with 1 refill     If no future appointment scheduled:  Review the last OV with PCP and review information for follow-up visit,  Route STAFF MESSAGE with patient name to the  Pool for scheduling with the following information:            -  Timing of next visit           -  Visit type ie Physical, OV, etc           -  Diagnoses/Reason ie. COPD, HTN - Do not use MEDICATION, Follow-up or CHECK UP - Give reason for visit      Next Appointment:   Future Appointments   Date Time Provider Department Center   6/6/2025  3:30 PM Ann Connell MD [facility]       Message sent to  to schedule appt with patient?  NO      Requested Prescriptions     Pending Prescriptions Disp Refills    OZEMPIC, 1 MG/DOSE, 4 MG/3ML SOPN sc injection [Pharmacy Med Name: OZEMPIC 4 MG/3 ML (1 MG/DOSE)]  1     Sig: INJECT 1 MG INTO THE SKIN EVERY 7 DAYS

## 2025-05-28 NOTE — PROGRESS NOTES
Have you been to the ER, urgent care clinic since your last visit?  Hospitalized since your last visit?   NO    Have you seen or consulted any other health care providers outside our system since your last visit?   NO    Have you had a mammogram?”   NO    Date of last Mammogram: 3/27/2024       “Have you had a diabetic eye exam?”    YES - Where: [facility] in December 2024 Nurse/CMA to request most recent records if not in the chart     Date of last diabetic eye exam: 7/26/2018     Urine drug screen sent to the lab for testing. 3 white tubes  Labeled and placed in bag with orders.   (ALL URINE CX TO BE PLACED IN THE FRIDGE)

## 2025-05-28 NOTE — ASSESSMENT & PLAN NOTE
Chronic, at goal (stable), continue current treatment plan. UDS to be signed today.     Orders:    Drug Panel-PM-HI Res-UR Interp-A; Future

## 2025-05-29 RX ORDER — ACYCLOVIR 400 MG/1
2 TABLET ORAL AS NEEDED
Qty: 2 EACH | Refills: 5 | Status: SHIPPED | OUTPATIENT
Start: 2025-05-29

## 2025-05-29 RX ORDER — AVOBENZONE, HOMOSALATE, OCTISALATE, OCTOCRYLENE 30; 40; 45; 26 MG/ML; MG/ML; MG/ML; MG/ML
CREAM TOPICAL
Qty: 100 EACH | Refills: 5 | Status: SHIPPED | OUTPATIENT
Start: 2025-05-29

## 2025-05-29 RX ORDER — GABAPENTIN 800 MG/1
TABLET ORAL
Qty: 90 TABLET | Refills: 1 | Status: SHIPPED | OUTPATIENT
Start: 2025-05-30 | End: 2025-07-29

## 2025-05-29 RX ORDER — AZELASTINE 1 MG/ML
1 SPRAY, METERED NASAL 2 TIMES DAILY
Qty: 30 ML | Refills: 1 | Status: SHIPPED | OUTPATIENT
Start: 2025-05-29

## 2025-05-29 RX ORDER — SEMAGLUTIDE 1.34 MG/ML
1 INJECTION, SOLUTION SUBCUTANEOUS
Qty: 3 ML | Refills: 1 | Status: SHIPPED | OUTPATIENT
Start: 2025-05-29

## 2025-05-29 RX ORDER — NYSTATIN AND TRIAMCINOLONE ACETONIDE 100000; 1 [USP'U]/G; MG/G
CREAM TOPICAL
Qty: 30 G | Refills: 0 | Status: SHIPPED | OUTPATIENT
Start: 2025-05-29

## 2025-05-29 RX ORDER — FLURBIPROFEN SODIUM 0.3 MG/ML
1 SOLUTION/ DROPS OPHTHALMIC 4 TIMES DAILY
Qty: 100 EACH | Refills: 3 | Status: SHIPPED | OUTPATIENT
Start: 2025-05-29

## 2025-05-29 RX ORDER — MELOXICAM 15 MG/1
15 TABLET ORAL DAILY PRN
Qty: 30 TABLET | Refills: 0 | Status: SHIPPED | OUTPATIENT
Start: 2025-05-29

## 2025-05-29 RX ORDER — ZOLPIDEM TARTRATE 10 MG/1
TABLET ORAL
Qty: 30 TABLET | Refills: 0 | Status: SHIPPED | OUTPATIENT
Start: 2025-05-30 | End: 2025-06-29

## 2025-05-29 RX ORDER — TOPIRAMATE 25 MG/1
TABLET, FILM COATED ORAL
Qty: 180 TABLET | Refills: 1 | Status: SHIPPED | OUTPATIENT
Start: 2025-05-29

## 2025-05-29 RX ORDER — ONDANSETRON 4 MG/1
4 TABLET, ORALLY DISINTEGRATING ORAL EVERY 8 HOURS PRN
Qty: 30 TABLET | Refills: 3 | Status: SHIPPED | OUTPATIENT
Start: 2025-05-29

## 2025-05-29 RX ORDER — INSULIN GLARGINE 300 U/ML
40 INJECTION, SOLUTION SUBCUTANEOUS DAILY
Qty: 4 ADJUSTABLE DOSE PRE-FILLED PEN SYRINGE | Refills: 1 | Status: SHIPPED | OUTPATIENT
Start: 2025-05-29

## 2025-05-29 RX ORDER — TRAMADOL HYDROCHLORIDE 50 MG/1
50 TABLET ORAL 2 TIMES DAILY PRN
Qty: 60 TABLET | Refills: 0 | Status: SHIPPED | OUTPATIENT
Start: 2025-05-30 | End: 2025-06-29

## 2025-05-31 LAB

## 2025-06-02 RX ORDER — ACYCLOVIR 400 MG/1
2 TABLET ORAL AS NEEDED
Qty: 2 EACH | Refills: 5 | Status: SHIPPED | OUTPATIENT
Start: 2025-06-02

## 2025-06-02 NOTE — TELEPHONE ENCOUNTER
Refill Request     CONFIRM preferred pharmacy with the patient.    If Mail Order Rx - Pend for 90 day refill.      Last Seen: Last Seen Department: 2025  Last Seen by PCP: 2025    Last Written: 25 2 with 5 refills     If no future appointment scheduled:  Review the last OV with PCP and review information for follow-up visit,  Route STAFF MESSAGE with patient name to the  Pool for scheduling with the following information:            -  Timing of next visit           -  Visit type ie Physical, OV, etc           -  Diagnoses/Reason ie. COPD, HTN - Do not use MEDICATION, Follow-up or CHECK UP - Give reason for visit      Next Appointment:   Future Appointments   Date Time Provider Department Center   2025  1:45 PM [facility] 2 [facility]       Message sent to  to schedule appt with patient?  YES Return in 3 months (on 2025) for DM/ Controlled Substance f/u       Requested Prescriptions     Pending Prescriptions Disp Refills    Continuous Glucose Sensor (DEXCOM G7 SENSOR) MISC 2 each 5     Si Implants by Does not apply route as needed (blood sugar)

## 2025-07-07 ENCOUNTER — OFFICE VISIT (OUTPATIENT)
Dept: FAMILY MEDICINE CLINIC | Age: 45
End: 2025-07-07

## 2025-07-07 VITALS
BODY MASS INDEX: 25.37 KG/M2 | SYSTOLIC BLOOD PRESSURE: 118 MMHG | DIASTOLIC BLOOD PRESSURE: 74 MMHG | TEMPERATURE: 97.9 F | HEART RATE: 82 BPM | WEIGHT: 134.4 LBS | OXYGEN SATURATION: 98 % | HEIGHT: 61 IN

## 2025-07-07 DIAGNOSIS — N10 ACUTE PYELONEPHRITIS: Primary | ICD-10-CM

## 2025-07-07 LAB
BILIRUBIN, POC: NORMAL
BLOOD URINE, POC: NORMAL
CLARITY, POC: NORMAL
COLOR, POC: NORMAL
GLUCOSE URINE, POC: NEGATIVE MG/DL
KETONES, POC: NORMAL MG/DL
LEUKOCYTE EST, POC: NORMAL
NITRITE, POC: POSITIVE
PH, POC: 5.5
PROTEIN, POC: 100 MG/DL
SPECIFIC GRAVITY, POC: 1.02
UROBILINOGEN, POC: 0.2 MG/DL

## 2025-07-07 PROCEDURE — 99212 OFFICE O/P EST SF 10 MIN: CPT

## 2025-07-07 PROCEDURE — 81002 URINALYSIS NONAUTO W/O SCOPE: CPT

## 2025-07-07 RX ORDER — TRAMADOL HYDROCHLORIDE 50 MG/1
50 TABLET ORAL EVERY 6 HOURS PRN
COMMUNITY

## 2025-07-07 RX ORDER — ZOLPIDEM TARTRATE 10 MG/1
TABLET ORAL NIGHTLY PRN
COMMUNITY

## 2025-07-07 RX ORDER — HYDROCHLOROTHIAZIDE 12.5 MG/1
CAPSULE ORAL
Qty: 2 EACH | Refills: 5 | Status: SHIPPED | OUTPATIENT
Start: 2025-07-07

## 2025-07-07 RX ORDER — CIPROFLOXACIN 500 MG/1
500 TABLET, FILM COATED ORAL 2 TIMES DAILY
Qty: 14 TABLET | Refills: 0 | Status: SHIPPED | OUTPATIENT
Start: 2025-07-07 | End: 2025-07-14

## 2025-07-07 SDOH — ECONOMIC STABILITY: FOOD INSECURITY: WITHIN THE PAST 12 MONTHS, THE FOOD YOU BOUGHT JUST DIDN'T LAST AND YOU DIDN'T HAVE MONEY TO GET MORE.: NEVER TRUE

## 2025-07-07 SDOH — ECONOMIC STABILITY: FOOD INSECURITY: WITHIN THE PAST 12 MONTHS, YOU WORRIED THAT YOUR FOOD WOULD RUN OUT BEFORE YOU GOT MONEY TO BUY MORE.: NEVER TRUE

## 2025-07-07 ASSESSMENT — PATIENT HEALTH QUESTIONNAIRE - PHQ9
SUM OF ALL RESPONSES TO PHQ QUESTIONS 1-9: 0
1. LITTLE INTEREST OR PLEASURE IN DOING THINGS: NOT AT ALL
SUM OF ALL RESPONSES TO PHQ QUESTIONS 1-9: 0
2. FEELING DOWN, DEPRESSED OR HOPELESS: NOT AT ALL

## 2025-07-07 ASSESSMENT — ENCOUNTER SYMPTOMS
SINUS PRESSURE: 0
NAUSEA: 0
COUGH: 0
DIARRHEA: 0
TROUBLE SWALLOWING: 0
CONSTIPATION: 0
ABDOMINAL PAIN: 0
SHORTNESS OF BREATH: 0
CHEST TIGHTNESS: 0

## 2025-07-07 NOTE — PROGRESS NOTES
Have you been to the ER, urgent care clinic since your last visit?  Hospitalized since your last visit?   NO    Have you seen or consulted any other health care providers outside our system since your last visit?   NO    Have you had a mammogram?”   NO    Date of last Mammogram: 3/27/2024       “Have you had a diabetic eye exam?”    YES - Where: [facility] at [facility]  Nurse/CMA to request most recent records if not in the chart     Date of last diabetic eye exam: 7/26/2018       Urine culture sent to the lab for testing. One gray tube  Labeled and placed in bag with orders.   (ALL URINE CX TO BE PLACED IN THE FRIDGE)

## 2025-07-07 NOTE — PROGRESS NOTES
Ramandeep Majano 45 y.o. female    Chief Complaint   Patient presents with    Other     Hematuria   Felt like she had to have a BM, blood was coming from her vagina and not her rectum   RLQ and flank pain        HPI    This past Saturday (3 days prior) wiped when using the bathroom noted blood. Noticed similar symptoms since then. Has noted urinary frequency; last night started to have right flank pain. No burning with urination. Experiencing more nausea than normal. No noted fevers, chills and body aches.   B.s. running less than 130; no noted lows.     Current Outpatient Medications:     zolpidem (AMBIEN) 10 MG tablet, Take by mouth nightly as needed for Sleep., Disp: , Rfl:     traMADol (ULTRAM) 50 MG tablet, Take 1 tablet by mouth every 6 hours as needed for Pain. Max Daily Amount: 200 mg, Disp: , Rfl:     Continuous Glucose Sensor (DEXCOM G7 SENSOR) MISC, 2 Implants by Does not apply route as needed (blood sugar), Disp: 2 each, Rfl: 5    nystatin-triamcinolone (MYCOLOG II) 971620-6.1 UNIT/GM-% cream, Apply topically 2 times daily., Disp: 30 g, Rfl: 0    azelastine (ASTELIN) 0.1 % nasal spray, 1 spray by Nasal route 2 times daily Use in each nostril as directed, Disp: 30 mL, Rfl: 1    Insulin Pen Needle (B-D UF III MINI PEN NEEDLES) 31G X 5 MM MISC, Inject 1 each into the skin 4 times daily, Disp: 100 each, Rfl: 3    Lancets MISC, DX: E 11.9-Uses insulin. FSBS 3 times daily-Delica lancets for one touch ultra II, Disp: 100 each, Rfl: 5    linaCLOtide (LINZESS) 72 MCG CAPS capsule, Take 1 capsule by mouth every morning (before breakfast), Disp: 90 capsule, Rfl: 1    meloxicam (MOBIC) 15 MG tablet, Take 1 tablet by mouth daily as needed for Pain, Disp: 30 tablet, Rfl: 0    meloxicam (MOBIC) 15 MG tablet, Take 1 tablet by mouth daily as needed for Pain, Disp: 30 tablet, Rfl: 0    ondansetron (ZOFRAN-ODT) 4 MG disintegrating tablet, Take 1 tablet by mouth every 8 hours as needed for Nausea or Vomiting, Disp: 30

## 2025-07-09 LAB — BACTERIA UR CULT: NORMAL

## 2025-07-21 DIAGNOSIS — M17.0 BILATERAL PRIMARY OSTEOARTHRITIS OF KNEE: ICD-10-CM

## 2025-07-21 DIAGNOSIS — G47.01 INSOMNIA DUE TO MEDICAL CONDITION: ICD-10-CM

## 2025-07-21 NOTE — TELEPHONE ENCOUNTER
Refill Request - Controlled Substance    CONFIRM preferred pharmacy with the patient.    If Mail Order Rx - Pend for 90 day refill.        Last Seen Department: 7/7/2025  Last Seen by PCP: 7/7/2025    Last Written: AMBIEN 5/30/2025  TRAMADOL 5/30/2025    Last UDS: 5/28/2025    Med Agreement Signed On: 7/10/2025    If no future appointment scheduled:  Review the last OV with PCP and review information for follow-up visit,  Route STAFF MESSAGE with patient name to the  Pool for scheduling with the following information:            -  Timing of next visit           -  Visit type ie Physical, OV, etc           -  Diagnoses/Reason ie. COPD, HTN - Do not use MEDICATION, Follow-up or CHECK UP - Give reason for visit        Next Appointment:   Future Appointments   Date Time Provider Department Center   8/28/2025  1:30 PM Ann Connell MD [facility] DEP       Message sent to  to schedule appt with patient?  NO      Requested Prescriptions     Pending Prescriptions Disp Refills    traMADol (ULTRAM) 50 MG tablet [Pharmacy Med Name: TRAMADOL HCL 50 MG TABLET] 60 tablet 0     Sig: TAKE 1 TABLET BY MOUTH 2 TIMES DAILY AS NEEDED FOR PAIN FOR UP TO 30 DAYS. MAX DAILY AMOUNT: 100 MG    zolpidem (AMBIEN) 10 MG tablet [Pharmacy Med Name: ZOLPIDEM TARTRATE 10 MG TABLET] 30 tablet 0     Sig: TAKE 1 TABLET BY MOUTH AT BEDTIME AS NEEDED FOR SLEEP. MUST LAST 1 MONTH

## 2025-07-23 RX ORDER — ZOLPIDEM TARTRATE 10 MG/1
TABLET ORAL
Qty: 30 TABLET | Refills: 0 | Status: SHIPPED | OUTPATIENT
Start: 2025-07-23 | End: 2025-08-22

## 2025-07-23 RX ORDER — TRAMADOL HYDROCHLORIDE 50 MG/1
50 TABLET ORAL 2 TIMES DAILY PRN
Qty: 60 TABLET | Refills: 0 | Status: SHIPPED | OUTPATIENT
Start: 2025-07-23 | End: 2025-08-22

## 2025-08-19 ENCOUNTER — OFFICE VISIT (OUTPATIENT)
Dept: FAMILY MEDICINE CLINIC | Age: 45
End: 2025-08-19

## 2025-08-19 VITALS
HEART RATE: 89 BPM | TEMPERATURE: 97.3 F | BODY MASS INDEX: 25.94 KG/M2 | WEIGHT: 137.4 LBS | OXYGEN SATURATION: 99 % | HEIGHT: 61 IN | SYSTOLIC BLOOD PRESSURE: 122 MMHG | DIASTOLIC BLOOD PRESSURE: 78 MMHG

## 2025-08-19 DIAGNOSIS — S00.86XA NONVENOMOUS INSECT BITE OF FACE WITH INFECTION, INITIAL ENCOUNTER: Primary | ICD-10-CM
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
## 2025-08-19 DIAGNOSIS — L08.9 NONVENOMOUS INSECT BITE OF FACE WITH INFECTION, INITIAL ENCOUNTER: Primary | ICD-10-CM

## 2025-08-19 DIAGNOSIS — L03.211 CELLULITIS OF FACE: ICD-10-CM

## 2025-08-19 PROCEDURE — 99213 OFFICE O/P EST LOW 20 MIN: CPT

## 2025-08-19 RX ORDER — CEPHALEXIN 500 MG/1
500 CAPSULE ORAL 3 TIMES DAILY
Qty: 21 CAPSULE | Refills: 0 | Status: SHIPPED | OUTPATIENT
Start: 2025-08-19 | End: 2025-08-26

## 2025-08-19 SDOH — ECONOMIC STABILITY: FOOD INSECURITY: WITHIN THE PAST 12 MONTHS, YOU WORRIED THAT YOUR FOOD WOULD RUN OUT BEFORE YOU GOT MONEY TO BUY MORE.: NEVER TRUE

## 2025-08-19 SDOH — ECONOMIC STABILITY: FOOD INSECURITY: WITHIN THE PAST 12 MONTHS, THE FOOD YOU BOUGHT JUST DIDN'T LAST AND YOU DIDN'T HAVE MONEY TO GET MORE.: NEVER TRUE

## 2025-08-19 ASSESSMENT — ENCOUNTER SYMPTOMS
DIARRHEA: 0
SHORTNESS OF BREATH: 0
COUGH: 0
CONSTIPATION: 0
ABDOMINAL PAIN: 0
CHEST TIGHTNESS: 0
SINUS PRESSURE: 0
TROUBLE SWALLOWING: 0
NAUSEA: 0

## 2025-08-19 ASSESSMENT — PATIENT HEALTH QUESTIONNAIRE - PHQ9
1. LITTLE INTEREST OR PLEASURE IN DOING THINGS: NOT AT ALL
SUM OF ALL RESPONSES TO PHQ QUESTIONS 1-9: 0
SUM OF ALL RESPONSES TO PHQ QUESTIONS 1-9: 0
2. FEELING DOWN, DEPRESSED OR HOPELESS: NOT AT ALL
SUM OF ALL RESPONSES TO PHQ QUESTIONS 1-9: 0
SUM OF ALL RESPONSES TO PHQ QUESTIONS 1-9: 0

## 2025-08-26 DIAGNOSIS — M17.0 BILATERAL PRIMARY OSTEOARTHRITIS OF KNEE: ICD-10-CM

## 2025-08-26 DIAGNOSIS — G47.01 INSOMNIA DUE TO MEDICAL CONDITION: ICD-10-CM

## 2025-08-29 RX ORDER — TRAMADOL HYDROCHLORIDE 50 MG/1
50 TABLET ORAL 2 TIMES DAILY PRN
Qty: 60 TABLET | Refills: 0 | Status: SHIPPED | OUTPATIENT
Start: 2025-08-29 | End: 2025-09-28

## 2025-08-29 RX ORDER — ZOLPIDEM TARTRATE 10 MG/1
TABLET ORAL
Qty: 30 TABLET | Refills: 0 | Status: SHIPPED | OUTPATIENT
Start: 2025-08-29 | End: 2025-09-28

## (undated) DEVICE — ENDOSCOPIC KIT 2 12 FT OP4 DE2 GWN SYR

## (undated) DEVICE — ELECTRODE ECG MONITR FOAM TEAR DROP ADLT RED

## (undated) DEVICE — CANNULA NSL AD 7 FT END-TIDAL CARBON DIOX

## (undated) DEVICE — FORCEP BX STD CAP 240CM RAD JAW 4

## (undated) DEVICE — CONMED SCOPE SAVER BITE BLOCK, 20X27 MM: Brand: SCOPE SAVER